# Patient Record
Sex: MALE | Race: WHITE | NOT HISPANIC OR LATINO | Employment: OTHER | URBAN - METROPOLITAN AREA
[De-identification: names, ages, dates, MRNs, and addresses within clinical notes are randomized per-mention and may not be internally consistent; named-entity substitution may affect disease eponyms.]

---

## 2017-07-06 ENCOUNTER — HOSPITAL ENCOUNTER (OUTPATIENT)
Dept: GENERAL RADIOLOGY | Facility: HOSPITAL | Age: 82
Discharge: HOME | End: 2017-07-06
Admitting: INTERNAL MEDICINE

## 2017-07-06 ENCOUNTER — LAB (OUTPATIENT)
Dept: LAB | Facility: HOSPITAL | Age: 82
End: 2017-07-06

## 2017-07-06 ENCOUNTER — TRANSCRIBE ORDERS (OUTPATIENT)
Dept: ADMINISTRATIVE | Facility: HOSPITAL | Age: 82
End: 2017-07-06

## 2017-07-06 DIAGNOSIS — K59.09 OTHER CONSTIPATION: ICD-10-CM

## 2017-07-06 DIAGNOSIS — K59.09 OTHER CONSTIPATION: Primary | ICD-10-CM

## 2017-07-06 DIAGNOSIS — Z87.19 HISTORY OF ULCERATIVE COLITIS: ICD-10-CM

## 2017-07-06 LAB
ALBUMIN SERPL-MCNC: 4.3 G/DL (ref 3.5–5.2)
ALBUMIN/GLOB SERPL: 1.7 G/DL
ALP SERPL-CCNC: 50 U/L (ref 39–117)
ALT SERPL W P-5'-P-CCNC: 9 U/L (ref 1–41)
ANION GAP SERPL CALCULATED.3IONS-SCNC: 15.4 MMOL/L
AST SERPL-CCNC: 16 U/L (ref 1–40)
BASOPHILS # BLD AUTO: 0.05 10*3/MM3 (ref 0–0.2)
BASOPHILS NFR BLD AUTO: 0.8 % (ref 0–1.5)
BILIRUB SERPL-MCNC: 0.7 MG/DL (ref 0.1–1.2)
BUN BLD-MCNC: 15 MG/DL (ref 8–23)
BUN/CREAT SERPL: 12.3 (ref 7–25)
CALCIUM SPEC-SCNC: 9.1 MG/DL (ref 8.6–10.5)
CHLORIDE SERPL-SCNC: 101 MMOL/L (ref 98–107)
CO2 SERPL-SCNC: 21.6 MMOL/L (ref 22–29)
CREAT BLD-MCNC: 1.22 MG/DL (ref 0.76–1.27)
DEPRECATED RDW RBC AUTO: 44.5 FL (ref 37–54)
EOSINOPHIL # BLD AUTO: 0.14 10*3/MM3 (ref 0–0.7)
EOSINOPHIL NFR BLD AUTO: 2.2 % (ref 0.3–6.2)
ERYTHROCYTE [DISTWIDTH] IN BLOOD BY AUTOMATED COUNT: 12.1 % (ref 11.5–14.5)
GFR SERPL CREATININE-BSD FRML MDRD: 57 ML/MIN/1.73
GLOBULIN UR ELPH-MCNC: 2.5 GM/DL
GLUCOSE BLD-MCNC: 95 MG/DL (ref 65–99)
HCT VFR BLD AUTO: 39 % (ref 40.4–52.2)
HGB BLD-MCNC: 13.3 G/DL (ref 13.7–17.6)
IMM GRANULOCYTES # BLD: 0 10*3/MM3 (ref 0–0.03)
IMM GRANULOCYTES NFR BLD: 0 % (ref 0–0.5)
LYMPHOCYTES # BLD AUTO: 1.85 10*3/MM3 (ref 0.9–4.8)
LYMPHOCYTES NFR BLD AUTO: 29.5 % (ref 19.6–45.3)
MCH RBC QN AUTO: 34.5 PG (ref 27–32.7)
MCHC RBC AUTO-ENTMCNC: 34.1 G/DL (ref 32.6–36.4)
MCV RBC AUTO: 101.3 FL (ref 79.8–96.2)
MONOCYTES # BLD AUTO: 0.69 10*3/MM3 (ref 0.2–1.2)
MONOCYTES NFR BLD AUTO: 11 % (ref 5–12)
NEUTROPHILS # BLD AUTO: 3.55 10*3/MM3 (ref 1.9–8.1)
NEUTROPHILS NFR BLD AUTO: 56.5 % (ref 42.7–76)
PLATELET # BLD AUTO: 197 10*3/MM3 (ref 140–500)
PMV BLD AUTO: 9.7 FL (ref 6–12)
POTASSIUM BLD-SCNC: 4.8 MMOL/L (ref 3.5–5.2)
PROT SERPL-MCNC: 6.8 G/DL (ref 6–8.5)
RBC # BLD AUTO: 3.85 10*6/MM3 (ref 4.6–6)
SODIUM BLD-SCNC: 138 MMOL/L (ref 136–145)
T4 FREE SERPL-MCNC: 1.06 NG/DL (ref 0.93–1.7)
TSH SERPL DL<=0.05 MIU/L-ACNC: 5.9 MIU/ML (ref 0.27–4.2)
WBC NRBC COR # BLD: 6.28 10*3/MM3 (ref 4.5–10.7)

## 2017-07-06 PROCEDURE — 36415 COLL VENOUS BLD VENIPUNCTURE: CPT

## 2017-07-06 PROCEDURE — 85025 COMPLETE CBC W/AUTO DIFF WBC: CPT

## 2017-07-06 PROCEDURE — 84439 ASSAY OF FREE THYROXINE: CPT

## 2017-07-06 PROCEDURE — 80053 COMPREHEN METABOLIC PANEL: CPT

## 2017-07-06 PROCEDURE — 84443 ASSAY THYROID STIM HORMONE: CPT

## 2017-07-06 PROCEDURE — 74022 RADEX COMPL AQT ABD SERIES: CPT

## 2019-06-30 ENCOUNTER — HOSPITAL ENCOUNTER (INPATIENT)
Facility: HOSPITAL | Age: 84
LOS: 4 days | Discharge: NURSING, REHAB OR LONG-TERM CARE | End: 2019-07-04
Attending: EMERGENCY MEDICINE | Admitting: ORTHOPAEDIC SURGERY

## 2019-06-30 ENCOUNTER — ANESTHESIA (OUTPATIENT)
Dept: PERIOP | Facility: HOSPITAL | Age: 84
End: 2019-06-30

## 2019-06-30 ENCOUNTER — APPOINTMENT (OUTPATIENT)
Dept: GENERAL RADIOLOGY | Facility: HOSPITAL | Age: 84
End: 2019-06-30

## 2019-06-30 ENCOUNTER — ANESTHESIA EVENT (OUTPATIENT)
Dept: PERIOP | Facility: HOSPITAL | Age: 84
End: 2019-06-30

## 2019-06-30 DIAGNOSIS — S72.142A CLOSED INTERTROCHANTERIC FRACTURE OF HIP, LEFT, INITIAL ENCOUNTER (HCC): ICD-10-CM

## 2019-06-30 DIAGNOSIS — S72.142A CLOSED COMMINUTED INTERTROCHANTERIC FRACTURE OF LEFT FEMUR, INITIAL ENCOUNTER (HCC): Primary | ICD-10-CM

## 2019-06-30 DIAGNOSIS — R26.2 DIFFICULTY WALKING: ICD-10-CM

## 2019-06-30 LAB
ABO GROUP BLD: NORMAL
ALBUMIN SERPL-MCNC: 3.4 G/DL (ref 3.5–5.2)
ALBUMIN/GLOB SERPL: 1.3 G/DL
ALP SERPL-CCNC: 49 U/L (ref 39–117)
ALT SERPL W P-5'-P-CCNC: 12 U/L (ref 1–41)
ANION GAP SERPL CALCULATED.3IONS-SCNC: 10.6 MMOL/L (ref 5–15)
APTT PPP: 28.6 SECONDS (ref 22.7–35.4)
AST SERPL-CCNC: 16 U/L (ref 1–40)
BASOPHILS # BLD AUTO: 0.03 10*3/MM3 (ref 0–0.2)
BASOPHILS NFR BLD AUTO: 0.4 % (ref 0–1.5)
BILIRUB SERPL-MCNC: 0.9 MG/DL (ref 0.2–1.2)
BLD GP AB SCN SERPL QL: NEGATIVE
BUN BLD-MCNC: 24 MG/DL (ref 8–23)
BUN/CREAT SERPL: 23.1 (ref 7–25)
CALCIUM SPEC-SCNC: 8.7 MG/DL (ref 8.6–10.5)
CHLORIDE SERPL-SCNC: 101 MMOL/L (ref 98–107)
CO2 SERPL-SCNC: 21.4 MMOL/L (ref 22–29)
CREAT BLD-MCNC: 1.04 MG/DL (ref 0.76–1.27)
DEPRECATED RDW RBC AUTO: 42.3 FL (ref 37–54)
EOSINOPHIL # BLD AUTO: 0.13 10*3/MM3 (ref 0–0.4)
EOSINOPHIL NFR BLD AUTO: 1.7 % (ref 0.3–6.2)
ERYTHROCYTE [DISTWIDTH] IN BLOOD BY AUTOMATED COUNT: 11.7 % (ref 12.3–15.4)
GFR SERPL CREATININE-BSD FRML MDRD: 68 ML/MIN/1.73
GLOBULIN UR ELPH-MCNC: 2.6 GM/DL
GLUCOSE BLD-MCNC: 132 MG/DL (ref 65–99)
HCT VFR BLD AUTO: 34.6 % (ref 37.5–51)
HGB BLD-MCNC: 11.9 G/DL (ref 13–17.7)
IMM GRANULOCYTES # BLD AUTO: 0.04 10*3/MM3 (ref 0–0.05)
IMM GRANULOCYTES NFR BLD AUTO: 0.5 % (ref 0–0.5)
INR PPP: 1.16 (ref 0.9–1.1)
LYMPHOCYTES # BLD AUTO: 0.65 10*3/MM3 (ref 0.7–3.1)
LYMPHOCYTES NFR BLD AUTO: 8.7 % (ref 19.6–45.3)
MCH RBC QN AUTO: 33.7 PG (ref 26.6–33)
MCHC RBC AUTO-ENTMCNC: 34.4 G/DL (ref 31.5–35.7)
MCV RBC AUTO: 98 FL (ref 79–97)
MONOCYTES # BLD AUTO: 0.7 10*3/MM3 (ref 0.1–0.9)
MONOCYTES NFR BLD AUTO: 9.4 % (ref 5–12)
NEUTROPHILS # BLD AUTO: 5.9 10*3/MM3 (ref 1.7–7)
NEUTROPHILS NFR BLD AUTO: 79.3 % (ref 42.7–76)
NRBC BLD AUTO-RTO: 0 /100 WBC (ref 0–0.2)
PLATELET # BLD AUTO: 202 10*3/MM3 (ref 140–450)
PMV BLD AUTO: 9.3 FL (ref 6–12)
POTASSIUM BLD-SCNC: 4.1 MMOL/L (ref 3.5–5.2)
PROT SERPL-MCNC: 6 G/DL (ref 6–8.5)
PROTHROMBIN TIME: 14.5 SECONDS (ref 11.7–14.2)
RBC # BLD AUTO: 3.53 10*6/MM3 (ref 4.14–5.8)
RH BLD: POSITIVE
SODIUM BLD-SCNC: 133 MMOL/L (ref 136–145)
T&S EXPIRATION DATE: NORMAL
TROPONIN T SERPL-MCNC: <0.01 NG/ML (ref 0–0.03)
WBC NRBC COR # BLD: 7.45 10*3/MM3 (ref 3.4–10.8)

## 2019-06-30 PROCEDURE — 73502 X-RAY EXAM HIP UNI 2-3 VIEWS: CPT

## 2019-06-30 PROCEDURE — 85730 THROMBOPLASTIN TIME PARTIAL: CPT | Performed by: PHYSICIAN ASSISTANT

## 2019-06-30 PROCEDURE — 25010000002 MORPHINE PER 10 MG: Performed by: HOSPITALIST

## 2019-06-30 PROCEDURE — 93010 ELECTROCARDIOGRAM REPORT: CPT | Performed by: INTERNAL MEDICINE

## 2019-06-30 PROCEDURE — 25010000002 DEXAMETHASONE PER 1 MG: Performed by: NURSE ANESTHETIST, CERTIFIED REGISTERED

## 2019-06-30 PROCEDURE — C1713 ANCHOR/SCREW BN/BN,TIS/BN: HCPCS | Performed by: ORTHOPAEDIC SURGERY

## 2019-06-30 PROCEDURE — 25010000002 MORPHINE PER 10 MG: Performed by: EMERGENCY MEDICINE

## 2019-06-30 PROCEDURE — 86850 RBC ANTIBODY SCREEN: CPT | Performed by: PHYSICIAN ASSISTANT

## 2019-06-30 PROCEDURE — 25010000002 NEOSTIGMINE PER 0.5 MG: Performed by: NURSE ANESTHETIST, CERTIFIED REGISTERED

## 2019-06-30 PROCEDURE — 99221 1ST HOSP IP/OBS SF/LOW 40: CPT | Performed by: INTERNAL MEDICINE

## 2019-06-30 PROCEDURE — 99285 EMERGENCY DEPT VISIT HI MDM: CPT

## 2019-06-30 PROCEDURE — 25010000002 FENTANYL CITRATE (PF) 100 MCG/2ML SOLUTION: Performed by: NURSE ANESTHETIST, CERTIFIED REGISTERED

## 2019-06-30 PROCEDURE — C1769 GUIDE WIRE: HCPCS | Performed by: ORTHOPAEDIC SURGERY

## 2019-06-30 PROCEDURE — 86900 BLOOD TYPING SEROLOGIC ABO: CPT | Performed by: PHYSICIAN ASSISTANT

## 2019-06-30 PROCEDURE — 85610 PROTHROMBIN TIME: CPT | Performed by: PHYSICIAN ASSISTANT

## 2019-06-30 PROCEDURE — 71045 X-RAY EXAM CHEST 1 VIEW: CPT

## 2019-06-30 PROCEDURE — 80053 COMPREHEN METABOLIC PANEL: CPT | Performed by: PHYSICIAN ASSISTANT

## 2019-06-30 PROCEDURE — 0QS704Z REPOSITION LEFT UPPER FEMUR WITH INTERNAL FIXATION DEVICE, OPEN APPROACH: ICD-10-PCS | Performed by: ORTHOPAEDIC SURGERY

## 2019-06-30 PROCEDURE — 25010000002 HYDROMORPHONE PER 4 MG: Performed by: NURSE ANESTHETIST, CERTIFIED REGISTERED

## 2019-06-30 PROCEDURE — 84484 ASSAY OF TROPONIN QUANT: CPT | Performed by: PHYSICIAN ASSISTANT

## 2019-06-30 PROCEDURE — 25010000002 ONDANSETRON PER 1 MG: Performed by: NURSE ANESTHETIST, CERTIFIED REGISTERED

## 2019-06-30 PROCEDURE — 85025 COMPLETE CBC W/AUTO DIFF WBC: CPT | Performed by: PHYSICIAN ASSISTANT

## 2019-06-30 PROCEDURE — 76000 FLUOROSCOPY <1 HR PHYS/QHP: CPT

## 2019-06-30 PROCEDURE — 93005 ELECTROCARDIOGRAM TRACING: CPT | Performed by: PHYSICIAN ASSISTANT

## 2019-06-30 PROCEDURE — 86901 BLOOD TYPING SEROLOGIC RH(D): CPT | Performed by: PHYSICIAN ASSISTANT

## 2019-06-30 PROCEDURE — 25010000002 ONDANSETRON PER 1 MG: Performed by: PHYSICIAN ASSISTANT

## 2019-06-30 PROCEDURE — 25010000002 PROPOFOL 10 MG/ML EMULSION: Performed by: NURSE ANESTHETIST, CERTIFIED REGISTERED

## 2019-06-30 DEVICE — LOCKING SCREW, FULLY THREADED: Type: IMPLANTABLE DEVICE | Site: TROCHANTER | Status: FUNCTIONAL

## 2019-06-30 DEVICE — LONG NAIL KIT R1.5, TI, LEFT
Type: IMPLANTABLE DEVICE | Site: TROCHANTER | Status: FUNCTIONAL
Brand: GAMMA

## 2019-06-30 DEVICE — LAG SCREW, TI
Type: IMPLANTABLE DEVICE | Site: TROCHANTER | Status: FUNCTIONAL
Brand: GAMMA

## 2019-06-30 RX ORDER — GLYCOPYRROLATE 0.2 MG/ML
INJECTION INTRAMUSCULAR; INTRAVENOUS AS NEEDED
Status: DISCONTINUED | OUTPATIENT
Start: 2019-06-30 | End: 2019-06-30 | Stop reason: SURG

## 2019-06-30 RX ORDER — SODIUM CHLORIDE 0.9 % (FLUSH) 0.9 %
1-10 SYRINGE (ML) INJECTION AS NEEDED
Status: DISCONTINUED | OUTPATIENT
Start: 2019-06-30 | End: 2019-06-30 | Stop reason: HOSPADM

## 2019-06-30 RX ORDER — SODIUM CHLORIDE 0.9 % (FLUSH) 0.9 %
3 SYRINGE (ML) INJECTION EVERY 12 HOURS SCHEDULED
Status: DISCONTINUED | OUTPATIENT
Start: 2019-06-30 | End: 2019-07-04 | Stop reason: HOSPADM

## 2019-06-30 RX ORDER — SODIUM CHLORIDE, SODIUM LACTATE, POTASSIUM CHLORIDE, CALCIUM CHLORIDE 600; 310; 30; 20 MG/100ML; MG/100ML; MG/100ML; MG/100ML
9 INJECTION, SOLUTION INTRAVENOUS CONTINUOUS
Status: DISCONTINUED | OUTPATIENT
Start: 2019-06-30 | End: 2019-06-30

## 2019-06-30 RX ORDER — PROPOFOL 10 MG/ML
VIAL (ML) INTRAVENOUS AS NEEDED
Status: DISCONTINUED | OUTPATIENT
Start: 2019-06-30 | End: 2019-06-30 | Stop reason: SURG

## 2019-06-30 RX ORDER — EPHEDRINE SULFATE 50 MG/ML
5 INJECTION, SOLUTION INTRAVENOUS ONCE AS NEEDED
Status: DISCONTINUED | OUTPATIENT
Start: 2019-06-30 | End: 2019-06-30 | Stop reason: HOSPADM

## 2019-06-30 RX ORDER — PROMETHAZINE HYDROCHLORIDE 25 MG/ML
12.5 INJECTION, SOLUTION INTRAMUSCULAR; INTRAVENOUS ONCE AS NEEDED
Status: DISCONTINUED | OUTPATIENT
Start: 2019-06-30 | End: 2019-06-30 | Stop reason: HOSPADM

## 2019-06-30 RX ORDER — BISACODYL 5 MG/1
10 TABLET, DELAYED RELEASE ORAL DAILY PRN
Status: DISCONTINUED | OUTPATIENT
Start: 2019-06-30 | End: 2019-07-04 | Stop reason: HOSPADM

## 2019-06-30 RX ORDER — MORPHINE SULFATE 2 MG/ML
2 INJECTION, SOLUTION INTRAMUSCULAR; INTRAVENOUS EVERY 4 HOURS PRN
Status: DISCONTINUED | OUTPATIENT
Start: 2019-06-30 | End: 2019-07-04 | Stop reason: HOSPADM

## 2019-06-30 RX ORDER — LABETALOL HYDROCHLORIDE 5 MG/ML
5 INJECTION, SOLUTION INTRAVENOUS
Status: DISCONTINUED | OUTPATIENT
Start: 2019-06-30 | End: 2019-06-30 | Stop reason: HOSPADM

## 2019-06-30 RX ORDER — SODIUM CHLORIDE 9 MG/ML
100 INJECTION, SOLUTION INTRAVENOUS CONTINUOUS
Status: DISCONTINUED | OUTPATIENT
Start: 2019-06-30 | End: 2019-07-01

## 2019-06-30 RX ORDER — HYDROCODONE BITARTRATE AND ACETAMINOPHEN 7.5; 325 MG/1; MG/1
1 TABLET ORAL ONCE AS NEEDED
Status: DISCONTINUED | OUTPATIENT
Start: 2019-06-30 | End: 2019-06-30 | Stop reason: HOSPADM

## 2019-06-30 RX ORDER — PROMETHAZINE HYDROCHLORIDE 25 MG/ML
6.25 INJECTION, SOLUTION INTRAMUSCULAR; INTRAVENOUS
Status: DISCONTINUED | OUTPATIENT
Start: 2019-06-30 | End: 2019-06-30 | Stop reason: HOSPADM

## 2019-06-30 RX ORDER — CLINDAMYCIN PHOSPHATE 900 MG/50ML
900 INJECTION INTRAVENOUS EVERY 8 HOURS
Status: COMPLETED | OUTPATIENT
Start: 2019-06-30 | End: 2019-07-01

## 2019-06-30 RX ORDER — MAGNESIUM HYDROXIDE 1200 MG/15ML
LIQUID ORAL AS NEEDED
Status: DISCONTINUED | OUTPATIENT
Start: 2019-06-30 | End: 2019-06-30 | Stop reason: HOSPADM

## 2019-06-30 RX ORDER — PROMETHAZINE HYDROCHLORIDE 25 MG/1
25 SUPPOSITORY RECTAL ONCE AS NEEDED
Status: DISCONTINUED | OUTPATIENT
Start: 2019-06-30 | End: 2019-06-30 | Stop reason: HOSPADM

## 2019-06-30 RX ORDER — DOCUSATE SODIUM 100 MG/1
100 CAPSULE, LIQUID FILLED ORAL 2 TIMES DAILY PRN
Status: DISCONTINUED | OUTPATIENT
Start: 2019-06-30 | End: 2019-07-04 | Stop reason: HOSPADM

## 2019-06-30 RX ORDER — FENTANYL CITRATE 50 UG/ML
INJECTION, SOLUTION INTRAMUSCULAR; INTRAVENOUS
Status: COMPLETED
Start: 2019-06-30 | End: 2019-06-30

## 2019-06-30 RX ORDER — OXYCODONE AND ACETAMINOPHEN 7.5; 325 MG/1; MG/1
1 TABLET ORAL ONCE AS NEEDED
Status: DISCONTINUED | OUTPATIENT
Start: 2019-06-30 | End: 2019-06-30 | Stop reason: HOSPADM

## 2019-06-30 RX ORDER — FAMOTIDINE 10 MG/ML
20 INJECTION, SOLUTION INTRAVENOUS ONCE
Status: COMPLETED | OUTPATIENT
Start: 2019-06-30 | End: 2019-06-30

## 2019-06-30 RX ORDER — HYDRALAZINE HYDROCHLORIDE 20 MG/ML
5 INJECTION INTRAMUSCULAR; INTRAVENOUS
Status: DISCONTINUED | OUTPATIENT
Start: 2019-06-30 | End: 2019-06-30 | Stop reason: HOSPADM

## 2019-06-30 RX ORDER — FENTANYL CITRATE 50 UG/ML
50 INJECTION, SOLUTION INTRAMUSCULAR; INTRAVENOUS
Status: DISCONTINUED | OUTPATIENT
Start: 2019-06-30 | End: 2019-06-30 | Stop reason: HOSPADM

## 2019-06-30 RX ORDER — DIPHENHYDRAMINE HYDROCHLORIDE 50 MG/ML
12.5 INJECTION INTRAMUSCULAR; INTRAVENOUS
Status: DISCONTINUED | OUTPATIENT
Start: 2019-06-30 | End: 2019-06-30 | Stop reason: HOSPADM

## 2019-06-30 RX ORDER — MORPHINE SULFATE 2 MG/ML
2 INJECTION, SOLUTION INTRAMUSCULAR; INTRAVENOUS ONCE
Status: COMPLETED | OUTPATIENT
Start: 2019-06-30 | End: 2019-06-30

## 2019-06-30 RX ORDER — FLUMAZENIL 0.1 MG/ML
0.2 INJECTION INTRAVENOUS AS NEEDED
Status: DISCONTINUED | OUTPATIENT
Start: 2019-06-30 | End: 2019-06-30 | Stop reason: HOSPADM

## 2019-06-30 RX ORDER — SODIUM CHLORIDE 0.9 % (FLUSH) 0.9 %
10 SYRINGE (ML) INJECTION AS NEEDED
Status: DISCONTINUED | OUTPATIENT
Start: 2019-06-30 | End: 2019-07-04 | Stop reason: HOSPADM

## 2019-06-30 RX ORDER — LIDOCAINE HYDROCHLORIDE 20 MG/ML
INJECTION, SOLUTION INFILTRATION; PERINEURAL AS NEEDED
Status: DISCONTINUED | OUTPATIENT
Start: 2019-06-30 | End: 2019-06-30 | Stop reason: SURG

## 2019-06-30 RX ORDER — ACETAMINOPHEN 325 MG/1
650 TABLET ORAL ONCE AS NEEDED
Status: DISCONTINUED | OUTPATIENT
Start: 2019-06-30 | End: 2019-06-30 | Stop reason: HOSPADM

## 2019-06-30 RX ORDER — ROCURONIUM BROMIDE 10 MG/ML
INJECTION, SOLUTION INTRAVENOUS AS NEEDED
Status: DISCONTINUED | OUTPATIENT
Start: 2019-06-30 | End: 2019-06-30 | Stop reason: SURG

## 2019-06-30 RX ORDER — ONDANSETRON 2 MG/ML
4 INJECTION INTRAMUSCULAR; INTRAVENOUS ONCE AS NEEDED
Status: DISCONTINUED | OUTPATIENT
Start: 2019-06-30 | End: 2019-06-30 | Stop reason: HOSPADM

## 2019-06-30 RX ORDER — SODIUM CHLORIDE 0.9 % (FLUSH) 0.9 %
3-10 SYRINGE (ML) INJECTION AS NEEDED
Status: DISCONTINUED | OUTPATIENT
Start: 2019-06-30 | End: 2019-07-04 | Stop reason: HOSPADM

## 2019-06-30 RX ORDER — CLINDAMYCIN PHOSPHATE 900 MG/50ML
900 INJECTION INTRAVENOUS ONCE
Status: COMPLETED | OUTPATIENT
Start: 2019-06-30 | End: 2019-06-30

## 2019-06-30 RX ORDER — PROMETHAZINE HYDROCHLORIDE 25 MG/1
25 TABLET ORAL ONCE AS NEEDED
Status: DISCONTINUED | OUTPATIENT
Start: 2019-06-30 | End: 2019-06-30 | Stop reason: HOSPADM

## 2019-06-30 RX ORDER — FAMOTIDINE 10 MG/ML
20 INJECTION, SOLUTION INTRAVENOUS EVERY 12 HOURS SCHEDULED
Status: DISCONTINUED | OUTPATIENT
Start: 2019-06-30 | End: 2019-07-01

## 2019-06-30 RX ORDER — HYDROMORPHONE HYDROCHLORIDE 1 MG/ML
0.5 INJECTION, SOLUTION INTRAMUSCULAR; INTRAVENOUS; SUBCUTANEOUS
Status: DISCONTINUED | OUTPATIENT
Start: 2019-06-30 | End: 2019-06-30 | Stop reason: HOSPADM

## 2019-06-30 RX ORDER — ONDANSETRON 2 MG/ML
4 INJECTION INTRAMUSCULAR; INTRAVENOUS EVERY 6 HOURS PRN
Status: DISCONTINUED | OUTPATIENT
Start: 2019-06-30 | End: 2019-07-04 | Stop reason: HOSPADM

## 2019-06-30 RX ORDER — HYDROCODONE BITARTRATE AND ACETAMINOPHEN 7.5; 325 MG/1; MG/1
1 TABLET ORAL EVERY 4 HOURS PRN
Status: DISCONTINUED | OUTPATIENT
Start: 2019-06-30 | End: 2019-07-04 | Stop reason: HOSPADM

## 2019-06-30 RX ORDER — LIDOCAINE HYDROCHLORIDE 10 MG/ML
0.5 INJECTION, SOLUTION EPIDURAL; INFILTRATION; INTRACAUDAL; PERINEURAL ONCE AS NEEDED
Status: DISCONTINUED | OUTPATIENT
Start: 2019-06-30 | End: 2019-06-30 | Stop reason: HOSPADM

## 2019-06-30 RX ORDER — ONDANSETRON 2 MG/ML
INJECTION INTRAMUSCULAR; INTRAVENOUS AS NEEDED
Status: DISCONTINUED | OUTPATIENT
Start: 2019-06-30 | End: 2019-06-30 | Stop reason: SURG

## 2019-06-30 RX ORDER — DEXAMETHASONE SODIUM PHOSPHATE 10 MG/ML
INJECTION INTRAMUSCULAR; INTRAVENOUS AS NEEDED
Status: DISCONTINUED | OUTPATIENT
Start: 2019-06-30 | End: 2019-06-30 | Stop reason: SURG

## 2019-06-30 RX ORDER — ATORVASTATIN CALCIUM 10 MG/1
10 TABLET, FILM COATED ORAL DAILY
Status: DISCONTINUED | OUTPATIENT
Start: 2019-06-30 | End: 2019-07-04 | Stop reason: HOSPADM

## 2019-06-30 RX ORDER — ACETAMINOPHEN 325 MG/1
650 TABLET ORAL EVERY 4 HOURS PRN
Status: DISCONTINUED | OUTPATIENT
Start: 2019-06-30 | End: 2019-07-04 | Stop reason: HOSPADM

## 2019-06-30 RX ORDER — FENTANYL CITRATE 50 UG/ML
INJECTION, SOLUTION INTRAMUSCULAR; INTRAVENOUS AS NEEDED
Status: DISCONTINUED | OUTPATIENT
Start: 2019-06-30 | End: 2019-06-30 | Stop reason: SURG

## 2019-06-30 RX ORDER — DIPHENHYDRAMINE HCL 25 MG
25 CAPSULE ORAL
Status: DISCONTINUED | OUTPATIENT
Start: 2019-06-30 | End: 2019-06-30 | Stop reason: HOSPADM

## 2019-06-30 RX ORDER — ONDANSETRON 2 MG/ML
4 INJECTION INTRAMUSCULAR; INTRAVENOUS ONCE
Status: COMPLETED | OUTPATIENT
Start: 2019-06-30 | End: 2019-06-30

## 2019-06-30 RX ORDER — ASPIRIN 81 MG/1
81 TABLET ORAL DAILY
Status: DISCONTINUED | OUTPATIENT
Start: 2019-06-30 | End: 2019-07-04 | Stop reason: HOSPADM

## 2019-06-30 RX ORDER — NALOXONE HCL 0.4 MG/ML
0.2 VIAL (ML) INJECTION AS NEEDED
Status: DISCONTINUED | OUTPATIENT
Start: 2019-06-30 | End: 2019-06-30 | Stop reason: HOSPADM

## 2019-06-30 RX ADMIN — MORPHINE SULFATE 2 MG: 2 INJECTION, SOLUTION INTRAMUSCULAR; INTRAVENOUS at 12:39

## 2019-06-30 RX ADMIN — FAMOTIDINE 20 MG: 10 INJECTION INTRAVENOUS at 12:35

## 2019-06-30 RX ADMIN — GLYCOPYRROLATE 0.3 MG: 0.2 INJECTION INTRAMUSCULAR; INTRAVENOUS at 16:10

## 2019-06-30 RX ADMIN — MORPHINE SULFATE 2 MG: 2 INJECTION, SOLUTION INTRAMUSCULAR; INTRAVENOUS at 18:16

## 2019-06-30 RX ADMIN — PROPOFOL 120 MG: 10 INJECTION, EMULSION INTRAVENOUS at 15:01

## 2019-06-30 RX ADMIN — ROCURONIUM BROMIDE 40 MG: 10 INJECTION INTRAVENOUS at 15:01

## 2019-06-30 RX ADMIN — FENTANYL CITRATE 50 MCG: 50 INJECTION, SOLUTION INTRAMUSCULAR; INTRAVENOUS at 16:53

## 2019-06-30 RX ADMIN — MORPHINE SULFATE 2 MG: 2 INJECTION, SOLUTION INTRAMUSCULAR; INTRAVENOUS at 05:18

## 2019-06-30 RX ADMIN — SODIUM CHLORIDE 500 ML: 9 INJECTION, SOLUTION INTRAVENOUS at 03:29

## 2019-06-30 RX ADMIN — SODIUM CHLORIDE 100 ML/HR: 9 INJECTION, SOLUTION INTRAVENOUS at 18:16

## 2019-06-30 RX ADMIN — FENTANYL CITRATE 50 MCG: 50 INJECTION INTRAMUSCULAR; INTRAVENOUS at 15:28

## 2019-06-30 RX ADMIN — ATORVASTATIN CALCIUM 10 MG: 10 TABLET, FILM COATED ORAL at 22:25

## 2019-06-30 RX ADMIN — CLINDAMYCIN PHOSPHATE 900 MG: 900 INJECTION INTRAVENOUS at 15:05

## 2019-06-30 RX ADMIN — MORPHINE SULFATE 2 MG: 2 INJECTION, SOLUTION INTRAMUSCULAR; INTRAVENOUS at 03:30

## 2019-06-30 RX ADMIN — METOPROLOL TARTRATE 12.5 MG: 25 TABLET ORAL at 22:26

## 2019-06-30 RX ADMIN — HYDROMORPHONE HYDROCHLORIDE 0.5 MG: 1 INJECTION, SOLUTION INTRAMUSCULAR; INTRAVENOUS; SUBCUTANEOUS at 17:05

## 2019-06-30 RX ADMIN — SODIUM CHLORIDE 100 ML/HR: 9 INJECTION, SOLUTION INTRAVENOUS at 05:16

## 2019-06-30 RX ADMIN — MORPHINE SULFATE 2 MG: 2 INJECTION, SOLUTION INTRAMUSCULAR; INTRAVENOUS at 22:26

## 2019-06-30 RX ADMIN — ONDANSETRON 4 MG: 2 INJECTION INTRAMUSCULAR; INTRAVENOUS at 16:00

## 2019-06-30 RX ADMIN — FAMOTIDINE 20 MG: 10 INJECTION INTRAVENOUS at 14:51

## 2019-06-30 RX ADMIN — SODIUM CHLORIDE, POTASSIUM CHLORIDE, SODIUM LACTATE AND CALCIUM CHLORIDE 9 ML/HR: 600; 310; 30; 20 INJECTION, SOLUTION INTRAVENOUS at 14:51

## 2019-06-30 RX ADMIN — SODIUM CHLORIDE, PRESERVATIVE FREE 3 ML: 5 INJECTION INTRAVENOUS at 22:25

## 2019-06-30 RX ADMIN — DEXAMETHASONE SODIUM PHOSPHATE 4 MG: 10 INJECTION INTRAMUSCULAR; INTRAVENOUS at 15:15

## 2019-06-30 RX ADMIN — LIDOCAINE HYDROCHLORIDE 60 MG: 20 INJECTION, SOLUTION INFILTRATION; PERINEURAL at 15:01

## 2019-06-30 RX ADMIN — FENTANYL CITRATE 50 MCG: 50 INJECTION INTRAMUSCULAR; INTRAVENOUS at 15:01

## 2019-06-30 RX ADMIN — ONDANSETRON 4 MG: 2 INJECTION INTRAMUSCULAR; INTRAVENOUS at 03:29

## 2019-06-30 RX ADMIN — ASPIRIN 81 MG: 81 TABLET, COATED ORAL at 22:26

## 2019-06-30 RX ADMIN — NEOSTIGMINE METHYLSULFATE 2 MG: 1 INJECTION INTRAMUSCULAR; INTRAVENOUS; SUBCUTANEOUS at 16:10

## 2019-06-30 RX ADMIN — CLINDAMYCIN PHOSPHATE 900 MG: 900 INJECTION, SOLUTION INTRAVENOUS at 22:27

## 2019-06-30 NOTE — ANESTHESIA PROCEDURE NOTES
Airway  Urgency: elective    Date/Time: 6/30/2019 3:02 PM  Airway not difficult    General Information and Staff    Patient location during procedure: OR  Anesthesiologist: Lobo Quintana MD  CRNA: Yaniv Greco CRNA    Indications and Patient Condition  Indications for airway management: airway protection    Preoxygenated: yes  MILS maintained throughout  Mask difficulty assessment: 2 - vent by mask + OA or adjuvant +/- NMBA    Final Airway Details  Final airway type: endotracheal airway      Successful airway: ETT  Cuffed: yes   Successful intubation technique: direct laryngoscopy  Endotracheal tube insertion site: oral  Blade: Je  Blade size: 4  ETT size (mm): 7.5  Cormack-Lehane Classification: grade I - full view of glottis  Placement verified by: chest auscultation and capnometry   Measured from: gums  ETT to gums (cm): 22  Number of attempts at approach: 1

## 2019-06-30 NOTE — ANESTHESIA PREPROCEDURE EVALUATION
Anesthesia Evaluation     Patient summary reviewed and Nursing notes reviewed   no history of anesthetic complications:  NPO Solid Status: > 6 hours             Airway   Mallampati: III  TM distance: >3 FB  Neck ROM: full  No difficulty expected  Dental    (+) edentulous    Pulmonary - normal exam   (-) not a smoker  Cardiovascular - normal exam    ECG reviewed    (+) CAD,   CAB.      Neuro/Psych  GI/Hepatic/Renal/Endo      Musculoskeletal         ROS comment: Left Femur Fracture  Abdominal    Substance History      OB/GYN          Other                      Anesthesia Plan    ASA 3     general     Anesthetic plan, all risks, benefits, and alternatives have been provided, discussed and informed consent has been obtained with: patient.

## 2019-06-30 NOTE — ANESTHESIA POSTPROCEDURE EVALUATION
Patient: Fritz Flores    Procedure Summary     Date:  06/30/19 Room / Location:  [facility] OR  / [facility] MAIN OR    Anesthesia Start:  1457 Anesthesia Stop:  1620    Procedure:  FEMUR INTRAMEDULLARY NAILING/RODDING (Left Thigh) Diagnosis:       Closed comminuted intertrochanteric fracture of left femur, initial encounter (CMS/[facility])      (Closed comminuted intertrochanteric fracture of left femur, initial encounter (CMS/[facility]) [S72.142A])    Surgeon:  Jaqueline Wagoner MD Provider:  Lobo Quintana MD    Anesthesia Type:  general ASA Status:  3          Anesthesia Type: general  Last vitals  BP   169/80 (06/30/19 1640)   Temp   37.1 °C (98.7 °F) (06/30/19 1618)   Pulse   80 (06/30/19 1640)   Resp   16 (06/30/19 1630)     SpO2   97 % (06/30/19 1640)     Post Anesthesia Care and Evaluation    Patient location during evaluation: PACU  Anesthetic complications: No anesthetic complications

## 2019-07-01 PROBLEM — D62 POSTOPERATIVE ANEMIA DUE TO ACUTE BLOOD LOSS: Status: ACTIVE | Noted: 2019-07-01

## 2019-07-01 PROBLEM — E87.1 HYPONATREMIA: Status: ACTIVE | Noted: 2019-07-01

## 2019-07-01 PROBLEM — K51.90 ULCERATIVE COLITIS (HCC): Chronic | Status: ACTIVE | Noted: 2019-07-01

## 2019-07-01 PROBLEM — I25.10 CORONARY ARTERY DISEASE: Chronic | Status: ACTIVE | Noted: 2019-07-01

## 2019-07-01 PROBLEM — G93.41 METABOLIC ENCEPHALOPATHY: Status: ACTIVE | Noted: 2019-07-01

## 2019-07-01 LAB
ANION GAP SERPL CALCULATED.3IONS-SCNC: 8.5 MMOL/L (ref 5–15)
BUN BLD-MCNC: 18 MG/DL (ref 8–23)
BUN/CREAT SERPL: 23.1 (ref 7–25)
CALCIUM SPEC-SCNC: 8.2 MG/DL (ref 8.6–10.5)
CHLORIDE SERPL-SCNC: 100 MMOL/L (ref 98–107)
CO2 SERPL-SCNC: 23.5 MMOL/L (ref 22–29)
CREAT BLD-MCNC: 0.78 MG/DL (ref 0.76–1.27)
DEPRECATED RDW RBC AUTO: 42.5 FL (ref 37–54)
ERYTHROCYTE [DISTWIDTH] IN BLOOD BY AUTOMATED COUNT: 11.9 % (ref 12.3–15.4)
GFR SERPL CREATININE-BSD FRML MDRD: 95 ML/MIN/1.73
GLUCOSE BLD-MCNC: 126 MG/DL (ref 65–99)
HCT VFR BLD AUTO: 26.9 % (ref 37.5–51)
HGB BLD-MCNC: 9 G/DL (ref 13–17.7)
MAGNESIUM SERPL-MCNC: 1.9 MG/DL (ref 1.6–2.4)
MCH RBC QN AUTO: 33.1 PG (ref 26.6–33)
MCHC RBC AUTO-ENTMCNC: 33.5 G/DL (ref 31.5–35.7)
MCV RBC AUTO: 98.9 FL (ref 79–97)
PLATELET # BLD AUTO: 218 10*3/MM3 (ref 140–450)
PMV BLD AUTO: 9.5 FL (ref 6–12)
POTASSIUM BLD-SCNC: 4.6 MMOL/L (ref 3.5–5.2)
RBC # BLD AUTO: 2.72 10*6/MM3 (ref 4.14–5.8)
SODIUM BLD-SCNC: 132 MMOL/L (ref 136–145)
WBC NRBC COR # BLD: 7.06 10*3/MM3 (ref 3.4–10.8)

## 2019-07-01 PROCEDURE — 80048 BASIC METABOLIC PNL TOTAL CA: CPT | Performed by: INTERNAL MEDICINE

## 2019-07-01 PROCEDURE — 25010000002 ENOXAPARIN PER 10 MG: Performed by: ORTHOPAEDIC SURGERY

## 2019-07-01 PROCEDURE — 83735 ASSAY OF MAGNESIUM: CPT | Performed by: INTERNAL MEDICINE

## 2019-07-01 PROCEDURE — 25010000002 MORPHINE PER 10 MG: Performed by: HOSPITALIST

## 2019-07-01 PROCEDURE — 99231 SBSQ HOSP IP/OBS SF/LOW 25: CPT | Performed by: NURSE PRACTITIONER

## 2019-07-01 PROCEDURE — 97110 THERAPEUTIC EXERCISES: CPT

## 2019-07-01 PROCEDURE — 97162 PT EVAL MOD COMPLEX 30 MIN: CPT

## 2019-07-01 PROCEDURE — 85027 COMPLETE CBC AUTOMATED: CPT | Performed by: INTERNAL MEDICINE

## 2019-07-01 RX ORDER — FAMOTIDINE 20 MG/1
20 TABLET, FILM COATED ORAL
Status: DISCONTINUED | OUTPATIENT
Start: 2019-07-01 | End: 2019-07-04 | Stop reason: HOSPADM

## 2019-07-01 RX ADMIN — SODIUM CHLORIDE 100 ML/HR: 9 INJECTION, SOLUTION INTRAVENOUS at 01:07

## 2019-07-01 RX ADMIN — METOPROLOL TARTRATE 12.5 MG: 25 TABLET ORAL at 08:29

## 2019-07-01 RX ADMIN — CLINDAMYCIN PHOSPHATE 900 MG: 900 INJECTION, SOLUTION INTRAVENOUS at 06:51

## 2019-07-01 RX ADMIN — HYDROCODONE BITARTRATE AND ACETAMINOPHEN 1 TABLET: 7.5; 325 TABLET ORAL at 08:29

## 2019-07-01 RX ADMIN — MORPHINE SULFATE 2 MG: 2 INJECTION, SOLUTION INTRAMUSCULAR; INTRAVENOUS at 03:05

## 2019-07-01 RX ADMIN — FAMOTIDINE 20 MG: 10 INJECTION INTRAVENOUS at 08:45

## 2019-07-01 RX ADMIN — ACETAMINOPHEN 650 MG: 325 TABLET, FILM COATED ORAL at 20:34

## 2019-07-01 RX ADMIN — FAMOTIDINE 20 MG: 20 TABLET, FILM COATED ORAL at 17:04

## 2019-07-01 RX ADMIN — ASPIRIN 81 MG: 81 TABLET, COATED ORAL at 08:29

## 2019-07-01 RX ADMIN — ACETAMINOPHEN 650 MG: 325 TABLET, FILM COATED ORAL at 16:47

## 2019-07-01 RX ADMIN — SODIUM CHLORIDE, PRESERVATIVE FREE 3 ML: 5 INJECTION INTRAVENOUS at 08:46

## 2019-07-01 RX ADMIN — ATORVASTATIN CALCIUM 10 MG: 10 TABLET, FILM COATED ORAL at 08:29

## 2019-07-01 RX ADMIN — ENOXAPARIN SODIUM 40 MG: 40 INJECTION SUBCUTANEOUS at 08:29

## 2019-07-01 RX ADMIN — FAMOTIDINE 20 MG: 10 INJECTION INTRAVENOUS at 01:07

## 2019-07-01 RX ADMIN — METOPROLOL TARTRATE 12.5 MG: 25 TABLET ORAL at 20:34

## 2019-07-01 NOTE — PROGRESS NOTES
"   LOS: 1 day   Patient Care Team:  Rachid العلي Jr., MD as PCP - General (Internal Medicine)    Chief Complaint: hip pain    Subjective     C/o some pain left hip. No BM. Voiding better today--had to be straight cath'd last night. Tolerating diet. Confused this AM. Improving as the day goes on.        Subjective:  Symptoms:  Stable.  No shortness of breath, malaise, cough, chest pain, weakness, headache, chest pressure, anorexia, diarrhea or anxiety.    Diet:  Adequate intake.  No nausea or vomiting.    Activity level: Impaired due to pain.    Pain:  He complains of pain that is mild.  He reports pain is unchanged.  Pain is well controlled.        History taken from: patient chart family RN    Objective     Vital Signs  Temp:  [97.4 °F (36.3 °C)-98.7 °F (37.1 °C)] 97.4 °F (36.3 °C)  Heart Rate:  [64-90] 65  Resp:  [14-18] 16  BP: (116-196)/(62-99) 116/66    Objective:  General Appearance:  Comfortable and in no acute distress.    Vital signs: (most recent): Blood pressure 116/66, pulse 65, temperature 97.4 °F (36.3 °C), temperature source Oral, resp. rate 16, height 182.9 cm (72\"), weight 81.6 kg (179 lb 14.3 oz), SpO2 96 %.  Vital signs are normal.  No fever.    Output: Producing urine and no stool output.    HEENT: Normal HEENT exam.    Lungs:  Normal effort and normal respiratory rate.  Breath sounds clear to auscultation.    Heart: Normal rate.  Regular rhythm.    Abdomen: Abdomen is soft.  Bowel sounds are normal.   There is no abdominal tenderness.     Extremities: There is dependent edema (1-2+ chronic in BLEs).    Pulses: Distal pulses are intact.    Neurological: Patient is alert.  (Oriented to person).    Skin:  Warm and dry.  No rash.             Results Review:     I reviewed the patient's new clinical results.  I reviewed the patient's other test results and agree with the interpretation  I personally viewed and interpreted the patient's EKG/Telemetry data  Discussed with pt, son, RN, and " CCP    Medication Review: reviewed and adjusted    Assessment/Plan       Closed comminuted intertrochanteric fracture of proximal end of left femur (CMS/HCC)    Ulcerative colitis (CMS/HCC)    Coronary artery disease    Hyponatremia    Metabolic encephalopathy    Postoperative anemia due to acute blood loss          Plan:   (POD#1 s/p IM nailing of left femoral neck fracture by Dr. Wagoner  Continue present mgmt  PT eval noted, SNF recommended  Monitor Hgb  Monitor Na+, dc IVFs  No cardiac symptoms  Confusion not unusual postop, improving as day goes on, hopefully just due to anesthesia, opioids, and hospital environment).       Richy Monterroso MD  07/01/19  2:12 PM    Time: 20min

## 2019-07-01 NOTE — PROGRESS NOTES
"[facility]    Patient Name: Fritz Flores  :1933  85 y.o.  LOS: 1  Encounter Provider: LAURA Marshall      Patient Care Team:  Rachid العلي Jr., MD as PCP - General (Internal Medicine)    Chief Complaint:  Follow-up coronary artery disease, hip-fracture    Interval History: Resting in bed. Per nursing staff he is slightly confused and trying to get out of bed, which is why they moved him to a new room.  He has no cardiac complaints.         Objective   Vital Signs  Temp:  [97.4 °F (36.3 °C)-98.4 °F (36.9 °C)] 97.9 °F (36.6 °C)  Heart Rate:  [64-90] 66  Resp:  [14-18] 18  BP: (116-169)/(55-99) 127/55    Intake/Output Summary (Last 24 hours) at 2019 1654  Last data filed at 2019 1300  Gross per 24 hour   Intake 2090 ml   Output 1200 ml   Net 890 ml     Flowsheet Rows      First Filed Value   Admission Height  185.4 cm (73\") Documented at 2019 0229   Admission Weight  84.4 kg (186 lb) Documented at 2019 0300            Physical Exam   Constitutional: He is oriented to person, place, and time. He appears well-developed and well-nourished. No distress.   HENT:   Head: Normocephalic and atraumatic.   Eyes: Pupils are equal, round, and reactive to light.   Neck: No JVD present. No thyromegaly present.   Cardiovascular: Normal rate, regular rhythm, normal heart sounds and intact distal pulses.   No murmur heard.  Pulmonary/Chest: Effort normal and breath sounds normal. No respiratory distress.   Abdominal: Soft. Bowel sounds are normal. He exhibits no distension. There is no splenomegaly or hepatomegaly. There is no tenderness.   Musculoskeletal: He exhibits no edema.        Left hip: He exhibits decreased range of motion and tenderness.   Neurological: He is alert and oriented to person, place, and time.   Skin: Skin is warm and dry. He is not diaphoretic. No erythema.   Psychiatric: He has a normal mood and affect. His behavior is normal. Judgment normal. "       Results Review:    Results from last 7 days   Lab Units 07/01/19  0751   SODIUM mmol/L 132*   POTASSIUM mmol/L 4.6   CHLORIDE mmol/L 100   CO2 mmol/L 23.5   BUN mg/dL 18   CREATININE mg/dL 0.78   GLUCOSE mg/dL 126*   CALCIUM mg/dL 8.2*     Results from last 7 days   Lab Units 06/30/19  0329   TROPONIN T ng/mL <0.010     Results from last 7 days   Lab Units 07/01/19  0751   WBC 10*3/mm3 7.06   HEMOGLOBIN g/dL 9.0*   HEMATOCRIT % 26.9*   PLATELETS 10*3/mm3 218     Results from last 7 days   Lab Units 06/30/19  0329   INR  1.16*   APTT seconds 28.6     Results from last 7 days   Lab Units 07/01/19  0751   MAGNESIUM mg/dL 1.9                   Medication Review:     aspirin 81 mg Oral Daily   atorvastatin 10 mg Oral Daily   enoxaparin 40 mg Subcutaneous Daily   famotidine 20 mg Oral BID AC   metoprolol tartrate 12.5 mg Oral Q12H   sodium chloride 3 mL Intravenous Q12H             Assessment/Plan   1. Coronary artery disease: History of CABG in 1990.  Does not follow regularly with a cardiologist.  No complaints of angina.    2.   Hip fracture: status post surgery.  Tolerated well.     His cardiac status is currently stable.  He should probably get established with a cardiologist to follow with as an outpatient.  We would be happy to follow.  He can call our office to arrange appointment if he wishes.  We will sign off.   Please call if you have any additional questions.         LAURA Marshall  [facility]  07/01/19  4:54 PM

## 2019-07-01 NOTE — THERAPY EVALUATION
Acute Care - Physical Therapy Initial Evaluation  [facility]     Patient Name: Fritz Flores  : 1933  MRN: [id number]  Today's Date: 2019   Onset of Illness/Injury or Date of Surgery: 19     Referring Physician: Rizwana      Admit Date: 2019    Visit Dx:     ICD-10-CM ICD-9-CM   1. Closed comminuted intertrochanteric fracture of left femur, initial encounter (CMS/HCC) S72.142A 820.21   2. Closed intertrochanteric fracture of hip, left, initial encounter (CMS/HCC) S72.142A 820.21   3. Difficulty walking R26.2 719.7     Patient Active Problem List   Diagnosis   • Closed intertrochanteric fracture of hip, left, initial encounter (CMS/[facility])   • Closed comminuted intertrochanteric fracture of proximal end of left femur (CMS/[facility])     Past Medical History:   Diagnosis Date   • Colon polyps    • Coronary artery disease    • Hyperlipemia    • Myocardial infarction (CMS/[facility])    • Ulcerative colitis (CMS/[facility])      Past Surgical History:   Procedure Laterality Date   • CARDIAC SURGERY      CABG X 1   • COLONOSCOPY     • COLONOSCOPY N/A 2016    Procedure: COLONOSCOPY INTO CECUM WITH COLD BIOPSIES;  Surgeon: Aaron Gregg MD;  Location: [facility] ENDOSCOPY;  Service:    • FRACTURE SURGERY      upper leg w/hardware   • HIP SURGERY Left     x 3        PT ASSESSMENT (last 12 hours)      Physical Therapy Evaluation     Row Name 19 0840          PT Evaluation Time/Intention    Subjective Information  complains of;pain  -EE     Document Type  evaluation  -EE     Mode of Treatment  physical therapy;individual therapy  -EE     Patient Effort  good  -EE     Symptoms Noted During/After Treatment  none  -EE     Row Name 19 0840          General Information    Onset of Illness/Injury or Date of Surgery  19  -EE     Referring Physician  Rizwana  -SONA     Patient Observations  alert;cooperative;agree to therapy  -EE     Patient/Family Observations  Pt supine in bed in no acute  distress.  -EE     Prior Level of Function  independent:;all household mobility;community mobility  -EE     Equipment Currently Used at Home  cane, straight  -EE     Pertinent History of Current Functional Problem  L femur intertrochanteric fx s/p IM nailing/rodding  -EE     Existing Precautions/Restrictions  fall  -EE     Barriers to Rehab  none identified  -EE     Row Name 07/01/19 0840          Relationship/Environment    Lives With  spouse  -EE     Row Name 07/01/19 0840          Cognitive Assessment/Interventions    Additional Documentation  Cognitive Assessment/Intervention (Group)  -EE     Row Name 07/01/19 0840          Cognitive Assessment/Intervention- PT/OT    Affect/Mental Status (Cognitive)  confused  -EE     Orientation Status (Cognition)  oriented to;person;place  -EE     Follows Commands (Cognition)  follows one step commands;over 90% accuracy  -EE     Cognitive Function (Cognitive)  safety deficit  -EE     Safety Deficit (Cognitive)  mild deficit  -EE     Personal Safety Interventions  fall prevention program maintained;gait belt;muscle strengthening facilitated;nonskid shoes/slippers when out of bed;supervised activity  -EE     Row Name 07/01/19 0840          Mobility Assessment/Treatment    Extremity Weight-bearing Status  left lower extremity  -EE     Left Lower Extremity (Weight-bearing Status)  weight-bearing as tolerated (WBAT)  -EE     Row Name 07/01/19 0840          Bed Mobility Assessment/Treatment    Bed Mobility Assessment/Treatment  supine-sit  -EE     Supine-Sit [place] (Bed Mobility)  minimum assist (75% patient effort);2 person assist;verbal cues  -EE     Bed Mobility, Safety Issues  decreased use of legs for bridging/pushing  -EE     Assistive Device (Bed Mobility)  bed rails;head of bed elevated  -EE     Row Name 07/01/19 0840          Transfer Assessment/Treatment    Transfer Assessment/Treatment  sit-stand transfer;stand-sit transfer  -EE     Sit-Stand [place]  (Transfers)  minimum assist (75% patient effort);2 person assist;verbal cues  -EE     Stand-Sit [place] (Transfers)  minimum assist (75% patient effort);2 person assist;verbal cues  -EE     Row Name 07/01/19 0840          Sit-Stand Transfer    Assistive Device (Sit-Stand Transfers)  walker, front-wheeled  -EE     Row Name 07/01/19 0840          Gait/Stairs Assessment/Training    [place] Level (Gait)  minimum assist (75% patient effort);2 person assist;verbal cues  -EE     Assistive Device (Gait)  walker, front-wheeled  -EE     Distance in Feet (Gait)  3  -EE     Pattern (Gait)  step-to  -EE     Deviations/Abnormal Patterns (Gait)  left sided deviations;antalgic;courtney decreased;stride length decreased  -EE     Bilateral Gait Deviations  forward flexed posture  -EE     Left Sided Gait Deviations  weight shift ability decreased  -EE     Comment (Gait/Stairs)  verbal cues for sequencing  with walker  -EE     Row Name 07/01/19 0840          General ROM    GENERAL ROM COMMENTS  L hip limited  by pain; all other ROM grossly WFL  -EE     Row Name 07/01/19 0840          MMT (Manual Muscle Testing)    General MMT Comments  R LE grossly WFL; L ankle WFL, L knee/hip somewhat limited by pain,  grossly 3- to 3/5  -EE     Row Name 07/01/19 0840          Motor Assessment/Intervention    Additional Documentation  Balance (Group);Therapeutic Exercise Interventions (Group)  -EE     Row Name 07/01/19 0840          Therapeutic Exercise    Comment (Therapeutic Exercise)  10 reps L hip ORIF protocol, assist for hip abd  -EE     Row Name 07/01/19 0840          Balance    Balance  static sitting balance;static standing balance  -EE     Row Name 07/01/19 0840          Static Sitting Balance    Level of [place] (Unsupported Sitting, Static Balance)  standby assist  -EE     Sitting Position (Unsupported Sitting, Static Balance)  sitting on edge of bed  -EE     Time Able to Maintain Position (Unsupported Sitting, Static  Balance)  3 to 4 minutes  -EE     Row Name 07/01/19 0840          Static Standing Balance    Level of [place] (Supported Standing, Static Balance)  minimal assist, 75% patient effort;contact guard assist;2 person assist  -EE     Time Able to Maintain Position (Supported Standing, Static Balance)  1 to 2 minutes  -EE     Assistive Device Utilized (Supported Standing, Static Balance)  walker, rolling  -EE     Row Name 07/01/19 0840          Pain Assessment    Additional Documentation  Pain Scale: Numbers Pre/Post-Treatment (Group)  -EE     Row Name 07/01/19 0840          Pain Scale: Numbers Pre/Post-Treatment    Pain Scale: Numbers, Post-Treatment  7/10  -EE     Pain Location - Side  Left  -EE     Pain Location  hip  -EE     Pain Intervention(s)  Repositioned;Cold applied;Medication (See MAR);Elevated  -EE     Row Name             Wound 06/30/19 1540 Left leg incision    Wound - Properties Group Date first assessed: 06/30/19  -JM Time first assessed: 1540  -JM Side: Left  -JM Location: leg  -JM Type: incision  -JM    Row Name 07/01/19 0840          Plan of Care Review    Plan of Care Reviewed With  patient  -EE     Row Name 07/01/19 0840          Physical Therapy Clinical Impression    Patient/Family Goals Statement (PT Clinical Impression)  Decrease pain  -EE     Criteria for Skilled Interventions Met (PT Clinical Impression)  yes;treatment indicated  -EE     Pathology/Pathophysiology Noted (Describe Specifically for Each System)  musculoskeletal  -EE     Impairments Found (describe specific impairments)  gait, locomotion, and balance;ROM;aerobic capacity/endurance  -EE     Rehab Potential (PT Clinical Summary)  good, to achieve stated therapy goals  -EE     Row Name 07/01/19 0840          Physical Therapy Goals    Bed Mobility Goal Selection (PT)  bed mobility, PT goal 1  -EE     Transfer Goal Selection (PT)  transfer, PT goal 1  -EE     Gait Training Goal Selection (PT)  gait training, PT goal 1  -EE     Row  Name 07/01/19 0840          Bed Mobility Goal 1 (PT)    Activity/Assistive Device (Bed Mobility Goal 1, PT)  bed mobility activities, all  -EE     [place] Level/Cues Needed (Bed Mobility Goal 1, PT)  contact guard assist  -EE     Time Frame (Bed Mobility Goal 1, PT)  1 week  -EE     Progress/Outcomes (Bed Mobility Goal 1, PT)  goal ongoing  -EE     Row Name 07/01/19 0840          Transfer Goal 1 (PT)    Activity/Assistive Device (Transfer Goal 1, PT)  sit-to-stand/stand-to-sit  -EE     [place] Level/Cues Needed (Transfer Goal 1, PT)  contact guard assist  -EE     Time Frame (Transfer Goal 1, PT)  1 week  -EE     Progress/Outcome (Transfer Goal 1, PT)  goal ongoing  -EE     Row Name 07/01/19 0840          Gait Training Goal 1 (PT)    Activity/Assistive Device (Gait Training Goal 1, PT)  gait (walking locomotion);walker, rolling  -EE     [place] Level (Gait Training Goal 1, PT)  minimum assist (75% or more patient effort)  -EE     Distance (Gait Goal 1, PT)  30  -EE     Time Frame (Gait Training Goal 1, PT)  1 week  -EE     Progress/Outcome (Gait Training Goal 1, PT)  goal ongoing  -EE     Row Name 07/01/19 0840          Patient Education Goal (PT)    Activity (Patient Education Goal, PT)  L hip HEP  -EE     [place]/Cues/Accuracy (Memory Goal 2, PT)  demonstrates adequately;verbalizes understanding  -EE     Time Frame (Patient Education Goal, PT)  1 week  -EE     Progress/Outcome (Patient Education Goal, PT)  goal ongoing  -EE     Row Name 07/01/19 0840          Positioning and Restraints    Pre-Treatment Position  in bed  -EE     Post Treatment Position  chair  -EE     In Chair  reclined;call light within reach;encouraged to call for assist;exit alarm on;notified nsg;with family/caregiver;legs elevated  -EE     Row Name 07/01/19 0840          Living Environment    Home Accessibility  wheelchair accessible  -EE       User Key  (r) = Recorded By, (t) = Taken By, (c) = Cosigned By    Initials Name  Provider Type    EE Sugar Marcus PT Physical Therapist    Anthony Valdovinos, RN Registered Nurse        Physical Therapy Education     Title: PT OT SLP Therapies (Done)     Topic: Physical Therapy (Done)     Point: Mobility training (Done)     Learning Progress Summary           Patient Acceptance, E, VU,NR by  at 7/1/2019  9:13 AM                   Point: Home exercise program (Done)     Learning Progress Summary           Patient Acceptance, E, VU,NR by  at 7/1/2019  9:13 AM                   Point: Body mechanics (Done)     Learning Progress Summary           Patient Acceptance, E, VU,NR by  at 7/1/2019  9:13 AM                   Point: Precautions (Done)     Learning Progress Summary           Patient Acceptance, E, VU,NR by  at 7/1/2019  9:13 AM                               User Key     Initials Effective Dates Name Provider Type Discipline     04/03/18 -  Sugar Marcus PT Physical Therapist PT              PT Recommendation and Plan  Anticipated Discharge Disposition (PT): skilled nursing facility  Planned Therapy Interventions (PT Eval): bed mobility training, balance training, gait training, home exercise program, patient/family education, ROM (range of motion), strengthening, transfer training  Therapy Frequency (PT Clinical Impression): daily  Outcome Summary/Treatment Plan (PT)  Anticipated Discharge Disposition (PT): skilled nursing facility  Plan of Care Reviewed With: patient  Outcome Summary: Pt is an 84 yo male who presents from home with L femur fx s/p IM nailing. Upon exam, pt demonstrates post op pain, impaired L hip ROM, generalized weakness, impaired balance, and decreased endurance. Pt was independent with cane prior to admission, currently requiring min A x2 for safety. Pt would benefit from continued PT to address impairments and increase independence with functional mobility.   Outcome Measures     Row Name 07/01/19 0900             How much help from another person do you  currently need...    Turning from your back to your side while in flat bed without using bedrails?  3  -EE      Moving from lying on back to sitting on the side of a flat bed without bedrails?  2  -EE      Moving to and from a bed to a chair (including a wheelchair)?  2  -EE      Standing up from a chair using your arms (e.g., wheelchair, bedside chair)?  2  -EE      Climbing 3-5 steps with a railing?  1  -EE      To walk in hospital room?  2  -EE      AM-PAC 6 Clicks Score  12  -EE         Functional Assessment    Outcome Measure Options  AM-PAC 6 Clicks Basic Mobility (PT)  -EE        User Key  (r) = Recorded By, (t) = Taken By, (c) = Cosigned By    Initials Name Provider Type    Sugar Bagley PT Physical Therapist         Time Calculation:   PT Charges     Row Name 07/01/19 0916             Time Calculation    Start Time  0840  -EE      Stop Time  0902  -EE      Time Calculation (min)  22 min  -EE      PT Received On  07/01/19  -EE      PT - Next Appointment  07/02/19  -EE      PT Goal Re-Cert Due Date  07/08/19  -EE         Time Calculation- PT    Total Timed Code Minutes- PT  10 minute(s)  -EE        User Key  (r) = Recorded By, (t) = Taken By, (c) = Cosigned By    Initials Name Provider Type    Sugar Bagley PT Physical Therapist        Therapy Charges for Today     Code Description Service Date Service Provider Modifiers Qty    30008533803 HC PT EVAL MOD COMPLEXITY 2 7/1/2019 Sugar Marcus, PT GP 1    84638172299 HC PT THER PROC EA 15 MIN 7/1/2019 Sugar Marcus, PT GP 1    37664620480 HC PT THER SUPP EA 15 MIN 7/1/2019 Sugar Marcus, PT GP 1          PT G-Codes  Outcome Measure Options: AM-PAC 6 Clicks Basic Mobility (PT)  AM-PAC 6 Clicks Score: 12      Sugar Marcus PT  7/1/2019

## 2019-07-01 NOTE — PLAN OF CARE
Problem: Patient Care Overview  Goal: Plan of Care Review   07/01/19 0913   Coping/Psychosocial   Plan of Care Reviewed With patient   OTHER   Outcome Summary Pt is an 86 yo male who presents from home with L femur fx s/p IM nailing. Upon exam, pt demonstrates post op pain, impaired L hip ROM, generalized weakness, impaired balance, and decreased endurance. Pt was independent with cane prior to admission, currently requiring min A x2 for safety. Pt would benefit from continued PT to address impairments and increase independence with functional mobility.

## 2019-07-01 NOTE — PLAN OF CARE
Problem: Patient Care Overview  Goal: Plan of Care Review   07/01/19 0621   Coping/Psychosocial   Plan of Care Reviewed With patient   OTHER   Outcome Summary Vital signs stable. Not in any distress. Medicated for pain. Unable to urinate at first,bladder scan and straight cath done as charted. Dressing dry and intact . Turned. Patient woke p at 0400 more alert and oriented except the time and able to urinate l via urinal. Fall precautions enforced. Monitored closely.      Goal: Discharge Needs Assessment  Outcome: Ongoing (interventions implemented as appropriate)      Problem: Fall Risk (Adult)  Goal: Absence of Fall  Outcome: Ongoing (interventions implemented as appropriate)      Problem: Skin Injury Risk (Adult)  Goal: Skin Health and Integrity  Outcome: Ongoing (interventions implemented as appropriate)      Problem: Fracture Orthopaedic (Adult)  Goal: Signs and Symptoms of Listed Potential Problems Will be Absent, Minimized or Managed (Fracture Orthopaedic)  Outcome: Ongoing (interventions implemented as appropriate)

## 2019-07-02 LAB
ALBUMIN SERPL-MCNC: 3.3 G/DL (ref 3.5–5.2)
ALBUMIN/GLOB SERPL: 1.3 G/DL
ALP SERPL-CCNC: 47 U/L (ref 39–117)
ALT SERPL W P-5'-P-CCNC: 11 U/L (ref 1–41)
ANION GAP SERPL CALCULATED.3IONS-SCNC: 8.6 MMOL/L (ref 5–15)
AST SERPL-CCNC: 20 U/L (ref 1–40)
BASOPHILS # BLD AUTO: 0.04 10*3/MM3 (ref 0–0.2)
BASOPHILS NFR BLD AUTO: 0.4 % (ref 0–1.5)
BILIRUB SERPL-MCNC: 0.8 MG/DL (ref 0.2–1.2)
BUN BLD-MCNC: 21 MG/DL (ref 8–23)
BUN/CREAT SERPL: 23.6 (ref 7–25)
CALCIUM SPEC-SCNC: 8.7 MG/DL (ref 8.6–10.5)
CHLORIDE SERPL-SCNC: 98 MMOL/L (ref 98–107)
CO2 SERPL-SCNC: 24.4 MMOL/L (ref 22–29)
CREAT BLD-MCNC: 0.89 MG/DL (ref 0.76–1.27)
DEPRECATED RDW RBC AUTO: 43.6 FL (ref 37–54)
EOSINOPHIL # BLD AUTO: 0.45 10*3/MM3 (ref 0–0.4)
EOSINOPHIL NFR BLD AUTO: 4.5 % (ref 0.3–6.2)
ERYTHROCYTE [DISTWIDTH] IN BLOOD BY AUTOMATED COUNT: 11.9 % (ref 12.3–15.4)
GFR SERPL CREATININE-BSD FRML MDRD: 81 ML/MIN/1.73
GLOBULIN UR ELPH-MCNC: 2.6 GM/DL
GLUCOSE BLD-MCNC: 103 MG/DL (ref 65–99)
HCT VFR BLD AUTO: 27.3 % (ref 37.5–51)
HGB BLD-MCNC: 9 G/DL (ref 13–17.7)
IMM GRANULOCYTES # BLD AUTO: 0.05 10*3/MM3 (ref 0–0.05)
IMM GRANULOCYTES NFR BLD AUTO: 0.5 % (ref 0–0.5)
LYMPHOCYTES # BLD AUTO: 1.04 10*3/MM3 (ref 0.7–3.1)
LYMPHOCYTES NFR BLD AUTO: 10.3 % (ref 19.6–45.3)
MCH RBC QN AUTO: 33.1 PG (ref 26.6–33)
MCHC RBC AUTO-ENTMCNC: 33 G/DL (ref 31.5–35.7)
MCV RBC AUTO: 100.4 FL (ref 79–97)
MONOCYTES # BLD AUTO: 0.83 10*3/MM3 (ref 0.1–0.9)
MONOCYTES NFR BLD AUTO: 8.2 % (ref 5–12)
NEUTROPHILS # BLD AUTO: 7.7 10*3/MM3 (ref 1.7–7)
NEUTROPHILS NFR BLD AUTO: 76.1 % (ref 42.7–76)
NRBC BLD AUTO-RTO: 0 /100 WBC (ref 0–0.2)
PLATELET # BLD AUTO: 249 10*3/MM3 (ref 140–450)
PMV BLD AUTO: 9.7 FL (ref 6–12)
POTASSIUM BLD-SCNC: 4.1 MMOL/L (ref 3.5–5.2)
PROT SERPL-MCNC: 5.9 G/DL (ref 6–8.5)
RBC # BLD AUTO: 2.72 10*6/MM3 (ref 4.14–5.8)
SODIUM BLD-SCNC: 131 MMOL/L (ref 136–145)
WBC NRBC COR # BLD: 10.11 10*3/MM3 (ref 3.4–10.8)

## 2019-07-02 PROCEDURE — 25010000002 ONDANSETRON PER 1 MG: Performed by: HOSPITALIST

## 2019-07-02 PROCEDURE — 97530 THERAPEUTIC ACTIVITIES: CPT

## 2019-07-02 PROCEDURE — 25010000002 ENOXAPARIN PER 10 MG: Performed by: ORTHOPAEDIC SURGERY

## 2019-07-02 PROCEDURE — 85025 COMPLETE CBC W/AUTO DIFF WBC: CPT | Performed by: HOSPITALIST

## 2019-07-02 PROCEDURE — 80053 COMPREHEN METABOLIC PANEL: CPT | Performed by: HOSPITALIST

## 2019-07-02 RX ORDER — SENNA AND DOCUSATE SODIUM 50; 8.6 MG/1; MG/1
2 TABLET, FILM COATED ORAL NIGHTLY
Status: DISCONTINUED | OUTPATIENT
Start: 2019-07-02 | End: 2019-07-04 | Stop reason: HOSPADM

## 2019-07-02 RX ADMIN — SENNOSIDES,DOCUSATE SODIUM 2 TABLET: 50; 8.6 TABLET, FILM COATED ORAL at 21:33

## 2019-07-02 RX ADMIN — SODIUM CHLORIDE, PRESERVATIVE FREE 3 ML: 5 INJECTION INTRAVENOUS at 08:14

## 2019-07-02 RX ADMIN — HYDROCODONE BITARTRATE AND ACETAMINOPHEN 1 TABLET: 7.5; 325 TABLET ORAL at 16:17

## 2019-07-02 RX ADMIN — FAMOTIDINE 20 MG: 20 TABLET, FILM COATED ORAL at 08:14

## 2019-07-02 RX ADMIN — METOPROLOL TARTRATE 12.5 MG: 25 TABLET ORAL at 21:33

## 2019-07-02 RX ADMIN — ENOXAPARIN SODIUM 40 MG: 40 INJECTION SUBCUTANEOUS at 08:14

## 2019-07-02 RX ADMIN — ATORVASTATIN CALCIUM 10 MG: 10 TABLET, FILM COATED ORAL at 08:13

## 2019-07-02 RX ADMIN — METOPROLOL TARTRATE 12.5 MG: 25 TABLET ORAL at 08:13

## 2019-07-02 RX ADMIN — FAMOTIDINE 20 MG: 20 TABLET, FILM COATED ORAL at 16:46

## 2019-07-02 RX ADMIN — SODIUM CHLORIDE, PRESERVATIVE FREE 3 ML: 5 INJECTION INTRAVENOUS at 19:29

## 2019-07-02 RX ADMIN — POLYETHYLENE GLYCOL 3350 17 G: 17 POWDER, FOR SOLUTION ORAL at 14:53

## 2019-07-02 RX ADMIN — SODIUM CHLORIDE, PRESERVATIVE FREE 3 ML: 5 INJECTION INTRAVENOUS at 19:28

## 2019-07-02 RX ADMIN — ASPIRIN 81 MG: 81 TABLET, COATED ORAL at 08:14

## 2019-07-02 RX ADMIN — ONDANSETRON HYDROCHLORIDE 4 MG: 2 SOLUTION INTRAMUSCULAR; INTRAVENOUS at 05:51

## 2019-07-02 NOTE — PLAN OF CARE
Problem: Patient Care Overview  Goal: Plan of Care Review  Outcome: Ongoing (interventions implemented as appropriate)   07/02/19 0628   Coping/Psychosocial   Plan of Care Reviewed With patient;daughter   OTHER   Outcome Summary Pt rested well. Tylenol given 1x for pain, good relief. Confusion improved. Zofran given 1x for intermit nausea. VSS< no s/s acute distress, will continue to monitor     Goal: Individualization and Mutuality  Outcome: Ongoing (interventions implemented as appropriate)      Problem: Fall Risk (Adult)  Goal: Absence of Fall  Outcome: Ongoing (interventions implemented as appropriate)      Problem: Skin Injury Risk (Adult)  Goal: Identify Related Risk Factors and Signs and Symptoms  Outcome: Outcome(s) achieved Date Met: 07/02/19    Goal: Skin Health and Integrity  Outcome: Ongoing (interventions implemented as appropriate)      Problem: Fracture Orthopaedic (Adult)  Goal: Signs and Symptoms of Listed Potential Problems Will be Absent, Minimized or Managed (Fracture Orthopaedic)  Outcome: Ongoing (interventions implemented as appropriate)

## 2019-07-02 NOTE — PLAN OF CARE
Problem: Patient Care Overview  Goal: Plan of Care Review  Outcome: Ongoing (interventions implemented as appropriate)   07/02/19 8929   Coping/Psychosocial   Plan of Care Reviewed With patient   Plan of Care Review   Progress no change   OTHER   Outcome Summary Patient treated pain with prn medications. Started on bowel regimen. VSS on RA. No s/s of acute distress. Will continue to monitor.

## 2019-07-02 NOTE — DISCHARGE PLACEMENT REQUEST
"Fritz Flores (85 y.o. Male)     Date of Birth Social Security Number Address Home Phone MRN    12/21/1933  [address] [place] [address] [phone] [id number]    Evangel[employer] Marital Status          Orthodoxy        Admission Date Admission Type Admitting Provider Attending Provider Department, Room/Bed    6/30/19 Emergency Luis E Rivera MD Benton, John B, MD [address], [address]    Discharge Date Discharge Disposition Discharge Destination                       Attending Provider:  Richy Monterroso MD    Allergies:  Penicillins    Isolation:  None   Infection:  None   Code Status:  CPR    Ht:  182.9 cm (72\")   Wt:  81.6 kg (179 lb 14.3 oz)    Admission Cmt:  None   Principal Problem:  Closed comminuted intertrochanteric fracture of proximal end of left femur (CMS/[facility]) [S72.142A] More...                 Active Insurance as of 6/30/2019     Primary Coverage     Payor Plan Insurance Group Employer/Plan Group    MEDICARE MEDICARE A & B      Payor Plan Address Payor Plan Phone Number Payor Plan Fax Number Effective Dates    [address] [phone]  12/1/1998 - None Entered    [facility] [id number]       Subscriber Name Subscriber Birth Date Member ID       FRITZ FLORES 12/21/1933 [id number]           Secondary Coverage     Payor Plan Insurance Group Employer/Plan Group    [employer]HEM BLUE CROSS ANTHEM BLUE CROSS BLUE SHIELD PPO [id number]     Payor Plan Address Payor Plan Phone Number Payor Plan Fax Number Effective Dates    [address] [phone]  7/1/2005 - None Entered    [facility] [id number]       Subscriber Name Subscriber Birth Date Member ID       FRITZ FLORES 12/21/1933 [id number]                 Emergency Contacts      (Rel.) Home Phone Work Phone Mobile Phone    MarkMaria D mckeon (Spouse) [phone] -- [phone]              "

## 2019-07-02 NOTE — PROGRESS NOTES
Continued Stay Note  [facility]     Patient Name: Fritz Flores  MRN: [id number]  Today's Date: 7/2/2019    Admit Date: 6/30/2019    Discharge Plan     Row Name 07/02/19 1432       Plan    Plan  [facility], referral pending     Plan Comments  Spoke with Dr Monterroso, pt needs to go to [facility]. Incoming call recieved from [facility] does not have any beds available. Spoke with pt and his son at bedside. Pts son states pt will go to [facility] at ID. He requested I speak to his sister Deidre about [facility] choices. Spoke with Pat and updated her, [facility] JCrogerstsophiaRN/CCP         Discharge Codes    No documentation.             Migdalia Cabral RN

## 2019-07-02 NOTE — PROGRESS NOTES
Discharge Planning Assessment  [facility]     Patient Name: Fritz Flores  MRN: [id number]  Today's Date: 7/2/2019    Admit Date: 6/30/2019    Discharge Needs Assessment    Met w/ patient & youngest daughter- Savanna in room.  Introduced self and explained role.  Facesheet verified.   Pt hopes to go home with family and home health services but was agreeable to rehab referral in case needed at d/c.  [facility] referral made in [facility].  Family is very supportive; dgt is a RN and niece is OT.  CCP to follow progress  and confirm d/c plans.         Discharge Plan :  Undetermined; pt hopes to d/c home with HH, but is considering rehab if warranted.  CCP  to follow & assist.            Destination      Service Provider Request Status Selected Services Address Phone Number Fax Number    [facility] Pending - Request Sent N/A [address] [facility] [id number] [phone]      Durable Medical Equipment      No service coordination in this encounter.      Dialysis/Infusion      No service coordination in this encounter.      Home Medical Care      No service coordination in this encounter.                 Maikol Agudelo, RN

## 2019-07-02 NOTE — PROGRESS NOTES
Continued Stay Note  [facility]     Patient Name: Fritz Flores  MRN: [id number]  Today's Date: 7/2/2019    Admit Date: 6/30/2019    Discharge Plan     Row Name 07/02/19 1737       Plan    Plan  [facility], accepted bed available tomorrow 7/3, [facility] ambulance set up for 1pm     Plan Comments  Spoke with Ania/Monica, [facility] has accepted and has a bed available tomorrow. [facility] ambulance set up for 1pm. Pat [phone], pts dtr updated. [facility]  following per Pat's request, Marilin notified. JChastsophiaRN/CCP         Discharge Codes    No documentation.             Migdalia Cabral RN

## 2019-07-02 NOTE — PROGRESS NOTES
Patient: rFitz Flores  YOB: 1933     Date of Admission: 6/30/2019  2:53 AM Medical Record Number: [id number]     Attending Physician: Richy Monterroso MD    Status Post:  Procedure(s):  FEMUR INTRAMEDULLARY NAILING/RODDINGPost Operative Day Number: 1    Subjective : No new orthopaedic complaints     Pain Relief: some relief with present medication.     Systemic Complaints: No Complaints  Vitals:    07/01/19 0500 07/01/19 0645 07/01/19 0700 07/01/19 1500   BP:   116/66 127/55   BP Location:   Left arm Right arm   Patient Position:   Lying Lying   Pulse: 65 65 65 66   Resp:  18 16 18   Temp:   97.4 °F (36.3 °C) 97.9 °F (36.6 °C)   TempSrc:   Oral Oral   SpO2:  94% 96% 93%   Weight:       Height:           Physical Exam: 85 y.o. male    General Appearance:       Alert, cooperative, in no acute distress                  Extremities:    Dressing Clean, Dry and Intact         Incision healthy without signs or symptoms of infections         No clinical sign of DVT        Able to do good movements of digits    Pulses:   Pulses palpable and equal bilaterally           Diagnostic Tests:     Results from last 7 days   Lab Units 07/01/19  0751 06/30/19  0329   WBC 10*3/mm3 7.06 7.45   HEMOGLOBIN g/dL 9.0* 11.9*   HEMATOCRIT % 26.9* 34.6*   PLATELETS 10*3/mm3 218 202     Results from last 7 days   Lab Units 07/01/19  0751 06/30/19  0329   SODIUM mmol/L 132* 133*   POTASSIUM mmol/L 4.6 4.1   CHLORIDE mmol/L 100 101   CO2 mmol/L 23.5 21.4*   BUN mg/dL 18 24*   CREATININE mg/dL 0.78 1.04   GLUCOSE mg/dL 126* 132*   CALCIUM mg/dL 8.2* 8.7     Results from last 7 days   Lab Units 06/30/19  0329   INR  1.16*   APTT seconds 28.6     No results found for: CRP  No results found for: SEDRATE  No results found for: URICACID  No results found for: CRYSTAL  Microbiology Results (last 10 days)     ** No results found for the last 240 hours. **        Xr Chest 1 View    Result Date: 6/30/2019   No active disease by portable  imaging.  This report was finalized on 6/30/2019 4:20 AM by Chino Grey M.D.      Xr Hip With Or Without Pelvis 2 - 3 View Left    Result Date: 6/30/2019  FINDINGS AND IMPRESSION: There is mild distention of multiple small bowel loops. While findings are favored to represent postoperative ileus, obstruction could appear similar and correlation with patient history as well as continued attention on follow-up with serial abdominal exams is recommended. This may be further evaluated with CT of the abdomen and pelvis versus small bowel follow-through if clinically indicated.  Post surgical changes from recent open reduction and internal fixation of a left proximal femur fracture with placement of an intramedullary tevin and dynamic hip screw are present. The hardware is intact. Alignment of the femoral neck is near-anatomic. There continues to be displacement of the lesser trochanter medially.  There is an intramedullary tevin and dynamic hip screw within the right femur. The distal locking screw is fractured, new since 07/08/2008. No displaced right hip fracture is visualized.  This report was finalized on 6/30/2019 5:18 PM by Dr. Oscar Silva M.D.      Xr Hip With Or Without Pelvis 2 - 3 View Left    Result Date: 6/30/2019  1. Proximal left femur fracture without dislocation.  This report was finalized on 6/30/2019 4:21 AM by Chino Grey M.D.      Current Medications:  Scheduled Meds:  aspirin 81 mg Oral Daily   atorvastatin 10 mg Oral Daily   enoxaparin 40 mg Subcutaneous Daily   famotidine 20 mg Oral BID AC   metoprolol tartrate 12.5 mg Oral Q12H   sodium chloride 3 mL Intravenous Q12H     Continuous Infusions:   PRN Meds:.•  acetaminophen  •  bisacodyl  •  docusate sodium  •  HYDROcodone-acetaminophen  •  Morphine  •  ondansetron  •  [COMPLETED] Insert peripheral IV **AND** sodium chloride  •  sodium chloride    Assessment: Status post  Procedure(s):  FEMUR INTRAMEDULLARY NAILING/RODDING    Patient Active  Problem List   Diagnosis   • Closed intertrochanteric fracture of hip, left, initial encounter (CMS/[facility])   • Closed comminuted intertrochanteric fracture of proximal end of left femur (CMS/[facility])   • Ulcerative colitis (CMS/[facility])   • Coronary artery disease   • Hyponatremia   • Metabolic encephalopathy   • Postoperative anemia due to acute blood loss       PLAN:   Continues current post-op course  Anticoagulation: Lovenox started  Dressing Change prn  Mobilize with PT as tolerated per protocol    Weight Bearing: WBAT  Discharge Plan: OK to plan for discharge in  tomorrow to SNF  from orthopadic perspective.      Jaqueline Wagoner MD    Date: 7/1/2019    Time: 9:10 PM

## 2019-07-02 NOTE — PROGRESS NOTES
"   LOS: 2 days   Patient Care Team:  Rachid العلي Jr., MD as PCP - General (Internal Medicine)    Chief Complaint: hip pain    Subjective     Feeling okay today. Pain is well-controlled with only Tylenol        Subjective:  Symptoms:  Stable.  No shortness of breath, malaise, cough, chest pain, weakness, headache, chest pressure, anorexia, diarrhea or anxiety.    Diet:  Adequate intake.  No nausea or vomiting.    Activity level: Impaired due to pain.    Pain:  He complains of pain that is mild.  He reports pain is unchanged.  Pain is well controlled.        History taken from: patient chart family RN    Objective     Vital Signs  Temp:  [97.9 °F (36.6 °C)-98 °F (36.7 °C)] 98 °F (36.7 °C)  Heart Rate:  [66-77] 71  Resp:  [18] 18  BP: (127-161)/(55-89) 137/68    Objective:  General Appearance:  Comfortable and in no acute distress.    Vital signs: (most recent): Blood pressure 137/68, pulse 71, temperature 98 °F (36.7 °C), temperature source Oral, resp. rate 18, height 182.9 cm (72\"), weight 81.6 kg (179 lb 14.3 oz), SpO2 98 %.  Vital signs are normal.  No fever.    Output: Producing urine and no stool output.    HEENT: Normal HEENT exam.    Lungs:  Normal effort and normal respiratory rate.  Breath sounds clear to auscultation.    Heart: Normal rate.  Regular rhythm.    Abdomen: Abdomen is soft.  Bowel sounds are normal.   There is no abdominal tenderness.     Extremities: There is dependent edema (1-2+ chronic in BLEs).    Pulses: Distal pulses are intact.    Neurological: Patient is alert.  (Oriented to person).    Skin:  Warm and dry.  No rash.             Results Review:     I reviewed the patient's new clinical results.  I reviewed the patient's other test results and agree with the interpretation  I personally viewed and interpreted the patient's EKG/Telemetry data  Discussed with pt, son, RN, and CCP    Medication Review: reviewed    Assessment/Plan       Closed comminuted intertrochanteric fracture of " proximal end of left femur (CMS/HCC)    Ulcerative colitis (CMS/HCC)    Coronary artery disease    Hyponatremia    Metabolic encephalopathy    Postoperative anemia due to acute blood loss          Plan:   (POD#2 s/p IM nailing of left femoral neck fracture by Dr. Wagoner  Continue present mgmt  PT eval noted, SNF recommended by PT and Dr. Wagoner  Monitoring Hgb, stable  Monitoring Na+, stable, dc'd IVFs  No cardiac symptoms  Confusion not unusual postop, better today  Start bowel regimen    Dispo: to [facility] when bed available and precert obtained).       Richy Monterroso MD  07/02/19  1:11 PM    Time: 20min

## 2019-07-02 NOTE — THERAPY TREATMENT NOTE
Acute Care - Physical Therapy Treatment Note  [facility]     Patient Name: Fritz Flores  : 1933  MRN: [id number]  Today's Date: 2019  Onset of Illness/Injury or Date of Surgery: 19     Referring Physician: Rizwana    Admit Date: 2019    Visit Dx:    ICD-10-CM ICD-9-CM   1. Closed comminuted intertrochanteric fracture of left femur, initial encounter (CMS/HCC) S72.142A 820.21   2. Closed intertrochanteric fracture of hip, left, initial encounter (CMS/[facility]) S72.142A 820.21   3. Difficulty walking R26.2 719.7     Patient Active Problem List   Diagnosis   • Closed intertrochanteric fracture of hip, left, initial encounter (CMS/[facility])   • Closed comminuted intertrochanteric fracture of proximal end of left femur (CMS/[facility])   • Ulcerative colitis (CMS/[facility])   • Coronary artery disease   • Hyponatremia   • Metabolic encephalopathy   • Postoperative anemia due to acute blood loss       Therapy Treatment    Rehabilitation Treatment Summary     Row Name 19 1602             Treatment Time/Intention    Discipline  physical therapist  -EE      Document Type  therapy note (daily note)  -EE      Subjective Information  complains of;pain  -EE      Mode of Treatment  physical therapy;individual therapy  -EE      Patient/Family Observations  Pt supine in bed in no acute distress.  -EE      Patient Effort  good  -EE      Existing Precautions/Restrictions  fall  -EE      Treatment Considerations/Comments  Pt with increased pain today; RNJade, notified pt requesting meds during tx  -EE      Recorded by [EE] Sugar Marcus, PT 19 1624      Row Name 19 1600             Cognitive Assessment/Intervention- PT/OT    Affect/Mental Status (Cognitive)  confused  -EE      Orientation Status (Cognition)  oriented to;person;place  -EE      Follows Commands (Cognition)  follows one step commands;75-90% accuracy;increased processing time needed;repetition of directions required;verbal cues/prompting required   -EE      Cognitive Function (Cognitive)  safety deficit  -EE      Safety Deficit (Cognitive)  moderate deficit;awareness of need for assistance;safety precautions awareness  -EE      Personal Safety Interventions  fall prevention program maintained;gait belt;muscle strengthening facilitated;nonskid shoes/slippers when out of bed;supervised activity  -EE      Recorded by [EE] Sugar Marcus, PT 07/02/19 1624      Row Name 07/02/19 1602             Mobility Assessment/Intervention    Extremity Weight-bearing Status  left lower extremity  -EE      Left Lower Extremity (Weight-bearing Status)  weight-bearing as tolerated (WBAT)  -EE      Recorded by [EE] Sugar Marcus, PT 07/02/19 1624      Row Name 07/02/19 1602             Bed Mobility Assessment/Treatment    Bed Mobility Assessment/Treatment  sit-supine  -EE      Supine-Sit [place] (Bed Mobility)  moderate assist (50% patient effort);2 person assist;verbal cues;nonverbal cues (demo/gesture)  -EE      Sit-Supine [place] (Bed Mobility)  maximum assist (25% patient effort);2 person assist;verbal cues  -EE      Bed Mobility, Safety Issues  decreased use of legs for bridging/pushing  -EE      Assistive Device (Bed Mobility)  bed rails;head of bed elevated  -EE      Recorded by [EE] Sugar Marcus, PT 07/02/19 1624      Row Name 07/02/19 1602             Transfer Assessment/Treatment    Comment (Transfers)  attempted to stand x2 from EOB; able to clear buttocks but unable to achieve fully upright posture due to L hip pain and weakness  -EE      Recorded by [EE] Sugar Marcus, PT 07/02/19 1624      Row Name 07/02/19 1602             Sit-Stand Transfer    Sit-Stand [place] (Transfers)  moderate assist (50% patient effort);maximum assist (25% patient effort);2 person assist;verbal cues  -EE      Assistive Device (Sit-Stand Transfers)  walker, front-wheeled x1 with walker, x1 with HHA  -EE      Recorded by [EE] Sugar Marcus, PT 07/02/19 1624      Row Name 07/02/19  1602             Stand-Sit Transfer    Stand-Sit [place] (Transfers)  moderate assist (50% patient effort);2 person assist;verbal cues  -EE      Recorded by [EE] Sugar Marcus, PT 07/02/19 1624      Row Name 07/02/19 1602             Gait/Stairs Assessment/Training    [place] Level (Gait)  not tested  -EE      Comment (Gait/Stairs)  unable to assess due to inc difficulty with static standing  -EE      Recorded by [EE] Sugar Marcus, PT 07/02/19 1624      Row Name 07/02/19 1602             Balance    Balance  static sitting balance;static standing balance  -EE      Recorded by [EE] Sugar Marcus, PT 07/02/19 1624      Row Name 07/02/19 1602             Static Sitting Balance    Level of [place] (Unsupported Sitting, Static Balance)  minimal assist, 75% patient effort  -EE      Sitting Position (Unsupported Sitting, Static Balance)  sitting on edge of bed  -EE      Time Able to Maintain Position (Unsupported Sitting, Static Balance)  3 to 4 minutes  -EE      Comment (Unsupported Sitting, Static Balance)  leaning to the R and posteriorly  -EE      Recorded by [EE] Sugar Marcus, PT 07/02/19 1624      Row Name 07/02/19 1602             Static Standing Balance    Level of [place] (Supported Standing, Static Balance)  moderate assist, 50 to 74% patient effort;2 person assist  -EE      Time Able to Maintain Position (Supported Standing, Static Balance)  less than 15 seconds  -EE      Assistive Device Utilized (Supported Standing, Static Balance)  walker, rolling;other (see comments) x1 with wx, x1 with HHA  -EE      Comment (Supported Standing, Static Balance)  Pt leaning posteriorly and to the R; very narrow ELIZABETH with R foot, therapist attempted to block but unable to keep in position while standing  -EE      Recorded by [EE] Sugar Marcus, PT 07/02/19 1624      Row Name 07/02/19 1602             Positioning and Restraints    Pre-Treatment Position  in bed  -EE      Post Treatment Position  bed  -EE      In  Bed  fowlers;call light within reach;encouraged to call for assist;exit alarm on;notified nsg  -EE      Recorded by [EE] Sugar Marcus, PT 07/02/19 1624      Row Name 07/02/19 1602             Pain Assessment    Additional Documentation  Pain Scale: Numbers Pre/Post-Treatment (Group)  -EE      Recorded by [EE] Sugar Marcus, PT 07/02/19 1624      Row Name 07/02/19 1602             Pain Scale: Numbers Pre/Post-Treatment    Pain Scale: Numbers, Post-Treatment  5/10  -EE      Pain Location - Side  Left  -EE      Pain Location - Orientation  incisional  -EE      Pain Location  hip  -EE      Pain Intervention(s)  Repositioned;Medication (See MAR);Cold applied;Elevated  -EE      Recorded by [EE] Sugar Marcus, PT 07/02/19 1624      Row Name                Wound 06/30/19 1540 Left leg incision    Wound - Properties Group Date first assessed: 06/30/19 [JM] Time first assessed: 1540 [JM] Side: Left [JM] Location: leg [JM] Type: incision [JM] Recorded by:  [JM] Anthony Ruth, RN 06/30/19 1540    Row Name 07/02/19 1602             Outcome Summary/Treatment Plan (PT)    Anticipated Discharge Disposition (PT)  skilled nursing facility  -EE      Recorded by [EE] Sugar Marcus, PT 07/02/19 1624        User Key  (r) = Recorded By, (t) = Taken By, (c) = Cosigned By    Initials Name Effective Dates Discipline    EE Sugar Marcus, PT 04/03/18 -  PT    Anthony Valdovinos, RN 06/16/16 -  Nurse          Wound 06/30/19 1540 Left leg incision (Active)   Dressing Appearance dry;intact 7/2/2019  3:54 PM   Closure Adhesive bandage 7/2/2019  3:54 PM   Base dressing in place, unable to visualize 7/2/2019  3:54 PM   Periwound intact;dry 7/2/2019  3:54 PM   Periwound Temperature warm 7/2/2019  9:30 AM   Periwound Skin Turgor soft 7/2/2019  9:30 AM   Periwound Care, Wound dry periwound area maintained 7/1/2019  8:05 PM           Physical Therapy Education     Title: PT OT SLP Therapies (In Progress)     Topic: Physical Therapy (In Progress)      Point: Mobility training (In Progress)     Learning Progress Summary           Patient Acceptance, E, NR by EE at 7/2/2019  4:25 PM    Acceptance, E, VU,NR by EE at 7/1/2019  9:13 AM                   Point: Home exercise program (Done)     Learning Progress Summary           Patient Acceptance, E, VU,NR by EE at 7/1/2019  9:13 AM                   Point: Body mechanics (In Progress)     Learning Progress Summary           Patient Acceptance, E, NR by EE at 7/2/2019  4:25 PM    Acceptance, E, VU,NR by EE at 7/1/2019  9:13 AM                   Point: Precautions (Done)     Learning Progress Summary           Patient Acceptance, E, VU,NR by EE at 7/1/2019  9:13 AM                               User Key     Initials Effective Dates Name Provider Type Discipline    EE 04/03/18 -  Sugar Marcus, PT Physical Therapist PT                PT Recommendation and Plan  Anticipated Discharge Disposition (PT): skilled nursing facility  Planned Therapy Interventions (PT Eval): bed mobility training, balance training, gait training, home exercise program, patient/family education, ROM (range of motion), strengthening, transfer training  Therapy Frequency (PT Clinical Impression): daily  Outcome Summary/Treatment Plan (PT)  Anticipated Discharge Disposition (PT): skilled nursing facility  Plan of Care Reviewed With: patient  Outcome Summary: Pt demonstrating more pain in L hip during session today; requiring more assistance with bed mobility and transfers. Unable to ambulate today due to increased pain and difficulty with static standing balance. RN notified and pt given pain meds following tx. Will continue to strengthen and progress activity as able.   Outcome Measures     Row Name 07/02/19 1600 07/01/19 0900          How much help from another person do you currently need...    Turning from your back to your side while in flat bed without using bedrails?  2  -EE  3  -EE     Moving from lying on back to sitting on the side of a  flat bed without bedrails?  2  -EE  2  -EE     Moving to and from a bed to a chair (including a wheelchair)?  2  -EE  2  -EE     Standing up from a chair using your arms (e.g., wheelchair, bedside chair)?  2  -EE  2  -EE     Climbing 3-5 steps with a railing?  1  -EE  1  -EE     To walk in hospital room?  1  -EE  2  -EE     AM-PAC 6 Clicks Score (PT)  10  -EE  12  -EE        Functional Assessment    Outcome Measure Options  AM-PAC 6 Clicks Basic Mobility (PT)  -EE  AM-PAC 6 Clicks Basic Mobility (PT)  -EE       User Key  (r) = Recorded By, (t) = Taken By, (c) = Cosigned By    Initials Name Provider Type    Sugar Bagley PT Physical Therapist         Time Calculation:   PT Charges     Row Name 07/02/19 1626 07/02/19 0941          Time Calculation    Start Time  1602  -EE  --     Stop Time  1615  -EE  --     Time Calculation (min)  13 min  -EE  --     PT Received On  07/02/19  -EE  --     PT - Next Appointment  07/03/19  -EE  07/02/19  -EE        Time Calculation- PT    Total Timed Code Minutes- PT  13 minute(s)  -EE  --       User Key  (r) = Recorded By, (t) = Taken By, (c) = Cosigned By    Initials Name Provider Type    Sugar Bagley PT Physical Therapist        Therapy Charges for Today     Code Description Service Date Service Provider Modifiers Qty    81349863902 HC PT EVAL MOD COMPLEXITY 2 7/1/2019 Sugar Marcus, PT GP 1    83604049451 HC PT THER PROC EA 15 MIN 7/1/2019 Sugar Marcus, PT GP 1    35135846208 HC PT THER SUPP EA 15 MIN 7/1/2019 Sugar Marcus, PT GP 1    45331412015 HC PT THERAPEUTIC ACT EA 15 MIN 7/2/2019 Sugar Marcus, PT GP 1    64394927067 HC PT THER SUPP EA 15 MIN 7/2/2019 Sugar Marcus, PT GP 1          PT G-Codes  Outcome Measure Options: AM-PAC 6 Clicks Basic Mobility (PT)  AM-PAC 6 Clicks Score (PT): 10    Sugar Marcus PT  7/2/2019

## 2019-07-02 NOTE — PLAN OF CARE
Problem: Patient Care Overview  Goal: Plan of Care Review   07/02/19 8770   Coping/Psychosocial   Plan of Care Reviewed With patient   OTHER   Outcome Summary Pt demonstrating more pain in L hip during session today; requiring more assistance with bed mobility and transfers. Unable to ambulate today due to increased pain and difficulty with static standing balance. RN notified and pt given pain meds following tx. Will continue to strengthen and progress activity as able.

## 2019-07-03 PROBLEM — G93.41 METABOLIC ENCEPHALOPATHY: Status: RESOLVED | Noted: 2019-07-01 | Resolved: 2019-07-03

## 2019-07-03 LAB
ANION GAP SERPL CALCULATED.3IONS-SCNC: 9 MMOL/L (ref 5–15)
BUN BLD-MCNC: 21 MG/DL (ref 8–23)
BUN/CREAT SERPL: 23.9 (ref 7–25)
CALCIUM SPEC-SCNC: 8.3 MG/DL (ref 8.6–10.5)
CHLORIDE SERPL-SCNC: 99 MMOL/L (ref 98–107)
CO2 SERPL-SCNC: 24 MMOL/L (ref 22–29)
CREAT BLD-MCNC: 0.88 MG/DL (ref 0.76–1.27)
DEPRECATED RDW RBC AUTO: 41.8 FL (ref 37–54)
ERYTHROCYTE [DISTWIDTH] IN BLOOD BY AUTOMATED COUNT: 11.8 % (ref 12.3–15.4)
GFR SERPL CREATININE-BSD FRML MDRD: 82 ML/MIN/1.73
GLUCOSE BLD-MCNC: 113 MG/DL (ref 65–99)
HCT VFR BLD AUTO: 22 % (ref 37.5–51)
HGB BLD-MCNC: 7.5 G/DL (ref 13–17.7)
MCH RBC QN AUTO: 33.5 PG (ref 26.6–33)
MCHC RBC AUTO-ENTMCNC: 34.1 G/DL (ref 31.5–35.7)
MCV RBC AUTO: 98.2 FL (ref 79–97)
PLATELET # BLD AUTO: 225 10*3/MM3 (ref 140–450)
PMV BLD AUTO: 9.5 FL (ref 6–12)
POTASSIUM BLD-SCNC: 4 MMOL/L (ref 3.5–5.2)
RBC # BLD AUTO: 2.24 10*6/MM3 (ref 4.14–5.8)
SODIUM BLD-SCNC: 132 MMOL/L (ref 136–145)
WBC NRBC COR # BLD: 7.26 10*3/MM3 (ref 3.4–10.8)

## 2019-07-03 PROCEDURE — P9016 RBC LEUKOCYTES REDUCED: HCPCS

## 2019-07-03 PROCEDURE — 86900 BLOOD TYPING SEROLOGIC ABO: CPT

## 2019-07-03 PROCEDURE — 85027 COMPLETE CBC AUTOMATED: CPT | Performed by: HOSPITALIST

## 2019-07-03 PROCEDURE — 97110 THERAPEUTIC EXERCISES: CPT

## 2019-07-03 PROCEDURE — 36430 TRANSFUSION BLD/BLD COMPNT: CPT

## 2019-07-03 PROCEDURE — 80048 BASIC METABOLIC PNL TOTAL CA: CPT | Performed by: HOSPITALIST

## 2019-07-03 PROCEDURE — 86923 COMPATIBILITY TEST ELECTRIC: CPT

## 2019-07-03 PROCEDURE — 86901 BLOOD TYPING SEROLOGIC RH(D): CPT

## 2019-07-03 PROCEDURE — 25010000002 ENOXAPARIN PER 10 MG: Performed by: ORTHOPAEDIC SURGERY

## 2019-07-03 RX ADMIN — FAMOTIDINE 20 MG: 20 TABLET, FILM COATED ORAL at 07:37

## 2019-07-03 RX ADMIN — ACETAMINOPHEN 650 MG: 325 TABLET, FILM COATED ORAL at 03:22

## 2019-07-03 RX ADMIN — POLYETHYLENE GLYCOL 3350 17 G: 17 POWDER, FOR SOLUTION ORAL at 09:29

## 2019-07-03 RX ADMIN — SODIUM CHLORIDE, PRESERVATIVE FREE 3 ML: 5 INJECTION INTRAVENOUS at 21:26

## 2019-07-03 RX ADMIN — SODIUM CHLORIDE, PRESERVATIVE FREE 3 ML: 5 INJECTION INTRAVENOUS at 09:27

## 2019-07-03 RX ADMIN — FAMOTIDINE 20 MG: 20 TABLET, FILM COATED ORAL at 18:41

## 2019-07-03 RX ADMIN — ACETAMINOPHEN 650 MG: 325 TABLET, FILM COATED ORAL at 14:39

## 2019-07-03 RX ADMIN — SENNOSIDES,DOCUSATE SODIUM 2 TABLET: 50; 8.6 TABLET, FILM COATED ORAL at 21:26

## 2019-07-03 RX ADMIN — HYDROCODONE BITARTRATE AND ACETAMINOPHEN 1 TABLET: 7.5; 325 TABLET ORAL at 07:37

## 2019-07-03 RX ADMIN — ENOXAPARIN SODIUM 40 MG: 40 INJECTION SUBCUTANEOUS at 11:49

## 2019-07-03 RX ADMIN — METOPROLOL TARTRATE 12.5 MG: 25 TABLET ORAL at 21:26

## 2019-07-03 RX ADMIN — ACETAMINOPHEN 650 MG: 325 TABLET, FILM COATED ORAL at 21:26

## 2019-07-03 RX ADMIN — ATORVASTATIN CALCIUM 10 MG: 10 TABLET, FILM COATED ORAL at 09:09

## 2019-07-03 RX ADMIN — ASPIRIN 81 MG: 81 TABLET, COATED ORAL at 09:09

## 2019-07-03 RX ADMIN — METOPROLOL TARTRATE 12.5 MG: 25 TABLET ORAL at 09:09

## 2019-07-03 NOTE — PROGRESS NOTES
Continued Stay Note  [facility]     Patient Name: Fritz Flores  MRN: [id number]  Today's Date: 7/3/2019    Admit Date: 6/30/2019    Discharge Plan     Row Name 07/03/19 1226       Plan    Plan  [facility], accepted bed available tomorrow 7/4, [facility] ambulance set up for 2pm    Plan Comments  Spoke with Dr Shultz, pt is not ready for dc today. Anticipate dc tomorrow. [facility] ambulance rescheduled for 7/4/19 @ 2pm. [facility] updated, bed available at [facility] tomorrow. Pkt on chart. Called and updated Pat, pts dtr [phone]. Pt resting in bed with eyes closed. CCP to follow. JChasteenRN/CCP         Discharge Codes    No documentation.             Migdalia Cabral RN

## 2019-07-03 NOTE — PROGRESS NOTES
"    DAILY PROGRESS NOTE  [facility]    Patient Identification:  Name: Fritz Flores  Age: 85 y.o.  Sex: male  :  1933  MRN: [id number]         Primary Care Physician: Rachid العلي Jr., MD    Subjective:  Interval History: Hemoglobin dropped down to 7.5 (11.9 at admission) -denies any current chest pain or shortness of breath altered mentation or any nausea or vomiting.  Currently undergoing transfusion with no reaction    Objective: No family at bedside    Scheduled Meds:    aspirin 81 mg Oral Daily   atorvastatin 10 mg Oral Daily   enoxaparin 40 mg Subcutaneous Daily   famotidine 20 mg Oral BID AC   metoprolol tartrate 12.5 mg Oral Q12H   polyethylene glycol 17 g Oral Daily   sennosides-docusate sodium 2 tablet Oral Nightly   sodium chloride 3 mL Intravenous Q12H     Continuous Infusions:     Vital signs in last 24 hours:  Temp:  [97.4 °F (36.3 °C)-98.2 °F (36.8 °C)] 97.5 °F (36.4 °C)  Heart Rate:  [66-92] 66  Resp:  [16-18] 18  BP: (120-149)/(52-64) 133/58    Intake/Output:    Intake/Output Summary (Last 24 hours) at 7/3/2019 1228  Last data filed at 7/3/2019 0640  Gross per 24 hour   Intake 270 ml   Output 1160 ml   Net -890 ml       Exam:  /58   Pulse 66   Temp 97.5 °F (36.4 °C) (Oral)   Resp 18   Ht 182.9 cm (72\")   Wt 81.6 kg (179 lb 14.3 oz)   SpO2 98%   BMI 24.40 kg/m²     General Appearance:    Alert, cooperative, no distress, AAOx3, appears weak but nontoxic                          Head:    Normocephalic, without obvious abnormality, atraumatic                           Eyes:    PERRL, conjunctiva/corneas clear, EOM's intact, both eyes                         Throat:   Oral mucosa pink and moist                           Neck:   Supple, symmetrical, trachea midline, no JVD                         Lungs:    Clear to auscultation bilaterally, respirations unlabored                 Chest Wall:    No tenderness or deformity                          Heart:    Regular " rate and rhythm, S1 and S2 normal                  Abdomen:     Soft, non-tender, bowel sounds active                 Extremities: Chronic lower extremity edema 1+                  Neurologic:   CNII-XII intact, no focal deficits noted     Data Review:  Labs in chart were reviewed.    Assessment:  Active Hospital Problems    Diagnosis  POA   • **Closed comminuted intertrochanteric fracture of proximal end of left femur (CMS/[facility]) [S72.142A]  Yes   • Ulcerative colitis (CMS/[facility]) [K51.90]  Yes   • Coronary artery disease [I25.10]  Yes   • Hyponatremia [E87.1]  Yes   • Postoperative anemia due to acute blood loss [D62]  No      Resolved Hospital Problems    Diagnosis Date Resolved POA   • Metabolic encephalopathy [G93.41] 07/03/2019 No       Plan:    ABLA -1 unit transferred this morning -nearly four-point drop since admission    Continue Lovenox for DVT prophylaxis    POD3 IM nail of left femoral neck fracture    Disposition -plan is for tomorrow.  Case discussed in multidisciplinary rounds.  A.m. H&H pending    Samuel Shultz MD  7/3/2019  12:28 PM

## 2019-07-03 NOTE — PLAN OF CARE
Problem: Patient Care Overview  Goal: Plan of Care Review  Outcome: Ongoing (interventions implemented as appropriate)   07/03/19 0351   Coping/Psychosocial   Plan of Care Reviewed With patient   Plan of Care Review   Progress no change   OTHER   Outcome Summary Slept intermittently, gets disoriented at times, oral pain meds given, no distress noted, voids per urnial but sometimes misses, possible DC to rehab today, will monitor     Goal: Individualization and Mutuality  Outcome: Ongoing (interventions implemented as appropriate)   07/03/19 0351   Individualization   Patient Specific Preferences none   Patient Specific Goals (Include Timeframe) pain control, prevent skin ulcer, go to rehab today, get stronger, no falls   Patient Specific Interventions oral pain meds offered, turned Q2, redirected and reoriented to surroundings     Goal: Discharge Needs Assessment  Outcome: Ongoing (interventions implemented as appropriate)   07/03/19 0351   Discharge Needs Assessment   Readmission Within the Last 30 Days no previous admission in last 30 days   Concerns to be Addressed basic needs;home safety   Patient/Family Anticipates Transition to inpatient rehabilitation facility   Patient/Family Anticipated Services at Transition rehabilitation services   Transportation Anticipated health plan transportation;agency   Equipment Needed After Discharge walker, rolling   Discharge Facility/Level of Care Needs rehabilitation facility   Offered/Gave Vendor List yes   Patient's Choice of Community Agency(s) [facility]   Current Discharge Risk physical impairment   Disability   Equipment Currently Used at Home cane, straight       Problem: Fall Risk (Adult)  Goal: Absence of Fall  Outcome: Ongoing (interventions implemented as appropriate)   07/03/19 0351   Fall Risk (Adult)   Absence of Fall making progress toward outcome       Problem: Skin Injury Risk (Adult)  Goal: Skin Health and Integrity  Outcome: Ongoing (interventions  implemented as appropriate)   07/03/19 0351   Skin Injury Risk (Adult)   Skin Health and Integrity making progress toward outcome       Problem: Fracture Orthopaedic (Adult)  Goal: Signs and Symptoms of Listed Potential Problems Will be Absent, Minimized or Managed (Fracture Orthopaedic)  Outcome: Ongoing (interventions implemented as appropriate)   07/03/19 0351   Goal/Outcome Evaluation   Problems Assessed (Orthopaedic Fracture) all   Problems Present (Orthopaedic Fracture) pain;situational response;functional deficit/self-care deficit

## 2019-07-03 NOTE — PROGRESS NOTES
[facility]    Physicians Statement of Medical Necessity for Ambulance Transportation    It is medically necessary for:    Patient Name: Fritz Flores    Insurance Information:      To be transported by ambulance:    From (if nursing facility, specify level of care: skilled, detention, etc): [facility]     To (specify level of care if nursing facility): [facility]     Date of Service:     For dialysis patients state date dialysis began:     Diagnosis: Closed intertrochanteric fracture of Left Hip with surgical repair     Past Medical/Surgical History:  Past Medical History:   Diagnosis Date   • Colon polyps    • Coronary artery disease    • Hyperlipemia    • Myocardial infarction (CMS/HCC) 1989   • Ulcerative colitis (CMS/[facility])       Past Surgical History:   Procedure Laterality Date   • CARDIAC SURGERY  1989    CABG X 1   • COLONOSCOPY  2014   • COLONOSCOPY N/A 8/23/2016    Procedure: COLONOSCOPY INTO CECUM WITH COLD BIOPSIES;  Surgeon: Aaron Gregg MD;  Location: [facility] ENDOSCOPY;  Service:    • FEMUR IM NAILING/RODDING Left 6/30/2019    Procedure: FEMUR INTRAMEDULLARY NAILING/RODDING;  Surgeon: Jaqueline Wagoner MD;  Location: [facility] MAIN OR;  Service: Orthopedics   • FRACTURE SURGERY      upper leg w/hardware   • HIP SURGERY Left     x 3        Current Objective Medical Evidence(including physical exam finding to support reason for limitations):    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

## 2019-07-03 NOTE — THERAPY TREATMENT NOTE
Acute Care - Physical Therapy Treatment Note  [facility]     Patient Name: Fritz Flores  : 1933  MRN: [id number]  Today's Date: 7/3/2019  Onset of Illness/Injury or Date of Surgery: 19     Referring Physician: Rizwana    Admit Date: 2019    Visit Dx:    ICD-10-CM ICD-9-CM   1. Closed comminuted intertrochanteric fracture of left femur, initial encounter (CMS/HCC) S72.142A 820.21   2. Closed intertrochanteric fracture of hip, left, initial encounter (CMS/HCC) S72.142A 820.21   3. Difficulty walking R26.2 719.7     Patient Active Problem List   Diagnosis   • Closed intertrochanteric fracture of hip, left, initial encounter (CMS/[facility])   • Closed comminuted intertrochanteric fracture of proximal end of left femur (CMS/[facility])   • Ulcerative colitis (CMS/[facility])   • Coronary artery disease   • Hyponatremia   • Postoperative anemia due to acute blood loss       Therapy Treatment    Rehabilitation Treatment Summary     Row Name 19 1444             Treatment Time/Intention    Discipline  physical therapist  -MA      Document Type  therapy note (daily note)  -MA      Subjective Information  complains of;pain  -MA      Mode of Treatment  physical therapy;individual therapy  -MA      Patient/Family Observations  supine in bed, no acute distress  -MA      Patient Effort  good  -MA      Existing Precautions/Restrictions  fall  -MA      Recorded by [MA] Vianney Oneill, PT 19 1445      Row Name 19 1444             Vital Signs    O2 Delivery Pre Treatment  room air  -MA      Recorded by [MA] Vianney Oneill, PT 19 1445      Row Name 19 1444             Cognitive Assessment/Intervention- PT/OT    Orientation Status (Cognition)  oriented to;person;place  -MA      Follows Commands (Cognition)  follows one step commands;75-90% accuracy;delayed response/completion;increased processing time needed;verbal cues/prompting required  -MA      Safety Deficit (Cognitive)  moderate deficit;awareness of  need for assistance;insight into deficits/self awareness  -MA      Personal Safety Interventions  fall prevention program maintained;muscle strengthening facilitated;gait belt;nonskid shoes/slippers when out of bed;supervised activity  -MA      Recorded by [MA] Vianney Oneill, PT 07/03/19 1445      Row Name 07/03/19 1444             Bed Mobility Assessment/Treatment    Supine-Sit [place] (Bed Mobility)  moderate assist (50% patient effort);2 person assist  -MA      Sit-Supine [place] (Bed Mobility)  maximum assist (25% patient effort);2 person assist  -MA      Bed Mobility, Safety Issues  cognitive deficits limit understanding;decreased use of arms for pushing/pulling;decreased use of legs for bridging/pushing;impaired trunk control for bed mobility  -MA      Assistive Device (Bed Mobility)  bed rails;draw sheet;head of bed elevated  -MA      Recorded by [MA] Vianney Oneill, PT 07/03/19 1456      Row Name 07/03/19 1444             Sit-Stand Transfer    Sit-Stand [place] (Transfers)  moderate assist (50% patient effort);2 person assist  -MA      Assistive Device (Sit-Stand Transfers)  walker, front-wheeled  -MA2      Recorded by [MA] Vianney Oneill, PT 07/03/19 1456  [MA2] Vianney Oneill, PT 07/03/19 1500      Row Name 07/03/19 1444             Stand-Sit Transfer    Stand-Sit [place] (Transfers)  moderate assist (50% patient effort);2 person assist  -MA      Recorded by [MA] Vianney Oneill, PT 07/03/19 1456      Row Name 07/03/19 1444             Gait/Stairs Assessment/Training    Comment (Gait/Stairs)  attempted to slide feet towards head of bed, minimal clearance of feet   -MA      Recorded by [MA] Vianney Oneill, PT 07/03/19 1500      Row Name 07/03/19 1444             Therapeutic Exercise    Comment (Therapeutic Exercise)  LAQ, ankle pumps x10  -MA      Recorded by [MA] Vianney Oneill, PT 07/03/19 1456      Row Name 07/03/19 1444             Static Sitting Balance    Level of [place] (Unsupported  Sitting, Static Balance)  minimal assist, 75% patient effort  -MA      Sitting Position (Unsupported Sitting, Static Balance)  sitting on edge of bed  -MA      Time Able to Maintain Position (Unsupported Sitting, Static Balance)  4 to 5 minutes  -MA      Comment (Unsupported Sitting, Static Balance)  leaning posterior  -MA      Recorded by [MA] Vianney Oneill, PT 07/03/19 1456      Row Name 07/03/19 1444             Static Standing Balance    Level of [place] (Supported Standing, Static Balance)  minimal assist, 75% patient effort;2 person assist  -MA      Time Able to Maintain Position (Supported Standing, Static Balance)  15 to 30 seconds  -MA      Assistive Device Utilized (Supported Standing, Static Balance)  walker, rolling  -MA2      Recorded by [MA] Vianney Oneill, PT 07/03/19 1456  [MA2] Vianney Oneill, PT 07/03/19 1500      Row Name 07/03/19 1444             Positioning and Restraints    Pre-Treatment Position  in bed  -MA      Post Treatment Position  bed  -MA      In Bed  fowlers;call light within reach;encouraged to call for assist;exit alarm on;with nsg  -MA2      Recorded by [MA] Vianney Oneill, PT 07/03/19 1456  [MA2] Vianney Oneill, PT 07/03/19 1500      Row Name 07/03/19 1444             Pain Assessment    Additional Documentation  Pain Scale: Word Pre/Post-Treatment (Group)  -MA      Recorded by [MA] Vianney Oneill, PT 07/03/19 1456      Row Name 07/03/19 1444             Pain Scale: Word Pre/Post-Treatment    Pain: Word Scale, Pretreatment  2 - mild pain  -MA      Pain: Word Scale, Post-Treatment  6 - moderate-severe pain  -MA      Pre/Post Treatment Pain Comment  L hip   -MA      Recorded by [MA] Vianney Oneill, PT 07/03/19 1500      Row Name                Wound 06/30/19 1540 Left leg incision    Wound - Properties Group Date first assessed: 06/30/19 [JM] Time first assessed: 1540 [JM] Side: Left [JM] Location: leg [JM] Type: incision [JM] Recorded by:  [SHARMILA] Anthony Ruth RN 06/30/19 1540      User  Key  (r) = Recorded By, (t) = Taken By, (c) = Cosigned By    Initials Name Effective Dates Discipline    Anthony Valdovinos RN 06/16/16 -  Nurse    Vianney Layne, PT 10/19/18 -  PT          Wound 06/30/19 1540 Left leg incision (Active)   Dressing Appearance dry;intact 7/3/2019  9:05 AM   Closure Adhesive bandage 7/3/2019  9:05 AM   Base dressing in place, unable to visualize 7/3/2019  9:05 AM   Periwound intact;dry 7/3/2019  9:05 AM   Drainage Amount none 7/3/2019  9:05 AM   Dressing Care, Wound border dressing 7/2/2019  7:33 PM           Physical Therapy Education     Title: PT OT SLP Therapies (Done)     Topic: Physical Therapy (Done)     Point: Mobility training (Done)     Learning Progress Summary           Patient Acceptance, E, VU,NR by MA at 7/3/2019  3:00 PM    Acceptance, E, NR by  at 7/2/2019  4:25 PM    Acceptance, E, VU,NR by  at 7/1/2019  9:13 AM                   Point: Home exercise program (Done)     Learning Progress Summary           Patient Acceptance, E, VU,NR by MA at 7/3/2019  3:00 PM    Acceptance, E, VU,NR by  at 7/1/2019  9:13 AM                   Point: Body mechanics (Done)     Learning Progress Summary           Patient Acceptance, E, VU,NR by MA at 7/3/2019  3:00 PM    Acceptance, E, NR by  at 7/2/2019  4:25 PM    Acceptance, E, VU,NR by  at 7/1/2019  9:13 AM                   Point: Precautions (Done)     Learning Progress Summary           Patient Acceptance, E, VU,NR by MA at 7/3/2019  3:00 PM    Acceptance, E, VU,NR by  at 7/1/2019  9:13 AM                               User Key     Initials Effective Dates Name Provider Type Discipline    EE 04/03/18 -  Sugar Marcus, PT Physical Therapist PT    MA 10/19/18 -  Vianney Oneill PT Physical Therapist PT                PT Recommendation and Plan     Outcome Summary: Pt requires 2 assist to stand. increased L hip pain with mobility. Educated on exercises to improve L hip mobility. Will continue to progress as tolerated.    Outcome Measures     Row Name 07/03/19 1500 07/02/19 1600 07/01/19 0900       How much help from another person do you currently need...    Turning from your back to your side while in flat bed without using bedrails?  2  -MA  2  -EE  3  -EE    Moving from lying on back to sitting on the side of a flat bed without bedrails?  2  -MA  2  -EE  2  -EE    Moving to and from a bed to a chair (including a wheelchair)?  2  -MA  2  -EE  2  -EE    Standing up from a chair using your arms (e.g., wheelchair, bedside chair)?  2  -MA  2  -EE  2  -EE    Climbing 3-5 steps with a railing?  1  -MA  1  -EE  1  -EE    To walk in hospital room?  1  -MA  1  -EE  2  -EE    AM-PAC 6 Clicks Score (PT)  10  -MA  10  -EE  12  -EE       Functional Assessment    Outcome Measure Options  AM-PAC 6 Clicks Basic Mobility (PT)  -MA  AM-PAC 6 Clicks Basic Mobility (PT)  -EE  AM-PAC 6 Clicks Basic Mobility (PT)  -EE      User Key  (r) = Recorded By, (t) = Taken By, (c) = Cosigned By    Initials Name Provider Type    EE Sugar Marcus, PT Physical Therapist    Vianney Layne PT Physical Therapist         Time Calculation:   PT Charges     Row Name 07/03/19 1501             Time Calculation    Start Time  1430  -MA      Stop Time  1442  -MA      Time Calculation (min)  12 min  -MA      PT Received On  07/03/19  -MA      PT - Next Appointment  07/04/19  -MA         Time Calculation- PT    Total Timed Code Minutes- PT  12 minute(s)  -MA        User Key  (r) = Recorded By, (t) = Taken By, (c) = Cosigned By    Initials Name Provider Type    Vianney Layne, PT Physical Therapist        Therapy Charges for Today     Code Description Service Date Service Provider Modifiers Qty    28909156930 HC PT THER PROC EA 15 MIN 7/3/2019 Vianney Oneill, PT GP 1    38993862257 HC PT THER SUPP EA 15 MIN 7/3/2019 Vianney Oneill, PT GP 1          PT G-Codes  Outcome Measure Options: AM-PAC 6 Clicks Basic Mobility (PT)  AM-PAC 6 Clicks Score (PT): 10    Vianney Oneill  PT  7/3/2019

## 2019-07-03 NOTE — PLAN OF CARE
Problem: Patient Care Overview  Goal: Plan of Care Review   07/03/19 1500   OTHER   Outcome Summary Pt requires 2 assist to stand. increased L hip pain with mobility. Educated on exercises to improve L hip mobility. Will continue to progress as tolerated.

## 2019-07-04 VITALS
RESPIRATION RATE: 18 BRPM | HEART RATE: 78 BPM | DIASTOLIC BLOOD PRESSURE: 87 MMHG | OXYGEN SATURATION: 97 % | WEIGHT: 179.9 LBS | TEMPERATURE: 97.7 F | BODY MASS INDEX: 24.37 KG/M2 | SYSTOLIC BLOOD PRESSURE: 165 MMHG | HEIGHT: 72 IN

## 2019-07-04 LAB
ABO + RH BLD: NORMAL
BH BB BLOOD EXPIRATION DATE: NORMAL
BH BB BLOOD TYPE BARCODE: 7300
BH BB DISPENSE STATUS: NORMAL
BH BB PRODUCT CODE: NORMAL
BH BB UNIT NUMBER: NORMAL
HCT VFR BLD AUTO: 27.7 % (ref 37.5–51)
HGB BLD-MCNC: 9.6 G/DL (ref 13–17.7)
UNIT  ABO: NORMAL
UNIT  RH: NORMAL

## 2019-07-04 PROCEDURE — 25010000002 ENOXAPARIN PER 10 MG: Performed by: ORTHOPAEDIC SURGERY

## 2019-07-04 PROCEDURE — 85014 HEMATOCRIT: CPT | Performed by: HOSPITALIST

## 2019-07-04 PROCEDURE — 85018 HEMOGLOBIN: CPT | Performed by: HOSPITALIST

## 2019-07-04 PROCEDURE — 25010000002 MORPHINE PER 10 MG: Performed by: HOSPITALIST

## 2019-07-04 RX ORDER — HYDROCODONE BITARTRATE AND ACETAMINOPHEN 7.5; 325 MG/1; MG/1
1 TABLET ORAL EVERY 4 HOURS PRN
Qty: 15 TABLET | Refills: 0 | Status: SHIPPED | OUTPATIENT
Start: 2019-07-04 | End: 2019-07-10

## 2019-07-04 RX ORDER — SENNA AND DOCUSATE SODIUM 50; 8.6 MG/1; MG/1
2 TABLET, FILM COATED ORAL NIGHTLY
Start: 2019-07-04 | End: 2022-01-28

## 2019-07-04 RX ADMIN — ENOXAPARIN SODIUM 40 MG: 40 INJECTION SUBCUTANEOUS at 08:53

## 2019-07-04 RX ADMIN — FAMOTIDINE 20 MG: 20 TABLET, FILM COATED ORAL at 06:32

## 2019-07-04 RX ADMIN — ASPIRIN 81 MG: 81 TABLET, COATED ORAL at 08:53

## 2019-07-04 RX ADMIN — MORPHINE SULFATE 2 MG: 2 INJECTION, SOLUTION INTRAMUSCULAR; INTRAVENOUS at 06:35

## 2019-07-04 RX ADMIN — ACETAMINOPHEN 650 MG: 325 TABLET, FILM COATED ORAL at 04:03

## 2019-07-04 RX ADMIN — HYDROCODONE BITARTRATE AND ACETAMINOPHEN 1 TABLET: 7.5; 325 TABLET ORAL at 00:08

## 2019-07-04 RX ADMIN — METOPROLOL TARTRATE 12.5 MG: 25 TABLET ORAL at 08:53

## 2019-07-04 RX ADMIN — POLYETHYLENE GLYCOL 3350 17 G: 17 POWDER, FOR SOLUTION ORAL at 08:53

## 2019-07-04 RX ADMIN — ATORVASTATIN CALCIUM 10 MG: 10 TABLET, FILM COATED ORAL at 08:53

## 2019-07-04 NOTE — DISCHARGE SUMMARY
[facility]    Date of Discharge:  7/4/2019    PCP: Rachid العلي Jr., MD    Discharge Diagnosis:   Active Hospital Problems    Diagnosis  POA   • **Closed comminuted intertrochanteric fracture of proximal end of left femur (CMS/HCC) [S72.142A]  Yes   • Ulcerative colitis (CMS/HCC) [K51.90]  Yes   • Coronary artery disease [I25.10]  Yes   • Hyponatremia [E87.1]  Yes   • Postoperative anemia due to acute blood loss [D62]  No      Resolved Hospital Problems    Diagnosis Date Resolved POA   • Metabolic encephalopathy [G93.41] 07/03/2019 No     Procedures Performed  Procedure(s):  FEMUR INTRAMEDULLARY NAILING/RODDING     Consults     Date and Time Order Name Status Description    6/30/2019 1119 Inpatient Cardiology Consult Completed     6/30/2019 0500 Inpatient Orthopedic Surgery Consult Completed     6/30/2019 0422 Ortho (on-call MD unless specified) Completed     6/30/2019 0422 LHA (on-call MD unless specified) Completed         Hospital Course  Please see history and physical for details. Patient is a 85 y.o. male initially admitted after a fall at home which resulted in a closed comminuted intertrochanteric fracture of the left femur.  Orthopedics was consulted and Dr. Wagoner surgically fixed with IM nailing and rodding on 6/30/2019.  Cardiology was consulted for OR clearance and past history of coronary artery disease of which she does not follow-up with a cardiologist routinely.  He was cleared for the OR and patient has done fine.  He has been started on metoprolol and blood pressure and heart rate are tolerating.  He experienced postoperative acute blood loss anemia and was transfused 1 unit packed red blood cells and by discharge hemoglobin is trended back up to 9.6.  Patient to continue to remain on Lovenox per orthopedic recommendations for DVT prophylaxis.  He is now postoperative day 4 from the above procedure.  He is medically stable for  transition and is voicing no complaints.  He is amenable to the above plan.  Answered all questions to the patient prior to discharge and no family member present at bedside at that time.  Case discussed with RN to try to ensure smooth transition to rehab.           Condition on Discharge: Improved.     Temp:  [97.1 °F (36.2 °C)-98 °F (36.7 °C)] 97.7 °F (36.5 °C)  Heart Rate:  [73-78] 78  Resp:  [16-18] 18  BP: (154-174)/(60-87) 165/87  Body mass index is 24.4 kg/m².    Physical Exam   Constitutional: He is oriented to person, place, and time. He appears well-developed.   HENT:   Head: Normocephalic.   Eyes: Conjunctivae are normal.   Neck: No JVD present.   Cardiovascular: Normal rate and regular rhythm.   Pulmonary/Chest: Effort normal and breath sounds normal. No respiratory distress.   Abdominal: Soft. Bowel sounds are normal. He exhibits no distension. There is no tenderness.   Neurological: He is alert and oriented to person, place, and time. No cranial nerve deficit.   Psychiatric: He has a normal mood and affect. His behavior is normal.        Discharge Medications      New Medications      Instructions Start Date   enoxaparin 40 MG/0.4ML solution syringe  Commonly known as:  LOVENOX   40 mg, Subcutaneous, Daily   Start Date:  7/5/2019     HYDROcodone-acetaminophen 7.5-325 MG per tablet  Commonly known as:  NORCO   1 tablet, Oral, Every 4 Hours PRN      metoprolol tartrate 25 MG tablet  Commonly known as:  LOPRESSOR   12.5 mg, Oral, Every 12 Hours Scheduled      polyethylene glycol pack packet  Commonly known as:  MIRALAX   17 g, Oral, Daily   Start Date:  7/5/2019     sennosides-docusate sodium 8.6-50 MG tablet  Commonly known as:  SENOKOT-S   2 tablets, Oral, Nightly         Continue These Medications      Instructions Start Date   aspirin 81 MG EC tablet   81 mg, Oral, Daily      AZULFIDINE PO   Oral, 2 Times Daily, Patient unsure of dose       SIMVASTATIN PO   Oral, Daily, Patient unsure of dose           Stop These Medications    Unable to find             Additional Instructions for the Follow-ups that You Need to Schedule     Discharge Follow-up with PCP   As directed       Currently Documented PCP:    Rachid العلي Jr., MD    PCP Phone Number:    [phone]     Follow Up Details:  pcp post rehab - Ortho per their recs - cards prn            Contact information for follow-up providers     Jaqueline Wagoner MD Follow up on 7/22/2019.    Specialty:  Orthopedic Surgery  Contact information:  [address]  [facility] [address]  [phone]             Rachid العلي Jr., MD .    Specialty:  Internal Medicine  Why:  pcp post rehab - Ortho per their recs - cards prn  Contact information:  [address]  [address]  [facility] [id number] [phone]                   Contact information for after-discharge care     Destination     [facility] .    Service:  Skilled Nursing  Contact information:  [address]  [facility] [address] [phone]                 Home Medical Care     [facility] .    Service:  Home Health Services  Contact information:  [address]  [facility] [id number] [phone]                           Test Results Pending at Discharge     Samuel Shultz MD  07/04/19  9:46 AM    Discharge time spent greater than 30 minutes.

## 2019-07-04 NOTE — PLAN OF CARE
Problem: Patient Care Overview  Goal: Plan of Care Review  Outcome: Ongoing (interventions implemented as appropriate)   07/04/19 0813   Coping/Psychosocial   Plan of Care Reviewed With patient   Plan of Care Review   Progress improving   OTHER   Outcome Summary C/O pain to lower back and bilat hips. Medicated with positive effect. Afebrile. No drainage from incison.        Problem: Fall Risk (Adult)  Goal: Absence of Fall  Outcome: Ongoing (interventions implemented as appropriate)      Problem: Skin Injury Risk (Adult)  Goal: Skin Health and Integrity  Outcome: Ongoing (interventions implemented as appropriate)      Problem: Fracture Orthopaedic (Adult)  Goal: Signs and Symptoms of Listed Potential Problems Will be Absent, Minimized or Managed (Fracture Orthopaedic)  Outcome: Ongoing (interventions implemented as appropriate)

## 2019-07-04 NOTE — PROGRESS NOTES
Patient: Fritz Flores  YOB: 1933     Date of Admission: 6/30/2019  2:53 AM Medical Record Number: [id number]     Attending Physician: Samuel Shultz MD    Status Post: Left FEMUR INTRAMEDULLARY NAILING/RODDINGPost Operative Day Number: 4    Subjective : No new orthopaedic complaints     Pain Relief: some relief with present medication.     Systemic Complaints: No Complaints  Vitals:    07/03/19 1900 07/03/19 2300 07/04/19 0406 07/04/19 0700   BP: 159/68 156/79 174/72 165/87   BP Location: Right arm Right arm Right arm Right arm   Patient Position: Lying Lying Lying Lying   Pulse:   75 78   Resp: 16 16 17 18   Temp: 98 °F (36.7 °C) 97.2 °F (36.2 °C)  97.7 °F (36.5 °C)   TempSrc: Oral Oral  Oral   SpO2: 98%  98% 97%   Weight:       Height:           Physical Exam: 85 y.o. male    General Appearance:       Alert, cooperative, in no acute distress                  Extremities:    Dressing Clean, Dry and Intact         Incision healthy without signs or symptoms of infections         No clinical sign of DVT        Able to do good movements of digits    Pulses:   Pulses palpable and equal bilaterally           Diagnostic Tests:     Results from last 7 days   Lab Units 07/04/19  0628 07/03/19  0536 07/02/19  0720 07/01/19  0751   WBC 10*3/mm3  --  7.26 10.11 7.06   HEMOGLOBIN g/dL 9.6* 7.5* 9.0* 9.0*   HEMATOCRIT % 27.7* 22.0* 27.3* 26.9*   PLATELETS 10*3/mm3  --  225 249 218     Results from last 7 days   Lab Units 07/03/19  0536 07/02/19  0720 07/01/19  0751   SODIUM mmol/L 132* 131* 132*   POTASSIUM mmol/L 4.0 4.1 4.6   CHLORIDE mmol/L 99 98 100   CO2 mmol/L 24.0 24.4 23.5   BUN mg/dL 21 21 18   CREATININE mg/dL 0.88 0.89 0.78   GLUCOSE mg/dL 113* 103* 126*   CALCIUM mg/dL 8.3* 8.7 8.2*     Results from last 7 days   Lab Units 06/30/19  0329   INR  1.16*   APTT seconds 28.6     No results found for: CRP  No results found for: SEDRATE  No results found for: URICACID  No results found for:  CRYSTAL  Microbiology Results (last 10 days)     ** No results found for the last 240 hours. **        Xr Chest 1 View    Result Date: 6/30/2019   No active disease by portable imaging.  This report was finalized on 6/30/2019 4:20 AM by Chino Grey M.D.      Xr Hip With Or Without Pelvis 2 - 3 View Left    Result Date: 6/30/2019  FINDINGS AND IMPRESSION: There is mild distention of multiple small bowel loops. While findings are favored to represent postoperative ileus, obstruction could appear similar and correlation with patient history as well as continued attention on follow-up with serial abdominal exams is recommended. This may be further evaluated with CT of the abdomen and pelvis versus small bowel follow-through if clinically indicated.  Post surgical changes from recent open reduction and internal fixation of a left proximal femur fracture with placement of an intramedullary tevin and dynamic hip screw are present. The hardware is intact. Alignment of the femoral neck is near-anatomic. There continues to be displacement of the lesser trochanter medially.  There is an intramedullary tevin and dynamic hip screw within the right femur. The distal locking screw is fractured, new since 07/08/2008. No displaced right hip fracture is visualized.  This report was finalized on 6/30/2019 5:18 PM by Dr. Oscar Silva M.D.      Xr Hip With Or Without Pelvis 2 - 3 View Left    Result Date: 6/30/2019  1. Proximal left femur fracture without dislocation.  This report was finalized on 6/30/2019 4:21 AM by Chino Grey M.D.              Current Medications:  Scheduled Meds:  aspirin 81 mg Oral Daily   atorvastatin 10 mg Oral Daily   enoxaparin 40 mg Subcutaneous Daily   famotidine 20 mg Oral BID AC   metoprolol tartrate 12.5 mg Oral Q12H   polyethylene glycol 17 g Oral Daily   sennosides-docusate sodium 2 tablet Oral Nightly   sodium chloride 3 mL Intravenous Q12H     Continuous Infusions:   PRN Meds:.•  acetaminophen  •   bisacodyl  •  docusate sodium  •  HYDROcodone-acetaminophen  •  Morphine  •  ondansetron  •  [COMPLETED] Insert peripheral IV **AND** sodium chloride  •  sodium chloride    Assessment: Status post  Procedure(s):  FEMUR INTRAMEDULLARY NAILING/RODDING    Patient Active Problem List   Diagnosis   • Closed intertrochanteric fracture of hip, left, initial encounter (CMS/[facility])   • Closed comminuted intertrochanteric fracture of proximal end of left femur (CMS/[facility])   • Ulcerative colitis (CMS/[facility])   • Coronary artery disease   • Hyponatremia   • Postoperative anemia due to acute blood loss       PLAN:   Continues current post-op course  Mobilize with PT as tolerated per protocol    Weight Bearing: WBAT  Discharge Plan: OK to plan for discharge   from orthopadic perspective.      Jaqueline Wagoner MD    Date: 7/4/2019    Time: 1:47 PM

## 2019-07-05 NOTE — PROGRESS NOTES
Case Management Discharge Note    Final Note: Pt dc'd to [facility]     Destination - Selection Complete      Service Provider Request Status Selected Services Address Phone Number Fax Number    [facility] Selected Skilled Nursing [address] [facility] [address] [phone]      Durable Medical Equipment      No service has been selected for the patient.      Dialysis/Infusion      No service has been selected for the patient.      Home Medical Care - Selection Complete      Service Provider Request Status Selected Services Address Phone Number Fax Number    [facility] Selected Home Health Services [address] [address] [phone]      Therapy      No service has been selected for the patient.      Community Resources      No service has been selected for the patient.        Transportation Services  Ambulance: [facility]    Final Discharge Disposition Code: 03 - skilled nursing facility (SNF)

## 2021-06-11 ENCOUNTER — APPOINTMENT (OUTPATIENT)
Dept: GENERAL RADIOLOGY | Facility: HOSPITAL | Age: 86
End: 2021-06-11

## 2021-06-11 ENCOUNTER — HOSPITAL ENCOUNTER (EMERGENCY)
Facility: HOSPITAL | Age: 86
Discharge: HOME | End: 2021-06-11
Attending: EMERGENCY MEDICINE | Admitting: EMERGENCY MEDICINE

## 2021-06-11 VITALS
DIASTOLIC BLOOD PRESSURE: 74 MMHG | BODY MASS INDEX: 23.86 KG/M2 | SYSTOLIC BLOOD PRESSURE: 155 MMHG | WEIGHT: 180 LBS | HEART RATE: 73 BPM | TEMPERATURE: 97.6 F | RESPIRATION RATE: 16 BRPM | OXYGEN SATURATION: 98 % | HEIGHT: 73 IN

## 2021-06-11 DIAGNOSIS — S50.12XA CONTUSION OF LEFT FOREARM, INITIAL ENCOUNTER: ICD-10-CM
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
## 2021-06-11 DIAGNOSIS — S51.812A SKIN TEAR OF LEFT FOREARM WITHOUT COMPLICATION, INITIAL ENCOUNTER: Primary | ICD-10-CM

## 2021-06-11 PROCEDURE — 73090 X-RAY EXAM OF FOREARM: CPT

## 2021-06-11 PROCEDURE — 99283 EMERGENCY DEPT VISIT LOW MDM: CPT

## 2021-06-11 NOTE — ED NOTES
Pt to triage via [facility] [phone] with c/o unwitnessed fall. Pt tripped out of bathroom and hit doorframe - c/o pain to left arm.  EMS states pt's son monitors parents and he was notified quickly of fall.  Pt did not hit head, no LOC, does not take blood thinners.  Pt has lower extremity edema, but this is chronic, per ems.  Pt wearing mask per ems.  Triage personnel wore appropriate PPE       Juany Osborn RN  06/11/21 0636

## 2021-06-11 NOTE — ED NOTES
This RN wearing all appropriate PPE during patient encounter. Hand hygiene performed before and during entering room.       Radha Glasgow, ALEX  06/11/21 2465

## 2021-06-11 NOTE — DISCHARGE INSTRUCTIONS
Keep the dressing in place for 2 days and then gently clean the wound every day.  Apply an over-the-counter antibiotic ointment on the wounds.  Follow-up with the wound care center.  Call today for an appointment.  He is return to the emergency department if symptoms worsen.

## 2021-06-11 NOTE — ED NOTES
This RN wearing all appropriate PPE during patient encounter. Hand hygiene performed before and during entering room.       Radha Glasgow, ALEX  06/11/21 1116

## 2021-07-25 ENCOUNTER — HOSPITAL ENCOUNTER (EMERGENCY)
Facility: HOSPITAL | Age: 86
Discharge: HOME | End: 2021-07-25
Attending: EMERGENCY MEDICINE | Admitting: EMERGENCY MEDICINE

## 2021-07-25 ENCOUNTER — APPOINTMENT (OUTPATIENT)
Dept: GENERAL RADIOLOGY | Facility: HOSPITAL | Age: 86
End: 2021-07-25

## 2021-07-25 VITALS
BODY MASS INDEX: 23.19 KG/M2 | DIASTOLIC BLOOD PRESSURE: 90 MMHG | TEMPERATURE: 97.9 F | SYSTOLIC BLOOD PRESSURE: 163 MMHG | WEIGHT: 175 LBS | OXYGEN SATURATION: 82 % | RESPIRATION RATE: 18 BRPM | HEIGHT: 73 IN | HEART RATE: 84 BPM

## 2021-07-25 DIAGNOSIS — R53.1 WEAKNESS: Primary | ICD-10-CM

## 2021-07-25 DIAGNOSIS — T67.5XXA HEAT EXHAUSTION, INITIAL ENCOUNTER: ICD-10-CM

## 2021-07-25 LAB
ALBUMIN SERPL-MCNC: 3.7 G/DL (ref 3.5–5.2)
ALBUMIN/GLOB SERPL: 1.9 G/DL
ALP SERPL-CCNC: 62 U/L (ref 39–117)
ALT SERPL W P-5'-P-CCNC: 6 U/L (ref 1–41)
ANION GAP SERPL CALCULATED.3IONS-SCNC: 9.1 MMOL/L (ref 5–15)
AST SERPL-CCNC: 11 U/L (ref 1–40)
BACTERIA UR QL AUTO: ABNORMAL /HPF
BASOPHILS # BLD AUTO: 0.05 10*3/MM3 (ref 0–0.2)
BASOPHILS NFR BLD AUTO: 0.5 % (ref 0–1.5)
BILIRUB SERPL-MCNC: 0.4 MG/DL (ref 0–1.2)
BILIRUB UR QL STRIP: NEGATIVE
BUN SERPL-MCNC: 20 MG/DL (ref 8–23)
BUN/CREAT SERPL: 16.9 (ref 7–25)
CALCIUM SPEC-SCNC: 8.3 MG/DL (ref 8.6–10.5)
CHLORIDE SERPL-SCNC: 106 MMOL/L (ref 98–107)
CLARITY UR: CLEAR
CO2 SERPL-SCNC: 23.9 MMOL/L (ref 22–29)
COLOR UR: YELLOW
CREAT SERPL-MCNC: 1.18 MG/DL (ref 0.76–1.27)
DEPRECATED RDW RBC AUTO: 44 FL (ref 37–54)
EOSINOPHIL # BLD AUTO: 0.07 10*3/MM3 (ref 0–0.4)
EOSINOPHIL NFR BLD AUTO: 0.8 % (ref 0.3–6.2)
ERYTHROCYTE [DISTWIDTH] IN BLOOD BY AUTOMATED COUNT: 12 % (ref 12.3–15.4)
GFR SERPL CREATININE-BSD FRML MDRD: 58 ML/MIN/1.73
GLOBULIN UR ELPH-MCNC: 1.9 GM/DL
GLUCOSE SERPL-MCNC: 96 MG/DL (ref 65–99)
GLUCOSE UR STRIP-MCNC: NEGATIVE MG/DL
HCT VFR BLD AUTO: 34 % (ref 37.5–51)
HGB BLD-MCNC: 11.6 G/DL (ref 13–17.7)
HGB UR QL STRIP.AUTO: NEGATIVE
HYALINE CASTS UR QL AUTO: ABNORMAL /LPF
IMM GRANULOCYTES # BLD AUTO: 0.03 10*3/MM3 (ref 0–0.05)
IMM GRANULOCYTES NFR BLD AUTO: 0.3 % (ref 0–0.5)
KETONES UR QL STRIP: NEGATIVE
LEUKOCYTE ESTERASE UR QL STRIP.AUTO: ABNORMAL
LYMPHOCYTES # BLD AUTO: 1.09 10*3/MM3 (ref 0.7–3.1)
LYMPHOCYTES NFR BLD AUTO: 11.8 % (ref 19.6–45.3)
MCH RBC QN AUTO: 33.9 PG (ref 26.6–33)
MCHC RBC AUTO-ENTMCNC: 34.1 G/DL (ref 31.5–35.7)
MCV RBC AUTO: 99.4 FL (ref 79–97)
MONOCYTES # BLD AUTO: 0.65 10*3/MM3 (ref 0.1–0.9)
MONOCYTES NFR BLD AUTO: 7 % (ref 5–12)
NEUTROPHILS NFR BLD AUTO: 7.34 10*3/MM3 (ref 1.7–7)
NEUTROPHILS NFR BLD AUTO: 79.6 % (ref 42.7–76)
NITRITE UR QL STRIP: NEGATIVE
NRBC BLD AUTO-RTO: 0 /100 WBC (ref 0–0.2)
NT-PROBNP SERPL-MCNC: 264.3 PG/ML (ref 0–1800)
PH UR STRIP.AUTO: 6 [PH] (ref 5–8)
PLATELET # BLD AUTO: 242 10*3/MM3 (ref 140–450)
PMV BLD AUTO: 9.2 FL (ref 6–12)
POTASSIUM SERPL-SCNC: 4.4 MMOL/L (ref 3.5–5.2)
PROT SERPL-MCNC: 5.6 G/DL (ref 6–8.5)
PROT UR QL STRIP: NEGATIVE
QT INTERVAL: 436 MS
RBC # BLD AUTO: 3.42 10*6/MM3 (ref 4.14–5.8)
RBC # UR: ABNORMAL /HPF
REF LAB TEST METHOD: ABNORMAL
SODIUM SERPL-SCNC: 139 MMOL/L (ref 136–145)
SP GR UR STRIP: 1.01 (ref 1–1.03)
SQUAMOUS #/AREA URNS HPF: ABNORMAL /HPF
TROPONIN T SERPL-MCNC: <0.01 NG/ML (ref 0–0.03)
UROBILINOGEN UR QL STRIP: ABNORMAL
WBC # BLD AUTO: 9.23 10*3/MM3 (ref 3.4–10.8)
WBC UR QL AUTO: ABNORMAL /HPF

## 2021-07-25 PROCEDURE — 93005 ELECTROCARDIOGRAM TRACING: CPT | Performed by: PHYSICIAN ASSISTANT

## 2021-07-25 PROCEDURE — 93010 ELECTROCARDIOGRAM REPORT: CPT | Performed by: INTERNAL MEDICINE

## 2021-07-25 PROCEDURE — 81001 URINALYSIS AUTO W/SCOPE: CPT | Performed by: PHYSICIAN ASSISTANT

## 2021-07-25 PROCEDURE — 99283 EMERGENCY DEPT VISIT LOW MDM: CPT

## 2021-07-25 PROCEDURE — 83880 ASSAY OF NATRIURETIC PEPTIDE: CPT | Performed by: PHYSICIAN ASSISTANT

## 2021-07-25 PROCEDURE — 85025 COMPLETE CBC W/AUTO DIFF WBC: CPT | Performed by: PHYSICIAN ASSISTANT

## 2021-07-25 PROCEDURE — 99284 EMERGENCY DEPT VISIT MOD MDM: CPT

## 2021-07-25 PROCEDURE — P9612 CATHETERIZE FOR URINE SPEC: HCPCS

## 2021-07-25 PROCEDURE — 71045 X-RAY EXAM CHEST 1 VIEW: CPT

## 2021-07-25 PROCEDURE — 80053 COMPREHEN METABOLIC PANEL: CPT | Performed by: PHYSICIAN ASSISTANT

## 2021-07-25 PROCEDURE — 84484 ASSAY OF TROPONIN QUANT: CPT | Performed by: PHYSICIAN ASSISTANT

## 2021-09-12 ENCOUNTER — APPOINTMENT (OUTPATIENT)
Dept: GENERAL RADIOLOGY | Facility: HOSPITAL | Age: 86
End: 2021-09-12

## 2021-09-12 ENCOUNTER — HOSPITAL ENCOUNTER (EMERGENCY)
Facility: HOSPITAL | Age: 86
Discharge: HOME | End: 2021-09-13
Attending: EMERGENCY MEDICINE | Admitting: EMERGENCY MEDICINE

## 2021-09-12 DIAGNOSIS — M25.571 ACUTE BILATERAL ANKLE PAIN: ICD-10-CM

## 2021-09-12 DIAGNOSIS — R53.1 GENERAL WEAKNESS: ICD-10-CM

## 2021-09-12 DIAGNOSIS — D72.829 LEUKOCYTOSIS, UNSPECIFIED TYPE: ICD-10-CM

## 2021-09-12 DIAGNOSIS — I95.1 ORTHOSTASIS: ICD-10-CM

## 2021-09-12 DIAGNOSIS — M25.572 ACUTE BILATERAL ANKLE PAIN: ICD-10-CM

## 2021-09-12 DIAGNOSIS — N17.9 AKI (ACUTE KIDNEY INJURY) (HCC): Primary | ICD-10-CM

## 2021-09-12 DIAGNOSIS — M25.551 RIGHT HIP PAIN: ICD-10-CM

## 2021-09-12 PROCEDURE — 93005 ELECTROCARDIOGRAM TRACING: CPT | Performed by: EMERGENCY MEDICINE

## 2021-09-12 PROCEDURE — 71045 X-RAY EXAM CHEST 1 VIEW: CPT

## 2021-09-12 PROCEDURE — 93010 ELECTROCARDIOGRAM REPORT: CPT | Performed by: INTERNAL MEDICINE

## 2021-09-12 PROCEDURE — 99284 EMERGENCY DEPT VISIT MOD MDM: CPT

## 2021-09-12 PROCEDURE — 73502 X-RAY EXAM HIP UNI 2-3 VIEWS: CPT

## 2021-09-12 PROCEDURE — 73610 X-RAY EXAM OF ANKLE: CPT

## 2021-09-13 VITALS
RESPIRATION RATE: 20 BRPM | SYSTOLIC BLOOD PRESSURE: 144 MMHG | DIASTOLIC BLOOD PRESSURE: 65 MMHG | HEART RATE: 68 BPM | OXYGEN SATURATION: 99 % | TEMPERATURE: 97.7 F

## 2021-09-13 LAB
ALBUMIN SERPL-MCNC: 4.2 G/DL (ref 3.5–5.2)
ALBUMIN/GLOB SERPL: 1.8 G/DL
ALP SERPL-CCNC: 62 U/L (ref 39–117)
ALT SERPL W P-5'-P-CCNC: 9 U/L (ref 1–41)
ANION GAP SERPL CALCULATED.3IONS-SCNC: 11.5 MMOL/L (ref 5–15)
AST SERPL-CCNC: 12 U/L (ref 1–40)
BACTERIA UR QL AUTO: NORMAL /HPF
BASOPHILS # BLD AUTO: 0.04 10*3/MM3 (ref 0–0.2)
BASOPHILS NFR BLD AUTO: 0.3 % (ref 0–1.5)
BILIRUB SERPL-MCNC: 0.5 MG/DL (ref 0–1.2)
BILIRUB UR QL STRIP: NEGATIVE
BUN SERPL-MCNC: 27 MG/DL (ref 8–23)
BUN/CREAT SERPL: 18.5 (ref 7–25)
CALCIUM SPEC-SCNC: 9 MG/DL (ref 8.6–10.5)
CHLORIDE SERPL-SCNC: 108 MMOL/L (ref 98–107)
CLARITY UR: CLEAR
CO2 SERPL-SCNC: 23.5 MMOL/L (ref 22–29)
COLOR UR: YELLOW
CREAT SERPL-MCNC: 1.46 MG/DL (ref 0.76–1.27)
D-LACTATE SERPL-SCNC: 1.1 MMOL/L (ref 0.5–2)
DEPRECATED RDW RBC AUTO: 43.4 FL (ref 37–54)
EOSINOPHIL # BLD AUTO: 0.09 10*3/MM3 (ref 0–0.4)
EOSINOPHIL NFR BLD AUTO: 0.7 % (ref 0.3–6.2)
ERYTHROCYTE [DISTWIDTH] IN BLOOD BY AUTOMATED COUNT: 11.9 % (ref 12.3–15.4)
GFR SERPL CREATININE-BSD FRML MDRD: 46 ML/MIN/1.73
GLOBULIN UR ELPH-MCNC: 2.3 GM/DL
GLUCOSE SERPL-MCNC: 113 MG/DL (ref 65–99)
GLUCOSE UR STRIP-MCNC: NEGATIVE MG/DL
HCT VFR BLD AUTO: 35.2 % (ref 37.5–51)
HGB BLD-MCNC: 12 G/DL (ref 13–17.7)
HGB UR QL STRIP.AUTO: NEGATIVE
HYALINE CASTS UR QL AUTO: NORMAL /LPF
IMM GRANULOCYTES # BLD AUTO: 0.14 10*3/MM3 (ref 0–0.05)
IMM GRANULOCYTES NFR BLD AUTO: 1.1 % (ref 0–0.5)
KETONES UR QL STRIP: ABNORMAL
LEUKOCYTE ESTERASE UR QL STRIP.AUTO: ABNORMAL
LYMPHOCYTES # BLD AUTO: 1.01 10*3/MM3 (ref 0.7–3.1)
LYMPHOCYTES NFR BLD AUTO: 7.6 % (ref 19.6–45.3)
MAGNESIUM SERPL-MCNC: 2.2 MG/DL (ref 1.6–2.4)
MCH RBC QN AUTO: 33.9 PG (ref 26.6–33)
MCHC RBC AUTO-ENTMCNC: 34.1 G/DL (ref 31.5–35.7)
MCV RBC AUTO: 99.4 FL (ref 79–97)
MONOCYTES # BLD AUTO: 0.95 10*3/MM3 (ref 0.1–0.9)
MONOCYTES NFR BLD AUTO: 7.2 % (ref 5–12)
NEUTROPHILS NFR BLD AUTO: 11.02 10*3/MM3 (ref 1.7–7)
NEUTROPHILS NFR BLD AUTO: 83.1 % (ref 42.7–76)
NITRITE UR QL STRIP: NEGATIVE
NRBC BLD AUTO-RTO: 0 /100 WBC (ref 0–0.2)
NT-PROBNP SERPL-MCNC: 347.8 PG/ML (ref 0–1800)
PH UR STRIP.AUTO: <=5 [PH] (ref 5–8)
PLATELET # BLD AUTO: 245 10*3/MM3 (ref 140–450)
PMV BLD AUTO: 9.4 FL (ref 6–12)
POTASSIUM SERPL-SCNC: 4.7 MMOL/L (ref 3.5–5.2)
PROT SERPL-MCNC: 6.5 G/DL (ref 6–8.5)
PROT UR QL STRIP: ABNORMAL
QT INTERVAL: 403 MS
RBC # BLD AUTO: 3.54 10*6/MM3 (ref 4.14–5.8)
RBC # UR: NORMAL /HPF
REF LAB TEST METHOD: NORMAL
SARS-COV-2 RNA RESP QL NAA+PROBE: NOT DETECTED
SODIUM SERPL-SCNC: 143 MMOL/L (ref 136–145)
SP GR UR STRIP: 1.02 (ref 1–1.03)
SQUAMOUS #/AREA URNS HPF: NORMAL /HPF
T4 FREE SERPL-MCNC: 1.18 NG/DL (ref 0.93–1.7)
TROPONIN T SERPL-MCNC: 0.02 NG/ML (ref 0–0.03)
TSH SERPL DL<=0.05 MIU/L-ACNC: 8.5 UIU/ML (ref 0.27–4.2)
UROBILINOGEN UR QL STRIP: ABNORMAL
WBC # BLD AUTO: 13.25 10*3/MM3 (ref 3.4–10.8)
WBC UR QL AUTO: NORMAL /HPF

## 2021-09-13 PROCEDURE — U0003 INFECTIOUS AGENT DETECTION BY NUCLEIC ACID (DNA OR RNA); SEVERE ACUTE RESPIRATORY SYNDROME CORONAVIRUS 2 (SARS-COV-2) (CORONAVIRUS DISEASE [COVID-19]), AMPLIFIED PROBE TECHNIQUE, MAKING USE OF HIGH THROUGHPUT TECHNOLOGIES AS DESCRIBED BY CMS-2020-01-R: HCPCS | Performed by: EMERGENCY MEDICINE

## 2021-09-13 PROCEDURE — 87040 BLOOD CULTURE FOR BACTERIA: CPT | Performed by: EMERGENCY MEDICINE

## 2021-09-13 PROCEDURE — U0005 INFEC AGEN DETEC AMPLI PROBE: HCPCS | Performed by: EMERGENCY MEDICINE

## 2021-09-13 PROCEDURE — 96374 THER/PROPH/DIAG INJ IV PUSH: CPT

## 2021-09-13 PROCEDURE — 80053 COMPREHEN METABOLIC PANEL: CPT | Performed by: EMERGENCY MEDICINE

## 2021-09-13 PROCEDURE — P9612 CATHETERIZE FOR URINE SPEC: HCPCS

## 2021-09-13 PROCEDURE — 83880 ASSAY OF NATRIURETIC PEPTIDE: CPT | Performed by: EMERGENCY MEDICINE

## 2021-09-13 PROCEDURE — 83605 ASSAY OF LACTIC ACID: CPT | Performed by: EMERGENCY MEDICINE

## 2021-09-13 PROCEDURE — 81001 URINALYSIS AUTO W/SCOPE: CPT | Performed by: EMERGENCY MEDICINE

## 2021-09-13 PROCEDURE — 96361 HYDRATE IV INFUSION ADD-ON: CPT

## 2021-09-13 PROCEDURE — 84439 ASSAY OF FREE THYROXINE: CPT | Performed by: EMERGENCY MEDICINE

## 2021-09-13 PROCEDURE — 25010000002 MORPHINE PER 10 MG: Performed by: EMERGENCY MEDICINE

## 2021-09-13 PROCEDURE — 84484 ASSAY OF TROPONIN QUANT: CPT | Performed by: EMERGENCY MEDICINE

## 2021-09-13 PROCEDURE — 83735 ASSAY OF MAGNESIUM: CPT | Performed by: EMERGENCY MEDICINE

## 2021-09-13 PROCEDURE — 85025 COMPLETE CBC W/AUTO DIFF WBC: CPT | Performed by: EMERGENCY MEDICINE

## 2021-09-13 PROCEDURE — 84443 ASSAY THYROID STIM HORMONE: CPT | Performed by: EMERGENCY MEDICINE

## 2021-09-13 RX ORDER — MORPHINE SULFATE 2 MG/ML
2 INJECTION, SOLUTION INTRAMUSCULAR; INTRAVENOUS ONCE
Status: COMPLETED | OUTPATIENT
Start: 2021-09-13 | End: 2021-09-13

## 2021-09-13 RX ADMIN — SODIUM CHLORIDE 500 ML: 9 INJECTION, SOLUTION INTRAVENOUS at 01:12

## 2021-09-13 RX ADMIN — MORPHINE SULFATE 2 MG: 2 INJECTION, SOLUTION INTRAMUSCULAR; INTRAVENOUS at 00:31

## 2021-09-13 NOTE — ED NOTES
Patient lost balance and fell while attempting to sit on the couch. Complaining of right hip and knee pain. Patient denies hitting his head and blood thinner use. Alert and oriented x 4. Patient in a mask and this RN wearing appropriate PPE.      Steph Hull, RN  09/12/21 9282

## 2021-09-13 NOTE — ED PROVIDER NOTES
EMERGENCY DEPARTMENT ENCOUNTER  Patient was placed in face mask in first look and the following protective measures were taken unless  documented below in the ED course. Patient was wearing facemask when I entered the room and throughout our encounter. I wore full protective equipment throughout this patient encounter including a N95, eye shield, and gloves. Hand hygiene was performed before donning protective equipment and after removal when leaving the room.    Room Number:  22/22  Date of encounter:  9/13/2021  PCP: Rachid العلي Jr., MD    HPI:  Context: Fritz Flores is a 87 y.o. male who presents to the ED c/o chief complaint of fall with right hip pain.  Patient reports that he was attempting to sit on the couch when he slipped and fell.  Patient complains of dull pain in his right hip.  Patient reports he was unable stand after the fall, called for EMS.  Patient denies any head injury, no neck or back pain.  Patient also complaining of mild dull pain in bilateral ankles.  Patient reports that prior to the fall he has been having progressive weakness for last 3 to 4 weeks.  Patient reports that he normally ambulates with a walker.  Patient denies any vomiting or diarrhea, reports he has been eating drinking normally.  No cough, no runny nose congestion or sore throat.  Patient denies any abdominal pain.  Patient does not endorse increased urination as well as increased urgency, no dysuria, no hematuria.  Patient denies any fever shakes chills or night sweats.  No chest pain or shortness of breath.    MEDICAL HISTORY REVIEW  Reviewed in EPIC    PAST MEDICAL HISTORY  Active Ambulatory Problems     Diagnosis Date Noted   • Closed intertrochanteric fracture of hip, left, initial encounter (CMS/[facility]) 06/30/2019   • Closed comminuted intertrochanteric fracture of proximal end of left femur (CMS/[facility]) 06/30/2019   • Ulcerative colitis (CMS/[facility]) 07/01/2019   • Coronary artery disease 07/01/2019   • Hyponatremia  07/01/2019   • Postoperative anemia due to acute blood loss 07/01/2019     Resolved Ambulatory Problems     Diagnosis Date Noted   • Metabolic encephalopathy 07/01/2019     Past Medical History:   Diagnosis Date   • Colon polyps    • Hyperlipemia    • Myocardial infarction (CMS/HCC) 1989       PAST SURGICAL HISTORY  Past Surgical History:   Procedure Laterality Date   • CARDIAC SURGERY  1989    CABG X 1   • COLONOSCOPY  2014   • COLONOSCOPY N/A 8/23/2016    Procedure: COLONOSCOPY INTO CECUM WITH COLD BIOPSIES;  Surgeon: Aaron Gregg MD;  Location: [facility] ENDOSCOPY;  Service:    • FEMUR IM NAILING/RODDING Left 6/30/2019    Procedure: FEMUR INTRAMEDULLARY NAILING/RODDING;  Surgeon: Jaqueline Wagoner MD;  Location: [facility] MAIN OR;  Service: Orthopedics   • FRACTURE SURGERY      upper leg w/hardware   • HIP SURGERY Left     x 3       FAMILY HISTORY  No family history on file.    SOCIAL HISTORY  Social History     Socioeconomic History   • Marital status:      Spouse name: Not on file   • Number of children: Not on file   • Years of education: Not on file   • Highest education level: Not on file   Tobacco Use   • Smoking status: Never Smoker   • Smokeless tobacco: Never Used   Substance and Sexual Activity   • Alcohol use: No   • Drug use: No   • Sexual activity: Defer       ALLERGIES  Penicillins    The patient's allergies have been reviewed    REVIEW OF SYSTEMS  All systems reviewed and negative except for those discussed in HPI.     PHYSICAL EXAM  I have reviewed the triage vital signs and nursing notes.  ED Triage Vitals [09/12/21 2248]   Temp Heart Rate Resp BP SpO2   97.7 °F (36.5 °C) 90 20 156/62 98 %      Temp src Heart Rate Source Patient Position BP Location FiO2 (%)   -- -- -- -- --       General: No acute distress.  HENT: NCAT, PERRL, Nares patent.  Eyes: no scleral icterus.  Neck: trachea midline, no ROM limitations.  CV: regular rhythm, regular rate.  Respiratory: normal effort,  CTAB.  Abdomen: soft, nondistended, NTTP, no rebound tenderness, no guarding or rigidity.  : deferred.  Musculoskeletal: no deformity.  Pelvis is stable.  Patient has right leg at normal length, externally rotated, is able to internally rotated.  Patient has tenderness at right lateral hip, no crepitus or deformity.  Patient is able to perform straight leg raise although with difficulty.  Patient denies any knee pain, knee is nontender, no swelling.  Patient has nonpitting edema in bilateral ankles with mild tenderness to palpation.  Skin is intact.  Bilateral lower extremities show: Ankle/toes up/down, sensation intact light touch all peripheral nerve distributions, 2+ dorsalis pedis and posterior tibial pulses, brisk cap refill all digits.  Neuro: Alert and oriented, no facial droop, speech clear, no dysarthria or aphasia, moves all extremities well, sensation intact light touch all extremities, no focal deficits  Skin: warm, dry.    LAB RESULTS  Recent Results (from the past 24 hour(s))   ECG 12 Lead    Collection Time: 09/12/21 11:54 PM   Result Value Ref Range    QT Interval 403 ms   Comprehensive Metabolic Panel    Collection Time: 09/13/21 12:06 AM    Specimen: Blood   Result Value Ref Range    Glucose 113 (H) 65 - 99 mg/dL    BUN 27 (H) 8 - 23 mg/dL    Creatinine 1.46 (H) 0.76 - 1.27 mg/dL    Sodium 143 136 - 145 mmol/L    Potassium 4.7 3.5 - 5.2 mmol/L    Chloride 108 (H) 98 - 107 mmol/L    CO2 23.5 22.0 - 29.0 mmol/L    Calcium 9.0 8.6 - 10.5 mg/dL    Total Protein 6.5 6.0 - 8.5 g/dL    Albumin 4.20 3.50 - 5.20 g/dL    ALT (SGPT) 9 1 - 41 U/L    AST (SGOT) 12 1 - 40 U/L    Alkaline Phosphatase 62 39 - 117 U/L    Total Bilirubin 0.5 0.0 - 1.2 mg/dL    eGFR Non African Amer 46 (L) >60 mL/min/1.73    Globulin 2.3 gm/dL    A/G Ratio 1.8 g/dL    BUN/Creatinine Ratio 18.5 7.0 - 25.0    Anion Gap 11.5 5.0 - 15.0 mmol/L   Troponin    Collection Time: 09/13/21 12:06 AM    Specimen: Blood   Result Value Ref Range     Troponin T 0.018 0.000 - 0.030 ng/mL   BNP    Collection Time: 09/13/21 12:06 AM    Specimen: Blood   Result Value Ref Range    proBNP 347.8 0.0-1,800.0 pg/mL   T4, Free    Collection Time: 09/13/21 12:06 AM    Specimen: Blood   Result Value Ref Range    Free T4 1.18 0.93 - 1.70 ng/dL   TSH    Collection Time: 09/13/21 12:06 AM    Specimen: Blood   Result Value Ref Range    TSH 8.500 (H) 0.270 - 4.200 uIU/mL   Magnesium    Collection Time: 09/13/21 12:06 AM    Specimen: Blood   Result Value Ref Range    Magnesium 2.2 1.6 - 2.4 mg/dL   CBC Auto Differential    Collection Time: 09/13/21 12:06 AM    Specimen: Blood   Result Value Ref Range    WBC 13.25 (H) 3.40 - 10.80 10*3/mm3    RBC 3.54 (L) 4.14 - 5.80 10*6/mm3    Hemoglobin 12.0 (L) 13.0 - 17.7 g/dL    Hematocrit 35.2 (L) 37.5 - 51.0 %    MCV 99.4 (H) 79.0 - 97.0 fL    MCH 33.9 (H) 26.6 - 33.0 pg    MCHC 34.1 31.5 - 35.7 g/dL    RDW 11.9 (L) 12.3 - 15.4 %    RDW-SD 43.4 37.0 - 54.0 fl    MPV 9.4 6.0 - 12.0 fL    Platelets 245 140 - 450 10*3/mm3    Neutrophil % 83.1 (H) 42.7 - 76.0 %    Lymphocyte % 7.6 (L) 19.6 - 45.3 %    Monocyte % 7.2 5.0 - 12.0 %    Eosinophil % 0.7 0.3 - 6.2 %    Basophil % 0.3 0.0 - 1.5 %    Immature Grans % 1.1 (H) 0.0 - 0.5 %    Neutrophils, Absolute 11.02 (H) 1.70 - 7.00 10*3/mm3    Lymphocytes, Absolute 1.01 0.70 - 3.10 10*3/mm3    Monocytes, Absolute 0.95 (H) 0.10 - 0.90 10*3/mm3    Eosinophils, Absolute 0.09 0.00 - 0.40 10*3/mm3    Basophils, Absolute 0.04 0.00 - 0.20 10*3/mm3    Immature Grans, Absolute 0.14 (H) 0.00 - 0.05 10*3/mm3    nRBC 0.0 0.0 - 0.2 /100 WBC   Urinalysis With Microscopic If Indicated (No Culture) - Urine, Catheter    Collection Time: 09/13/21 12:09 AM    Specimen: Urine, Catheter   Result Value Ref Range    Color, UA Yellow Yellow, Straw    Appearance, UA Clear Clear    pH, UA <=5.0 5.0 - 8.0    Specific Gravity, UA 1.022 1.005 - 1.030    Glucose, UA Negative Negative    Ketones, UA Trace (A) Negative     Bilirubin, UA Negative Negative    Blood, UA Negative Negative    Protein, UA Trace (A) Negative    Leuk Esterase, UA Trace (A) Negative    Nitrite, UA Negative Negative    Urobilinogen, UA 1.0 E.U./dL 0.2 - 1.0 E.U./dL   Urinalysis, Microscopic Only - Urine, Catheter    Collection Time: 09/13/21 12:09 AM    Specimen: Urine, Catheter   Result Value Ref Range    RBC, UA 0-2 None Seen, 0-2 /HPF    WBC, UA 0-2 None Seen, 0-2 /HPF    Bacteria, UA None Seen None Seen /HPF    Squamous Epithelial Cells, UA 0-2 None Seen, 0-2 /HPF    Hyaline Casts, UA 0-2 None Seen /LPF    Methodology Automated Microscopy        I ordered the above labs and reviewed the results.    RADIOLOGY  XR Chest 1 View    Result Date: 9/13/2021  Patient: FELIPA HERBERT  Time Out: 01:06 Exam(s): FILM CXR 1 VIEW EXAM:   XR Chest, 1 View CLINICAL HISTORY:    Reason for exam: gen weakness. TECHNIQUE:   Frontal view of the chest. COMPARISON:   Chest x-ray 7 25 2021 at 1905 hrs. FINDINGS:   Lungs:  Unremarkable.  No consolidation.   Pleural space:  Unremarkable.  No pneumothorax.   Heart:  Stable post CABG surgical changes with the lung bases not fully included in the field-of-view.   Mediastinum:  Unremarkable.   Bones joints:  Unremarkable. IMPRESSION:       No acute cardiopulmonary process.     Electronically signed by Jason Ferrera MD on 09-13-21 at 0106    XR Ankle 3+ View Bilateral    Result Date: 9/13/2021  Patient: FELIPA HERBERT  Time Out: 01:08 Exam(s): FILM BILATERAL ANKLES EXAM:   XR Bilateral Ankles Complete, 3 or More Views CLINICAL HISTORY:    Reason for exam: pain trauma. TECHNIQUE:   Frontal, lateral and oblique views of the bilateral ankles. COMPARISON:   No relevant prior studies available. FINDINGS:   Bones joints:  No fracture or malalignment.  Joint spaces are preserved.   No aggressive appearing bony lesions are seen.  The bones are diffusely demineralized.   Soft tissues:  Soft tissues show diffuse soft tissue swelling of the  ankles.  No abnormal soft tissue gas.  Arterial vascular calcifications noted. IMPRESSION:       No acute osseous abnormality in the ankles but soft tissue swelling is present, potentially reflecting lymphedema or dependent edema.     Electronically signed by Jason Ferrera MD on 09-13-21 at 0108    XR Hip With or Without Pelvis 2 - 3 View Right    Result Date: 9/13/2021  Patient: FELIPA HERBERT  Time Out: 01:14 Exam(s): FILM HIP + PELVIS EXAM:   XR Bilateral Hips With Pelvis When Performed, 2 Views CLINICAL HISTORY:    Reason for exam: pain trauma. TECHNIQUE:   Frontal view of the bilateral hips with pelvis when performed. COMPARISON:   Pelvis and right hip x-ray of 6 30 2019 FINDINGS:   Bones joints:  Bilateral intramedullary rods with dynamic hip screw fixation proximally and locking screws distally are present.  Fracture of the distal right locking screw is redemonstrated.  There is evidence of good osseous bridging of the bilateral proximal femoral fractures.  No acute fracture or dislocation is identified.  The bones are demineralized.   There is some lucency along the lateral cortex of the proximal right femoral diaphysis could reflect incomplete osseous healing versus remodeling from a stress reaction.  It is overall similar to the previous examination.   Soft tissues:  The soft tissues show moderate stool throughout the visualized large bowel. IMPRESSION:       No acute osseous abnormality status post bilateral femoral fixation.     Electronically signed by Jason Ferrera MD on 09-13-21 at 0114      I ordered the above noted radiological studies. I reviewed the images and results. I agree with the radiologist interpretation.    PROCEDURES  Procedures    MEDICATIONS GIVEN IN ER  Medications   sodium chloride 0.9 % bolus 500 mL (500 mL Intravenous New Bag 9/13/21 0112)   morphine injection 2 mg (2 mg Intravenous Given 9/13/21 0031)       PROGRESS, DATA ANALYSIS, CONSULTS, AND MEDICAL DECISION  MAKING  A complete history and physical exam have been performed.  All available laboratory and imaging results have been reviewed by myself prior to disposition.    MDM  After the initial H&P, I discussed pertinent information from history and physical exam with patient/family.  Discussed differential diagnosis.  Discussed plan for ED evaluation/work-up/treatment.  All questions answered.  Patient/family is agreeable with plan.  ED Course as of Sep 13 0129   Sun Sep 12, 2021   2358 EKG independently viewed and contemporaneously interpreted by ED physician. Time: 2354.  Rate 73.  Interpretation: Normal sinus rhythm, normal axis, normal QRS, no acute ST changes.    [JG]   Mon Sep 13, 2021   0016 Patient's blood pressure did not decrease with orthostatics but did have increase in heart rate from 79 to 108 that then increased to 119 with continued standing.  Giving IV fluids.    [JG]   0054 Chronic anemia, hemoglobin similar to prior.    [JG]   0055 Patient afebrile but noted to have leukocytosis with left shift.  Chest x-ray shows no sign of pneumonia, urinalysis shows no sign of urinary tract infection.  Given patient's age, risk for occult bacteremia, ordering blood cultures and lactic acid.    [JG]   0056 JOSE with creatinine elevated at 1.46, BUN 27.  Potassium is normal, no anion gap.  Patient receiving IV fluids.    [JG]   0056 TSH is abnormal but free T4 is normal.    [JG]   0127 Patient reassessed.  Daughter now at bedside who lives with him, reports that father has 24-hour around the clock care.  Discussed ED work-up and results, offered admission for further treatment.  After extensive discussion of risk and benefits, patient declines admission, reports that he would prefer discharge with outpatient follow-up.  Family states that they are comfortable caring for him at home.  Did discuss need to ensure that he is adequately hydrated, need for close follow-up with primary care for repeat lab work, discussed  follow-up with orthopedics as needed.  I did discuss leukocytosis and that blood cultures were obtained and that we would call back if they were positive and patient would require repeat visit at that time.  Extensive discussion return precautions.  Patient family comfortable with discharge, no questions or concerns.    [JG]   0128 The patient was reexamined.  They have had symptomatic improvement during their ED stay.  I discussed today's findings with the patient, explaining the pertinent positives and negatives from today's visit, and the plan of care.  Discussed plan for discharge.  Discussed limitation of the ED work-up and that this is to rule out life-threatening emergencies but that they could require further testing as determined by their primary care and or any referred specialist patient is agreeable and understands need for follow-up and repeat exam/testing.  Patient is aware that discharge does not mean there is nothing wrong, and that they must continue their care with their primary care physician and/or any referred specialist.  They were given appropriate follow-up with their primary care physician and/or specialist.  I had an extensive discussion on the expected clinical course and return precautions.  Patient understands to return to the emergency department for continuation, worsening, or new symptoms.  I answered any of the patient's questions. Patient was discharged home in a stable condition.        [JG]      ED Course User Index  [JG] Miguel Ángel Newberry MD       AS OF 01:29 EDT VITALS:    BP - 101/72  HR - 66  TEMP - 97.7 °F (36.5 °C)  O2 SATS - 98%    DIAGNOSIS  Final diagnoses:   JOSE (acute kidney injury) (CMS/[facility])   Orthostasis   General weakness   Right hip pain   Acute bilateral ankle pain   Leukocytosis, unspecified type         DISPOSITION  DISCHARGE    Patient discharged in stable condition.    Reviewed implications of results, diagnosis, meds, responsibility to follow up, warning  signs and symptoms of possible worsening, potential complications and reasons to return to ER.    Patient/Family voiced understanding of above instructions.    Discussed plan for discharge. Patient referred to primary care provider for BP management due to today's BP. Pt/family is agreeable and understands need for follow up and repeat testing.  Pt is aware that discharge does not mean that nothing is wrong and they must continue care with follow-up as given below or physician of their choice.     FOLLOW-UP  Rachid العلي Jr., MD  [address] [id number] [phone]    Schedule an appointment as soon as possible for a visit in 2 days  even if well, for repeat BMP         Medication List      No changes were made to your prescriptions during this visit.          Miguel Ángel Newberry MD  09/13/21 0139

## 2021-09-17 ENCOUNTER — APPOINTMENT (OUTPATIENT)
Dept: CT IMAGING | Facility: HOSPITAL | Age: 86
End: 2021-09-17

## 2021-09-17 ENCOUNTER — APPOINTMENT (OUTPATIENT)
Dept: GENERAL RADIOLOGY | Facility: HOSPITAL | Age: 86
End: 2021-09-17

## 2021-09-17 ENCOUNTER — HOSPITAL ENCOUNTER (EMERGENCY)
Facility: HOSPITAL | Age: 86
Discharge: HOME | End: 2021-09-17
Attending: EMERGENCY MEDICINE | Admitting: EMERGENCY MEDICINE

## 2021-09-17 VITALS
HEART RATE: 68 BPM | HEIGHT: 73 IN | SYSTOLIC BLOOD PRESSURE: 147 MMHG | TEMPERATURE: 97.5 F | RESPIRATION RATE: 17 BRPM | DIASTOLIC BLOOD PRESSURE: 62 MMHG | WEIGHT: 180 LBS | BODY MASS INDEX: 23.86 KG/M2 | OXYGEN SATURATION: 97 %
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
## 2021-09-17 DIAGNOSIS — M25.551 ACUTE RIGHT HIP PAIN: ICD-10-CM

## 2021-09-17 LAB
ALBUMIN SERPL-MCNC: 4 G/DL (ref 3.5–5.2)
ALBUMIN/GLOB SERPL: 1.7 G/DL
ALP SERPL-CCNC: 58 U/L (ref 39–117)
ALT SERPL W P-5'-P-CCNC: 13 U/L (ref 1–41)
ANION GAP SERPL CALCULATED.3IONS-SCNC: 11.3 MMOL/L (ref 5–15)
APTT PPP: 29.5 SECONDS (ref 22.7–35.4)
AST SERPL-CCNC: 26 U/L (ref 1–40)
BASOPHILS # BLD AUTO: 0.03 10*3/MM3 (ref 0–0.2)
BASOPHILS NFR BLD AUTO: 0.2 % (ref 0–1.5)
BILIRUB SERPL-MCNC: 1 MG/DL (ref 0–1.2)
BUN SERPL-MCNC: 29 MG/DL (ref 8–23)
BUN/CREAT SERPL: 20.4 (ref 7–25)
CALCIUM SPEC-SCNC: 8.8 MG/DL (ref 8.6–10.5)
CHLORIDE SERPL-SCNC: 104 MMOL/L (ref 98–107)
CO2 SERPL-SCNC: 23.7 MMOL/L (ref 22–29)
CREAT SERPL-MCNC: 1.42 MG/DL (ref 0.76–1.27)
DEPRECATED RDW RBC AUTO: 44 FL (ref 37–54)
EOSINOPHIL # BLD AUTO: 0.03 10*3/MM3 (ref 0–0.4)
EOSINOPHIL NFR BLD AUTO: 0.2 % (ref 0.3–6.2)
ERYTHROCYTE [DISTWIDTH] IN BLOOD BY AUTOMATED COUNT: 12.1 % (ref 12.3–15.4)
GFR SERPL CREATININE-BSD FRML MDRD: 47 ML/MIN/1.73
GLOBULIN UR ELPH-MCNC: 2.4 GM/DL
GLUCOSE SERPL-MCNC: 104 MG/DL (ref 65–99)
HCT VFR BLD AUTO: 33 % (ref 37.5–51)
HGB BLD-MCNC: 11.4 G/DL (ref 13–17.7)
IMM GRANULOCYTES # BLD AUTO: 0.06 10*3/MM3 (ref 0–0.05)
IMM GRANULOCYTES NFR BLD AUTO: 0.5 % (ref 0–0.5)
INR PPP: 1.19 (ref 0.9–1.1)
LYMPHOCYTES # BLD AUTO: 1.58 10*3/MM3 (ref 0.7–3.1)
LYMPHOCYTES NFR BLD AUTO: 13.1 % (ref 19.6–45.3)
MCH RBC QN AUTO: 34.2 PG (ref 26.6–33)
MCHC RBC AUTO-ENTMCNC: 34.5 G/DL (ref 31.5–35.7)
MCV RBC AUTO: 99.1 FL (ref 79–97)
MONOCYTES # BLD AUTO: 1.07 10*3/MM3 (ref 0.1–0.9)
MONOCYTES NFR BLD AUTO: 8.8 % (ref 5–12)
NEUTROPHILS NFR BLD AUTO: 77.2 % (ref 42.7–76)
NEUTROPHILS NFR BLD AUTO: 9.33 10*3/MM3 (ref 1.7–7)
NRBC BLD AUTO-RTO: 0 /100 WBC (ref 0–0.2)
PLATELET # BLD AUTO: 260 10*3/MM3 (ref 140–450)
PMV BLD AUTO: 9.2 FL (ref 6–12)
POTASSIUM SERPL-SCNC: 4 MMOL/L (ref 3.5–5.2)
PROT SERPL-MCNC: 6.4 G/DL (ref 6–8.5)
PROTHROMBIN TIME: 14.9 SECONDS (ref 11.7–14.2)
RBC # BLD AUTO: 3.33 10*6/MM3 (ref 4.14–5.8)
SARS-COV-2 RNA RESP QL NAA+PROBE: NOT DETECTED
SODIUM SERPL-SCNC: 139 MMOL/L (ref 136–145)
WBC # BLD AUTO: 12.1 10*3/MM3 (ref 3.4–10.8)

## 2021-09-17 PROCEDURE — 99284 EMERGENCY DEPT VISIT MOD MDM: CPT

## 2021-09-17 PROCEDURE — 85025 COMPLETE CBC W/AUTO DIFF WBC: CPT | Performed by: PHYSICIAN ASSISTANT

## 2021-09-17 PROCEDURE — 85610 PROTHROMBIN TIME: CPT | Performed by: PHYSICIAN ASSISTANT

## 2021-09-17 PROCEDURE — 73700 CT LOWER EXTREMITY W/O DYE: CPT

## 2021-09-17 PROCEDURE — 80053 COMPREHEN METABOLIC PANEL: CPT | Performed by: PHYSICIAN ASSISTANT

## 2021-09-17 PROCEDURE — U0005 INFEC AGEN DETEC AMPLI PROBE: HCPCS | Performed by: PHYSICIAN ASSISTANT

## 2021-09-17 PROCEDURE — U0003 INFECTIOUS AGENT DETECTION BY NUCLEIC ACID (DNA OR RNA); SEVERE ACUTE RESPIRATORY SYNDROME CORONAVIRUS 2 (SARS-COV-2) (CORONAVIRUS DISEASE [COVID-19]), AMPLIFIED PROBE TECHNIQUE, MAKING USE OF HIGH THROUGHPUT TECHNOLOGIES AS DESCRIBED BY CMS-2020-01-R: HCPCS | Performed by: PHYSICIAN ASSISTANT

## 2021-09-17 PROCEDURE — 73502 X-RAY EXAM HIP UNI 2-3 VIEWS: CPT

## 2021-09-17 PROCEDURE — 85730 THROMBOPLASTIN TIME PARTIAL: CPT | Performed by: PHYSICIAN ASSISTANT

## 2021-09-17 NOTE — DISCHARGE INSTRUCTIONS
Although you are being discharged from the ED today, I encourage you to return for worsening symptoms. Things can, and do, change such that treatment at home with medication may not be adequate. Specifically I recommend returning for chest pain or discomfort, difficulty breathing, persistent vomiting or difficulty holding down liquids or medications, fever > 102.0 F or any other worsening or alarming symptoms.     Rest. Drink plenty of fluids.  Follow up with primary care provider for further management and to have blood pressure rechecked.

## 2021-09-17 NOTE — ED TRIAGE NOTES
Pt from home for mechanical fall using walking trying to get to bathroom pt states was about 0500. reports rt hip injury, denies head injury. Skin tear to rt forearm. Family lives with pt and found him this morning. Pt brought to ER via [facility] ems.     Pt arrives in triage with mask on. Triage staff wearing masks and goggles.

## 2021-09-17 NOTE — ED NOTES
This RN wearing all appropriate PPE during patient encounter. Hand hygiene performed before and during entering room.       Radha Glasgow, ALEX  09/17/21 [id number]

## 2021-09-17 NOTE — ED NOTES
This RN wearing all appropriate PPE during patient encounter. Hand hygiene performed before and during entering room.       Radha Glasgow, LAEX  09/17/21 5128

## 2021-09-17 NOTE — ED PROVIDER NOTES
EMERGENCY DEPARTMENT ENCOUNTER    Room Number: 06/06  Date seen: 9/17/2021  Time seen: 07:40 EDT  PCP: Rachid العلي Jr., MD    Spoken Language:  English  Language interpretation services not needed     CHIEF COMPLAINT: right hip pain    HPI: Fritz Flores is a 87 y.o. male with PMH of coronary artery disease and hyperlipidemia presenting to the ED for evaluation of right hip pain. The history is being obtained by the patient and by review of the medical chart.  The patient states that he was ambulating with his walker at approximately 5 AM this morning whenever his walker got caught, causing him to fall.  The patient denies feeling lightheaded or dizzy prior to his fall.  The patient denies hitting his head.  He denies headache or neck pain.  The patient does present complaining of pain in the right hip.  He rates his pain as 6/10 while laying still.  It is increased with any movement.  He has not been ambulatory since his fall.  He denies any relieving factors.  He denies any additional injuries resulting from his fall.  The patient states that he does have chronic swelling in the right lower extremity.  He states that this is unchanged from his baseline.    MEDICAL RECORD REVIEW:  Reviewed in [employer] ([email or web address]).     PAST MEDICAL HISTORY  Past Medical History:   Diagnosis Date   • Colon polyps    • Coronary artery disease    • Hyperlipemia    • Myocardial infarction (CMS/HCC) 1989   • Ulcerative colitis (CMS/HCC)        PAST SURGICAL HISTORY  Past Surgical History:   Procedure Laterality Date   • CARDIAC SURGERY  1989    CABG X 1   • COLONOSCOPY  2014   • COLONOSCOPY N/A 8/23/2016    Procedure: COLONOSCOPY INTO CECUM WITH COLD BIOPSIES;  Surgeon: Aaron Gregg MD;  Location: [facility] ENDOSCOPY;  Service:    • FEMUR IM NAILING/RODDING Left 6/30/2019    Procedure: FEMUR INTRAMEDULLARY NAILING/RODDING;  Surgeon: Jaqueline Wagoner MD;  Location: [facility] OR;  Service: Orthopedics   • FRACTURE SURGERY      upper leg  w/hardware   • HIP SURGERY Left     x 3       FAMILY HISTORY  No family history on file.    SOCIAL HISTORY  Social History     Socioeconomic History   • Marital status:      Spouse name: Not on file   • Number of children: Not on file   • Years of education: Not on file   • Highest education level: Not on file   Tobacco Use   • Smoking status: Never Smoker   • Smokeless tobacco: Never Used   Substance and Sexual Activity   • Alcohol use: No   • Drug use: No   • Sexual activity: Defer       CURRENT MEDICATIONS  Prior to Admission medications    Medication Sig Start Date End Date Taking? Authorizing Provider   aspirin 81 MG EC tablet Take 81 mg by mouth daily.    Crescencio Fernandez MD   Furosemide (LASIX PO) Take  by mouth.    Crescencio Fernandez MD   metoprolol tartrate (LOPRESSOR) 25 MG tablet Take 0.5 tablets by mouth Every 12 (Twelve) Hours. 7/4/19   Samuel Shultz MD   polyethylene glycol (MIRALAX) pack packet Take 17 g by mouth Daily. 7/5/19   Samuel Shultz MD   sennosides-docusate sodium (SENOKOT-S) 8.6-50 MG tablet Take 2 tablets by mouth Every Night. 7/4/19   Samuel Shultz MD   SIMVASTATIN PO Take  by mouth daily. Patient unsure of dose    Crescencio Fernandez MD   SulfaSALAzine (AZULFIDINE PO) Take  by mouth 2 (two) times a day. Patient unsure of dose    Crescencio Fernandez MD       ALLERGIES  Penicillins    REVIEW OF SYSTEMS  All systems reviewed and negative except for those discussed in HPI.     PHYSICAL EXAM  ED Triage Vitals [09/17/21 0712]   Temp Heart Rate Resp BP SpO2   97.5 °F (36.4 °C) 77 16 156/72 100 %      Temp src Heart Rate Source Patient Position BP Location FiO2 (%)   Tympanic -- -- -- --       Physical Exam  Constitutional:       Appearance: Normal appearance.   HENT:      Head: Normocephalic and atraumatic.      Mouth/Throat:      Mouth: Mucous membranes are moist.   Eyes:      Extraocular Movements: Extraocular movements intact.      Pupils: Pupils are  equal, round, and reactive to light.   Cardiovascular:      Rate and Rhythm: Normal rate and regular rhythm.   Pulmonary:      Effort: Pulmonary effort is normal.      Breath sounds: Normal breath sounds.   Abdominal:      General: There is no distension.      Palpations: Abdomen is soft.      Tenderness: There is no abdominal tenderness.   Musculoskeletal:      Cervical back: Normal range of motion.      Comments: The patient's right leg has 2+ pitting edema.  It is mildly shortened and externally rotated.   Skin:     General: Skin is warm and dry.   Neurological:      General: No focal deficit present.      Mental Status: He is alert and oriented to person, place, and time.   Psychiatric:         Mood and Affect: Mood normal.         Behavior: Behavior normal.         Thought Content: Thought content normal.         Judgment: Judgment normal.         PROCEDURES  Procedures  None    LABS  Recent Results (from the past 24 hour(s))   Comprehensive Metabolic Panel    Collection Time: 09/17/21  8:20 AM    Specimen: Blood   Result Value Ref Range    Glucose 104 (H) 65 - 99 mg/dL    BUN 29 (H) 8 - 23 mg/dL    Creatinine 1.42 (H) 0.76 - 1.27 mg/dL    Sodium 139 136 - 145 mmol/L    Potassium 4.0 3.5 - 5.2 mmol/L    Chloride 104 98 - 107 mmol/L    CO2 23.7 22.0 - 29.0 mmol/L    Calcium 8.8 8.6 - 10.5 mg/dL    Total Protein 6.4 6.0 - 8.5 g/dL    Albumin 4.00 3.50 - 5.20 g/dL    ALT (SGPT) 13 1 - 41 U/L    AST (SGOT) 26 1 - 40 U/L    Alkaline Phosphatase 58 39 - 117 U/L    Total Bilirubin 1.0 0.0 - 1.2 mg/dL    eGFR Non African Amer 47 (L) >60 mL/min/1.73    Globulin 2.4 gm/dL    A/G Ratio 1.7 g/dL    BUN/Creatinine Ratio 20.4 7.0 - 25.0    Anion Gap 11.3 5.0 - 15.0 mmol/L   Protime-INR    Collection Time: 09/17/21  8:20 AM    Specimen: Blood   Result Value Ref Range    Protime 14.9 (H) 11.7 - 14.2 Seconds    INR 1.19 (H) 0.90 - 1.10   aPTT    Collection Time: 09/17/21  8:20 AM    Specimen: Blood   Result Value Ref Range     PTT 29.5 22.7 - 35.4 seconds   CBC Auto Differential    Collection Time: 09/17/21  8:20 AM    Specimen: Blood   Result Value Ref Range    WBC 12.10 (H) 3.40 - 10.80 10*3/mm3    RBC 3.33 (L) 4.14 - 5.80 10*6/mm3    Hemoglobin 11.4 (L) 13.0 - 17.7 g/dL    Hematocrit 33.0 (L) 37.5 - 51.0 %    MCV 99.1 (H) 79.0 - 97.0 fL    MCH 34.2 (H) 26.6 - 33.0 pg    MCHC 34.5 31.5 - 35.7 g/dL    RDW 12.1 (L) 12.3 - 15.4 %    RDW-SD 44.0 37.0 - 54.0 fl    MPV 9.2 6.0 - 12.0 fL    Platelets 260 140 - 450 10*3/mm3    Neutrophil % 77.2 (H) 42.7 - 76.0 %    Lymphocyte % 13.1 (L) 19.6 - 45.3 %    Monocyte % 8.8 5.0 - 12.0 %    Eosinophil % 0.2 (L) 0.3 - 6.2 %    Basophil % 0.2 0.0 - 1.5 %    Immature Grans % 0.5 0.0 - 0.5 %    Neutrophils, Absolute 9.33 (H) 1.70 - 7.00 10*3/mm3    Lymphocytes, Absolute 1.58 0.70 - 3.10 10*3/mm3    Monocytes, Absolute 1.07 (H) 0.10 - 0.90 10*3/mm3    Eosinophils, Absolute 0.03 0.00 - 0.40 10*3/mm3    Basophils, Absolute 0.03 0.00 - 0.20 10*3/mm3    Immature Grans, Absolute 0.06 (H) 0.00 - 0.05 10*3/mm3    nRBC 0.0 0.0 - 0.2 /100 WBC   COVID-19,[facility] IN-HOUSE CEPHEID/ANA M NP SWAB IN TRANSPORT MEDIA 8-12 HR TAT - Swab, Nasopharynx    Collection Time: 09/17/21  8:22 AM    Specimen: Nasopharynx; Swab   Result Value Ref Range    COVID19 Not Detected Not Detected - Ref. Range       RADIOLOGY  CT Lower Extremity Right Without Contrast   Final Result   1. Previous surgical fixation of both proximal femoral fractures which   are healed. There is also a healed right midfemoral shaft fracture.   There has been settling on the right and the distal interlocking screw   is fractured and exhibits inferior angulation similar to previous   x-rays. There is some limitation due to the degree of osteopenia though   there is no evidence for acute fracture or acute osseous abnormality.   2. Edema within the subcutaneous fat along the mid and distal lateral   right thigh and lateral right knee may be posttraumatic without  formed   fluid collection.       Radiation dose reduction techniques were utilized, including automated   exposure control and exposure modulation based on body size.       This report was finalized on 9/17/2021 10:47 AM by Dr. Herson Grider M.D.          XR Hip With or Without Pelvis 2 - 3 View Right   Final Result   FINDINGS AND IMPRESSION:   For the purposes of this dictation, there are 5 nonrib-bearing lumbar   vertebral bodies. Generalized bony demineralization is present. L5   compression deformity as a grossly unchanged AP appearance since   06/30/2019; however it is not well evaluated single radiograph. Findings   can be further evaluated with dedicated lumbar spine radiographs if   clinically indicated.       Postsurgical changes from left intertrochanteric hip fracture fixation   with intramedullary tevin and left dynamic hip screw are present. The   hardware is intact.       Postsurgical changes from placement of a right femoral intramedullary   tevin and dynamic hip screw are present as well. No displaced right hip   fracture is visualized; however, please note in setting of bony   demineralization and trauma, nondisplaced hip fractures can remain   radiographically occult and if there is continued clinical concern for   right hip trauma, recommend dedicated right hip MRI or CT for further   evaluation. Fracture of the distal interlocking screw within the right   femoral intramedullary tevin has a grossly unchanged appearance since   06/30/2019.       This report was finalized on 9/17/2021 8:45 AM by Dr. Oscar Silva M.D.              MEDICATIONS GIVEN IN THE ER  Medications - No data to display    MEDICAL DECISION MAKING, CONSULTS AND PROGRESS NOTES  All labs and all radiology studies were have been viewed and interpreted by me.   Discussion below represents my analysis of pertinent findings related to patient's condition, differential diagnosis, treatment plan and final disposition.    Differential  "diagnosis includes but is not limited to:  - hip fracture  - contusion  - muscle strain    PPE: The patient was placed in a face mask in first look. Patient was wearing facemask when I entered the room and throughout our encounter. I wore full protective equipment throughout this patient encounter including a face mask, eye protection and gloves. Hand hygiene was performed before donning protective equipment and after removal when leaving the room.    AS OF 11:46 EDT VITALS:    BP - 172/76  HR - 76  TEMP - 97.5 °F (36.4 °C) (Tympanic)  O2 SATS - 99%    ED Course as of Sep 17 1146   Fri Sep 17, 2021   1115 I discussed the patient's overall care with his daughter, who is now at bedside.  She states that the patient has had multiple falls at home because he is \"stubborn\" and wants to get up and do things independently.  She is adamant that the family provides 24-hour care and that he has a walker, hospital bed, monitors, etc. in order to provide good care for him at home.  She states that her and her siblings wish to take him home from the ED today.  They are not interested in any admission to discuss potential rehab or nursing home placement.    [AR]      ED Course User Index  [AR] Stpehie Carson PA     The patient's history, physical exam, and lab findings were discussed with the physician, who also performed a face to face history and physical exam.  I discussed all results and noted any abnormalities with patient.  Discussed absoute need to recheck abnormalities with their family physician.  I answered any of the patient's questions.  Discussed plan for discharge, as there is no emergent indication for admission.  Pt is agreeable and understands need for follow up and repeat testing.  Pt is aware that discharge does not mean that nothing is wrong but it indicates no emergency is present and they must continue care with their family physician.  Pt is discharged with instructions to follow up with primary " care doctor to have their blood pressure rechecked.     DIAGNOSIS   Diagnosis Plan   1. Fall, initial encounter     2. Acute right hip pain         DISPOSITION  ED Disposition     ED Disposition Condition Comment    Discharge Stable           FOLLOW UP  Rachid العلي Jr., MD  [address] [facility] [place] [id number]  [phone]    Schedule an appointment as soon as possible for a visit         RX  Medications - No data to display       Medication List      No changes were made to your prescriptions during this visit.       Provider Attestation:  I personally reviewed the past medical history, past surgical history, social history, family history, current medications and allergies as they appear in the chart. I reviewed the patient's history, physical, lab/imaging results and overall care with Dr. Watters who is in agreement with the patient's treatment plan.    EMR Dragon/Transcription disclaimer:  Some of this encounter note is an electronic transcription/translation of spoken language to printed text. The electronic translation of spoken language may permit erroneous, or at times, nonsensical words or phrases to be inadvertently transcribed; Although I have reviewed the note for such errors, some may still exist.    Provider note signed by:         Stephie Carson PA  09/17/21 1146

## 2021-09-17 NOTE — ED PROVIDER NOTES
Pt presents to the ED c/o  right hip pain.  He reportedly had a fall this morning.  Patient is at home with 24/7 supervision.  He has personal care and also 5 children that help.     On exam,   Awake and alert, no acute distress.  No scalp hematoma.  Pupils 3 mm reactive bilaterally.       Plan: Plain films of the pelvis as well as CT pelvis are negative for acute fracture.  Patient was offered admission in order to facilitate skilled nursing placement however family prefers to keep him at home and they feel that they have adequate help to care for him at home.  They will contact his PCP about getting a wheelchair and also about ordering physical therapy at home.  He was advised to take Tylenol as needed for pain.      I wore an N95 mask, face shield, and gloves during this patient encounter.  Patient also wearing a surgical mask.  Hand hygeine performed before and after seeing the patient.     Attestation:  The ELIA and I have discussed this patient's history, physical exam, and treatment plan.  I have reviewed the documentation and personally had a face to face interaction with the patient. I affirm the documentation and agree with the treatment and plan.  The attached note describes my personal findings.            Raul Watters MD  09/17/21 [id number]

## 2021-09-18 LAB
BACTERIA SPEC AEROBE CULT: NORMAL
BACTERIA SPEC AEROBE CULT: NORMAL

## 2021-09-22 ENCOUNTER — HOME HEALTH ADMISSION (OUTPATIENT)
Dept: HOME HEALTH SERVICES | Facility: HOME HEALTHCARE | Age: 86
End: 2021-09-22

## 2021-09-22 ENCOUNTER — TRANSCRIBE ORDERS (OUTPATIENT)
Dept: HOME HEALTH SERVICES | Facility: HOME HEALTHCARE | Age: 86
End: 2021-09-22
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
## 2021-09-27 ENCOUNTER — APPOINTMENT (OUTPATIENT)
Dept: GENERAL RADIOLOGY | Facility: HOSPITAL | Age: 86
End: 2021-09-27

## 2021-09-27 ENCOUNTER — HOSPITAL ENCOUNTER (OUTPATIENT)
Facility: HOSPITAL | Age: 86
Setting detail: OBSERVATION
Discharge: HOME | End: 2021-09-30
Attending: EMERGENCY MEDICINE | Admitting: EMERGENCY MEDICINE

## 2021-09-27 ENCOUNTER — APPOINTMENT (OUTPATIENT)
Dept: CT IMAGING | Facility: HOSPITAL | Age: 86
End: 2021-09-27

## 2021-09-27 DIAGNOSIS — R53.1 GENERAL WEAKNESS: ICD-10-CM

## 2021-09-27 DIAGNOSIS — N39.0 UTI (URINARY TRACT INFECTION), UNCOMPLICATED: ICD-10-CM

## 2021-09-27 DIAGNOSIS — N20.1 URETEROLITHIASIS: ICD-10-CM

## 2021-09-27 DIAGNOSIS — K59.00 CONSTIPATION, UNSPECIFIED CONSTIPATION TYPE: Primary | ICD-10-CM

## 2021-09-27 DIAGNOSIS — R54 AGE-RELATED PHYSICAL DEBILITY: ICD-10-CM

## 2021-09-27 LAB
ALBUMIN SERPL-MCNC: 3.9 G/DL (ref 3.5–5.2)
ALBUMIN/GLOB SERPL: 1.6 G/DL
ALP SERPL-CCNC: 87 U/L (ref 39–117)
ALT SERPL W P-5'-P-CCNC: 7 U/L (ref 1–41)
ANION GAP SERPL CALCULATED.3IONS-SCNC: 11.5 MMOL/L (ref 5–15)
AST SERPL-CCNC: 10 U/L (ref 1–40)
BACTERIA UR QL AUTO: ABNORMAL /HPF
BASOPHILS # BLD AUTO: 0.06 10*3/MM3 (ref 0–0.2)
BASOPHILS NFR BLD AUTO: 0.6 % (ref 0–1.5)
BILIRUB SERPL-MCNC: 0.6 MG/DL (ref 0–1.2)
BILIRUB UR QL STRIP: NEGATIVE
BUN SERPL-MCNC: 28 MG/DL (ref 8–23)
BUN/CREAT SERPL: 20.7 (ref 7–25)
CALCIUM SPEC-SCNC: 8.7 MG/DL (ref 8.6–10.5)
CHLORIDE SERPL-SCNC: 103 MMOL/L (ref 98–107)
CLARITY UR: ABNORMAL
CO2 SERPL-SCNC: 24.5 MMOL/L (ref 22–29)
COLOR UR: YELLOW
CREAT SERPL-MCNC: 1.35 MG/DL (ref 0.76–1.27)
DEPRECATED RDW RBC AUTO: 44.8 FL (ref 37–54)
EOSINOPHIL # BLD AUTO: 0.15 10*3/MM3 (ref 0–0.4)
EOSINOPHIL NFR BLD AUTO: 1.4 % (ref 0.3–6.2)
ERYTHROCYTE [DISTWIDTH] IN BLOOD BY AUTOMATED COUNT: 12 % (ref 12.3–15.4)
GFR SERPL CREATININE-BSD FRML MDRD: 50 ML/MIN/1.73
GLOBULIN UR ELPH-MCNC: 2.4 GM/DL
GLUCOSE SERPL-MCNC: 101 MG/DL (ref 65–99)
GLUCOSE UR STRIP-MCNC: NEGATIVE MG/DL
HCT VFR BLD AUTO: 35.6 % (ref 37.5–51)
HGB BLD-MCNC: 12 G/DL (ref 13–17.7)
HGB UR QL STRIP.AUTO: NEGATIVE
HOLD SPECIMEN: NORMAL
HOLD SPECIMEN: NORMAL
HYALINE CASTS UR QL AUTO: ABNORMAL /LPF
IMM GRANULOCYTES # BLD AUTO: 0.08 10*3/MM3 (ref 0–0.05)
IMM GRANULOCYTES NFR BLD AUTO: 0.8 % (ref 0–0.5)
KETONES UR QL STRIP: NEGATIVE
LEUKOCYTE ESTERASE UR QL STRIP.AUTO: ABNORMAL
LYMPHOCYTES # BLD AUTO: 1.13 10*3/MM3 (ref 0.7–3.1)
LYMPHOCYTES NFR BLD AUTO: 10.9 % (ref 19.6–45.3)
MAGNESIUM SERPL-MCNC: 2.2 MG/DL (ref 1.6–2.4)
MCH RBC QN AUTO: 33.7 PG (ref 26.6–33)
MCHC RBC AUTO-ENTMCNC: 33.7 G/DL (ref 31.5–35.7)
MCV RBC AUTO: 100 FL (ref 79–97)
MONOCYTES # BLD AUTO: 0.78 10*3/MM3 (ref 0.1–0.9)
MONOCYTES NFR BLD AUTO: 7.5 % (ref 5–12)
NEUTROPHILS NFR BLD AUTO: 78.8 % (ref 42.7–76)
NEUTROPHILS NFR BLD AUTO: 8.16 10*3/MM3 (ref 1.7–7)
NITRITE UR QL STRIP: POSITIVE
NRBC BLD AUTO-RTO: 0.1 /100 WBC (ref 0–0.2)
NT-PROBNP SERPL-MCNC: 599.5 PG/ML (ref 0–1800)
PH UR STRIP.AUTO: 5.5 [PH] (ref 5–8)
PLATELET # BLD AUTO: 439 10*3/MM3 (ref 140–450)
PMV BLD AUTO: 9.1 FL (ref 6–12)
POTASSIUM SERPL-SCNC: 4.8 MMOL/L (ref 3.5–5.2)
PROT SERPL-MCNC: 6.3 G/DL (ref 6–8.5)
PROT UR QL STRIP: ABNORMAL
QT INTERVAL: 428 MS
RBC # BLD AUTO: 3.56 10*6/MM3 (ref 4.14–5.8)
RBC # UR: ABNORMAL /HPF
REF LAB TEST METHOD: ABNORMAL
SARS-COV-2 ORF1AB RESP QL NAA+PROBE: NOT DETECTED
SODIUM SERPL-SCNC: 139 MMOL/L (ref 136–145)
SP GR UR STRIP: 1.02 (ref 1–1.03)
SQUAMOUS #/AREA URNS HPF: ABNORMAL /HPF
TROPONIN T SERPL-MCNC: 0.03 NG/ML (ref 0–0.03)
TSH SERPL DL<=0.05 MIU/L-ACNC: 3.89 UIU/ML (ref 0.27–4.2)
UROBILINOGEN UR QL STRIP: ABNORMAL
WBC # BLD AUTO: 10.36 10*3/MM3 (ref 3.4–10.8)
WBC UR QL AUTO: ABNORMAL /HPF
WHOLE BLOOD HOLD SPECIMEN: NORMAL
WHOLE BLOOD HOLD SPECIMEN: NORMAL

## 2021-09-27 PROCEDURE — 83735 ASSAY OF MAGNESIUM: CPT | Performed by: NURSE PRACTITIONER

## 2021-09-27 PROCEDURE — 25010000002 ENOXAPARIN PER 10 MG: Performed by: INTERNAL MEDICINE

## 2021-09-27 PROCEDURE — 99285 EMERGENCY DEPT VISIT HI MDM: CPT

## 2021-09-27 PROCEDURE — 25010000002 CEFTRIAXONE PER 250 MG: Performed by: NURSE PRACTITIONER

## 2021-09-27 PROCEDURE — G0378 HOSPITAL OBSERVATION PER HR: HCPCS

## 2021-09-27 PROCEDURE — U0004 COV-19 TEST NON-CDC HGH THRU: HCPCS | Performed by: INTERNAL MEDICINE

## 2021-09-27 PROCEDURE — 96365 THER/PROPH/DIAG IV INF INIT: CPT

## 2021-09-27 PROCEDURE — 80053 COMPREHEN METABOLIC PANEL: CPT | Performed by: NURSE PRACTITIONER

## 2021-09-27 PROCEDURE — 70450 CT HEAD/BRAIN W/O DYE: CPT

## 2021-09-27 PROCEDURE — 93005 ELECTROCARDIOGRAM TRACING: CPT | Performed by: NURSE PRACTITIONER

## 2021-09-27 PROCEDURE — 87088 URINE BACTERIA CULTURE: CPT | Performed by: NURSE PRACTITIONER

## 2021-09-27 PROCEDURE — 81001 URINALYSIS AUTO W/SCOPE: CPT | Performed by: NURSE PRACTITIONER

## 2021-09-27 PROCEDURE — 93010 ELECTROCARDIOGRAM REPORT: CPT | Performed by: INTERNAL MEDICINE

## 2021-09-27 PROCEDURE — 74176 CT ABD & PELVIS W/O CONTRAST: CPT

## 2021-09-27 PROCEDURE — P9612 CATHETERIZE FOR URINE SPEC: HCPCS

## 2021-09-27 PROCEDURE — C9803 HOPD COVID-19 SPEC COLLECT: HCPCS

## 2021-09-27 PROCEDURE — 83880 ASSAY OF NATRIURETIC PEPTIDE: CPT | Performed by: NURSE PRACTITIONER

## 2021-09-27 PROCEDURE — U0005 INFEC AGEN DETEC AMPLI PROBE: HCPCS | Performed by: INTERNAL MEDICINE

## 2021-09-27 PROCEDURE — 87086 URINE CULTURE/COLONY COUNT: CPT | Performed by: NURSE PRACTITIONER

## 2021-09-27 PROCEDURE — 84484 ASSAY OF TROPONIN QUANT: CPT | Performed by: NURSE PRACTITIONER

## 2021-09-27 PROCEDURE — 85025 COMPLETE CBC W/AUTO DIFF WBC: CPT | Performed by: NURSE PRACTITIONER

## 2021-09-27 PROCEDURE — 96372 THER/PROPH/DIAG INJ SC/IM: CPT

## 2021-09-27 PROCEDURE — 84443 ASSAY THYROID STIM HORMONE: CPT | Performed by: NURSE PRACTITIONER

## 2021-09-27 PROCEDURE — 71045 X-RAY EXAM CHEST 1 VIEW: CPT

## 2021-09-27 PROCEDURE — 87186 SC STD MICRODIL/AGAR DIL: CPT | Performed by: NURSE PRACTITIONER

## 2021-09-27 RX ORDER — POLYETHYLENE GLYCOL 3350 17 G/17G
17 POWDER, FOR SOLUTION ORAL DAILY
Status: DISCONTINUED | OUTPATIENT
Start: 2021-09-28 | End: 2021-09-28

## 2021-09-27 RX ORDER — ASPIRIN 81 MG/1
81 TABLET ORAL DAILY
Status: DISCONTINUED | OUTPATIENT
Start: 2021-09-28 | End: 2021-09-30 | Stop reason: HOSPADM

## 2021-09-27 RX ORDER — SODIUM CHLORIDE 0.9 % (FLUSH) 0.9 %
10 SYRINGE (ML) INJECTION AS NEEDED
Status: DISCONTINUED | OUTPATIENT
Start: 2021-09-27 | End: 2021-09-30 | Stop reason: HOSPADM

## 2021-09-27 RX ORDER — ONDANSETRON 2 MG/ML
4 INJECTION INTRAMUSCULAR; INTRAVENOUS EVERY 6 HOURS PRN
Status: DISCONTINUED | OUTPATIENT
Start: 2021-09-27 | End: 2021-09-30 | Stop reason: HOSPADM

## 2021-09-27 RX ORDER — ONDANSETRON 4 MG/1
4 TABLET, FILM COATED ORAL EVERY 6 HOURS PRN
Status: DISCONTINUED | OUTPATIENT
Start: 2021-09-27 | End: 2021-09-30 | Stop reason: HOSPADM

## 2021-09-27 RX ORDER — NITROGLYCERIN 0.4 MG/1
0.4 TABLET SUBLINGUAL
Status: DISCONTINUED | OUTPATIENT
Start: 2021-09-27 | End: 2021-09-30 | Stop reason: HOSPADM

## 2021-09-27 RX ORDER — ACETAMINOPHEN 325 MG/1
650 TABLET ORAL EVERY 4 HOURS PRN
Status: DISCONTINUED | OUTPATIENT
Start: 2021-09-27 | End: 2021-09-30 | Stop reason: HOSPADM

## 2021-09-27 RX ORDER — SODIUM CHLORIDE 9 MG/ML
75 INJECTION, SOLUTION INTRAVENOUS CONTINUOUS
Status: DISCONTINUED | OUTPATIENT
Start: 2021-09-27 | End: 2021-09-28

## 2021-09-27 RX ORDER — AMOXICILLIN 250 MG
2 CAPSULE ORAL NIGHTLY
Status: DISCONTINUED | OUTPATIENT
Start: 2021-09-27 | End: 2021-09-28

## 2021-09-27 RX ORDER — UREA 10 %
3 LOTION (ML) TOPICAL NIGHTLY PRN
Status: DISCONTINUED | OUTPATIENT
Start: 2021-09-27 | End: 2021-09-30 | Stop reason: HOSPADM

## 2021-09-27 RX ORDER — FUROSEMIDE 20 MG/1
20 TABLET ORAL DAILY
Status: DISCONTINUED | OUTPATIENT
Start: 2021-09-28 | End: 2021-09-27

## 2021-09-27 RX ADMIN — DOCUSATE SODIUM 50MG AND SENNOSIDES 8.6MG 2 TABLET: 8.6; 5 TABLET, FILM COATED ORAL at 22:13

## 2021-09-27 RX ADMIN — ENOXAPARIN SODIUM 40 MG: 40 INJECTION SUBCUTANEOUS at 22:14

## 2021-09-27 RX ADMIN — CEFTRIAXONE 1 G: 1 INJECTION, POWDER, FOR SOLUTION INTRAMUSCULAR; INTRAVENOUS at 15:34

## 2021-09-27 RX ADMIN — SODIUM CHLORIDE 75 ML/HR: 9 INJECTION, SOLUTION INTRAVENOUS at 22:14

## 2021-09-27 RX ADMIN — METOPROLOL TARTRATE 12.5 MG: 25 TABLET, FILM COATED ORAL at 22:13

## 2021-09-28 PROBLEM — N39.0 UTI (URINARY TRACT INFECTION), BACTERIAL: Status: ACTIVE | Noted: 2021-09-28

## 2021-09-28 PROBLEM — A49.9 UTI (URINARY TRACT INFECTION), BACTERIAL: Status: ACTIVE | Noted: 2021-09-28

## 2021-09-28 PROBLEM — R13.10 DYSPHAGIA: Status: ACTIVE | Noted: 2021-09-28

## 2021-09-28 LAB
ANION GAP SERPL CALCULATED.3IONS-SCNC: 10.5 MMOL/L (ref 5–15)
BUN SERPL-MCNC: 24 MG/DL (ref 8–23)
BUN/CREAT SERPL: 24.2 (ref 7–25)
CALCIUM SPEC-SCNC: 8.6 MG/DL (ref 8.6–10.5)
CHLORIDE SERPL-SCNC: 104 MMOL/L (ref 98–107)
CO2 SERPL-SCNC: 22.5 MMOL/L (ref 22–29)
CREAT SERPL-MCNC: 0.99 MG/DL (ref 0.76–1.27)
DEPRECATED RDW RBC AUTO: 46.6 FL (ref 37–54)
ERYTHROCYTE [DISTWIDTH] IN BLOOD BY AUTOMATED COUNT: 12.1 % (ref 12.3–15.4)
GFR SERPL CREATININE-BSD FRML MDRD: 72 ML/MIN/1.73
GLUCOSE SERPL-MCNC: 93 MG/DL (ref 65–99)
HCT VFR BLD AUTO: 38.7 % (ref 37.5–51)
HGB BLD-MCNC: 12.5 G/DL (ref 13–17.7)
MCH RBC QN AUTO: 33.5 PG (ref 26.6–33)
MCHC RBC AUTO-ENTMCNC: 32.3 G/DL (ref 31.5–35.7)
MCV RBC AUTO: 103.8 FL (ref 79–97)
PLATELET # BLD AUTO: 414 10*3/MM3 (ref 140–450)
PMV BLD AUTO: 8.9 FL (ref 6–12)
POTASSIUM SERPL-SCNC: 4.2 MMOL/L (ref 3.5–5.2)
RBC # BLD AUTO: 3.73 10*6/MM3 (ref 4.14–5.8)
SODIUM SERPL-SCNC: 137 MMOL/L (ref 136–145)
WBC # BLD AUTO: 10.2 10*3/MM3 (ref 3.4–10.8)

## 2021-09-28 PROCEDURE — 97530 THERAPEUTIC ACTIVITIES: CPT

## 2021-09-28 PROCEDURE — 92610 EVALUATE SWALLOWING FUNCTION: CPT

## 2021-09-28 PROCEDURE — 96366 THER/PROPH/DIAG IV INF ADDON: CPT

## 2021-09-28 PROCEDURE — G0378 HOSPITAL OBSERVATION PER HR: HCPCS

## 2021-09-28 PROCEDURE — 97166 OT EVAL MOD COMPLEX 45 MIN: CPT

## 2021-09-28 PROCEDURE — 80048 BASIC METABOLIC PNL TOTAL CA: CPT | Performed by: INTERNAL MEDICINE

## 2021-09-28 PROCEDURE — 96372 THER/PROPH/DIAG INJ SC/IM: CPT

## 2021-09-28 PROCEDURE — 25010000002 ENOXAPARIN PER 10 MG: Performed by: HOSPITALIST

## 2021-09-28 PROCEDURE — 25010000002 CEFTRIAXONE PER 250 MG: Performed by: INTERNAL MEDICINE

## 2021-09-28 PROCEDURE — 85027 COMPLETE CBC AUTOMATED: CPT | Performed by: INTERNAL MEDICINE

## 2021-09-28 PROCEDURE — 97162 PT EVAL MOD COMPLEX 30 MIN: CPT

## 2021-09-28 RX ORDER — POLYETHYLENE GLYCOL 3350 17 G/17G
17 POWDER, FOR SOLUTION ORAL 2 TIMES DAILY
Status: DISCONTINUED | OUTPATIENT
Start: 2021-09-28 | End: 2021-09-30 | Stop reason: HOSPADM

## 2021-09-28 RX ORDER — AMOXICILLIN 250 MG
2 CAPSULE ORAL 2 TIMES DAILY
Status: DISCONTINUED | OUTPATIENT
Start: 2021-09-28 | End: 2021-09-30 | Stop reason: HOSPADM

## 2021-09-28 RX ADMIN — METOPROLOL TARTRATE 12.5 MG: 25 TABLET, FILM COATED ORAL at 21:44

## 2021-09-28 RX ADMIN — ENOXAPARIN SODIUM 40 MG: 40 INJECTION SUBCUTANEOUS at 21:44

## 2021-09-28 RX ADMIN — METOPROLOL TARTRATE 12.5 MG: 25 TABLET, FILM COATED ORAL at 08:20

## 2021-09-28 RX ADMIN — DOCUSATE SODIUM 50MG AND SENNOSIDES 8.6MG 2 TABLET: 8.6; 5 TABLET, FILM COATED ORAL at 21:44

## 2021-09-28 RX ADMIN — ASPIRIN 81 MG: 81 TABLET, COATED ORAL at 08:20

## 2021-09-28 RX ADMIN — POLYETHYLENE GLYCOL 3350 17 G: 17 POWDER, FOR SOLUTION ORAL at 08:20

## 2021-09-28 RX ADMIN — CEFTRIAXONE 1 G: 1 INJECTION, POWDER, FOR SOLUTION INTRAMUSCULAR; INTRAVENOUS at 14:03

## 2021-09-28 RX ADMIN — SODIUM CHLORIDE, PRESERVATIVE FREE 10 ML: 5 INJECTION INTRAVENOUS at 08:20

## 2021-09-29 ENCOUNTER — APPOINTMENT (OUTPATIENT)
Dept: GENERAL RADIOLOGY | Facility: HOSPITAL | Age: 86
End: 2021-09-29

## 2021-09-29 PROBLEM — R82.71 BACTERIURIA: Status: ACTIVE | Noted: 2021-09-29

## 2021-09-29 PROBLEM — R53.81 DEBILITY: Status: ACTIVE | Noted: 2021-09-29

## 2021-09-29 PROBLEM — B96.20 E-COLI UTI: Status: ACTIVE | Noted: 2021-09-28

## 2021-09-29 PROBLEM — R54 AGE-RELATED PHYSICAL DEBILITY: Status: ACTIVE | Noted: 2021-09-29

## 2021-09-29 LAB — BACTERIA SPEC AEROBE CULT: ABNORMAL

## 2021-09-29 PROCEDURE — 96372 THER/PROPH/DIAG INJ SC/IM: CPT

## 2021-09-29 PROCEDURE — G0378 HOSPITAL OBSERVATION PER HR: HCPCS

## 2021-09-29 PROCEDURE — 99219 PR INITIAL OBSERVATION CARE/DAY 50 MINUTES: CPT | Performed by: NURSE PRACTITIONER

## 2021-09-29 PROCEDURE — 92611 MOTION FLUOROSCOPY/SWALLOW: CPT

## 2021-09-29 PROCEDURE — 25010000002 ENOXAPARIN PER 10 MG: Performed by: HOSPITALIST

## 2021-09-29 PROCEDURE — 74230 X-RAY XM SWLNG FUNCJ C+: CPT

## 2021-09-29 PROCEDURE — 97530 THERAPEUTIC ACTIVITIES: CPT

## 2021-09-29 RX ORDER — BISACODYL 10 MG
10 SUPPOSITORY, RECTAL RECTAL DAILY
Status: DISCONTINUED | OUTPATIENT
Start: 2021-09-29 | End: 2021-09-30 | Stop reason: HOSPADM

## 2021-09-29 RX ADMIN — METOPROLOL TARTRATE 12.5 MG: 25 TABLET, FILM COATED ORAL at 22:01

## 2021-09-29 RX ADMIN — ENOXAPARIN SODIUM 40 MG: 40 INJECTION SUBCUTANEOUS at 22:01

## 2021-09-29 RX ADMIN — METOPROLOL TARTRATE 12.5 MG: 25 TABLET, FILM COATED ORAL at 10:51

## 2021-09-29 RX ADMIN — BARIUM SULFATE 55 ML: 0.81 POWDER, FOR SUSPENSION ORAL at 09:20

## 2021-09-29 RX ADMIN — POLYETHYLENE GLYCOL 3350 17 G: 17 POWDER, FOR SOLUTION ORAL at 10:51

## 2021-09-29 RX ADMIN — BARIUM SULFATE 135 ML: 980 POWDER, FOR SUSPENSION ORAL at 09:20

## 2021-09-29 RX ADMIN — BARIUM SULFATE 50 ML: 400 SUSPENSION ORAL at 09:20

## 2021-09-29 RX ADMIN — BARIUM SULFATE 1 TEASPOON(S): 0.6 CREAM ORAL at 09:20

## 2021-09-29 RX ADMIN — ASPIRIN 81 MG: 81 TABLET, COATED ORAL at 10:51

## 2021-09-29 RX ADMIN — DOCUSATE SODIUM 50MG AND SENNOSIDES 8.6MG 2 TABLET: 8.6; 5 TABLET, FILM COATED ORAL at 10:51

## 2021-09-29 RX ADMIN — BISACODYL 10 MG: 10 SUPPOSITORY RECTAL at 15:28

## 2021-09-30 ENCOUNTER — HOME HEALTH ADMISSION (OUTPATIENT)
Dept: HOME HEALTH SERVICES | Facility: HOME HEALTHCARE | Age: 86
End: 2021-09-30

## 2021-09-30 ENCOUNTER — TRANSCRIBE ORDERS (OUTPATIENT)
Dept: HOME HEALTH SERVICES | Facility: HOME HEALTHCARE | Age: 86
End: 2021-09-30

## 2021-09-30 VITALS
WEIGHT: 184.97 LBS | TEMPERATURE: 98.5 F | SYSTOLIC BLOOD PRESSURE: 110 MMHG | HEART RATE: 65 BPM | RESPIRATION RATE: 16 BRPM | HEIGHT: 74 IN | BODY MASS INDEX: 23.74 KG/M2 | OXYGEN SATURATION: 96 % | DIASTOLIC BLOOD PRESSURE: 55 MMHG

## 2021-09-30 DIAGNOSIS — N39.0 URINARY TRACT INFECTION WITHOUT HEMATURIA, SITE UNSPECIFIED: Primary | ICD-10-CM

## 2021-09-30 DIAGNOSIS — N18.30 STAGE 3 CHRONIC KIDNEY DISEASE, UNSPECIFIED WHETHER STAGE 3A OR 3B CKD (HCC): ICD-10-CM

## 2021-09-30 PROCEDURE — G0008 ADMIN INFLUENZA VIRUS VAC: HCPCS | Performed by: STUDENT IN AN ORGANIZED HEALTH CARE EDUCATION/TRAINING PROGRAM

## 2021-09-30 PROCEDURE — 25010000002 INFLUENZA VAC SPLIT QUAD 0.5 ML SUSPENSION PREFILLED SYRINGE: Performed by: STUDENT IN AN ORGANIZED HEALTH CARE EDUCATION/TRAINING PROGRAM

## 2021-09-30 PROCEDURE — G0378 HOSPITAL OBSERVATION PER HR: HCPCS

## 2021-09-30 PROCEDURE — 90686 IIV4 VACC NO PRSV 0.5 ML IM: CPT | Performed by: STUDENT IN AN ORGANIZED HEALTH CARE EDUCATION/TRAINING PROGRAM

## 2021-09-30 RX ADMIN — INFLUENZA VIRUS VACCINE 0.5 ML: 15; 15; 15; 15 SUSPENSION INTRAMUSCULAR at 12:24

## 2021-09-30 RX ADMIN — DOCUSATE SODIUM 50MG AND SENNOSIDES 8.6MG 2 TABLET: 8.6; 5 TABLET, FILM COATED ORAL at 08:48

## 2021-09-30 RX ADMIN — METOPROLOL TARTRATE 12.5 MG: 25 TABLET, FILM COATED ORAL at 08:48

## 2021-09-30 RX ADMIN — POLYETHYLENE GLYCOL 3350 17 G: 17 POWDER, FOR SOLUTION ORAL at 08:48

## 2021-09-30 RX ADMIN — ASPIRIN 81 MG: 81 TABLET, COATED ORAL at 08:47

## 2021-10-01 ENCOUNTER — HOME CARE VISIT (OUTPATIENT)
Dept: HOME HEALTH SERVICES | Facility: HOME HEALTHCARE | Age: 86
End: 2021-10-01

## 2021-10-01 VITALS
DIASTOLIC BLOOD PRESSURE: 60 MMHG | OXYGEN SATURATION: 98 % | TEMPERATURE: 95 F | SYSTOLIC BLOOD PRESSURE: 106 MMHG | HEART RATE: 92 BPM | RESPIRATION RATE: 18 BRPM

## 2021-10-01 PROCEDURE — G0299 HHS/HOSPICE OF RN EA 15 MIN: HCPCS

## 2021-10-01 NOTE — CASE COMMUNICATION
<<<RESIDENT DISCHARGE NOTE>>>     SOCORRO MANUEL  MRN-[id number]    VITAL SIGNS:  T(F): 97.6 (04-11-19 @ 05:22), Max: 98.1 (04-10-19 @ 14:14)  HR: 55 (04-11-19 @ 12:22)  BP: 129/67 (04-11-19 @ 12:22)  SpO2: 96% (04-11-19 @ 07:14)      PHYSICAL EXAMINATION:  General: obese male NAD  HEENT: no scleral icterus, no nasal discharge, moist mucous membranes, upper extremities appear jaundiced   Neck: supple, no cervical lymphadenopathy  Heart RRR s1s2 normal, no rub/murmur appreciated   Lungs- symmetric chest excursion, clear to auscultation bilaterally  Abdomen: Soft, distended, mild abdominal tenderness upon deep palpation of the LUQ, no guarding or rebound tenderness,negative Boateng sign   Extremities- 2+ pitting LE edema extending from feet to shins, this is about the same from the pt's prior hospitalization as per patient , no numbness or tingling, no clubbing/cyanosis, no asterixis   Neuro: AAA x 3        TEST RESULTS:                        8.9    3.01  )-----------( 87       ( 11 Apr 2019 08:27 )             28.1       04-11    138  |  107  |  18  ----------------------------<  90  4.5   |  25  |  1.1    Ca    8.2<L>      11 Apr 2019 08:27  Mg     1.7     04-11    TPro  6.2  /  Alb  2.2<L>  /  TBili  1.7<H>  /  DBili  x   /  AST  59<H>  /  ALT  34  /  AlkPhos  149<H>  04-11      FINAL DISCHARGE INTERVIEW:  Resident(s) Present: (Name: Dr. Castellon) RN Present: (Name:  Viki)    DISCHARGE MEDICATION RECONCILIATION  reviewed with Attending (Name: Dr. Castaneda)    DISPOSITION:   [ X ] Home,    [  ] Home with Visiting Nursing Services,   [    ]  SNF/ NH,    [   ] Acute Rehab (4A),   [   ] Other (Specify:_________) SOC 10/1    Patient seen following hospital stay where he was found to have E. Coli UTI, was not treated with antibiotics. Also found to have urinary stones, but these were not restricting flow, so these were not treated at this time.  Patient initially went to hospital following a fall and subsequent weakness. The primary goal for this patient will be strengthing with therapy at home.  Patient is alert and oriented, hard of hearing, can use walker to ambulate short distances, but uses a wheelchair for longer distances.  At hospital admission, patient reports to have large amount of BLE swelling, but this is resolved at this time, maybe only 1+ edema.  Patient also has a RUE skin tear, being treated with xeroform and covered with mepilex or gauze to be changed every 3 days.  Patient on honey thick liquids and pureed foods at this time.

## 2021-10-01 NOTE — PROGRESS NOTES
Discharge Planning Assessment  [facility]     Patient Name: Fritz Flores  MRN: [id number]  Today's Date: 10/1/2021    Admit Date: 9/27/2021    Discharge Needs Assessment    No documentation.       Discharge Plan     Row Name 10/01/21 1820       Plan    Plan  [facility]    Final Discharge Disposition Code  06 - home with home health care    Final Note  Home with [facility]        Continued Care and Services - Discharged on 9/30/2021 Admission date: 9/27/2021 - Discharge disposition: Home or Self Care    Home Medical Care Coordination complete.    Service Provider Request Status Selected Services Address Phone Fax Patient Preferred     [facility]   Selected Home Health Services [address] [address] [phone] --              Expected Discharge Date and Time     Expected Discharge Date Expected Discharge Time    Sep 30, 2021         Demographic Summary    No documentation.       Functional Status    No documentation.       Psychosocial    No documentation.       Abuse/Neglect    No documentation.       Legal    No documentation.       Substance Abuse    No documentation.       Patient Forms    No documentation.           Karen Jonas, RN

## 2021-10-01 NOTE — HOME HEALTH
Patient seen following hospital stay where he was found to have E. Coli UTI, was not treated with antibiotics. Also found to have urinary stones, but these were not restricting flow, so these were not treated at this time.  Patient initially went to hospital following a fall and subsequent weakness. The primary goal for this patient will be strengthing with therapy at home.  Patient is alert and oriented, hard of hearing, can use walker to ambulate short distances, but uses a wheelchair for longer distances.  At hospital admission, patient reports to have large amount of BLE swelling, but this is resolved at this time, maybe only 1+ edema.  Patient also has a RUE skin tear, being treated with xeroform and covered with mepilex or gauze to be changed every 3 days.  Orders for meds, nursing and wound care confirmed with Karen at Dr. العلي office.  Patient on honey thick liquids and pureed foods at this time.

## 2021-10-04 ENCOUNTER — HOME CARE VISIT (OUTPATIENT)
Dept: HOME HEALTH SERVICES | Facility: HOME HEALTHCARE | Age: 86
End: 2021-10-04

## 2021-10-04 VITALS
TEMPERATURE: 96.6 F | SYSTOLIC BLOOD PRESSURE: 110 MMHG | HEART RATE: 82 BPM | DIASTOLIC BLOOD PRESSURE: 62 MMHG | RESPIRATION RATE: 17 BRPM | OXYGEN SATURATION: 98 %

## 2021-10-04 PROCEDURE — G0299 HHS/HOSPICE OF RN EA 15 MIN: HCPCS

## 2021-10-04 PROCEDURE — G0151 HHCP-SERV OF PT,EA 15 MIN: HCPCS

## 2021-10-05 NOTE — HOME HEALTH
Patient initially went to hospital following a fall and subsequent weakness. The primary goal for this patient will be strengthing with therapy at home. PT seeing today.    Patient is alert and oriented, hard of hearing, can use walker to ambulate short distances, but uses a wheelchair for longer distances.    BLE Edema is slightly worse today, maybe 2+, educated patient to elevate feet more frequently.     Patient also has a RUE skin tear, being treated with xeroform and covered with mepilex or gauze to be changed every 3 days, only minimal improvement at this time.     Updated picture for wound not taken today because original just taken Friday, take updated picture at Thursday visit.    Orders for meds, nursing and wound care confirmed with Karen at Dr. العلي office, family still needs to obtain new meds.    Patient diet advanced to regular with thin liquids per family.

## 2021-10-05 NOTE — HOME HEALTH
Patient had multiple falls recently and was taken to the ER and admitted to hospital for UTI and weakness.  He has weakness and immobility at a baseline (weakness especially in the right LE from multiple injuries), but his daughter states he was able to ambulate some with the walker in his home.  He is elderly and appears to be in a functional decline over the last several years.  The family is providing 24/hr care.  PMH: multiple right LE hip surgeries, CAD and CABG, dysphagia on honey thick liquids (unsure of etiology and family denies any stroke history), Pala, falls.     Family reports patient now unable to stand/walk and they are assisting with all at this time.     Plan for next visit  1.  Continue transfer training as needed  2.  Increase gait duration as tolerated and as is safe.   3.  HEP progression.

## 2021-10-07 ENCOUNTER — HOME CARE VISIT (OUTPATIENT)
Dept: HOME HEALTH SERVICES | Facility: HOME HEALTHCARE | Age: 86
End: 2021-10-07

## 2021-10-07 VITALS
RESPIRATION RATE: 18 BRPM | TEMPERATURE: 96.9 F | HEART RATE: 81 BPM | DIASTOLIC BLOOD PRESSURE: 66 MMHG | SYSTOLIC BLOOD PRESSURE: 126 MMHG | OXYGEN SATURATION: 100 %

## 2021-10-07 PROCEDURE — G0300 HHS/HOSPICE OF LPN EA 15 MIN: HCPCS

## 2021-10-07 PROCEDURE — G0151 HHCP-SERV OF PT,EA 15 MIN: HCPCS

## 2021-10-07 NOTE — HOME HEALTH
Patient is a CP assess-  assess for edema in bilateal lower legs and make sure he has his compression socks on.  Check skin to make sure no other pressure areas are forming.  Check bowel status--- patients grandson states that patient has not had a bowel movement in 1 week. SN put a call into PCP to notify and see if any changes need to be made.  Grandson states he is taking his sennna everynight but does not think he is taking his miralax.  SN did educate on the importance of taking the miralax and drinking plenty of water.    He has skin tear to R forarm-  clean with normal saline or wound cleanser and apply vaseline gauze to wound then cover with a optifoam dressing.

## 2021-10-08 VITALS
DIASTOLIC BLOOD PRESSURE: 76 MMHG | TEMPERATURE: 97.4 F | OXYGEN SATURATION: 99 % | SYSTOLIC BLOOD PRESSURE: 136 MMHG | HEART RATE: 84 BPM

## 2021-10-08 NOTE — HOME HEALTH
His daughter (via the phone) stated concern that the patient was starting to get up on the walker by himself and she wanted me to reinforce that he needs assist when up.   Patient was up eating a sandwich when I arrived.  A caregiver was present, but did not stay in the room for any teaching.  Patient states he is doing better and feeling better.     Plan for next visit  1.  Continue to progress gait as tolerated  2.  Progress his HEP as needed and tolerated.   3.  Transfer training as needed.

## 2021-10-11 ENCOUNTER — HOME CARE VISIT (OUTPATIENT)
Dept: HOME HEALTH SERVICES | Facility: HOME HEALTHCARE | Age: 86
End: 2021-10-11

## 2021-10-11 VITALS
OXYGEN SATURATION: 98 % | RESPIRATION RATE: 16 BRPM | HEART RATE: 68 BPM | SYSTOLIC BLOOD PRESSURE: 128 MMHG | DIASTOLIC BLOOD PRESSURE: 60 MMHG | TEMPERATURE: 95.9 F

## 2021-10-11 PROCEDURE — G0299 HHS/HOSPICE OF RN EA 15 MIN: HCPCS

## 2021-10-12 ENCOUNTER — HOME CARE VISIT (OUTPATIENT)
Dept: HOME HEALTH SERVICES | Facility: HOME HEALTHCARE | Age: 86
End: 2021-10-12

## 2021-10-12 PROCEDURE — G0151 HHCP-SERV OF PT,EA 15 MIN: HCPCS

## 2021-10-12 NOTE — HOME HEALTH
Continue to see patient for CP assessment and wound care following UTI and subsequent fall.  PT is still seeing patient for strengthening.  Patient had bout of constipation lasting about 8 days until night before visit where patient did report a bowel movement.  Abdomen is slightly firm on assessment, but bowel sounds present, patient does not complain of any abdominal pain.  Patient did not have any miralax in home, educated on the importance of taking this daily to help promote more regular bowel movements, follow up with this on next visit.  Skin tear seems to be progressing appropriately.

## 2021-10-13 VITALS
OXYGEN SATURATION: 95 % | HEART RATE: 72 BPM | SYSTOLIC BLOOD PRESSURE: 142 MMHG | TEMPERATURE: 97.3 F | DIASTOLIC BLOOD PRESSURE: 64 MMHG

## 2021-10-13 NOTE — HOME HEALTH
Caregiver present and patient sitting up at the kitchen table.  Patient was not in a good mood and was short with the therapist today.  Caregiver states the patient is mad that they are not allowing him to use his walker by himself.  But, the caregiver states he is getting up and walking frequently.  His is transfering out of his lift chair and hospital bed with supervsion only.  Caregiver states he needs help get out of his regular bed but it is more because of the bed height and no rail.      Patient presents at a baseline and the family agrees that he is too much of a falls risk to let him walk on his own.  Since the goal will not be independent walking, his goals have been met and the family agree that physical therapy discharge is warranted.  Discipline discharge today.    **Caregiver was made aware to look out for any signs of a UTI since patient had one in the hospital and is prone to have one.

## 2021-10-14 ENCOUNTER — HOME CARE VISIT (OUTPATIENT)
Dept: HOME HEALTH SERVICES | Facility: HOME HEALTHCARE | Age: 86
End: 2021-10-14

## 2021-10-14 PROCEDURE — G0299 HHS/HOSPICE OF RN EA 15 MIN: HCPCS

## 2021-10-15 NOTE — HOME HEALTH
Patient continues to be seen after UTI and subsequent fall.   Much weaker after hospital stay, but this has been improving with PT.  BLE edema continues to improve.  Right arm skin tear is healing well.  CP assessment stable.  Patient will likely be ready for discharge in next visit or two.

## 2021-10-21 ENCOUNTER — HOME CARE VISIT (OUTPATIENT)
Dept: HOME HEALTH SERVICES | Facility: HOME HEALTHCARE | Age: 86
End: 2021-10-21

## 2021-10-21 PROCEDURE — G0299 HHS/HOSPICE OF RN EA 15 MIN: HCPCS

## 2021-10-25 VITALS
HEART RATE: 65 BPM | SYSTOLIC BLOOD PRESSURE: 110 MMHG | OXYGEN SATURATION: 95 % | DIASTOLIC BLOOD PRESSURE: 62 MMHG | RESPIRATION RATE: 16 BRPM | TEMPERATURE: 95.9 F

## 2021-10-25 NOTE — HOME HEALTH
Patient continues to improve.  Up moving independently with walker today.   Skin tear is healed, no need for more dressing changes.  Patient is elevating feet while resting, edema signifcantly reduced today.  Will be appropriate for d/c at next visit.

## 2021-10-27 ENCOUNTER — HOME CARE VISIT (OUTPATIENT)
Dept: HOME HEALTH SERVICES | Facility: HOME HEALTHCARE | Age: 86
End: 2021-10-27

## 2021-10-27 VITALS
OXYGEN SATURATION: 99 % | HEART RATE: 58 BPM | RESPIRATION RATE: 14 BRPM | TEMPERATURE: 95.5 F | DIASTOLIC BLOOD PRESSURE: 66 MMHG | SYSTOLIC BLOOD PRESSURE: 134 MMHG

## 2021-10-27 PROCEDURE — G0299 HHS/HOSPICE OF RN EA 15 MIN: HCPCS

## 2021-10-27 NOTE — HOME HEALTH
Patient has and is taking all meds as ordered.  skin tear completely healed.  Is elevating feet regularly.  Is appropriate for d/c at this time.

## 2022-01-01 ENCOUNTER — HOME CARE VISIT (OUTPATIENT)
Dept: HOME HEALTH SERVICES | Facility: HOME HEALTHCARE | Age: 87
End: 2022-01-01

## 2022-01-01 ENCOUNTER — APPOINTMENT (OUTPATIENT)
Dept: CT IMAGING | Facility: HOSPITAL | Age: 87
End: 2022-01-01

## 2022-01-01 ENCOUNTER — APPOINTMENT (OUTPATIENT)
Dept: CARDIOLOGY | Facility: HOSPITAL | Age: 87
End: 2022-01-01

## 2022-01-01 ENCOUNTER — READMISSION MANAGEMENT (OUTPATIENT)
Dept: CALL CENTER | Facility: HOSPITAL | Age: 87
End: 2022-01-01

## 2022-01-01 ENCOUNTER — HOME HEALTH ADMISSION (OUTPATIENT)
Dept: HOME HEALTH SERVICES | Facility: HOME HEALTHCARE | Age: 87
End: 2022-01-01

## 2022-01-01 ENCOUNTER — APPOINTMENT (OUTPATIENT)
Dept: MRI IMAGING | Facility: HOSPITAL | Age: 87
End: 2022-01-01

## 2022-01-01 ENCOUNTER — APPOINTMENT (OUTPATIENT)
Dept: GENERAL RADIOLOGY | Facility: HOSPITAL | Age: 87
End: 2022-01-01

## 2022-01-01 ENCOUNTER — TRANSCRIBE ORDERS (OUTPATIENT)
Dept: HOME HEALTH SERVICES | Facility: HOME HEALTHCARE | Age: 87
End: 2022-01-01

## 2022-01-01 ENCOUNTER — HOSPITAL ENCOUNTER (INPATIENT)
Facility: HOSPITAL | Age: 87
LOS: 3 days | Discharge: HOME HEALTH | End: 2022-06-05
Attending: EMERGENCY MEDICINE | Admitting: HOSPITALIST

## 2022-01-01 ENCOUNTER — HOSPITAL ENCOUNTER (EMERGENCY)
Facility: HOSPITAL | Age: 87
Discharge: HOME | End: 2022-04-05
Attending: EMERGENCY MEDICINE | Admitting: EMERGENCY MEDICINE

## 2022-01-01 ENCOUNTER — HOSPITAL ENCOUNTER (EMERGENCY)
Facility: HOSPITAL | Age: 87
Discharge: HOME | End: 2022-04-07
Attending: EMERGENCY MEDICINE | Admitting: EMERGENCY MEDICINE

## 2022-01-01 VITALS
SYSTOLIC BLOOD PRESSURE: 151 MMHG | HEART RATE: 71 BPM | OXYGEN SATURATION: 97 % | TEMPERATURE: 98.6 F | RESPIRATION RATE: 14 BRPM | DIASTOLIC BLOOD PRESSURE: 77 MMHG

## 2022-01-01 VITALS
DIASTOLIC BLOOD PRESSURE: 66 MMHG | RESPIRATION RATE: 18 BRPM | SYSTOLIC BLOOD PRESSURE: 118 MMHG | HEART RATE: 71 BPM | OXYGEN SATURATION: 99 %

## 2022-01-01 VITALS
SYSTOLIC BLOOD PRESSURE: 116 MMHG | HEART RATE: 77 BPM | DIASTOLIC BLOOD PRESSURE: 70 MMHG | OXYGEN SATURATION: 97 % | TEMPERATURE: 97.2 F

## 2022-01-01 VITALS
SYSTOLIC BLOOD PRESSURE: 112 MMHG | DIASTOLIC BLOOD PRESSURE: 52 MMHG | TEMPERATURE: 97.9 F | HEART RATE: 77 BPM | OXYGEN SATURATION: 99 %

## 2022-01-01 VITALS
HEART RATE: 68 BPM | DIASTOLIC BLOOD PRESSURE: 60 MMHG | SYSTOLIC BLOOD PRESSURE: 118 MMHG | TEMPERATURE: 97.4 F | OXYGEN SATURATION: 99 %

## 2022-01-01 VITALS
SYSTOLIC BLOOD PRESSURE: 120 MMHG | RESPIRATION RATE: 18 BRPM | HEART RATE: 62 BPM | DIASTOLIC BLOOD PRESSURE: 64 MMHG | TEMPERATURE: 97.5 F

## 2022-01-01 VITALS — HEART RATE: 68 BPM | OXYGEN SATURATION: 96 % | SYSTOLIC BLOOD PRESSURE: 122 MMHG | DIASTOLIC BLOOD PRESSURE: 58 MMHG

## 2022-01-01 VITALS
HEART RATE: 74 BPM | TEMPERATURE: 97.4 F | RESPIRATION RATE: 18 BRPM | SYSTOLIC BLOOD PRESSURE: 134 MMHG | DIASTOLIC BLOOD PRESSURE: 66 MMHG

## 2022-01-01 VITALS
TEMPERATURE: 97.2 F | RESPIRATION RATE: 18 BRPM | DIASTOLIC BLOOD PRESSURE: 62 MMHG | HEART RATE: 64 BPM | SYSTOLIC BLOOD PRESSURE: 110 MMHG

## 2022-01-01 VITALS
SYSTOLIC BLOOD PRESSURE: 128 MMHG | HEART RATE: 72 BPM | TEMPERATURE: 97.6 F | RESPIRATION RATE: 18 BRPM | DIASTOLIC BLOOD PRESSURE: 64 MMHG

## 2022-01-01 VITALS
DIASTOLIC BLOOD PRESSURE: 52 MMHG | OXYGEN SATURATION: 99 % | RESPIRATION RATE: 16 BRPM | TEMPERATURE: 97.6 F | SYSTOLIC BLOOD PRESSURE: 110 MMHG | HEART RATE: 62 BPM

## 2022-01-01 VITALS
HEART RATE: 73 BPM | OXYGEN SATURATION: 99 % | WEIGHT: 192 LBS | TEMPERATURE: 98.7 F | SYSTOLIC BLOOD PRESSURE: 147 MMHG | HEIGHT: 73 IN | RESPIRATION RATE: 18 BRPM | BODY MASS INDEX: 25.45 KG/M2 | DIASTOLIC BLOOD PRESSURE: 78 MMHG

## 2022-01-01 VITALS
TEMPERATURE: 97.3 F | DIASTOLIC BLOOD PRESSURE: 56 MMHG | OXYGEN SATURATION: 98 % | RESPIRATION RATE: 17 BRPM | SYSTOLIC BLOOD PRESSURE: 114 MMHG | HEART RATE: 58 BPM

## 2022-01-01 VITALS
DIASTOLIC BLOOD PRESSURE: 64 MMHG | RESPIRATION RATE: 18 BRPM | OXYGEN SATURATION: 96 % | HEART RATE: 73 BPM | SYSTOLIC BLOOD PRESSURE: 118 MMHG

## 2022-01-01 VITALS
RESPIRATION RATE: 18 BRPM | SYSTOLIC BLOOD PRESSURE: 142 MMHG | DIASTOLIC BLOOD PRESSURE: 76 MMHG | HEART RATE: 70 BPM | OXYGEN SATURATION: 98 %

## 2022-01-01 VITALS
SYSTOLIC BLOOD PRESSURE: 108 MMHG | OXYGEN SATURATION: 98 % | TEMPERATURE: 97.6 F | DIASTOLIC BLOOD PRESSURE: 58 MMHG | HEART RATE: 68 BPM

## 2022-01-01 VITALS
DIASTOLIC BLOOD PRESSURE: 54 MMHG | TEMPERATURE: 97.3 F | SYSTOLIC BLOOD PRESSURE: 110 MMHG | OXYGEN SATURATION: 97 % | RESPIRATION RATE: 20 BRPM | HEART RATE: 70 BPM

## 2022-01-01 VITALS
HEART RATE: 64 BPM | RESPIRATION RATE: 18 BRPM | DIASTOLIC BLOOD PRESSURE: 58 MMHG | OXYGEN SATURATION: 99 % | SYSTOLIC BLOOD PRESSURE: 112 MMHG

## 2022-01-01 VITALS
DIASTOLIC BLOOD PRESSURE: 60 MMHG | TEMPERATURE: 97.2 F | HEART RATE: 68 BPM | OXYGEN SATURATION: 99 % | SYSTOLIC BLOOD PRESSURE: 118 MMHG

## 2022-01-01 VITALS
RESPIRATION RATE: 18 BRPM | TEMPERATURE: 98.1 F | OXYGEN SATURATION: 98 % | DIASTOLIC BLOOD PRESSURE: 81 MMHG | HEART RATE: 71 BPM | SYSTOLIC BLOOD PRESSURE: 173 MMHG

## 2022-01-01 VITALS
OXYGEN SATURATION: 99 % | HEART RATE: 69 BPM | DIASTOLIC BLOOD PRESSURE: 58 MMHG | TEMPERATURE: 97.4 F | SYSTOLIC BLOOD PRESSURE: 108 MMHG

## 2022-01-01 VITALS
RESPIRATION RATE: 18 BRPM | SYSTOLIC BLOOD PRESSURE: 120 MMHG | TEMPERATURE: 98.6 F | DIASTOLIC BLOOD PRESSURE: 68 MMHG | HEART RATE: 64 BPM

## 2022-01-01 VITALS
SYSTOLIC BLOOD PRESSURE: 133 MMHG | RESPIRATION RATE: 18 BRPM | OXYGEN SATURATION: 99 % | HEART RATE: 62 BPM | TEMPERATURE: 97.7 F | DIASTOLIC BLOOD PRESSURE: 77 MMHG

## 2022-01-01 VITALS
HEART RATE: 80 BPM | DIASTOLIC BLOOD PRESSURE: 67 MMHG | SYSTOLIC BLOOD PRESSURE: 129 MMHG | RESPIRATION RATE: 18 BRPM | OXYGEN SATURATION: 98 % | TEMPERATURE: 97.7 F

## 2022-01-01 VITALS
RESPIRATION RATE: 18 BRPM | OXYGEN SATURATION: 99 % | HEART RATE: 57 BPM | DIASTOLIC BLOOD PRESSURE: 76 MMHG | SYSTOLIC BLOOD PRESSURE: 122 MMHG

## 2022-01-01 VITALS
TEMPERATURE: 97.4 F | RESPIRATION RATE: 17 BRPM | OXYGEN SATURATION: 99 % | SYSTOLIC BLOOD PRESSURE: 132 MMHG | HEART RATE: 68 BPM | DIASTOLIC BLOOD PRESSURE: 70 MMHG

## 2022-01-01 VITALS
SYSTOLIC BLOOD PRESSURE: 118 MMHG | DIASTOLIC BLOOD PRESSURE: 64 MMHG | RESPIRATION RATE: 18 BRPM | TEMPERATURE: 96.3 F | HEART RATE: 80 BPM | OXYGEN SATURATION: 92 %

## 2022-01-01 VITALS
DIASTOLIC BLOOD PRESSURE: 72 MMHG | HEART RATE: 72 BPM | SYSTOLIC BLOOD PRESSURE: 120 MMHG | OXYGEN SATURATION: 99 % | RESPIRATION RATE: 18 BRPM

## 2022-01-01 VITALS
TEMPERATURE: 97.2 F | HEART RATE: 63 BPM | DIASTOLIC BLOOD PRESSURE: 60 MMHG | SYSTOLIC BLOOD PRESSURE: 120 MMHG | OXYGEN SATURATION: 96 %

## 2022-01-01 VITALS
RESPIRATION RATE: 18 BRPM | HEART RATE: 70 BPM | TEMPERATURE: 97.4 F | SYSTOLIC BLOOD PRESSURE: 114 MMHG | DIASTOLIC BLOOD PRESSURE: 58 MMHG

## 2022-01-01 VITALS
OXYGEN SATURATION: 100 % | SYSTOLIC BLOOD PRESSURE: 118 MMHG | TEMPERATURE: 97.2 F | DIASTOLIC BLOOD PRESSURE: 56 MMHG | HEART RATE: 60 BPM

## 2022-01-01 VITALS
TEMPERATURE: 94.4 F | OXYGEN SATURATION: 99 % | DIASTOLIC BLOOD PRESSURE: 60 MMHG | SYSTOLIC BLOOD PRESSURE: 92 MMHG | HEART RATE: 69 BPM | RESPIRATION RATE: 18 BRPM

## 2022-01-01 VITALS — SYSTOLIC BLOOD PRESSURE: 124 MMHG | DIASTOLIC BLOOD PRESSURE: 64 MMHG | HEART RATE: 81 BPM | OXYGEN SATURATION: 100 %

## 2022-01-01 VITALS
DIASTOLIC BLOOD PRESSURE: 62 MMHG | SYSTOLIC BLOOD PRESSURE: 132 MMHG | TEMPERATURE: 97.7 F | OXYGEN SATURATION: 98 % | HEART RATE: 62 BPM

## 2022-01-01 VITALS
SYSTOLIC BLOOD PRESSURE: 128 MMHG | HEART RATE: 64 BPM | RESPIRATION RATE: 18 BRPM | DIASTOLIC BLOOD PRESSURE: 74 MMHG | TEMPERATURE: 97.4 F

## 2022-01-01 VITALS
DIASTOLIC BLOOD PRESSURE: 60 MMHG | HEART RATE: 77 BPM | SYSTOLIC BLOOD PRESSURE: 108 MMHG | OXYGEN SATURATION: 98 % | TEMPERATURE: 97.8 F

## 2022-01-01 VITALS
SYSTOLIC BLOOD PRESSURE: 110 MMHG | DIASTOLIC BLOOD PRESSURE: 60 MMHG | OXYGEN SATURATION: 97 % | TEMPERATURE: 97.8 F | HEART RATE: 76 BPM

## 2022-01-01 DIAGNOSIS — D72.829 LEUKOCYTOSIS, UNSPECIFIED TYPE: ICD-10-CM

## 2022-01-01 DIAGNOSIS — S02.119D CLOSED FRACTURE OF OCCIPITAL BONE WITH ROUTINE HEALING, UNSPECIFIED LATERALITY, UNSPECIFIED OCCIPITAL FRACTURE TYPE, SUBSEQUENT ENCOUNTER: Primary | ICD-10-CM

## 2022-01-01 DIAGNOSIS — M54.50 ACUTE MIDLINE LOW BACK PAIN WITHOUT SCIATICA: Primary | ICD-10-CM

## 2022-01-01 DIAGNOSIS — R29.6 MULTIPLE FALLS: Primary | ICD-10-CM

## 2022-01-01 DIAGNOSIS — R77.8 ELEVATED TROPONIN: ICD-10-CM

## 2022-01-01 DIAGNOSIS — R41.82 ALTERED MENTAL STATUS, UNSPECIFIED ALTERED MENTAL STATUS TYPE: ICD-10-CM

## 2022-01-01 DIAGNOSIS — R13.10 DYSPHAGIA, UNSPECIFIED TYPE: Primary | ICD-10-CM

## 2022-01-01 DIAGNOSIS — R29.6 FREQUENT FALLS: ICD-10-CM

## 2022-01-01 DIAGNOSIS — M79.89 LEG SWELLING: ICD-10-CM

## 2022-01-01 DIAGNOSIS — E86.0 DEHYDRATION: ICD-10-CM

## 2022-01-01 DIAGNOSIS — N39.0 ACUTE UTI: ICD-10-CM

## 2022-01-01 DIAGNOSIS — N17.9 ACUTE KIDNEY INJURY: ICD-10-CM

## 2022-01-01 LAB
ALBUMIN SERPL-MCNC: 4.1 G/DL (ref 3.5–5.2)
ALBUMIN SERPL-MCNC: 4.2 G/DL (ref 3.5–5.2)
ALBUMIN/GLOB SERPL: 1.9 G/DL
ALBUMIN/GLOB SERPL: 2.2 G/DL
ALP SERPL-CCNC: 67 U/L (ref 39–117)
ALP SERPL-CCNC: 80 U/L (ref 39–117)
ALT SERPL W P-5'-P-CCNC: 10 U/L (ref 1–41)
ALT SERPL W P-5'-P-CCNC: 12 U/L (ref 1–41)
ANION GAP SERPL CALCULATED.3IONS-SCNC: 10 MMOL/L (ref 5–15)
ANION GAP SERPL CALCULATED.3IONS-SCNC: 12.8 MMOL/L (ref 5–15)
ANION GAP SERPL CALCULATED.3IONS-SCNC: 13 MMOL/L (ref 5–15)
ANION GAP SERPL CALCULATED.3IONS-SCNC: 13.8 MMOL/L (ref 5–15)
ANION GAP SERPL CALCULATED.3IONS-SCNC: 9 MMOL/L (ref 5–15)
AORTIC DIMENSIONLESS INDEX: 0.7 (DI)
AST SERPL-CCNC: 15 U/L (ref 1–40)
AST SERPL-CCNC: 18 U/L (ref 1–40)
BACTERIA SPEC AEROBE CULT: ABNORMAL
BACTERIA UR QL AUTO: ABNORMAL /HPF
BACTERIA UR QL AUTO: ABNORMAL /HPF
BASOPHILS # BLD AUTO: 0.03 10*3/MM3 (ref 0–0.2)
BASOPHILS # BLD AUTO: 0.04 10*3/MM3 (ref 0–0.2)
BASOPHILS # BLD AUTO: 0.05 10*3/MM3 (ref 0–0.2)
BASOPHILS NFR BLD AUTO: 0.2 % (ref 0–1.5)
BASOPHILS NFR BLD AUTO: 0.3 % (ref 0–1.5)
BASOPHILS NFR BLD AUTO: 0.6 % (ref 0–1.5)
BH CV ECHO MEAS - ACS: 1.99 CM
BH CV ECHO MEAS - AI P1/2T: 446.1 MSEC
BH CV ECHO MEAS - AO MAX PG: 6.7 MMHG
BH CV ECHO MEAS - AO MEAN PG: 3.8 MMHG
BH CV ECHO MEAS - AO V2 MAX: 129 CM/SEC
BH CV ECHO MEAS - AO V2 VTI: 34.5 CM
BH CV ECHO MEAS - AVA(I,D): 2.3 CM2
BH CV ECHO MEAS - CONTRAST EF 4CH: 57 CM2
BH CV ECHO MEAS - EDV(CUBED): 102 ML
BH CV ECHO MEAS - EDV(MOD-SP2): 124 ML
BH CV ECHO MEAS - EDV(MOD-SP4): 129 ML
BH CV ECHO MEAS - EF(MOD-BP): 57 %
BH CV ECHO MEAS - EF(MOD-SP2): 60.5 %
BH CV ECHO MEAS - EF(MOD-SP4): 53.5 %
BH CV ECHO MEAS - ESV(CUBED): 30.4 ML
BH CV ECHO MEAS - ESV(MOD-SP2): 49 ML
BH CV ECHO MEAS - ESV(MOD-SP4): 60 ML
BH CV ECHO MEAS - FS: 33.2 %
BH CV ECHO MEAS - IVS/LVPW: 0.93 CM
BH CV ECHO MEAS - IVSD: 0.68 CM
BH CV ECHO MEAS - LAT PEAK E' VEL: 7.8 CM/SEC
BH CV ECHO MEAS - LV DIASTOLIC VOL/BSA (35-75): 61 CM2
BH CV ECHO MEAS - LV MASS(C)D: 102 GRAMS
BH CV ECHO MEAS - LV MAX PG: 3.7 MMHG
BH CV ECHO MEAS - LV MEAN PG: 1.97 MMHG
BH CV ECHO MEAS - LV SYSTOLIC VOL/BSA (12-30): 28.4 CM2
BH CV ECHO MEAS - LV V1 MAX: 96.5 CM/SEC
BH CV ECHO MEAS - LV V1 VTI: 25.6 CM
BH CV ECHO MEAS - LVIDD: 4.7 CM
BH CV ECHO MEAS - LVIDS: 3.1 CM
BH CV ECHO MEAS - LVOT AREA: 3.1 CM2
BH CV ECHO MEAS - LVOT DIAM: 1.99 CM
BH CV ECHO MEAS - LVPWD: 0.72 CM
BH CV ECHO MEAS - MED PEAK E' VEL: 5.1 CM/SEC
BH CV ECHO MEAS - MV A DUR: 0.13 SEC
BH CV ECHO MEAS - MV A MAX VEL: 103.3 CM/SEC
BH CV ECHO MEAS - MV DEC SLOPE: 242.4 CM/SEC2
BH CV ECHO MEAS - MV DEC TIME: 310 MSEC
BH CV ECHO MEAS - MV E MAX VEL: 66.5 CM/SEC
BH CV ECHO MEAS - MV E/A: 0.64
BH CV ECHO MEAS - MV MAX PG: 5.5 MMHG
BH CV ECHO MEAS - MV MEAN PG: 2.35 MMHG
BH CV ECHO MEAS - MV P1/2T: 113.2 MSEC
BH CV ECHO MEAS - MV V2 VTI: 28.8 CM
BH CV ECHO MEAS - MVA(P1/2T): 1.94 CM2
BH CV ECHO MEAS - MVA(VTI): 2.8 CM2
BH CV ECHO MEAS - PA ACC TIME: 0.11 SEC
BH CV ECHO MEAS - PA PR(ACCEL): 28.3 MMHG
BH CV ECHO MEAS - PA V2 MAX: 100.4 CM/SEC
BH CV ECHO MEAS - PI END-D VEL: 123.5 CM/SEC
BH CV ECHO MEAS - PULM A REVS DUR: 0.1 SEC
BH CV ECHO MEAS - PULM A REVS VEL: 27.6 CM/SEC
BH CV ECHO MEAS - PULM DIAS VEL: 52.4 CM/SEC
BH CV ECHO MEAS - PULM S/D: 1.64
BH CV ECHO MEAS - PULM SYS VEL: 86 CM/SEC
BH CV ECHO MEAS - QP/QS: 1.26
BH CV ECHO MEAS - RAP SYSTOLE: 8 MMHG
BH CV ECHO MEAS - RV MAX PG: 1.79 MMHG
BH CV ECHO MEAS - RV V1 MAX: 66.8 CM/SEC
BH CV ECHO MEAS - RV V1 VTI: 17.5 CM
BH CV ECHO MEAS - RVOT DIAM: 2.7 CM
BH CV ECHO MEAS - RVSP: 30.6 MMHG
BH CV ECHO MEAS - SI(MOD-SP2): 35.5 ML/M2
BH CV ECHO MEAS - SI(MOD-SP4): 32.6 ML/M2
BH CV ECHO MEAS - SV(LVOT): 79.5 ML
BH CV ECHO MEAS - SV(MOD-SP2): 75 ML
BH CV ECHO MEAS - SV(MOD-SP4): 69 ML
BH CV ECHO MEAS - SV(RVOT): 100.2 ML
BH CV ECHO MEAS - TAPSE (>1.6): 1.7 CM
BH CV ECHO MEAS - TR MAX PG: 22.6 MMHG
BH CV ECHO MEAS - TR MAX VEL: 237.5 CM/SEC
BH CV ECHO MEASUREMENTS AVERAGE E/E' RATIO: 10.31
BH CV LOW VAS LEFT GASTRONEMIUS VESSEL: 1
BH CV LOW VAS LEFT LESSER SAPH VESSEL: 1
BH CV LOW VAS RIGHT LESSER SAPH VESSEL: 1
BH CV LOWER VASCULAR LEFT COMMON FEMORAL AUGMENT: NORMAL
BH CV LOWER VASCULAR LEFT COMMON FEMORAL COMPETENT: NORMAL
BH CV LOWER VASCULAR LEFT COMMON FEMORAL COMPRESS: NORMAL
BH CV LOWER VASCULAR LEFT COMMON FEMORAL PHASIC: NORMAL
BH CV LOWER VASCULAR LEFT COMMON FEMORAL SPONT: NORMAL
BH CV LOWER VASCULAR LEFT DISTAL FEMORAL COMPRESS: NORMAL
BH CV LOWER VASCULAR LEFT GASTRONEMIUS COMPRESS: NORMAL
BH CV LOWER VASCULAR LEFT GASTRONEMIUS THROMBUS: NORMAL
BH CV LOWER VASCULAR LEFT GREATER SAPH AK COMPRESS: NORMAL
BH CV LOWER VASCULAR LEFT GREATER SAPH BK COMPRESS: NORMAL
BH CV LOWER VASCULAR LEFT LESSER SAPH COMPRESS: NORMAL
BH CV LOWER VASCULAR LEFT LESSER SAPH THROMBUS: NORMAL
BH CV LOWER VASCULAR LEFT MID FEMORAL AUGMENT: NORMAL
BH CV LOWER VASCULAR LEFT MID FEMORAL COMPETENT: NORMAL
BH CV LOWER VASCULAR LEFT MID FEMORAL COMPRESS: NORMAL
BH CV LOWER VASCULAR LEFT MID FEMORAL PHASIC: NORMAL
BH CV LOWER VASCULAR LEFT MID FEMORAL SPONT: NORMAL
BH CV LOWER VASCULAR LEFT PERONEAL COMPRESS: NORMAL
BH CV LOWER VASCULAR LEFT POPLITEAL AUGMENT: NORMAL
BH CV LOWER VASCULAR LEFT POPLITEAL COMPETENT: NORMAL
BH CV LOWER VASCULAR LEFT POPLITEAL COMPRESS: NORMAL
BH CV LOWER VASCULAR LEFT POPLITEAL PHASIC: NORMAL
BH CV LOWER VASCULAR LEFT POPLITEAL SPONT: NORMAL
BH CV LOWER VASCULAR LEFT POSTERIOR TIBIAL COMPRESS: NORMAL
BH CV LOWER VASCULAR LEFT PROFUNDA FEMORAL COMPRESS: NORMAL
BH CV LOWER VASCULAR LEFT PROXIMAL FEMORAL COMPRESS: NORMAL
BH CV LOWER VASCULAR LEFT SAPHENOFEMORAL JUNCTION COMPRESS: NORMAL
BH CV LOWER VASCULAR RIGHT COMMON FEMORAL AUGMENT: NORMAL
BH CV LOWER VASCULAR RIGHT COMMON FEMORAL COMPETENT: NORMAL
BH CV LOWER VASCULAR RIGHT COMMON FEMORAL COMPRESS: NORMAL
BH CV LOWER VASCULAR RIGHT COMMON FEMORAL PHASIC: NORMAL
BH CV LOWER VASCULAR RIGHT COMMON FEMORAL SPONT: NORMAL
BH CV LOWER VASCULAR RIGHT DISTAL FEMORAL COMPRESS: NORMAL
BH CV LOWER VASCULAR RIGHT GASTRONEMIUS COMPRESS: NORMAL
BH CV LOWER VASCULAR RIGHT GREATER SAPH AK COMPRESS: NORMAL
BH CV LOWER VASCULAR RIGHT GREATER SAPH BK COMPRESS: NORMAL
BH CV LOWER VASCULAR RIGHT LESSER SAPH COMPRESS: NORMAL
BH CV LOWER VASCULAR RIGHT LESSER SAPH THROMBUS: NORMAL
BH CV LOWER VASCULAR RIGHT MID FEMORAL AUGMENT: NORMAL
BH CV LOWER VASCULAR RIGHT MID FEMORAL COMPETENT: NORMAL
BH CV LOWER VASCULAR RIGHT MID FEMORAL COMPRESS: NORMAL
BH CV LOWER VASCULAR RIGHT MID FEMORAL PHASIC: NORMAL
BH CV LOWER VASCULAR RIGHT MID FEMORAL SPONT: NORMAL
BH CV LOWER VASCULAR RIGHT PERONEAL COMPRESS: NORMAL
BH CV LOWER VASCULAR RIGHT POPLITEAL AUGMENT: NORMAL
BH CV LOWER VASCULAR RIGHT POPLITEAL COMPETENT: NORMAL
BH CV LOWER VASCULAR RIGHT POPLITEAL COMPRESS: NORMAL
BH CV LOWER VASCULAR RIGHT POPLITEAL PHASIC: NORMAL
BH CV LOWER VASCULAR RIGHT POPLITEAL SPONT: NORMAL
BH CV LOWER VASCULAR RIGHT POSTERIOR TIBIAL COMPRESS: NORMAL
BH CV LOWER VASCULAR RIGHT PROFUNDA FEMORAL COMPRESS: NORMAL
BH CV LOWER VASCULAR RIGHT PROXIMAL FEMORAL COMPRESS: NORMAL
BH CV LOWER VASCULAR RIGHT SAPHENOFEMORAL JUNCTION COMPRESS: NORMAL
BH CV XLRA - RV BASE: 2.8 CM
BH CV XLRA - RV LENGTH: 6.8 CM
BH CV XLRA - RV MID: 2.14 CM
BH CV XLRA - TDI S': 8.4 CM/SEC
BILIRUB SERPL-MCNC: 0.4 MG/DL (ref 0–1.2)
BILIRUB SERPL-MCNC: 0.7 MG/DL (ref 0–1.2)
BILIRUB UR QL STRIP: NEGATIVE
BILIRUB UR QL STRIP: NEGATIVE
BUN SERPL-MCNC: 26 MG/DL (ref 8–23)
BUN SERPL-MCNC: 27 MG/DL (ref 8–23)
BUN SERPL-MCNC: 30 MG/DL (ref 8–23)
BUN SERPL-MCNC: 35 MG/DL (ref 8–23)
BUN SERPL-MCNC: 42 MG/DL (ref 8–23)
BUN/CREAT SERPL: 19.4 (ref 7–25)
BUN/CREAT SERPL: 23.3 (ref 7–25)
BUN/CREAT SERPL: 23.4 (ref 7–25)
BUN/CREAT SERPL: 26.9 (ref 7–25)
BUN/CREAT SERPL: 27.1 (ref 7–25)
CALCIUM SPEC-SCNC: 7.5 MG/DL (ref 8.6–10.5)
CALCIUM SPEC-SCNC: 8.4 MG/DL (ref 8.6–10.5)
CALCIUM SPEC-SCNC: 8.7 MG/DL (ref 8.6–10.5)
CALCIUM SPEC-SCNC: 8.8 MG/DL (ref 8.6–10.5)
CALCIUM SPEC-SCNC: 8.9 MG/DL (ref 8.6–10.5)
CHLORIDE SERPL-SCNC: 107 MMOL/L (ref 98–107)
CHLORIDE SERPL-SCNC: 109 MMOL/L (ref 98–107)
CHLORIDE SERPL-SCNC: 98 MMOL/L (ref 98–107)
CLARITY UR: CLEAR
CLARITY UR: CLEAR
CO2 SERPL-SCNC: 20 MMOL/L (ref 22–29)
CO2 SERPL-SCNC: 20.2 MMOL/L (ref 22–29)
CO2 SERPL-SCNC: 22.2 MMOL/L (ref 22–29)
CO2 SERPL-SCNC: 24 MMOL/L (ref 22–29)
CO2 SERPL-SCNC: 27 MMOL/L (ref 22–29)
COD CRY URNS QL: ABNORMAL /HPF
COLOR UR: YELLOW
COLOR UR: YELLOW
CREAT SERPL-MCNC: 1.11 MG/DL (ref 0.76–1.27)
CREAT SERPL-MCNC: 1.29 MG/DL (ref 0.76–1.27)
CREAT SERPL-MCNC: 1.3 MG/DL (ref 0.76–1.27)
CREAT SERPL-MCNC: 1.39 MG/DL (ref 0.76–1.27)
CREAT SERPL-MCNC: 1.55 MG/DL (ref 0.76–1.27)
D DIMER PPP FEU-MCNC: 0.7 MCGFEU/ML (ref 0–0.49)
DEPRECATED RDW RBC AUTO: 43.7 FL (ref 37–54)
DEPRECATED RDW RBC AUTO: 43.9 FL (ref 37–54)
DEPRECATED RDW RBC AUTO: 44.1 FL (ref 37–54)
DEPRECATED RDW RBC AUTO: 47.3 FL (ref 37–54)
EGFRCR SERPLBLD CKD-EPI 2021: 42.8 ML/MIN/1.73
EGFRCR SERPLBLD CKD-EPI 2021: 48.8 ML/MIN/1.73
EGFRCR SERPLBLD CKD-EPI 2021: 52.8 ML/MIN/1.73
EGFRCR SERPLBLD CKD-EPI 2021: 53.3 ML/MIN/1.73
EGFRCR SERPLBLD CKD-EPI 2021: 63.9 ML/MIN/1.73
EOSINOPHIL # BLD AUTO: 0.01 10*3/MM3 (ref 0–0.4)
EOSINOPHIL # BLD AUTO: 0.07 10*3/MM3 (ref 0–0.4)
EOSINOPHIL # BLD AUTO: 0.25 10*3/MM3 (ref 0–0.4)
EOSINOPHIL NFR BLD AUTO: 0.1 % (ref 0.3–6.2)
EOSINOPHIL NFR BLD AUTO: 0.5 % (ref 0.3–6.2)
EOSINOPHIL NFR BLD AUTO: 3 % (ref 0.3–6.2)
ERYTHROCYTE [DISTWIDTH] IN BLOOD BY AUTOMATED COUNT: 11.8 % (ref 12.3–15.4)
ERYTHROCYTE [DISTWIDTH] IN BLOOD BY AUTOMATED COUNT: 12 % (ref 12.3–15.4)
ERYTHROCYTE [DISTWIDTH] IN BLOOD BY AUTOMATED COUNT: 12 % (ref 12.3–15.4)
ERYTHROCYTE [DISTWIDTH] IN BLOOD BY AUTOMATED COUNT: 13.1 % (ref 12.3–15.4)
GLOBULIN UR ELPH-MCNC: 1.9 GM/DL
GLOBULIN UR ELPH-MCNC: 2.2 GM/DL
GLUCOSE SERPL-MCNC: 101 MG/DL (ref 65–99)
GLUCOSE SERPL-MCNC: 116 MG/DL (ref 65–99)
GLUCOSE SERPL-MCNC: 151 MG/DL (ref 65–99)
GLUCOSE SERPL-MCNC: 71 MG/DL (ref 65–99)
GLUCOSE SERPL-MCNC: 98 MG/DL (ref 65–99)
GLUCOSE UR STRIP-MCNC: NEGATIVE MG/DL
GLUCOSE UR STRIP-MCNC: NEGATIVE MG/DL
HCT VFR BLD AUTO: 31.6 % (ref 37.5–51)
HCT VFR BLD AUTO: 32.1 % (ref 37.5–51)
HCT VFR BLD AUTO: 33.7 % (ref 37.5–51)
HCT VFR BLD AUTO: 35.3 % (ref 37.5–51)
HGB BLD-MCNC: 11 G/DL (ref 13–17.7)
HGB BLD-MCNC: 11.2 G/DL (ref 13–17.7)
HGB BLD-MCNC: 11.5 G/DL (ref 13–17.7)
HGB BLD-MCNC: 12.5 G/DL (ref 13–17.7)
HGB UR QL STRIP.AUTO: NEGATIVE
HGB UR QL STRIP.AUTO: NEGATIVE
HYALINE CASTS UR QL AUTO: ABNORMAL /LPF
HYALINE CASTS UR QL AUTO: ABNORMAL /LPF
IMM GRANULOCYTES # BLD AUTO: 0.03 10*3/MM3 (ref 0–0.05)
IMM GRANULOCYTES # BLD AUTO: 0.07 10*3/MM3 (ref 0–0.05)
IMM GRANULOCYTES # BLD AUTO: 0.09 10*3/MM3 (ref 0–0.05)
IMM GRANULOCYTES NFR BLD AUTO: 0.4 % (ref 0–0.5)
IMM GRANULOCYTES NFR BLD AUTO: 0.5 % (ref 0–0.5)
IMM GRANULOCYTES NFR BLD AUTO: 0.6 % (ref 0–0.5)
KETONES UR QL STRIP: NEGATIVE
KETONES UR QL STRIP: NEGATIVE
LEFT ATRIUM VOLUME INDEX: 26.3 ML/M2
LEUKOCYTE ESTERASE UR QL STRIP.AUTO: ABNORMAL
LEUKOCYTE ESTERASE UR QL STRIP.AUTO: ABNORMAL
LYMPHOCYTES # BLD AUTO: 0.65 10*3/MM3 (ref 0.7–3.1)
LYMPHOCYTES # BLD AUTO: 0.98 10*3/MM3 (ref 0.7–3.1)
LYMPHOCYTES # BLD AUTO: 1.1 10*3/MM3 (ref 0.7–3.1)
LYMPHOCYTES NFR BLD AUTO: 13.3 % (ref 19.6–45.3)
LYMPHOCYTES NFR BLD AUTO: 4.4 % (ref 19.6–45.3)
LYMPHOCYTES NFR BLD AUTO: 7 % (ref 19.6–45.3)
MAGNESIUM SERPL-MCNC: 2.2 MG/DL (ref 1.6–2.4)
MAXIMAL PREDICTED HEART RATE: 132 BPM
MAXIMAL PREDICTED HEART RATE: 132 BPM
MCH RBC QN AUTO: 34.5 PG (ref 26.6–33)
MCH RBC QN AUTO: 34.6 PG (ref 26.6–33)
MCH RBC QN AUTO: 34.8 PG (ref 26.6–33)
MCH RBC QN AUTO: 34.8 PG (ref 26.6–33)
MCHC RBC AUTO-ENTMCNC: 34.1 G/DL (ref 31.5–35.7)
MCHC RBC AUTO-ENTMCNC: 34.8 G/DL (ref 31.5–35.7)
MCHC RBC AUTO-ENTMCNC: 34.9 G/DL (ref 31.5–35.7)
MCHC RBC AUTO-ENTMCNC: 35.4 G/DL (ref 31.5–35.7)
MCV RBC AUTO: 101.2 FL (ref 79–97)
MCV RBC AUTO: 98.3 FL (ref 79–97)
MCV RBC AUTO: 99.4 FL (ref 79–97)
MCV RBC AUTO: 99.7 FL (ref 79–97)
MONOCYTES # BLD AUTO: 0.61 10*3/MM3 (ref 0.1–0.9)
MONOCYTES # BLD AUTO: 0.88 10*3/MM3 (ref 0.1–0.9)
MONOCYTES # BLD AUTO: 0.95 10*3/MM3 (ref 0.1–0.9)
MONOCYTES NFR BLD AUTO: 6.3 % (ref 5–12)
MONOCYTES NFR BLD AUTO: 6.5 % (ref 5–12)
MONOCYTES NFR BLD AUTO: 7.4 % (ref 5–12)
MUCOUS THREADS URNS QL MICRO: ABNORMAL /HPF
NEUTROPHILS NFR BLD AUTO: 11.87 10*3/MM3 (ref 1.7–7)
NEUTROPHILS NFR BLD AUTO: 12.89 10*3/MM3 (ref 1.7–7)
NEUTROPHILS NFR BLD AUTO: 6.24 10*3/MM3 (ref 1.7–7)
NEUTROPHILS NFR BLD AUTO: 75.3 % (ref 42.7–76)
NEUTROPHILS NFR BLD AUTO: 85.4 % (ref 42.7–76)
NEUTROPHILS NFR BLD AUTO: 88.2 % (ref 42.7–76)
NITRITE UR QL STRIP: NEGATIVE
NITRITE UR QL STRIP: NEGATIVE
NRBC BLD AUTO-RTO: 0 /100 WBC (ref 0–0.2)
NT-PROBNP SERPL-MCNC: 1715 PG/ML (ref 0–1800)
PH UR STRIP.AUTO: 7 [PH] (ref 5–8)
PH UR STRIP.AUTO: <=5 [PH] (ref 5–8)
PLATELET # BLD AUTO: 221 10*3/MM3 (ref 140–450)
PLATELET # BLD AUTO: 232 10*3/MM3 (ref 140–450)
PLATELET # BLD AUTO: 245 10*3/MM3 (ref 140–450)
PLATELET # BLD AUTO: 284 10*3/MM3 (ref 140–450)
PMV BLD AUTO: 9 FL (ref 6–12)
PMV BLD AUTO: 9.1 FL (ref 6–12)
PMV BLD AUTO: 9.2 FL (ref 6–12)
PMV BLD AUTO: 9.8 FL (ref 6–12)
POTASSIUM SERPL-SCNC: 3 MMOL/L (ref 3.5–5.2)
POTASSIUM SERPL-SCNC: 3.9 MMOL/L (ref 3.5–5.2)
POTASSIUM SERPL-SCNC: 4.5 MMOL/L (ref 3.5–5.2)
POTASSIUM SERPL-SCNC: 4.6 MMOL/L (ref 3.5–5.2)
POTASSIUM SERPL-SCNC: 4.8 MMOL/L (ref 3.5–5.2)
PROT SERPL-MCNC: 6 G/DL (ref 6–8.5)
PROT SERPL-MCNC: 6.4 G/DL (ref 6–8.5)
PROT UR QL STRIP: NEGATIVE
PROT UR QL STRIP: NEGATIVE
QT INTERVAL: 405 MS
QT INTERVAL: 434 MS
RBC # BLD AUTO: 3.18 10*6/MM3 (ref 4.14–5.8)
RBC # BLD AUTO: 3.22 10*6/MM3 (ref 4.14–5.8)
RBC # BLD AUTO: 3.33 10*6/MM3 (ref 4.14–5.8)
RBC # BLD AUTO: 3.59 10*6/MM3 (ref 4.14–5.8)
RBC # UR STRIP: ABNORMAL /HPF
RBC # UR STRIP: ABNORMAL /HPF
REF LAB TEST METHOD: ABNORMAL
REF LAB TEST METHOD: ABNORMAL
SARS-COV-2 RNA RESP QL NAA+PROBE: NOT DETECTED
SINUS: 3.4 CM
SODIUM SERPL-SCNC: 135 MMOL/L (ref 136–145)
SODIUM SERPL-SCNC: 142 MMOL/L (ref 136–145)
SODIUM SERPL-SCNC: 143 MMOL/L (ref 136–145)
SP GR UR STRIP: 1.01 (ref 1–1.03)
SP GR UR STRIP: 1.01 (ref 1–1.03)
SQUAMOUS #/AREA URNS HPF: ABNORMAL /HPF
SQUAMOUS #/AREA URNS HPF: ABNORMAL /HPF
STRESS TARGET HR: 112 BPM
STRESS TARGET HR: 112 BPM
T4 FREE SERPL-MCNC: 1.04 NG/DL (ref 0.93–1.7)
TROPONIN T SERPL-MCNC: 0.05 NG/ML (ref 0–0.03)
TROPONIN T SERPL-MCNC: 0.05 NG/ML (ref 0–0.03)
TROPONIN T SERPL-MCNC: 0.06 NG/ML (ref 0–0.03)
TROPONIN T SERPL-MCNC: <0.01 NG/ML (ref 0–0.03)
TSH SERPL DL<=0.05 MIU/L-ACNC: 4.1 UIU/ML (ref 0.27–4.2)
UROBILINOGEN UR QL STRIP: ABNORMAL
UROBILINOGEN UR QL STRIP: ABNORMAL
VIT B12 BLD-MCNC: 198 PG/ML (ref 211–946)
WBC # UR STRIP: ABNORMAL /HPF
WBC # UR STRIP: ABNORMAL /HPF
WBC NRBC COR # BLD: 11.63 10*3/MM3 (ref 3.4–10.8)
WBC NRBC COR # BLD: 13.91 10*3/MM3 (ref 3.4–10.8)
WBC NRBC COR # BLD: 14.62 10*3/MM3 (ref 3.4–10.8)
WBC NRBC COR # BLD: 8.28 10*3/MM3 (ref 3.4–10.8)

## 2022-01-01 PROCEDURE — G0156 HHCP-SVS OF AIDE,EA 15 MIN: HCPCS

## 2022-01-01 PROCEDURE — G0151 HHCP-SERV OF PT,EA 15 MIN: HCPCS

## 2022-01-01 PROCEDURE — 73030 X-RAY EXAM OF SHOULDER: CPT

## 2022-01-01 PROCEDURE — P9612 CATHETERIZE FOR URINE SPEC: HCPCS

## 2022-01-01 PROCEDURE — G0152 HHCP-SERV OF OT,EA 15 MIN: HCPCS

## 2022-01-01 PROCEDURE — 97162 PT EVAL MOD COMPLEX 30 MIN: CPT

## 2022-01-01 PROCEDURE — 93005 ELECTROCARDIOGRAM TRACING: CPT | Performed by: EMERGENCY MEDICINE

## 2022-01-01 PROCEDURE — 71045 X-RAY EXAM CHEST 1 VIEW: CPT

## 2022-01-01 PROCEDURE — 71275 CT ANGIOGRAPHY CHEST: CPT

## 2022-01-01 PROCEDURE — 25010000002 ENOXAPARIN PER 10 MG: Performed by: STUDENT IN AN ORGANIZED HEALTH CARE EDUCATION/TRAINING PROGRAM

## 2022-01-01 PROCEDURE — 84484 ASSAY OF TROPONIN QUANT: CPT | Performed by: EMERGENCY MEDICINE

## 2022-01-01 PROCEDURE — G0153 HHCP-SVS OF S/L PATH,EA 15MN: HCPCS

## 2022-01-01 PROCEDURE — 83735 ASSAY OF MAGNESIUM: CPT | Performed by: INTERNAL MEDICINE

## 2022-01-01 PROCEDURE — 85025 COMPLETE CBC W/AUTO DIFF WBC: CPT | Performed by: HOSPITALIST

## 2022-01-01 PROCEDURE — 97116 GAIT TRAINING THERAPY: CPT

## 2022-01-01 PROCEDURE — 84484 ASSAY OF TROPONIN QUANT: CPT | Performed by: HOSPITALIST

## 2022-01-01 PROCEDURE — 85379 FIBRIN DEGRADATION QUANT: CPT | Performed by: INTERNAL MEDICINE

## 2022-01-01 PROCEDURE — 81001 URINALYSIS AUTO W/SCOPE: CPT | Performed by: EMERGENCY MEDICINE

## 2022-01-01 PROCEDURE — 83880 ASSAY OF NATRIURETIC PEPTIDE: CPT | Performed by: STUDENT IN AN ORGANIZED HEALTH CARE EDUCATION/TRAINING PROGRAM

## 2022-01-01 PROCEDURE — 70450 CT HEAD/BRAIN W/O DYE: CPT

## 2022-01-01 PROCEDURE — 84439 ASSAY OF FREE THYROXINE: CPT | Performed by: HOSPITALIST

## 2022-01-01 PROCEDURE — 93306 TTE W/DOPPLER COMPLETE: CPT | Performed by: INTERNAL MEDICINE

## 2022-01-01 PROCEDURE — 87086 URINE CULTURE/COLONY COUNT: CPT | Performed by: EMERGENCY MEDICINE

## 2022-01-01 PROCEDURE — 93306 TTE W/DOPPLER COMPLETE: CPT

## 2022-01-01 PROCEDURE — U0003 INFECTIOUS AGENT DETECTION BY NUCLEIC ACID (DNA OR RNA); SEVERE ACUTE RESPIRATORY SYNDROME CORONAVIRUS 2 (SARS-COV-2) (CORONAVIRUS DISEASE [COVID-19]), AMPLIFIED PROBE TECHNIQUE, MAKING USE OF HIGH THROUGHPUT TECHNOLOGIES AS DESCRIBED BY CMS-2020-01-R: HCPCS | Performed by: EMERGENCY MEDICINE

## 2022-01-01 PROCEDURE — 93970 EXTREMITY STUDY: CPT

## 2022-01-01 PROCEDURE — 0 IOPAMIDOL PER 1 ML: Performed by: STUDENT IN AN ORGANIZED HEALTH CARE EDUCATION/TRAINING PROGRAM

## 2022-01-01 PROCEDURE — 72110 X-RAY EXAM L-2 SPINE 4/>VWS: CPT

## 2022-01-01 PROCEDURE — 99283 EMERGENCY DEPT VISIT LOW MDM: CPT

## 2022-01-01 PROCEDURE — U0005 INFEC AGEN DETEC AMPLI PROBE: HCPCS | Performed by: EMERGENCY MEDICINE

## 2022-01-01 PROCEDURE — 85027 COMPLETE CBC AUTOMATED: CPT | Performed by: NURSE PRACTITIONER

## 2022-01-01 PROCEDURE — 36415 COLL VENOUS BLD VENIPUNCTURE: CPT

## 2022-01-01 PROCEDURE — 99284 EMERGENCY DEPT VISIT MOD MDM: CPT

## 2022-01-01 PROCEDURE — 25010000002 PERFLUTREN (DEFINITY) 8.476 MG IN SODIUM CHLORIDE (PF) 0.9 % 10 ML INJECTION: Performed by: HOSPITALIST

## 2022-01-01 PROCEDURE — 99232 SBSQ HOSP IP/OBS MODERATE 35: CPT | Performed by: INTERNAL MEDICINE

## 2022-01-01 PROCEDURE — 70551 MRI BRAIN STEM W/O DYE: CPT

## 2022-01-01 PROCEDURE — 36415 COLL VENOUS BLD VENIPUNCTURE: CPT | Performed by: HOSPITALIST

## 2022-01-01 PROCEDURE — 85025 COMPLETE CBC W/AUTO DIFF WBC: CPT | Performed by: EMERGENCY MEDICINE

## 2022-01-01 PROCEDURE — 84484 ASSAY OF TROPONIN QUANT: CPT | Performed by: NURSE PRACTITIONER

## 2022-01-01 PROCEDURE — 96374 THER/PROPH/DIAG INJ IV PUSH: CPT

## 2022-01-01 PROCEDURE — 80053 COMPREHEN METABOLIC PANEL: CPT | Performed by: EMERGENCY MEDICINE

## 2022-01-01 PROCEDURE — 72125 CT NECK SPINE W/O DYE: CPT

## 2022-01-01 PROCEDURE — 99222 1ST HOSP IP/OBS MODERATE 55: CPT | Performed by: INTERNAL MEDICINE

## 2022-01-01 PROCEDURE — 80048 BASIC METABOLIC PNL TOTAL CA: CPT | Performed by: HOSPITALIST

## 2022-01-01 PROCEDURE — 99285 EMERGENCY DEPT VISIT HI MDM: CPT

## 2022-01-01 PROCEDURE — 25010000002 CEFTRIAXONE PER 250 MG: Performed by: EMERGENCY MEDICINE

## 2022-01-01 PROCEDURE — 93010 ELECTROCARDIOGRAM REPORT: CPT | Performed by: INTERNAL MEDICINE

## 2022-01-01 PROCEDURE — 80048 BASIC METABOLIC PNL TOTAL CA: CPT | Performed by: INTERNAL MEDICINE

## 2022-01-01 PROCEDURE — 82607 VITAMIN B-12: CPT | Performed by: HOSPITALIST

## 2022-01-01 PROCEDURE — 84443 ASSAY THYROID STIM HORMONE: CPT | Performed by: HOSPITALIST

## 2022-01-01 RX ORDER — SODIUM CHLORIDE 0.9 % (FLUSH) 0.9 %
10 SYRINGE (ML) INJECTION AS NEEDED
Status: DISCONTINUED | OUTPATIENT
Start: 2022-01-01 | End: 2022-01-01 | Stop reason: HOSPADM

## 2022-01-01 RX ORDER — CHOLECALCIFEROL (VITAMIN D3) 125 MCG
5 CAPSULE ORAL NIGHTLY
Status: DISCONTINUED | OUTPATIENT
Start: 2022-01-01 | End: 2022-01-01 | Stop reason: HOSPADM

## 2022-01-01 RX ORDER — SODIUM CHLORIDE 9 MG/ML
100 INJECTION, SOLUTION INTRAVENOUS CONTINUOUS
Status: DISCONTINUED | OUTPATIENT
Start: 2022-01-01 | End: 2022-01-01

## 2022-01-01 RX ORDER — POTASSIUM CHLORIDE 750 MG/1
20 TABLET, FILM COATED, EXTENDED RELEASE ORAL DAILY
Qty: 60 TABLET | Refills: 0 | Status: SHIPPED | OUTPATIENT
Start: 2022-01-01

## 2022-01-01 RX ORDER — POTASSIUM CHLORIDE 750 MG/1
40 TABLET, FILM COATED, EXTENDED RELEASE ORAL AS NEEDED
Status: DISCONTINUED | OUTPATIENT
Start: 2022-01-01 | End: 2022-01-01 | Stop reason: HOSPADM

## 2022-01-01 RX ORDER — BISACODYL 10 MG
10 SUPPOSITORY, RECTAL RECTAL DAILY PRN
Status: DISCONTINUED | OUTPATIENT
Start: 2022-01-01 | End: 2022-01-01 | Stop reason: HOSPADM

## 2022-01-01 RX ORDER — AMOXICILLIN 250 MG
2 CAPSULE ORAL 2 TIMES DAILY
Status: DISCONTINUED | OUTPATIENT
Start: 2022-01-01 | End: 2022-01-01 | Stop reason: HOSPADM

## 2022-01-01 RX ORDER — CEPHALEXIN 500 MG/1
500 CAPSULE ORAL 4 TIMES DAILY
Qty: 40 CAPSULE | Refills: 0 | Status: SHIPPED | OUTPATIENT
Start: 2022-01-01 | End: 2022-01-01

## 2022-01-01 RX ORDER — ACETAMINOPHEN 650 MG/1
650 SUPPOSITORY RECTAL EVERY 4 HOURS PRN
Status: DISCONTINUED | OUTPATIENT
Start: 2022-01-01 | End: 2022-01-01 | Stop reason: HOSPADM

## 2022-01-01 RX ORDER — QUETIAPINE FUMARATE 25 MG/1
25 TABLET, FILM COATED ORAL NIGHTLY PRN
Status: DISCONTINUED | OUTPATIENT
Start: 2022-01-01 | End: 2022-01-01 | Stop reason: HOSPADM

## 2022-01-01 RX ORDER — CALCIUM CARBONATE 200(500)MG
2 TABLET,CHEWABLE ORAL 2 TIMES DAILY PRN
Status: DISCONTINUED | OUTPATIENT
Start: 2022-01-01 | End: 2022-01-01 | Stop reason: HOSPADM

## 2022-01-01 RX ORDER — FUROSEMIDE 20 MG/1
20 TABLET ORAL DAILY
Status: DISCONTINUED | OUTPATIENT
Start: 2022-01-01 | End: 2022-01-01

## 2022-01-01 RX ORDER — ASPIRIN 81 MG/1
81 TABLET ORAL DAILY
Status: DISCONTINUED | OUTPATIENT
Start: 2022-01-01 | End: 2022-01-01 | Stop reason: HOSPADM

## 2022-01-01 RX ORDER — SODIUM CHLORIDE 0.9 % (FLUSH) 0.9 %
10 SYRINGE (ML) INJECTION EVERY 12 HOURS SCHEDULED
Status: DISCONTINUED | OUTPATIENT
Start: 2022-01-01 | End: 2022-01-01 | Stop reason: HOSPADM

## 2022-01-01 RX ORDER — NITROGLYCERIN 0.4 MG/1
0.4 TABLET SUBLINGUAL
Status: DISCONTINUED | OUTPATIENT
Start: 2022-01-01 | End: 2022-01-01 | Stop reason: HOSPADM

## 2022-01-01 RX ORDER — CHOLECALCIFEROL (VITAMIN D3) 125 MCG
1000 CAPSULE ORAL DAILY
Status: DISCONTINUED | OUTPATIENT
Start: 2022-01-01 | End: 2022-01-01 | Stop reason: HOSPADM

## 2022-01-01 RX ORDER — ACETAMINOPHEN 160 MG/5ML
650 SOLUTION ORAL EVERY 4 HOURS PRN
Status: DISCONTINUED | OUTPATIENT
Start: 2022-01-01 | End: 2022-01-01 | Stop reason: HOSPADM

## 2022-01-01 RX ORDER — POLYETHYLENE GLYCOL 3350 17 G/17G
17 POWDER, FOR SOLUTION ORAL DAILY
Status: DISCONTINUED | OUTPATIENT
Start: 2022-01-01 | End: 2022-01-01 | Stop reason: HOSPADM

## 2022-01-01 RX ORDER — ACETAMINOPHEN 325 MG/1
650 TABLET ORAL EVERY 4 HOURS PRN
Status: DISCONTINUED | OUTPATIENT
Start: 2022-01-01 | End: 2022-01-01 | Stop reason: HOSPADM

## 2022-01-01 RX ORDER — DOCUSATE SODIUM 100 MG/1
100 CAPSULE, LIQUID FILLED ORAL 2 TIMES DAILY
Status: DISCONTINUED | OUTPATIENT
Start: 2022-01-01 | End: 2022-01-01

## 2022-01-01 RX ORDER — POTASSIUM CHLORIDE 1.5 G/1.77G
40 POWDER, FOR SOLUTION ORAL AS NEEDED
Status: DISCONTINUED | OUTPATIENT
Start: 2022-01-01 | End: 2022-01-01 | Stop reason: HOSPADM

## 2022-01-01 RX ORDER — POTASSIUM CHLORIDE 750 MG/1
20 TABLET, FILM COATED, EXTENDED RELEASE ORAL DAILY
Status: DISCONTINUED | OUTPATIENT
Start: 2022-01-01 | End: 2022-01-01 | Stop reason: HOSPADM

## 2022-01-01 RX ORDER — ENOXAPARIN SODIUM 100 MG/ML
40 INJECTION SUBCUTANEOUS EVERY 24 HOURS
Status: DISCONTINUED | OUTPATIENT
Start: 2022-01-01 | End: 2022-01-01 | Stop reason: HOSPADM

## 2022-01-01 RX ORDER — ONDANSETRON 4 MG/1
4 TABLET, FILM COATED ORAL EVERY 6 HOURS PRN
Status: DISCONTINUED | OUTPATIENT
Start: 2022-01-01 | End: 2022-01-01 | Stop reason: HOSPADM

## 2022-01-01 RX ORDER — POTASSIUM CHLORIDE 7.45 MG/ML
10 INJECTION INTRAVENOUS
Status: DISCONTINUED | OUTPATIENT
Start: 2022-01-01 | End: 2022-01-01 | Stop reason: HOSPADM

## 2022-01-01 RX ADMIN — Medication 10 ML: at 22:26

## 2022-01-01 RX ADMIN — WATER 1 G: 100 INJECTION, SOLUTION INTRAVENOUS at 01:37

## 2022-01-01 RX ADMIN — Medication 1000 MCG: at 08:13

## 2022-01-01 RX ADMIN — POLYETHYLENE GLYCOL 3350 17 G: 17 POWDER, FOR SOLUTION ORAL at 08:01

## 2022-01-01 RX ADMIN — POTASSIUM CHLORIDE 40 MEQ: 10 TABLET, EXTENDED RELEASE ORAL at 12:10

## 2022-01-01 RX ADMIN — SODIUM CHLORIDE 100 ML/HR: 9 INJECTION, SOLUTION INTRAVENOUS at 06:18

## 2022-01-01 RX ADMIN — Medication 10 ML: at 08:02

## 2022-01-01 RX ADMIN — ASPIRIN 81 MG: 81 TABLET, COATED ORAL at 08:02

## 2022-01-01 RX ADMIN — Medication 10 ML: at 16:00

## 2022-01-01 RX ADMIN — ENOXAPARIN SODIUM 40 MG: 100 INJECTION SUBCUTANEOUS at 17:17

## 2022-01-01 RX ADMIN — POTASSIUM CHLORIDE 40 MEQ: 10 TABLET, EXTENDED RELEASE ORAL at 19:56

## 2022-01-01 RX ADMIN — FUROSEMIDE 20 MG: 20 TABLET ORAL at 12:11

## 2022-01-01 RX ADMIN — POTASSIUM CHLORIDE 40 MEQ: 10 TABLET, EXTENDED RELEASE ORAL at 16:10

## 2022-01-01 RX ADMIN — POTASSIUM CHLORIDE 20 MEQ: 750 TABLET, EXTENDED RELEASE ORAL at 08:13

## 2022-01-01 RX ADMIN — SODIUM CHLORIDE 100 ML/HR: 9 INJECTION, SOLUTION INTRAVENOUS at 16:00

## 2022-01-01 RX ADMIN — ASPIRIN 81 MG: 81 TABLET, COATED ORAL at 10:11

## 2022-01-01 RX ADMIN — DOCUSATE SODIUM 50MG AND SENNOSIDES 8.6MG 2 TABLET: 8.6; 5 TABLET, FILM COATED ORAL at 08:02

## 2022-01-01 RX ADMIN — DOCUSATE SODIUM 50MG AND SENNOSIDES 8.6MG 2 TABLET: 8.6; 5 TABLET, FILM COATED ORAL at 22:26

## 2022-01-01 RX ADMIN — POTASSIUM CHLORIDE 20 MEQ: 750 TABLET, EXTENDED RELEASE ORAL at 10:11

## 2022-01-01 RX ADMIN — ENOXAPARIN SODIUM 40 MG: 100 INJECTION SUBCUTANEOUS at 14:13

## 2022-01-01 RX ADMIN — DOCUSATE SODIUM 100 MG: 100 CAPSULE, LIQUID FILLED ORAL at 19:56

## 2022-01-01 RX ADMIN — DOCUSATE SODIUM 100 MG: 100 CAPSULE, LIQUID FILLED ORAL at 10:11

## 2022-01-01 RX ADMIN — POTASSIUM CHLORIDE 20 MEQ: 750 TABLET, EXTENDED RELEASE ORAL at 08:01

## 2022-01-01 RX ADMIN — DOCUSATE SODIUM 100 MG: 100 CAPSULE, LIQUID FILLED ORAL at 20:43

## 2022-01-01 RX ADMIN — DOCUSATE SODIUM 100 MG: 100 CAPSULE, LIQUID FILLED ORAL at 12:11

## 2022-01-01 RX ADMIN — ASPIRIN 81 MG: 81 TABLET, COATED ORAL at 12:10

## 2022-01-01 RX ADMIN — Medication 10 ML: at 08:13

## 2022-01-01 RX ADMIN — Medication 1000 MCG: at 17:17

## 2022-01-01 RX ADMIN — SODIUM CHLORIDE 100 ML/HR: 9 INJECTION, SOLUTION INTRAVENOUS at 19:56

## 2022-01-01 RX ADMIN — ASPIRIN 81 MG: 81 TABLET, COATED ORAL at 08:13

## 2022-01-01 RX ADMIN — SODIUM CHLORIDE 500 ML: 9 INJECTION, SOLUTION INTRAVENOUS at 02:43

## 2022-01-01 RX ADMIN — Medication 10 ML: at 20:43

## 2022-01-01 RX ADMIN — Medication 1000 MCG: at 08:02

## 2022-01-01 RX ADMIN — Medication 5 MG: at 00:54

## 2022-01-01 RX ADMIN — PERFLUTREN 6 ML: 6.52 INJECTION, SUSPENSION INTRAVENOUS at 16:20

## 2022-01-01 RX ADMIN — IOPAMIDOL 85 ML: 755 INJECTION, SOLUTION INTRAVENOUS at 12:50

## 2022-01-01 RX ADMIN — DOCUSATE SODIUM 100 MG: 100 CAPSULE, LIQUID FILLED ORAL at 08:13

## 2022-01-01 RX ADMIN — POLYETHYLENE GLYCOL 3350 17 G: 17 POWDER, FOR SOLUTION ORAL at 16:35

## 2022-01-27 ENCOUNTER — HOSPITAL ENCOUNTER (INPATIENT)
Facility: HOSPITAL | Age: 87
LOS: 4 days | Discharge: NURSING, REHAB OR LONG-TERM CARE | End: 2022-02-01
Attending: EMERGENCY MEDICINE | Admitting: INTERNAL MEDICINE

## 2022-01-27 ENCOUNTER — APPOINTMENT (OUTPATIENT)
Dept: GENERAL RADIOLOGY | Facility: HOSPITAL | Age: 87
End: 2022-01-27

## 2022-01-27 ENCOUNTER — APPOINTMENT (OUTPATIENT)
Dept: CT IMAGING | Facility: HOSPITAL | Age: 87
End: 2022-01-27

## 2022-01-27 DIAGNOSIS — U07.1 COVID-19: ICD-10-CM

## 2022-01-27 DIAGNOSIS — R29.6 MULTIPLE FALLS: ICD-10-CM

## 2022-01-27 DIAGNOSIS — R53.1 GENERALIZED WEAKNESS: ICD-10-CM

## 2022-01-27 DIAGNOSIS — R77.8 ELEVATED TROPONIN: ICD-10-CM

## 2022-01-27 DIAGNOSIS — N17.9 AKI (ACUTE KIDNEY INJURY): Primary | ICD-10-CM

## 2022-01-27 PROBLEM — N39.0 ACUTE URINARY TRACT INFECTION: Status: ACTIVE | Noted: 2022-01-27

## 2022-01-27 PROBLEM — Z66 DNR (DO NOT RESUSCITATE): Status: ACTIVE | Noted: 2022-01-27

## 2022-01-27 PROBLEM — E86.0 DEHYDRATION: Status: ACTIVE | Noted: 2022-01-27

## 2022-01-27 LAB
ALBUMIN SERPL-MCNC: 4.3 G/DL (ref 3.5–5.2)
ALBUMIN/GLOB SERPL: 1.9 G/DL
ALP SERPL-CCNC: 78 U/L (ref 39–117)
ALT SERPL W P-5'-P-CCNC: 10 U/L (ref 1–41)
ANION GAP SERPL CALCULATED.3IONS-SCNC: 14.4 MMOL/L (ref 5–15)
AST SERPL-CCNC: 28 U/L (ref 1–40)
BACTERIA UR QL AUTO: ABNORMAL /HPF
BASOPHILS # BLD AUTO: 0.02 10*3/MM3 (ref 0–0.2)
BASOPHILS NFR BLD AUTO: 0.2 % (ref 0–1.5)
BILIRUB SERPL-MCNC: 0.5 MG/DL (ref 0–1.2)
BILIRUB UR QL STRIP: NEGATIVE
BUN SERPL-MCNC: 31 MG/DL (ref 8–23)
BUN/CREAT SERPL: 20.3 (ref 7–25)
CALCIUM SPEC-SCNC: 9.5 MG/DL (ref 8.6–10.5)
CHLORIDE SERPL-SCNC: 101 MMOL/L (ref 98–107)
CLARITY UR: CLEAR
CO2 SERPL-SCNC: 21.6 MMOL/L (ref 22–29)
COLOR UR: YELLOW
CREAT SERPL-MCNC: 1.53 MG/DL (ref 0.76–1.27)
DEPRECATED RDW RBC AUTO: 42.7 FL (ref 37–54)
EOSINOPHIL # BLD AUTO: 0 10*3/MM3 (ref 0–0.4)
EOSINOPHIL NFR BLD AUTO: 0 % (ref 0.3–6.2)
ERYTHROCYTE [DISTWIDTH] IN BLOOD BY AUTOMATED COUNT: 11.7 % (ref 12.3–15.4)
GFR SERPL CREATININE-BSD FRML MDRD: 43 ML/MIN/1.73
GLOBULIN UR ELPH-MCNC: 2.3 GM/DL
GLUCOSE SERPL-MCNC: 91 MG/DL (ref 65–99)
GLUCOSE UR STRIP-MCNC: NEGATIVE MG/DL
HCT VFR BLD AUTO: 38.1 % (ref 37.5–51)
HGB BLD-MCNC: 13 G/DL (ref 13–17.7)
HGB UR QL STRIP.AUTO: ABNORMAL
HYALINE CASTS UR QL AUTO: ABNORMAL /LPF
IMM GRANULOCYTES # BLD AUTO: 0.05 10*3/MM3 (ref 0–0.05)
IMM GRANULOCYTES NFR BLD AUTO: 0.6 % (ref 0–0.5)
KETONES UR QL STRIP: ABNORMAL
LEUKOCYTE ESTERASE UR QL STRIP.AUTO: ABNORMAL
LYMPHOCYTES # BLD AUTO: 0.52 10*3/MM3 (ref 0.7–3.1)
LYMPHOCYTES NFR BLD AUTO: 6.1 % (ref 19.6–45.3)
MAGNESIUM SERPL-MCNC: 2.1 MG/DL (ref 1.6–2.4)
MCH RBC QN AUTO: 34.1 PG (ref 26.6–33)
MCHC RBC AUTO-ENTMCNC: 34.1 G/DL (ref 31.5–35.7)
MCV RBC AUTO: 100 FL (ref 79–97)
MONOCYTES # BLD AUTO: 1.29 10*3/MM3 (ref 0.1–0.9)
MONOCYTES NFR BLD AUTO: 15.2 % (ref 5–12)
NEUTROPHILS NFR BLD AUTO: 6.58 10*3/MM3 (ref 1.7–7)
NEUTROPHILS NFR BLD AUTO: 77.9 % (ref 42.7–76)
NITRITE UR QL STRIP: NEGATIVE
NRBC BLD AUTO-RTO: 0 /100 WBC (ref 0–0.2)
NT-PROBNP SERPL-MCNC: 1048 PG/ML (ref 0–1800)
PH UR STRIP.AUTO: 6 [PH] (ref 5–8)
PLATELET # BLD AUTO: 201 10*3/MM3 (ref 140–450)
PMV BLD AUTO: 9.2 FL (ref 6–12)
POTASSIUM SERPL-SCNC: 4.7 MMOL/L (ref 3.5–5.2)
PROT SERPL-MCNC: 6.6 G/DL (ref 6–8.5)
PROT UR QL STRIP: ABNORMAL
RBC # BLD AUTO: 3.81 10*6/MM3 (ref 4.14–5.8)
RBC # UR STRIP: ABNORMAL /HPF
REF LAB TEST METHOD: ABNORMAL
SARS-COV-2 RNA RESP QL NAA+PROBE: DETECTED
SODIUM SERPL-SCNC: 137 MMOL/L (ref 136–145)
SP GR UR STRIP: 1.02 (ref 1–1.03)
SQUAMOUS #/AREA URNS HPF: ABNORMAL /HPF
TROPONIN T SERPL-MCNC: 0.03 NG/ML (ref 0–0.03)
UROBILINOGEN UR QL STRIP: ABNORMAL
WBC # UR STRIP: ABNORMAL /HPF
WBC NRBC COR # BLD: 8.46 10*3/MM3 (ref 3.4–10.8)

## 2022-01-27 PROCEDURE — 36415 COLL VENOUS BLD VENIPUNCTURE: CPT

## 2022-01-27 PROCEDURE — G0378 HOSPITAL OBSERVATION PER HR: HCPCS

## 2022-01-27 PROCEDURE — 72125 CT NECK SPINE W/O DYE: CPT

## 2022-01-27 PROCEDURE — 86140 C-REACTIVE PROTEIN: CPT | Performed by: INTERNAL MEDICINE

## 2022-01-27 PROCEDURE — 70450 CT HEAD/BRAIN W/O DYE: CPT

## 2022-01-27 PROCEDURE — U0005 INFEC AGEN DETEC AMPLI PROBE: HCPCS | Performed by: NURSE PRACTITIONER

## 2022-01-27 PROCEDURE — 93010 ELECTROCARDIOGRAM REPORT: CPT | Performed by: INTERNAL MEDICINE

## 2022-01-27 PROCEDURE — 71045 X-RAY EXAM CHEST 1 VIEW: CPT

## 2022-01-27 PROCEDURE — U0003 INFECTIOUS AGENT DETECTION BY NUCLEIC ACID (DNA OR RNA); SEVERE ACUTE RESPIRATORY SYNDROME CORONAVIRUS 2 (SARS-COV-2) (CORONAVIRUS DISEASE [COVID-19]), AMPLIFIED PROBE TECHNIQUE, MAKING USE OF HIGH THROUGHPUT TECHNOLOGIES AS DESCRIBED BY CMS-2020-01-R: HCPCS | Performed by: NURSE PRACTITIONER

## 2022-01-27 PROCEDURE — 80053 COMPREHEN METABOLIC PANEL: CPT | Performed by: NURSE PRACTITIONER

## 2022-01-27 PROCEDURE — 84484 ASSAY OF TROPONIN QUANT: CPT | Performed by: NURSE PRACTITIONER

## 2022-01-27 PROCEDURE — 81001 URINALYSIS AUTO W/SCOPE: CPT | Performed by: NURSE PRACTITIONER

## 2022-01-27 PROCEDURE — 25010000002 CEFTRIAXONE PER 250 MG: Performed by: NURSE PRACTITIONER

## 2022-01-27 PROCEDURE — 93005 ELECTROCARDIOGRAM TRACING: CPT | Performed by: NURSE PRACTITIONER

## 2022-01-27 PROCEDURE — 83735 ASSAY OF MAGNESIUM: CPT | Performed by: NURSE PRACTITIONER

## 2022-01-27 PROCEDURE — 83880 ASSAY OF NATRIURETIC PEPTIDE: CPT | Performed by: NURSE PRACTITIONER

## 2022-01-27 PROCEDURE — 99284 EMERGENCY DEPT VISIT MOD MDM: CPT

## 2022-01-27 PROCEDURE — P9612 CATHETERIZE FOR URINE SPEC: HCPCS

## 2022-01-27 PROCEDURE — 85025 COMPLETE CBC W/AUTO DIFF WBC: CPT | Performed by: NURSE PRACTITIONER

## 2022-01-27 PROCEDURE — 72131 CT LUMBAR SPINE W/O DYE: CPT

## 2022-01-27 RX ORDER — SODIUM CHLORIDE 0.9 % (FLUSH) 0.9 %
10 SYRINGE (ML) INJECTION AS NEEDED
Status: DISCONTINUED | OUTPATIENT
Start: 2022-01-27 | End: 2022-02-01 | Stop reason: HOSPADM

## 2022-01-27 RX ORDER — ACETAMINOPHEN 325 MG/1
650 TABLET ORAL EVERY 4 HOURS PRN
Status: DISCONTINUED | OUTPATIENT
Start: 2022-01-27 | End: 2022-02-01 | Stop reason: HOSPADM

## 2022-01-27 RX ORDER — ACETAMINOPHEN 160 MG/5ML
650 SOLUTION ORAL EVERY 4 HOURS PRN
Status: DISCONTINUED | OUTPATIENT
Start: 2022-01-27 | End: 2022-02-01 | Stop reason: HOSPADM

## 2022-01-27 RX ORDER — ACETAMINOPHEN 650 MG/1
650 SUPPOSITORY RECTAL EVERY 4 HOURS PRN
Status: DISCONTINUED | OUTPATIENT
Start: 2022-01-27 | End: 2022-02-01 | Stop reason: HOSPADM

## 2022-01-27 RX ORDER — SODIUM CHLORIDE 9 MG/ML
50 INJECTION, SOLUTION INTRAVENOUS CONTINUOUS
Status: DISCONTINUED | OUTPATIENT
Start: 2022-01-27 | End: 2022-01-28

## 2022-01-27 RX ORDER — SODIUM CHLORIDE 0.9 % (FLUSH) 0.9 %
10 SYRINGE (ML) INJECTION EVERY 12 HOURS SCHEDULED
Status: DISCONTINUED | OUTPATIENT
Start: 2022-01-27 | End: 2022-02-01 | Stop reason: HOSPADM

## 2022-01-27 RX ORDER — ONDANSETRON 4 MG/1
4 TABLET, FILM COATED ORAL EVERY 6 HOURS PRN
Status: DISCONTINUED | OUTPATIENT
Start: 2022-01-27 | End: 2022-02-01 | Stop reason: HOSPADM

## 2022-01-27 RX ORDER — ONDANSETRON 2 MG/ML
4 INJECTION INTRAMUSCULAR; INTRAVENOUS EVERY 6 HOURS PRN
Status: DISCONTINUED | OUTPATIENT
Start: 2022-01-27 | End: 2022-02-01 | Stop reason: HOSPADM

## 2022-01-27 RX ADMIN — CEFTRIAXONE 1 G: 1 INJECTION, POWDER, FOR SOLUTION INTRAMUSCULAR; INTRAVENOUS at 19:18

## 2022-01-27 RX ADMIN — SODIUM CHLORIDE, PRESERVATIVE FREE 10 ML: 5 INJECTION INTRAVENOUS at 23:38

## 2022-01-27 RX ADMIN — SODIUM CHLORIDE 50 ML/HR: 9 INJECTION, SOLUTION INTRAVENOUS at 23:38

## 2022-01-28 LAB
ANION GAP SERPL CALCULATED.3IONS-SCNC: 13.6 MMOL/L (ref 5–15)
BASOPHILS # BLD AUTO: 0.03 10*3/MM3 (ref 0–0.2)
BASOPHILS NFR BLD AUTO: 0.3 % (ref 0–1.5)
BUN SERPL-MCNC: 23 MG/DL (ref 8–23)
BUN/CREAT SERPL: 18.7 (ref 7–25)
CALCIUM SPEC-SCNC: 9.1 MG/DL (ref 8.6–10.5)
CHLORIDE SERPL-SCNC: 100 MMOL/L (ref 98–107)
CO2 SERPL-SCNC: 22.4 MMOL/L (ref 22–29)
CREAT SERPL-MCNC: 1.23 MG/DL (ref 0.76–1.27)
CRP SERPL-MCNC: 1.38 MG/DL (ref 0–0.5)
CRP SERPL-MCNC: 2.64 MG/DL (ref 0–0.5)
DEPRECATED RDW RBC AUTO: 45.1 FL (ref 37–54)
EOSINOPHIL # BLD AUTO: 0 10*3/MM3 (ref 0–0.4)
EOSINOPHIL NFR BLD AUTO: 0 % (ref 0.3–6.2)
ERYTHROCYTE [DISTWIDTH] IN BLOOD BY AUTOMATED COUNT: 11.9 % (ref 12.3–15.4)
GFR SERPL CREATININE-BSD FRML MDRD: 56 ML/MIN/1.73
GLUCOSE SERPL-MCNC: 100 MG/DL (ref 65–99)
HCT VFR BLD AUTO: 39.4 % (ref 37.5–51)
HGB BLD-MCNC: 13.2 G/DL (ref 13–17.7)
IMM GRANULOCYTES # BLD AUTO: 0.02 10*3/MM3 (ref 0–0.05)
IMM GRANULOCYTES NFR BLD AUTO: 0.2 % (ref 0–0.5)
LYMPHOCYTES # BLD AUTO: 0.79 10*3/MM3 (ref 0.7–3.1)
LYMPHOCYTES NFR BLD AUTO: 9.1 % (ref 19.6–45.3)
MCH RBC QN AUTO: 34.2 PG (ref 26.6–33)
MCHC RBC AUTO-ENTMCNC: 33.5 G/DL (ref 31.5–35.7)
MCV RBC AUTO: 102.1 FL (ref 79–97)
MONOCYTES # BLD AUTO: 1.13 10*3/MM3 (ref 0.1–0.9)
MONOCYTES NFR BLD AUTO: 13 % (ref 5–12)
NEUTROPHILS NFR BLD AUTO: 6.7 10*3/MM3 (ref 1.7–7)
NEUTROPHILS NFR BLD AUTO: 77.4 % (ref 42.7–76)
NRBC BLD AUTO-RTO: 0 /100 WBC (ref 0–0.2)
PLATELET # BLD AUTO: 203 10*3/MM3 (ref 140–450)
PMV BLD AUTO: 9.3 FL (ref 6–12)
POTASSIUM SERPL-SCNC: 4.1 MMOL/L (ref 3.5–5.2)
QT INTERVAL: 418 MS
RBC # BLD AUTO: 3.86 10*6/MM3 (ref 4.14–5.8)
SODIUM SERPL-SCNC: 136 MMOL/L (ref 136–145)
TROPONIN T SERPL-MCNC: 0.03 NG/ML (ref 0–0.03)
WBC NRBC COR # BLD: 8.67 10*3/MM3 (ref 3.4–10.8)

## 2022-01-28 PROCEDURE — 85025 COMPLETE CBC W/AUTO DIFF WBC: CPT | Performed by: INTERNAL MEDICINE

## 2022-01-28 PROCEDURE — 25010000002 REMDESIVIR 100 MG/20ML SOLUTION 1 EACH VIAL: Performed by: STUDENT IN AN ORGANIZED HEALTH CARE EDUCATION/TRAINING PROGRAM

## 2022-01-28 PROCEDURE — 97530 THERAPEUTIC ACTIVITIES: CPT

## 2022-01-28 PROCEDURE — 25010000002 HYDRALAZINE PER 20 MG: Performed by: STUDENT IN AN ORGANIZED HEALTH CARE EDUCATION/TRAINING PROGRAM

## 2022-01-28 PROCEDURE — 86140 C-REACTIVE PROTEIN: CPT | Performed by: INTERNAL MEDICINE

## 2022-01-28 PROCEDURE — 84484 ASSAY OF TROPONIN QUANT: CPT | Performed by: INTERNAL MEDICINE

## 2022-01-28 PROCEDURE — 97162 PT EVAL MOD COMPLEX 30 MIN: CPT

## 2022-01-28 PROCEDURE — 25010000002 CEFTRIAXONE PER 250 MG: Performed by: INTERNAL MEDICINE

## 2022-01-28 PROCEDURE — 80048 BASIC METABOLIC PNL TOTAL CA: CPT | Performed by: INTERNAL MEDICINE

## 2022-01-28 PROCEDURE — 25010000002 ENOXAPARIN PER 10 MG: Performed by: INTERNAL MEDICINE

## 2022-01-28 PROCEDURE — XW033E5 INTRODUCTION OF REMDESIVIR ANTI-INFECTIVE INTO PERIPHERAL VEIN, PERCUTANEOUS APPROACH, NEW TECHNOLOGY GROUP 5: ICD-10-PCS | Performed by: INTERNAL MEDICINE

## 2022-01-28 RX ORDER — DOCUSATE SODIUM 100 MG/1
100 CAPSULE, LIQUID FILLED ORAL 2 TIMES DAILY
COMMUNITY

## 2022-01-28 RX ORDER — AMLODIPINE BESYLATE 5 MG/1
5 TABLET ORAL
Status: DISCONTINUED | OUTPATIENT
Start: 2022-01-28 | End: 2022-02-01 | Stop reason: HOSPADM

## 2022-01-28 RX ORDER — HYDRALAZINE HYDROCHLORIDE 20 MG/ML
10 INJECTION INTRAMUSCULAR; INTRAVENOUS EVERY 6 HOURS PRN
Status: DISCONTINUED | OUTPATIENT
Start: 2022-01-28 | End: 2022-02-01 | Stop reason: HOSPADM

## 2022-01-28 RX ADMIN — HYDRALAZINE HYDROCHLORIDE 10 MG: 20 INJECTION INTRAMUSCULAR; INTRAVENOUS at 11:55

## 2022-01-28 RX ADMIN — SODIUM CHLORIDE, PRESERVATIVE FREE 10 ML: 5 INJECTION INTRAVENOUS at 13:57

## 2022-01-28 RX ADMIN — SODIUM CHLORIDE, PRESERVATIVE FREE 10 ML: 5 INJECTION INTRAVENOUS at 20:51

## 2022-01-28 RX ADMIN — ENOXAPARIN SODIUM 30 MG: 30 INJECTION SUBCUTANEOUS at 08:03

## 2022-01-28 RX ADMIN — REMDESIVIR 200 MG: 100 INJECTION, POWDER, LYOPHILIZED, FOR SOLUTION INTRAVENOUS at 17:13

## 2022-01-28 RX ADMIN — CEFTRIAXONE 1 G: 1 INJECTION, POWDER, FOR SOLUTION INTRAMUSCULAR; INTRAVENOUS at 15:58

## 2022-01-28 RX ADMIN — AMLODIPINE BESYLATE 5 MG: 5 TABLET ORAL at 11:52

## 2022-01-28 RX ADMIN — SODIUM CHLORIDE 50 ML/HR: 9 INJECTION, SOLUTION INTRAVENOUS at 13:42

## 2022-01-29 LAB
ALBUMIN SERPL-MCNC: 3.3 G/DL (ref 3.5–5.2)
ALBUMIN SERPL-MCNC: 4 G/DL (ref 3.5–5.2)
ALP SERPL-CCNC: 67 U/L (ref 39–117)
ALP SERPL-CCNC: 70 U/L (ref 39–117)
ALT SERPL W P-5'-P-CCNC: 13 U/L (ref 1–41)
ALT SERPL W P-5'-P-CCNC: 17 U/L (ref 1–41)
ANION GAP SERPL CALCULATED.3IONS-SCNC: 13 MMOL/L (ref 5–15)
AST SERPL-CCNC: 54 U/L (ref 1–40)
AST SERPL-CCNC: 55 U/L (ref 1–40)
BASOPHILS # BLD AUTO: 0.02 10*3/MM3 (ref 0–0.2)
BASOPHILS NFR BLD AUTO: 0.2 % (ref 0–1.5)
BILIRUB CONJ SERPL-MCNC: <0.2 MG/DL (ref 0–0.3)
BILIRUB CONJ SERPL-MCNC: <0.2 MG/DL (ref 0–0.3)
BILIRUB INDIRECT SERPL-MCNC: ABNORMAL MG/DL
BILIRUB INDIRECT SERPL-MCNC: ABNORMAL MG/DL
BILIRUB SERPL-MCNC: 0.5 MG/DL (ref 0–1.2)
BILIRUB SERPL-MCNC: 0.5 MG/DL (ref 0–1.2)
BUN SERPL-MCNC: 23 MG/DL (ref 8–23)
BUN/CREAT SERPL: 21.1 (ref 7–25)
CALCIUM SPEC-SCNC: 8.7 MG/DL (ref 8.6–10.5)
CHLORIDE SERPL-SCNC: 101 MMOL/L (ref 98–107)
CO2 SERPL-SCNC: 21 MMOL/L (ref 22–29)
CREAT SERPL-MCNC: 1.09 MG/DL (ref 0.76–1.27)
CRP SERPL-MCNC: 7.5 MG/DL (ref 0–0.5)
DEPRECATED RDW RBC AUTO: 40.4 FL (ref 37–54)
EOSINOPHIL # BLD AUTO: 0 10*3/MM3 (ref 0–0.4)
EOSINOPHIL NFR BLD AUTO: 0 % (ref 0.3–6.2)
ERYTHROCYTE [DISTWIDTH] IN BLOOD BY AUTOMATED COUNT: 11.8 % (ref 12.3–15.4)
GFR SERPL CREATININE-BSD FRML MDRD: 64 ML/MIN/1.73
GLUCOSE SERPL-MCNC: 100 MG/DL (ref 65–99)
HCT VFR BLD AUTO: 38.9 % (ref 37.5–51)
HGB BLD-MCNC: 14.1 G/DL (ref 13–17.7)
IMM GRANULOCYTES # BLD AUTO: 0.03 10*3/MM3 (ref 0–0.05)
IMM GRANULOCYTES NFR BLD AUTO: 0.3 % (ref 0–0.5)
LYMPHOCYTES # BLD AUTO: 0.76 10*3/MM3 (ref 0.7–3.1)
LYMPHOCYTES NFR BLD AUTO: 7.1 % (ref 19.6–45.3)
MAGNESIUM SERPL-MCNC: 2 MG/DL (ref 1.6–2.4)
MCH RBC QN AUTO: 34.2 PG (ref 26.6–33)
MCHC RBC AUTO-ENTMCNC: 36.2 G/DL (ref 31.5–35.7)
MCV RBC AUTO: 94.4 FL (ref 79–97)
MONOCYTES # BLD AUTO: 1.22 10*3/MM3 (ref 0.1–0.9)
MONOCYTES NFR BLD AUTO: 11.5 % (ref 5–12)
NEUTROPHILS NFR BLD AUTO: 8.61 10*3/MM3 (ref 1.7–7)
NEUTROPHILS NFR BLD AUTO: 80.9 % (ref 42.7–76)
NRBC BLD AUTO-RTO: 0 /100 WBC (ref 0–0.2)
PHOSPHATE SERPL-MCNC: 2.7 MG/DL (ref 2.5–4.5)
PLATELET # BLD AUTO: 206 10*3/MM3 (ref 140–450)
PMV BLD AUTO: 9.8 FL (ref 6–12)
POTASSIUM SERPL-SCNC: 4.1 MMOL/L (ref 3.5–5.2)
PROT SERPL-MCNC: 6.5 G/DL (ref 6–8.5)
PROT SERPL-MCNC: 6.6 G/DL (ref 6–8.5)
RBC # BLD AUTO: 4.12 10*6/MM3 (ref 4.14–5.8)
SODIUM SERPL-SCNC: 135 MMOL/L (ref 136–145)
WBC NRBC COR # BLD: 10.64 10*3/MM3 (ref 3.4–10.8)

## 2022-01-29 PROCEDURE — 36415 COLL VENOUS BLD VENIPUNCTURE: CPT | Performed by: STUDENT IN AN ORGANIZED HEALTH CARE EDUCATION/TRAINING PROGRAM

## 2022-01-29 PROCEDURE — 84100 ASSAY OF PHOSPHORUS: CPT | Performed by: STUDENT IN AN ORGANIZED HEALTH CARE EDUCATION/TRAINING PROGRAM

## 2022-01-29 PROCEDURE — 25010000002 CEFTRIAXONE PER 250 MG: Performed by: INTERNAL MEDICINE

## 2022-01-29 PROCEDURE — 85025 COMPLETE CBC W/AUTO DIFF WBC: CPT | Performed by: STUDENT IN AN ORGANIZED HEALTH CARE EDUCATION/TRAINING PROGRAM

## 2022-01-29 PROCEDURE — 86140 C-REACTIVE PROTEIN: CPT | Performed by: INTERNAL MEDICINE

## 2022-01-29 PROCEDURE — 80048 BASIC METABOLIC PNL TOTAL CA: CPT | Performed by: STUDENT IN AN ORGANIZED HEALTH CARE EDUCATION/TRAINING PROGRAM

## 2022-01-29 PROCEDURE — 80076 HEPATIC FUNCTION PANEL: CPT | Performed by: STUDENT IN AN ORGANIZED HEALTH CARE EDUCATION/TRAINING PROGRAM

## 2022-01-29 PROCEDURE — 83735 ASSAY OF MAGNESIUM: CPT | Performed by: STUDENT IN AN ORGANIZED HEALTH CARE EDUCATION/TRAINING PROGRAM

## 2022-01-29 PROCEDURE — 25010000002 ENOXAPARIN PER 10 MG: Performed by: INTERNAL MEDICINE

## 2022-01-29 PROCEDURE — 25010000002 REMDESIVIR 100 MG/20ML SOLUTION 1 EACH VIAL: Performed by: STUDENT IN AN ORGANIZED HEALTH CARE EDUCATION/TRAINING PROGRAM

## 2022-01-29 RX ADMIN — REMDESIVIR 100 MG: 100 INJECTION, POWDER, LYOPHILIZED, FOR SOLUTION INTRAVENOUS at 22:06

## 2022-01-29 RX ADMIN — ENOXAPARIN SODIUM 30 MG: 30 INJECTION SUBCUTANEOUS at 08:49

## 2022-01-29 RX ADMIN — SODIUM CHLORIDE, PRESERVATIVE FREE 10 ML: 5 INJECTION INTRAVENOUS at 20:22

## 2022-01-29 RX ADMIN — CEFTRIAXONE 1 G: 1 INJECTION, POWDER, FOR SOLUTION INTRAMUSCULAR; INTRAVENOUS at 17:15

## 2022-01-29 RX ADMIN — SODIUM CHLORIDE, PRESERVATIVE FREE 10 ML: 5 INJECTION INTRAVENOUS at 21:32

## 2022-01-29 RX ADMIN — AMLODIPINE BESYLATE 5 MG: 5 TABLET ORAL at 08:49

## 2022-01-30 LAB
ALBUMIN SERPL-MCNC: 3.5 G/DL (ref 3.5–5.2)
ALP SERPL-CCNC: 63 U/L (ref 39–117)
ALT SERPL W P-5'-P-CCNC: 12 U/L (ref 1–41)
ANION GAP SERPL CALCULATED.3IONS-SCNC: 16 MMOL/L (ref 5–15)
AST SERPL-CCNC: 38 U/L (ref 1–40)
BASOPHILS # BLD AUTO: 0.02 10*3/MM3 (ref 0–0.2)
BASOPHILS NFR BLD AUTO: 0.2 % (ref 0–1.5)
BILIRUB CONJ SERPL-MCNC: <0.2 MG/DL (ref 0–0.3)
BILIRUB INDIRECT SERPL-MCNC: NORMAL MG/DL
BILIRUB SERPL-MCNC: 0.4 MG/DL (ref 0–1.2)
BUN SERPL-MCNC: 27 MG/DL (ref 8–23)
BUN/CREAT SERPL: 23.7 (ref 7–25)
CALCIUM SPEC-SCNC: 8.6 MG/DL (ref 8.6–10.5)
CHLORIDE SERPL-SCNC: 100 MMOL/L (ref 98–107)
CO2 SERPL-SCNC: 20 MMOL/L (ref 22–29)
CREAT SERPL-MCNC: 1.14 MG/DL (ref 0.76–1.27)
CRP SERPL-MCNC: 9.28 MG/DL (ref 0–0.5)
DEPRECATED RDW RBC AUTO: 40.2 FL (ref 37–54)
EOSINOPHIL # BLD AUTO: 0.02 10*3/MM3 (ref 0–0.4)
EOSINOPHIL NFR BLD AUTO: 0.2 % (ref 0.3–6.2)
ERYTHROCYTE [DISTWIDTH] IN BLOOD BY AUTOMATED COUNT: 11.4 % (ref 12.3–15.4)
GFR SERPL CREATININE-BSD FRML MDRD: 61 ML/MIN/1.73
GLUCOSE SERPL-MCNC: 105 MG/DL (ref 65–99)
HCT VFR BLD AUTO: 39 % (ref 37.5–51)
HGB BLD-MCNC: 13.8 G/DL (ref 13–17.7)
IMM GRANULOCYTES # BLD AUTO: 0.02 10*3/MM3 (ref 0–0.05)
IMM GRANULOCYTES NFR BLD AUTO: 0.2 % (ref 0–0.5)
LYMPHOCYTES # BLD AUTO: 0.85 10*3/MM3 (ref 0.7–3.1)
LYMPHOCYTES NFR BLD AUTO: 9.8 % (ref 19.6–45.3)
MAGNESIUM SERPL-MCNC: 2 MG/DL (ref 1.6–2.4)
MCH RBC QN AUTO: 33.7 PG (ref 26.6–33)
MCHC RBC AUTO-ENTMCNC: 35.4 G/DL (ref 31.5–35.7)
MCV RBC AUTO: 95.1 FL (ref 79–97)
MONOCYTES # BLD AUTO: 0.84 10*3/MM3 (ref 0.1–0.9)
MONOCYTES NFR BLD AUTO: 9.7 % (ref 5–12)
NEUTROPHILS NFR BLD AUTO: 6.92 10*3/MM3 (ref 1.7–7)
NEUTROPHILS NFR BLD AUTO: 79.9 % (ref 42.7–76)
NRBC BLD AUTO-RTO: 0 /100 WBC (ref 0–0.2)
PHOSPHATE SERPL-MCNC: 2.4 MG/DL (ref 2.5–4.5)
PLATELET # BLD AUTO: 208 10*3/MM3 (ref 140–450)
PMV BLD AUTO: 9.7 FL (ref 6–12)
POTASSIUM SERPL-SCNC: 3.6 MMOL/L (ref 3.5–5.2)
PROT SERPL-MCNC: 6.2 G/DL (ref 6–8.5)
RBC # BLD AUTO: 4.1 10*6/MM3 (ref 4.14–5.8)
SODIUM SERPL-SCNC: 136 MMOL/L (ref 136–145)
WBC NRBC COR # BLD: 8.67 10*3/MM3 (ref 3.4–10.8)

## 2022-01-30 PROCEDURE — 25010000002 CEFTRIAXONE PER 250 MG: Performed by: INTERNAL MEDICINE

## 2022-01-30 PROCEDURE — 80076 HEPATIC FUNCTION PANEL: CPT | Performed by: STUDENT IN AN ORGANIZED HEALTH CARE EDUCATION/TRAINING PROGRAM

## 2022-01-30 PROCEDURE — 86140 C-REACTIVE PROTEIN: CPT | Performed by: INTERNAL MEDICINE

## 2022-01-30 PROCEDURE — 85025 COMPLETE CBC W/AUTO DIFF WBC: CPT | Performed by: STUDENT IN AN ORGANIZED HEALTH CARE EDUCATION/TRAINING PROGRAM

## 2022-01-30 PROCEDURE — 25010000002 ENOXAPARIN PER 10 MG: Performed by: INTERNAL MEDICINE

## 2022-01-30 PROCEDURE — 80048 BASIC METABOLIC PNL TOTAL CA: CPT | Performed by: STUDENT IN AN ORGANIZED HEALTH CARE EDUCATION/TRAINING PROGRAM

## 2022-01-30 PROCEDURE — 84100 ASSAY OF PHOSPHORUS: CPT | Performed by: STUDENT IN AN ORGANIZED HEALTH CARE EDUCATION/TRAINING PROGRAM

## 2022-01-30 PROCEDURE — 25010000002 REMDESIVIR 100 MG/20ML SOLUTION 1 EACH VIAL: Performed by: STUDENT IN AN ORGANIZED HEALTH CARE EDUCATION/TRAINING PROGRAM

## 2022-01-30 PROCEDURE — 83735 ASSAY OF MAGNESIUM: CPT | Performed by: STUDENT IN AN ORGANIZED HEALTH CARE EDUCATION/TRAINING PROGRAM

## 2022-01-30 RX ADMIN — AMLODIPINE BESYLATE 5 MG: 5 TABLET ORAL at 08:10

## 2022-01-30 RX ADMIN — SODIUM CHLORIDE, PRESERVATIVE FREE 10 ML: 5 INJECTION INTRAVENOUS at 11:27

## 2022-01-30 RX ADMIN — CEFTRIAXONE 1 G: 1 INJECTION, POWDER, FOR SOLUTION INTRAMUSCULAR; INTRAVENOUS at 15:02

## 2022-01-30 RX ADMIN — ENOXAPARIN SODIUM 30 MG: 30 INJECTION SUBCUTANEOUS at 08:10

## 2022-01-30 RX ADMIN — REMDESIVIR 100 MG: 100 INJECTION, POWDER, LYOPHILIZED, FOR SOLUTION INTRAVENOUS at 16:22

## 2022-01-30 RX ADMIN — SODIUM CHLORIDE, PRESERVATIVE FREE 10 ML: 5 INJECTION INTRAVENOUS at 22:21

## 2022-01-31 ENCOUNTER — APPOINTMENT (OUTPATIENT)
Dept: CARDIOLOGY | Facility: HOSPITAL | Age: 87
End: 2022-01-31

## 2022-01-31 PROBLEM — D89.831 CYTOKINE RELEASE SYNDROME, GRADE 1: Status: ACTIVE | Noted: 2022-01-31

## 2022-01-31 LAB
ALBUMIN SERPL-MCNC: 3.2 G/DL (ref 3.5–5.2)
ALP SERPL-CCNC: 59 U/L (ref 39–117)
ALT SERPL W P-5'-P-CCNC: 10 U/L (ref 1–41)
ANION GAP SERPL CALCULATED.3IONS-SCNC: 13.4 MMOL/L (ref 5–15)
AST SERPL-CCNC: 28 U/L (ref 1–40)
BASOPHILS # BLD AUTO: 0.01 10*3/MM3 (ref 0–0.2)
BASOPHILS NFR BLD AUTO: 0.1 % (ref 0–1.5)
BH CV LOW VAS LEFT GASTRONEMIUS VESSEL: 1
BH CV LOW VAS LEFT POPLITEAL SPONT: 1
BH CV LOW VAS RIGHT LESSER SAPH VESSEL: 1
BH CV LOW VAS RIGHT POPLITEAL SPONT: 1
BH CV LOWER VASCULAR LEFT COMMON FEMORAL AUGMENT: NORMAL
BH CV LOWER VASCULAR LEFT COMMON FEMORAL COMPETENT: NORMAL
BH CV LOWER VASCULAR LEFT COMMON FEMORAL COMPRESS: NORMAL
BH CV LOWER VASCULAR LEFT COMMON FEMORAL PHASIC: NORMAL
BH CV LOWER VASCULAR LEFT COMMON FEMORAL SPONT: NORMAL
BH CV LOWER VASCULAR LEFT DISTAL FEMORAL COMPRESS: NORMAL
BH CV LOWER VASCULAR LEFT GASTRONEMIUS COMPRESS: NORMAL
BH CV LOWER VASCULAR LEFT GREATER SAPH AK COMPRESS: NORMAL
BH CV LOWER VASCULAR LEFT GREATER SAPH BK COMPRESS: NORMAL
BH CV LOWER VASCULAR LEFT LESSER SAPH COMPRESS: NORMAL
BH CV LOWER VASCULAR LEFT LESSER SAPH THROMBUS: NORMAL
BH CV LOWER VASCULAR LEFT MID FEMORAL AUGMENT: NORMAL
BH CV LOWER VASCULAR LEFT MID FEMORAL COMPETENT: NORMAL
BH CV LOWER VASCULAR LEFT MID FEMORAL COMPRESS: NORMAL
BH CV LOWER VASCULAR LEFT MID FEMORAL PHASIC: NORMAL
BH CV LOWER VASCULAR LEFT MID FEMORAL SPONT: NORMAL
BH CV LOWER VASCULAR LEFT PERONEAL COMPRESS: NORMAL
BH CV LOWER VASCULAR LEFT POPLITEAL AUGMENT: NORMAL
BH CV LOWER VASCULAR LEFT POPLITEAL COMPETENT: NORMAL
BH CV LOWER VASCULAR LEFT POPLITEAL COMPRESS: NORMAL
BH CV LOWER VASCULAR LEFT POPLITEAL SPONT: NORMAL
BH CV LOWER VASCULAR LEFT POPLITEAL THROMBUS: NORMAL
BH CV LOWER VASCULAR LEFT POSTERIOR TIBIAL COMPRESS: NORMAL
BH CV LOWER VASCULAR LEFT PROFUNDA FEMORAL COMPRESS: NORMAL
BH CV LOWER VASCULAR LEFT PROXIMAL FEMORAL COMPRESS: NORMAL
BH CV LOWER VASCULAR LEFT SAPHENOFEMORAL JUNCTION COMPRESS: NORMAL
BH CV LOWER VASCULAR RIGHT COMMON FEMORAL AUGMENT: NORMAL
BH CV LOWER VASCULAR RIGHT COMMON FEMORAL COMPETENT: NORMAL
BH CV LOWER VASCULAR RIGHT COMMON FEMORAL COMPRESS: NORMAL
BH CV LOWER VASCULAR RIGHT COMMON FEMORAL PHASIC: NORMAL
BH CV LOWER VASCULAR RIGHT COMMON FEMORAL SPONT: NORMAL
BH CV LOWER VASCULAR RIGHT DISTAL FEMORAL COMPRESS: NORMAL
BH CV LOWER VASCULAR RIGHT GASTRONEMIUS COMPRESS: NORMAL
BH CV LOWER VASCULAR RIGHT GREATER SAPH AK COMPRESS: NORMAL
BH CV LOWER VASCULAR RIGHT GREATER SAPH BK COMPRESS: NORMAL
BH CV LOWER VASCULAR RIGHT LESSER SAPH COMPRESS: NORMAL
BH CV LOWER VASCULAR RIGHT LESSER SAPH THROMBUS: NORMAL
BH CV LOWER VASCULAR RIGHT MID FEMORAL AUGMENT: NORMAL
BH CV LOWER VASCULAR RIGHT MID FEMORAL COMPETENT: NORMAL
BH CV LOWER VASCULAR RIGHT MID FEMORAL COMPRESS: NORMAL
BH CV LOWER VASCULAR RIGHT MID FEMORAL PHASIC: NORMAL
BH CV LOWER VASCULAR RIGHT MID FEMORAL SPONT: NORMAL
BH CV LOWER VASCULAR RIGHT PERONEAL COMPRESS: NORMAL
BH CV LOWER VASCULAR RIGHT POPLITEAL AUGMENT: NORMAL
BH CV LOWER VASCULAR RIGHT POPLITEAL COMPRESS: NORMAL
BH CV LOWER VASCULAR RIGHT POPLITEAL PHASIC: NORMAL
BH CV LOWER VASCULAR RIGHT POPLITEAL SPONT: NORMAL
BH CV LOWER VASCULAR RIGHT POPLITEAL THROMBUS: NORMAL
BH CV LOWER VASCULAR RIGHT POSTERIOR TIBIAL COMPRESS: NORMAL
BH CV LOWER VASCULAR RIGHT PROFUNDA FEMORAL COMPRESS: NORMAL
BH CV LOWER VASCULAR RIGHT PROXIMAL FEMORAL COMPRESS: NORMAL
BH CV LOWER VASCULAR RIGHT SAPHENOFEMORAL JUNCTION COMPRESS: NORMAL
BILIRUB CONJ SERPL-MCNC: <0.2 MG/DL (ref 0–0.3)
BILIRUB INDIRECT SERPL-MCNC: ABNORMAL MG/DL
BILIRUB SERPL-MCNC: 0.5 MG/DL (ref 0–1.2)
BUN SERPL-MCNC: 27 MG/DL (ref 8–23)
BUN/CREAT SERPL: 23.7 (ref 7–25)
CALCIUM SPEC-SCNC: 8 MG/DL (ref 8.6–10.5)
CHLORIDE SERPL-SCNC: 102 MMOL/L (ref 98–107)
CO2 SERPL-SCNC: 19.6 MMOL/L (ref 22–29)
CREAT SERPL-MCNC: 1.14 MG/DL (ref 0.76–1.27)
CRP SERPL-MCNC: 8.3 MG/DL (ref 0–0.5)
DEPRECATED RDW RBC AUTO: 42.4 FL (ref 37–54)
EOSINOPHIL # BLD AUTO: 0.06 10*3/MM3 (ref 0–0.4)
EOSINOPHIL NFR BLD AUTO: 0.9 % (ref 0.3–6.2)
ERYTHROCYTE [DISTWIDTH] IN BLOOD BY AUTOMATED COUNT: 11.9 % (ref 12.3–15.4)
GFR SERPL CREATININE-BSD FRML MDRD: 61 ML/MIN/1.73
GLUCOSE SERPL-MCNC: 106 MG/DL (ref 65–99)
HCT VFR BLD AUTO: 35.9 % (ref 37.5–51)
HGB BLD-MCNC: 12.6 G/DL (ref 13–17.7)
IMM GRANULOCYTES # BLD AUTO: 0.02 10*3/MM3 (ref 0–0.05)
IMM GRANULOCYTES NFR BLD AUTO: 0.3 % (ref 0–0.5)
LYMPHOCYTES # BLD AUTO: 1.07 10*3/MM3 (ref 0.7–3.1)
LYMPHOCYTES NFR BLD AUTO: 15.6 % (ref 19.6–45.3)
MAGNESIUM SERPL-MCNC: 2.1 MG/DL (ref 1.6–2.4)
MAXIMAL PREDICTED HEART RATE: 132 BPM
MCH RBC QN AUTO: 34.1 PG (ref 26.6–33)
MCHC RBC AUTO-ENTMCNC: 35.1 G/DL (ref 31.5–35.7)
MCV RBC AUTO: 97.3 FL (ref 79–97)
MONOCYTES # BLD AUTO: 0.77 10*3/MM3 (ref 0.1–0.9)
MONOCYTES NFR BLD AUTO: 11.2 % (ref 5–12)
NEUTROPHILS NFR BLD AUTO: 4.93 10*3/MM3 (ref 1.7–7)
NEUTROPHILS NFR BLD AUTO: 71.9 % (ref 42.7–76)
NRBC BLD AUTO-RTO: 0 /100 WBC (ref 0–0.2)
PHOSPHATE SERPL-MCNC: 2.4 MG/DL (ref 2.5–4.5)
PLATELET # BLD AUTO: 199 10*3/MM3 (ref 140–450)
PMV BLD AUTO: 9.5 FL (ref 6–12)
POTASSIUM SERPL-SCNC: 3.6 MMOL/L (ref 3.5–5.2)
PROT SERPL-MCNC: 5.7 G/DL (ref 6–8.5)
RBC # BLD AUTO: 3.69 10*6/MM3 (ref 4.14–5.8)
SODIUM SERPL-SCNC: 135 MMOL/L (ref 136–145)
STRESS TARGET HR: 112 BPM
WBC NRBC COR # BLD: 6.86 10*3/MM3 (ref 3.4–10.8)

## 2022-01-31 PROCEDURE — 25010000002 ENOXAPARIN PER 10 MG: Performed by: INTERNAL MEDICINE

## 2022-01-31 PROCEDURE — 80048 BASIC METABOLIC PNL TOTAL CA: CPT | Performed by: STUDENT IN AN ORGANIZED HEALTH CARE EDUCATION/TRAINING PROGRAM

## 2022-01-31 PROCEDURE — 83735 ASSAY OF MAGNESIUM: CPT | Performed by: STUDENT IN AN ORGANIZED HEALTH CARE EDUCATION/TRAINING PROGRAM

## 2022-01-31 PROCEDURE — 84100 ASSAY OF PHOSPHORUS: CPT | Performed by: STUDENT IN AN ORGANIZED HEALTH CARE EDUCATION/TRAINING PROGRAM

## 2022-01-31 PROCEDURE — 93970 EXTREMITY STUDY: CPT

## 2022-01-31 PROCEDURE — 85025 COMPLETE CBC W/AUTO DIFF WBC: CPT | Performed by: STUDENT IN AN ORGANIZED HEALTH CARE EDUCATION/TRAINING PROGRAM

## 2022-01-31 PROCEDURE — 25010000002 CEFTRIAXONE PER 250 MG: Performed by: INTERNAL MEDICINE

## 2022-01-31 PROCEDURE — 36415 COLL VENOUS BLD VENIPUNCTURE: CPT | Performed by: STUDENT IN AN ORGANIZED HEALTH CARE EDUCATION/TRAINING PROGRAM

## 2022-01-31 PROCEDURE — 80076 HEPATIC FUNCTION PANEL: CPT | Performed by: STUDENT IN AN ORGANIZED HEALTH CARE EDUCATION/TRAINING PROGRAM

## 2022-01-31 PROCEDURE — 97110 THERAPEUTIC EXERCISES: CPT

## 2022-01-31 PROCEDURE — 97530 THERAPEUTIC ACTIVITIES: CPT

## 2022-01-31 PROCEDURE — 97166 OT EVAL MOD COMPLEX 45 MIN: CPT

## 2022-01-31 PROCEDURE — 86140 C-REACTIVE PROTEIN: CPT | Performed by: INTERNAL MEDICINE

## 2022-01-31 RX ORDER — AMLODIPINE BESYLATE 5 MG/1
5 TABLET ORAL
Start: 2022-02-01 | End: 2022-01-01 | Stop reason: HOSPADM

## 2022-01-31 RX ORDER — FUROSEMIDE 20 MG/1
20 TABLET ORAL DAILY
Status: DISCONTINUED | OUTPATIENT
Start: 2022-01-31 | End: 2022-02-01 | Stop reason: HOSPADM

## 2022-01-31 RX ORDER — ACETAMINOPHEN 325 MG/1
650 TABLET ORAL EVERY 4 HOURS PRN
Start: 2022-01-31

## 2022-01-31 RX ORDER — ONDANSETRON 4 MG/1
4 TABLET, FILM COATED ORAL EVERY 6 HOURS PRN
Start: 2022-01-31

## 2022-01-31 RX ADMIN — AMLODIPINE BESYLATE 5 MG: 5 TABLET ORAL at 08:49

## 2022-01-31 RX ADMIN — CEFTRIAXONE 1 G: 1 INJECTION, POWDER, FOR SOLUTION INTRAMUSCULAR; INTRAVENOUS at 17:54

## 2022-01-31 RX ADMIN — ENOXAPARIN SODIUM 40 MG: 100 INJECTION SUBCUTANEOUS at 08:48

## 2022-01-31 RX ADMIN — FUROSEMIDE 20 MG: 20 TABLET ORAL at 08:49

## 2022-01-31 RX ADMIN — SODIUM CHLORIDE, PRESERVATIVE FREE 10 ML: 5 INJECTION INTRAVENOUS at 20:34

## 2022-02-01 VITALS
HEART RATE: 84 BPM | BODY MASS INDEX: 23.74 KG/M2 | RESPIRATION RATE: 18 BRPM | WEIGHT: 184.97 LBS | OXYGEN SATURATION: 96 % | HEIGHT: 74 IN | SYSTOLIC BLOOD PRESSURE: 140 MMHG | TEMPERATURE: 96.1 F | DIASTOLIC BLOOD PRESSURE: 70 MMHG

## 2022-02-01 LAB
ALBUMIN SERPL-MCNC: 3.3 G/DL (ref 3.5–5.2)
ALP SERPL-CCNC: 58 U/L (ref 39–117)
ALT SERPL W P-5'-P-CCNC: 12 U/L (ref 1–41)
ANION GAP SERPL CALCULATED.3IONS-SCNC: 12 MMOL/L (ref 5–15)
AST SERPL-CCNC: 24 U/L (ref 1–40)
BASOPHILS # BLD AUTO: 0.02 10*3/MM3 (ref 0–0.2)
BASOPHILS NFR BLD AUTO: 0.3 % (ref 0–1.5)
BILIRUB CONJ SERPL-MCNC: 0.2 MG/DL (ref 0–0.3)
BILIRUB INDIRECT SERPL-MCNC: 0.5 MG/DL
BILIRUB SERPL-MCNC: 0.7 MG/DL (ref 0–1.2)
BUN SERPL-MCNC: 26 MG/DL (ref 8–23)
BUN/CREAT SERPL: 26.8 (ref 7–25)
CALCIUM SPEC-SCNC: 8.1 MG/DL (ref 8.6–10.5)
CHLORIDE SERPL-SCNC: 102 MMOL/L (ref 98–107)
CO2 SERPL-SCNC: 20 MMOL/L (ref 22–29)
CREAT SERPL-MCNC: 0.97 MG/DL (ref 0.76–1.27)
CRP SERPL-MCNC: 7.89 MG/DL (ref 0–0.5)
DEPRECATED RDW RBC AUTO: 40.9 FL (ref 37–54)
EOSINOPHIL # BLD AUTO: 0.07 10*3/MM3 (ref 0–0.4)
EOSINOPHIL NFR BLD AUTO: 1.1 % (ref 0.3–6.2)
ERYTHROCYTE [DISTWIDTH] IN BLOOD BY AUTOMATED COUNT: 11.8 % (ref 12.3–15.4)
GFR SERPL CREATININE-BSD FRML MDRD: 73 ML/MIN/1.73
GLUCOSE SERPL-MCNC: 108 MG/DL (ref 65–99)
HCT VFR BLD AUTO: 36 % (ref 37.5–51)
HGB BLD-MCNC: 12.7 G/DL (ref 13–17.7)
IMM GRANULOCYTES # BLD AUTO: 0.03 10*3/MM3 (ref 0–0.05)
IMM GRANULOCYTES NFR BLD AUTO: 0.5 % (ref 0–0.5)
LYMPHOCYTES # BLD AUTO: 0.93 10*3/MM3 (ref 0.7–3.1)
LYMPHOCYTES NFR BLD AUTO: 14.2 % (ref 19.6–45.3)
MAGNESIUM SERPL-MCNC: 2.1 MG/DL (ref 1.6–2.4)
MCH RBC QN AUTO: 34 PG (ref 26.6–33)
MCHC RBC AUTO-ENTMCNC: 35.3 G/DL (ref 31.5–35.7)
MCV RBC AUTO: 96.5 FL (ref 79–97)
MONOCYTES # BLD AUTO: 0.61 10*3/MM3 (ref 0.1–0.9)
MONOCYTES NFR BLD AUTO: 9.3 % (ref 5–12)
NEUTROPHILS NFR BLD AUTO: 4.88 10*3/MM3 (ref 1.7–7)
NEUTROPHILS NFR BLD AUTO: 74.6 % (ref 42.7–76)
NRBC BLD AUTO-RTO: 0 /100 WBC (ref 0–0.2)
PHOSPHATE SERPL-MCNC: 2.3 MG/DL (ref 2.5–4.5)
PLATELET # BLD AUTO: 208 10*3/MM3 (ref 140–450)
PMV BLD AUTO: 9.8 FL (ref 6–12)
POTASSIUM SERPL-SCNC: 3.4 MMOL/L (ref 3.5–5.2)
PROT SERPL-MCNC: 5.8 G/DL (ref 6–8.5)
RBC # BLD AUTO: 3.73 10*6/MM3 (ref 4.14–5.8)
SODIUM SERPL-SCNC: 134 MMOL/L (ref 136–145)
WBC NRBC COR # BLD: 6.54 10*3/MM3 (ref 3.4–10.8)

## 2022-02-01 PROCEDURE — 85025 COMPLETE CBC W/AUTO DIFF WBC: CPT | Performed by: STUDENT IN AN ORGANIZED HEALTH CARE EDUCATION/TRAINING PROGRAM

## 2022-02-01 PROCEDURE — 80076 HEPATIC FUNCTION PANEL: CPT | Performed by: STUDENT IN AN ORGANIZED HEALTH CARE EDUCATION/TRAINING PROGRAM

## 2022-02-01 PROCEDURE — 83735 ASSAY OF MAGNESIUM: CPT | Performed by: STUDENT IN AN ORGANIZED HEALTH CARE EDUCATION/TRAINING PROGRAM

## 2022-02-01 PROCEDURE — 86140 C-REACTIVE PROTEIN: CPT | Performed by: INTERNAL MEDICINE

## 2022-02-01 PROCEDURE — 84100 ASSAY OF PHOSPHORUS: CPT | Performed by: STUDENT IN AN ORGANIZED HEALTH CARE EDUCATION/TRAINING PROGRAM

## 2022-02-01 PROCEDURE — 25010000002 ENOXAPARIN PER 10 MG: Performed by: INTERNAL MEDICINE

## 2022-02-01 PROCEDURE — 80048 BASIC METABOLIC PNL TOTAL CA: CPT | Performed by: STUDENT IN AN ORGANIZED HEALTH CARE EDUCATION/TRAINING PROGRAM

## 2022-02-01 RX ORDER — POTASSIUM CHLORIDE 750 MG/1
20 TABLET, FILM COATED, EXTENDED RELEASE ORAL DAILY
Status: ON HOLD
Start: 2022-02-01 | End: 2022-01-01 | Stop reason: SDUPTHER

## 2022-02-01 RX ORDER — POTASSIUM CHLORIDE 750 MG/1
40 TABLET, FILM COATED, EXTENDED RELEASE ORAL AS NEEDED
Status: DISCONTINUED | OUTPATIENT
Start: 2022-02-01 | End: 2022-02-01 | Stop reason: HOSPADM

## 2022-02-01 RX ORDER — POTASSIUM CHLORIDE 1.5 G/1.77G
40 POWDER, FOR SOLUTION ORAL AS NEEDED
Status: DISCONTINUED | OUTPATIENT
Start: 2022-02-01 | End: 2022-02-01 | Stop reason: HOSPADM

## 2022-02-01 RX ADMIN — POTASSIUM CHLORIDE 40 MEQ: 750 TABLET, EXTENDED RELEASE ORAL at 18:09

## 2022-02-01 RX ADMIN — ENOXAPARIN SODIUM 40 MG: 100 INJECTION SUBCUTANEOUS at 09:52

## 2022-02-01 RX ADMIN — FUROSEMIDE 20 MG: 20 TABLET ORAL at 09:52

## 2022-02-01 RX ADMIN — POTASSIUM CHLORIDE 40 MEQ: 750 TABLET, EXTENDED RELEASE ORAL at 12:28

## 2022-02-01 RX ADMIN — SODIUM CHLORIDE, PRESERVATIVE FREE 10 ML: 5 INJECTION INTRAVENOUS at 09:52

## 2022-02-01 RX ADMIN — AMLODIPINE BESYLATE 5 MG: 5 TABLET ORAL at 09:52

## 2022-02-02 NOTE — CASE MANAGEMENT/SOCIAL WORK
Case Management Discharge Note      Final Note: [facility] via [facility] at [facility]         Selected Continued Care - Discharged on 2/1/2022 Admission date: 1/27/2022 - Discharge disposition: Skilled Nursing Facility (DC - External)    Destination Coordination complete.    Service Provider Selected Services Address Phone Fax Patient Preferred    [facility]  [facility] [address] [facility] [id number] [phone] --          Durable Medical Equipment    No services have been selected for the patient.              Dialysis/Infusion    No services have been selected for the patient.              Home Medical Care    No services have been selected for the patient.              Therapy    No services have been selected for the patient.              Community Resources    No services have been selected for the patient.              Community & DME    No services have been selected for the patient.                  Transportation Services  Ambulance: [facility]    Final Discharge Disposition Code: 03 - skilled nursing facility (SNF)

## 2022-02-10 ENCOUNTER — TRANSCRIBE ORDERS (OUTPATIENT)
Dept: HOME HEALTH SERVICES | Facility: HOME HEALTHCARE | Age: 87
End: 2022-02-10

## 2022-02-10 ENCOUNTER — HOME HEALTH ADMISSION (OUTPATIENT)
Dept: HOME HEALTH SERVICES | Facility: HOME HEALTHCARE | Age: 87
End: 2022-02-10

## 2022-02-10 DIAGNOSIS — U07.1 CLINICAL DIAGNOSIS OF SEVERE ACUTE RESPIRATORY SYNDROME CORONAVIRUS 2 (SARS-COV-2) DISEASE: Primary | ICD-10-CM

## 2022-02-11 ENCOUNTER — HOME CARE VISIT (OUTPATIENT)
Dept: HOME HEALTH SERVICES | Facility: HOME HEALTHCARE | Age: 87
End: 2022-02-11

## 2022-02-11 PROCEDURE — G0299 HHS/HOSPICE OF RN EA 15 MIN: HCPCS

## 2022-02-13 VITALS
TEMPERATURE: 97.8 F | DIASTOLIC BLOOD PRESSURE: 60 MMHG | OXYGEN SATURATION: 99 % | HEART RATE: 58 BPM | RESPIRATION RATE: 20 BRPM | SYSTOLIC BLOOD PRESSURE: 116 MMHG

## 2022-02-13 NOTE — CASE COMMUNICATION
SOC 2/11   DIAG: COVID, CYTOKINE RELEASE SYNDROM GRADE 1, UTI, JOSE, DEHYDRATION, PHYSICAL DEBILITY , CAD, DYSPHAGIA   Patient is a 89 yo male lives in his home with his wife. ( both are having medical issues ) family takes turn staying with them so they can remain at home.   He is up with walker, has no c/o pain, is soa with activity, and still has productive cough with thick yellow sputum   He had uti and has completed his oral antibio tic s.   nurisng rev meds in home, there are issues and concerns, tried to call rehab was put on hold for a very long time.   No lovenox in home dtr who is a nurse knew nothing about it. He was on aspirn before, she is going to resume his prior meds   He was on lasix before, not on d/c from rehab, she is going to give until fillow up with pmd.   She does not have any zofran in home and states he does not need.   Nursing called Dr Meenu houser left message .

## 2022-02-13 NOTE — HOME HEALTH
SOC 2/11  DIAG: COVID, CYTOKINE RELEASE SYNDROM GRADE 1, UTI, JOSE, DEHYDRATION, PHYSICAL DEBILITY , CAD, DYSPHAGIA  Patient is a 89 yo male lives in his home with his wife. ( both are having medical issues ) family takes turn staying with them so they can remain at home.   He is up with walker, has no c/o pain, is soa with activity, and still has productive cough with thick yellow sputum  He had uti and has completed his oral antibiotics.   nurisng rev meds in home, there are issues and concerns, tried to call rehab was put on hold for a very long time.   No lovenox in home dtr who is a nurse knew nothing about it. He was on aspirn before, she is going to resume his prior meds   He was on lasix before, not on d/c from rehab, she is going to give until fillow up with pmd.   She does not have any zofran in home and states he does not need.   Nursing called Dr العلي office left message .

## 2022-02-14 ENCOUNTER — HOME CARE VISIT (OUTPATIENT)
Dept: HOME HEALTH SERVICES | Facility: HOME HEALTHCARE | Age: 87
End: 2022-02-14

## 2022-02-14 VITALS
TEMPERATURE: 96.1 F | DIASTOLIC BLOOD PRESSURE: 72 MMHG | OXYGEN SATURATION: 98 % | HEART RATE: 72 BPM | RESPIRATION RATE: 18 BRPM | SYSTOLIC BLOOD PRESSURE: 130 MMHG

## 2022-02-14 VITALS
DIASTOLIC BLOOD PRESSURE: 62 MMHG | RESPIRATION RATE: 18 BRPM | OXYGEN SATURATION: 98 % | SYSTOLIC BLOOD PRESSURE: 138 MMHG | TEMPERATURE: 97.8 F | HEART RATE: 64 BPM

## 2022-02-14 PROCEDURE — G0300 HHS/HOSPICE OF LPN EA 15 MIN: HCPCS

## 2022-02-14 PROCEDURE — G0151 HHCP-SERV OF PT,EA 15 MIN: HCPCS

## 2022-02-14 NOTE — CASE COMMUNICATION
Please contact Deidre Flores, patient's daughter for all appointments. [phone].  She will be coordinating things for family.  Patient will be 2w4 for PT working on transfers, gait, exercise.

## 2022-02-14 NOTE — HOME HEALTH
Physical Therapy Evaluation   Diagnosis: had a fall and went to the hospital, found out about the COVID, UTI  Surgical Procedure and date: n/a  Pertinent Medical History:  see epic    Prior level of function:   Ambulation:  walks with a walker with supervision   Activities of daily living:  gets assist with bathing and dressing   Instrumental activities of daily living: family   Driving: does not drive   Other/ Hobbies/Work:    Edema:  bilateral LE's  Home/ social environment:  lives with wife, family assists on a schedule 24/7  Goal for Therapy: strengthening, walking short distances    Plan for next visit: walk with walker, add standing exercise and sit to stand

## 2022-02-15 ENCOUNTER — HOME CARE VISIT (OUTPATIENT)
Dept: HOME HEALTH SERVICES | Facility: HOME HEALTHCARE | Age: 87
End: 2022-02-15

## 2022-02-15 VITALS
TEMPERATURE: 97.5 F | RESPIRATION RATE: 18 BRPM | HEART RATE: 72 BPM | SYSTOLIC BLOOD PRESSURE: 138 MMHG | DIASTOLIC BLOOD PRESSURE: 64 MMHG | OXYGEN SATURATION: 98 %

## 2022-02-15 PROCEDURE — G0152 HHCP-SERV OF OT,EA 15 MIN: HCPCS

## 2022-02-16 ENCOUNTER — HOME CARE VISIT (OUTPATIENT)
Dept: HOME HEALTH SERVICES | Facility: HOME HEALTHCARE | Age: 87
End: 2022-02-16

## 2022-02-16 VITALS
TEMPERATURE: 98.6 F | DIASTOLIC BLOOD PRESSURE: 62 MMHG | HEART RATE: 70 BPM | OXYGEN SATURATION: 98 % | SYSTOLIC BLOOD PRESSURE: 122 MMHG | RESPIRATION RATE: 18 BRPM

## 2022-02-16 PROCEDURE — G0151 HHCP-SERV OF PT,EA 15 MIN: HCPCS

## 2022-02-16 NOTE — HOME HEALTH
"CURRENT SITUATION / DIAGNOSIS: Had 2 falls in same day, went to hospital. Was admitted (01/27 to 02/01/22) w/UTI & also found to be COVID-19 (+) with a mild cough only, JOSE, dehydration.   PMHx: age related generalized weakness, CAD, HLD, LBP, UTI, long term (current) use of aspirin.  SUBJECTIVE / CHIEF COMPLAINT: \"I don't believe I need any therapy.\"  Agreeable to OT evaluation.  SOCIAL / ENVIRONMENT: Pt lives w/his disabled wife. They have supportive CGs 24/7 via family members taking shifts/living there. Grandalexander Escobar lives w/them [facility] and other family members are there other days/nights. Home is single story, 2 steps to enter (no rail), home is w/wood and carpeted floors.   PATIENT'S / CAREGIVER'S GOAL: (CG) \"He needs to get his strength back. He's needing so much more help that before he went into the hospital. I can tell he's starting to gain it back, but it's not where it was.\"  INTERVENTIONS: OT evaluation, ADL training, Home Safety, Therapeutic Exercise/Activity, Transfer/Mobility training, Monitor vitals including SPO2 via pulse-oximeter (notifiy MD if resting O2 <90% on room air), Patient/CG education, Falls risk prevention, Recommendations on AE, DME, AD, environmental adaptations.  ASSESSMENT/PLAN: 1w4 (OT) and 1w3 (HHA) appropriate. He is requiring assistance w/transfers, mobilities, ADLs. BLE are w/mild swelling and zippered compression toe-less socks. Balance is impaired. Cognition is impaired requiring assistance/supervision in the home at all times for safety.   PLAN FOR NEXT VISIT: If new/recommended DME in the home, make sure it is set up correctly. Initiate BUE HEP for strengthening. Functional ambulation in home for endurance building. CG training as needed."

## 2022-02-16 NOTE — HOME HEALTH
Plan for next visit: add standing exercise, try car transfer    Subjective: Per grandson they have been exercisesing and walking.  Have not used the WC since I left the other day.    Falls since last visit: none  Home/Social Environment:  lives with wife in home with ramp to enter/ exit, has 24/7/ care

## 2022-02-17 ENCOUNTER — HOME CARE VISIT (OUTPATIENT)
Dept: HOME HEALTH SERVICES | Facility: HOME HEALTHCARE | Age: 87
End: 2022-02-17

## 2022-02-17 PROCEDURE — G0300 HHS/HOSPICE OF LPN EA 15 MIN: HCPCS

## 2022-02-18 ENCOUNTER — HOME CARE VISIT (OUTPATIENT)
Dept: HOME HEALTH SERVICES | Facility: HOME HEALTHCARE | Age: 87
End: 2022-02-18

## 2022-02-18 VITALS
DIASTOLIC BLOOD PRESSURE: 58 MMHG | RESPIRATION RATE: 18 BRPM | SYSTOLIC BLOOD PRESSURE: 120 MMHG | OXYGEN SATURATION: 96 % | HEART RATE: 75 BPM | TEMPERATURE: 96.6 F

## 2022-02-18 PROCEDURE — G0156 HHCP-SVS OF AIDE,EA 15 MIN: HCPCS

## 2022-02-18 NOTE — CASE COMMUNICATION
CALLED [facility] AT DR DUKES OFFICE   THEY DO SHOW LOVENOX IN CHART AS WELL, D/C ASPIRIN   THEY ALSO D/C LASIX WHICH HE NEEDS FOR SWELLING TO LEGS.   per Dr العلي , he is to restart aspirin 81 , d/c lovenox, restart lasix 20 mg a day   and nursing to get CBC and CMP in one week,   order entered. 
Daughter Deidre notified of orders. Pat voiced she was giving asa and lasix but did not have any Lovenox to administer. Also, patient has appointment w/ MD below on Monday 2.21.22 and will have labs drawn at that time. thanks  ----- Message -----  From: Marianela Quintana RN  Sent: 2/16/2022   1:21 PM EST  To: Maxwell Cid LPN      CALLED [facility] AT DR [facility]UKES OFFICE   THEY DO SHOW LOVENOX IN CHART AS WELL, D/C ASPIRIN   THEY ALSO D/C  LASIX WHICH HE NEEDS FOR SWELLING TO LEGS.   per Dr العلي , he is to restart aspirin 81 , d/c lovenox, restart lasix 20 mg a day   and nursing to get CBC and CMP in one week,   order entered. 
Clothing

## 2022-02-18 NOTE — HOME HEALTH
Skilled nurse visit for cp assessment and t/i on disease management r/t Covid, UTI. Nurse spoke with patient's daughter Deidre before arrival to make sure she received PARVI[facility] Quinones nte. Patient voiced yes and she was all ready giving lasix and ASA, but not Lovenox and Norvasc. Pat voiced patient has appointment with [facility] on Monday and will discuss medications at that time and have Labs to drawn. Family members checking b/p daily. Patient's granson and another daughter present at visit. Patient continues to have productive cough and scattered rhonchi, bot deneis any SOB. Patient has increase fluid to thin secrections and help UTI. Patient and family have no further question and left in good condition.

## 2022-02-20 VITALS
SYSTOLIC BLOOD PRESSURE: 124 MMHG | DIASTOLIC BLOOD PRESSURE: 58 MMHG | RESPIRATION RATE: 20 BRPM | HEART RATE: 74 BPM | TEMPERATURE: 96.9 F

## 2022-02-21 ENCOUNTER — HOME CARE VISIT (OUTPATIENT)
Dept: HOME HEALTH SERVICES | Facility: HOME HEALTHCARE | Age: 87
End: 2022-02-21

## 2022-02-21 VITALS
DIASTOLIC BLOOD PRESSURE: 70 MMHG | OXYGEN SATURATION: 99 % | SYSTOLIC BLOOD PRESSURE: 116 MMHG | TEMPERATURE: 96.9 F | RESPIRATION RATE: 18 BRPM | HEART RATE: 69 BPM

## 2022-02-21 VITALS
HEART RATE: 69 BPM | TEMPERATURE: 96.9 F | DIASTOLIC BLOOD PRESSURE: 70 MMHG | RESPIRATION RATE: 18 BRPM | SYSTOLIC BLOOD PRESSURE: 116 MMHG | OXYGEN SATURATION: 99 %

## 2022-02-21 PROCEDURE — G0151 HHCP-SERV OF PT,EA 15 MIN: HCPCS

## 2022-02-21 PROCEDURE — G0156 HHCP-SVS OF AIDE,EA 15 MIN: HCPCS

## 2022-02-21 PROCEDURE — G0299 HHS/HOSPICE OF RN EA 15 MIN: HCPCS

## 2022-02-21 NOTE — HOME HEALTH
Plan for next visit: car transfer if desired    Subjective:  Went to the doctor today and had a shower this morning.  Tired.      Falls since last visit: none  Home/Social Environment:  lives with wife in home with ramp to enter/ exit, has 24/7/ care

## 2022-02-23 ENCOUNTER — HOME CARE VISIT (OUTPATIENT)
Dept: HOME HEALTH SERVICES | Facility: HOME HEALTHCARE | Age: 87
End: 2022-02-23

## 2022-02-23 VITALS
SYSTOLIC BLOOD PRESSURE: 132 MMHG | TEMPERATURE: 96.4 F | DIASTOLIC BLOOD PRESSURE: 68 MMHG | HEART RATE: 64 BPM | RESPIRATION RATE: 20 BRPM

## 2022-02-23 PROCEDURE — G0300 HHS/HOSPICE OF LPN EA 15 MIN: HCPCS

## 2022-02-23 NOTE — CASE COMMUNICATION
Regarding: Fritz Flores  : 33    The Physical Therapy Visit for the above patient was missed on 22 due to patient cancellation.  Arrived at home for scheduled visit and daughter canceled visit due to patient having a bad night.  Therefore the prescribed frequency of visits was not met.      If you have any questions or would like further information about this patient please contact Vivian Mitchell PT [facility] at [phone].  Than k you for allowing us to assist you in the care of your patients.

## 2022-02-24 ENCOUNTER — HOME CARE VISIT (OUTPATIENT)
Dept: HOME HEALTH SERVICES | Facility: HOME HEALTHCARE | Age: 87
End: 2022-02-24

## 2022-02-24 PROCEDURE — G0152 HHCP-SERV OF OT,EA 15 MIN: HCPCS

## 2022-02-24 NOTE — HOME HEALTH
Patient seems to be improving.  Feels he is making progress with PT.  Still has a productive cough, but it is less frequent and productive than on admission.  Reviewed meds with patient and caregiver, they state no issues or questions.  CP assessment WNL, continue to monitor CP assessment and cough to make sure patient does not regress.

## 2022-02-25 VITALS
OXYGEN SATURATION: 99 % | RESPIRATION RATE: 18 BRPM | TEMPERATURE: 97.7 F | DIASTOLIC BLOOD PRESSURE: 60 MMHG | SYSTOLIC BLOOD PRESSURE: 124 MMHG | HEART RATE: 58 BPM

## 2022-02-28 ENCOUNTER — HOME CARE VISIT (OUTPATIENT)
Dept: HOME HEALTH SERVICES | Facility: HOME HEALTHCARE | Age: 87
End: 2022-02-28

## 2022-02-28 VITALS
TEMPERATURE: 96.8 F | DIASTOLIC BLOOD PRESSURE: 68 MMHG | RESPIRATION RATE: 18 BRPM | HEART RATE: 90 BPM | OXYGEN SATURATION: 100 % | SYSTOLIC BLOOD PRESSURE: 114 MMHG

## 2022-02-28 VITALS
OXYGEN SATURATION: 98 % | TEMPERATURE: 96.9 F | SYSTOLIC BLOOD PRESSURE: 120 MMHG | RESPIRATION RATE: 18 BRPM | DIASTOLIC BLOOD PRESSURE: 52 MMHG | HEART RATE: 52 BPM

## 2022-02-28 PROCEDURE — G0151 HHCP-SERV OF PT,EA 15 MIN: HCPCS

## 2022-02-28 PROCEDURE — G0156 HHCP-SVS OF AIDE,EA 15 MIN: HCPCS

## 2022-02-28 NOTE — HOME HEALTH
Plan for next visit: review exercises, walk further    Subjective:  Patient states he feels stronger.    Falls since last visit: none  Home/Social Environment:  lives with wife in home with ramp to enter/ exit, has 24/7/ care

## 2022-03-01 ENCOUNTER — HOSPITAL ENCOUNTER (EMERGENCY)
Facility: HOSPITAL | Age: 87
Discharge: HOME | End: 2022-03-02
Attending: EMERGENCY MEDICINE | Admitting: EMERGENCY MEDICINE

## 2022-03-01 VITALS
DIASTOLIC BLOOD PRESSURE: 74 MMHG | HEART RATE: 68 BPM | SYSTOLIC BLOOD PRESSURE: 132 MMHG | RESPIRATION RATE: 18 BRPM | TEMPERATURE: 98.2 F
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
## 2022-03-01 DIAGNOSIS — N39.0 ACUTE URINARY TRACT INFECTION: ICD-10-CM

## 2022-03-01 DIAGNOSIS — M54.9 UPPER BACK PAIN: ICD-10-CM

## 2022-03-01 DIAGNOSIS — I10 PRIMARY HYPERTENSION: ICD-10-CM

## 2022-03-01 PROCEDURE — 99284 EMERGENCY DEPT VISIT MOD MDM: CPT

## 2022-03-01 PROCEDURE — 36415 COLL VENOUS BLD VENIPUNCTURE: CPT

## 2022-03-02 ENCOUNTER — APPOINTMENT (OUTPATIENT)
Dept: GENERAL RADIOLOGY | Facility: HOSPITAL | Age: 87
End: 2022-03-02

## 2022-03-02 ENCOUNTER — HOME CARE VISIT (OUTPATIENT)
Dept: HOME HEALTH SERVICES | Facility: HOME HEALTHCARE | Age: 87
End: 2022-03-02

## 2022-03-02 VITALS
DIASTOLIC BLOOD PRESSURE: 62 MMHG | HEART RATE: 62 BPM | SYSTOLIC BLOOD PRESSURE: 140 MMHG | TEMPERATURE: 96.9 F | RESPIRATION RATE: 18 BRPM | OXYGEN SATURATION: 99 %

## 2022-03-02 VITALS
HEART RATE: 76 BPM | BODY MASS INDEX: 24.4 KG/M2 | HEIGHT: 73 IN | OXYGEN SATURATION: 99 % | RESPIRATION RATE: 16 BRPM | SYSTOLIC BLOOD PRESSURE: 163 MMHG | DIASTOLIC BLOOD PRESSURE: 83 MMHG | TEMPERATURE: 98 F

## 2022-03-02 LAB
ALBUMIN SERPL-MCNC: 3.4 G/DL (ref 3.5–5.2)
ALBUMIN/GLOB SERPL: 1.4 G/DL
ALP SERPL-CCNC: 73 U/L (ref 39–117)
ALT SERPL W P-5'-P-CCNC: 6 U/L (ref 1–41)
ANION GAP SERPL CALCULATED.3IONS-SCNC: 12 MMOL/L (ref 5–15)
AST SERPL-CCNC: 12 U/L (ref 1–40)
BACTERIA UR QL AUTO: ABNORMAL /HPF
BASOPHILS # BLD AUTO: 0.04 10*3/MM3 (ref 0–0.2)
BASOPHILS NFR BLD AUTO: 0.5 % (ref 0–1.5)
BILIRUB SERPL-MCNC: 0.4 MG/DL (ref 0–1.2)
BILIRUB UR QL STRIP: NEGATIVE
BUN SERPL-MCNC: 20 MG/DL (ref 8–23)
BUN/CREAT SERPL: 16.4 (ref 7–25)
CALCIUM SPEC-SCNC: 8.4 MG/DL (ref 8.6–10.5)
CHLORIDE SERPL-SCNC: 104 MMOL/L (ref 98–107)
CLARITY UR: CLEAR
CO2 SERPL-SCNC: 22 MMOL/L (ref 22–29)
COLOR UR: YELLOW
CREAT SERPL-MCNC: 1.22 MG/DL (ref 0.76–1.27)
DEPRECATED RDW RBC AUTO: 45.6 FL (ref 37–54)
EGFRCR SERPLBLD CKD-EPI 2021: 57 ML/MIN/1.73
EOSINOPHIL # BLD AUTO: 0.15 10*3/MM3 (ref 0–0.4)
EOSINOPHIL NFR BLD AUTO: 1.7 % (ref 0.3–6.2)
ERYTHROCYTE [DISTWIDTH] IN BLOOD BY AUTOMATED COUNT: 12.3 % (ref 12.3–15.4)
GLOBULIN UR ELPH-MCNC: 2.4 GM/DL
GLUCOSE SERPL-MCNC: 111 MG/DL (ref 65–99)
GLUCOSE UR STRIP-MCNC: NEGATIVE MG/DL
HCT VFR BLD AUTO: 35.7 % (ref 37.5–51)
HGB BLD-MCNC: 12.1 G/DL (ref 13–17.7)
HGB UR QL STRIP.AUTO: NEGATIVE
HYALINE CASTS UR QL AUTO: ABNORMAL /LPF
KETONES UR QL STRIP: NEGATIVE
LEUKOCYTE ESTERASE UR QL STRIP.AUTO: ABNORMAL
LYMPHOCYTES # BLD AUTO: 1.11 10*3/MM3 (ref 0.7–3.1)
LYMPHOCYTES NFR BLD AUTO: 12.7 % (ref 19.6–45.3)
MAGNESIUM SERPL-MCNC: 1.9 MG/DL (ref 1.6–2.4)
MCH RBC QN AUTO: 34.1 PG (ref 26.6–33)
MCHC RBC AUTO-ENTMCNC: 33.9 G/DL (ref 31.5–35.7)
MCV RBC AUTO: 100.6 FL (ref 79–97)
MONOCYTES # BLD AUTO: 0.64 10*3/MM3 (ref 0.1–0.9)
MONOCYTES NFR BLD AUTO: 7.3 % (ref 5–12)
NEUTROPHILS NFR BLD AUTO: 6.73 10*3/MM3 (ref 1.7–7)
NEUTROPHILS NFR BLD AUTO: 77.2 % (ref 42.7–76)
NITRITE UR QL STRIP: NEGATIVE
PH UR STRIP.AUTO: 6 [PH] (ref 5–8)
PLAT MORPH BLD: NORMAL
PLATELET # BLD AUTO: 269 10*3/MM3 (ref 140–450)
PMV BLD AUTO: 9.7 FL (ref 6–12)
POTASSIUM SERPL-SCNC: 3.7 MMOL/L (ref 3.5–5.2)
PROT SERPL-MCNC: 5.8 G/DL (ref 6–8.5)
PROT UR QL STRIP: ABNORMAL
RBC # BLD AUTO: 3.55 10*6/MM3 (ref 4.14–5.8)
RBC # UR STRIP: ABNORMAL /HPF
RBC MORPH BLD: NORMAL
REF LAB TEST METHOD: ABNORMAL
SODIUM SERPL-SCNC: 138 MMOL/L (ref 136–145)
SP GR UR STRIP: 1.02 (ref 1–1.03)
SQUAMOUS #/AREA URNS HPF: ABNORMAL /HPF
UROBILINOGEN UR QL STRIP: ABNORMAL
WBC # UR STRIP: ABNORMAL /HPF
WBC MORPH BLD: NORMAL
WBC NRBC COR # BLD: 8.72 10*3/MM3 (ref 3.4–10.8)

## 2022-03-02 PROCEDURE — 72072 X-RAY EXAM THORAC SPINE 3VWS: CPT

## 2022-03-02 PROCEDURE — 72170 X-RAY EXAM OF PELVIS: CPT

## 2022-03-02 PROCEDURE — 83735 ASSAY OF MAGNESIUM: CPT | Performed by: PHYSICIAN ASSISTANT

## 2022-03-02 PROCEDURE — 71046 X-RAY EXAM CHEST 2 VIEWS: CPT

## 2022-03-02 PROCEDURE — 85025 COMPLETE CBC W/AUTO DIFF WBC: CPT | Performed by: PHYSICIAN ASSISTANT

## 2022-03-02 PROCEDURE — 81001 URINALYSIS AUTO W/SCOPE: CPT | Performed by: PHYSICIAN ASSISTANT

## 2022-03-02 PROCEDURE — G0151 HHCP-SERV OF PT,EA 15 MIN: HCPCS

## 2022-03-02 PROCEDURE — 72050 X-RAY EXAM NECK SPINE 4/5VWS: CPT

## 2022-03-02 PROCEDURE — 80053 COMPREHEN METABOLIC PANEL: CPT | Performed by: PHYSICIAN ASSISTANT

## 2022-03-02 PROCEDURE — 87086 URINE CULTURE/COLONY COUNT: CPT | Performed by: PHYSICIAN ASSISTANT

## 2022-03-02 PROCEDURE — 85007 BL SMEAR W/DIFF WBC COUNT: CPT | Performed by: PHYSICIAN ASSISTANT

## 2022-03-02 RX ORDER — CEFDINIR 300 MG/1
300 CAPSULE ORAL 2 TIMES DAILY
Qty: 14 CAPSULE | Refills: 0 | Status: SHIPPED | OUTPATIENT
Start: 2022-03-02 | End: 2022-01-01 | Stop reason: HOSPADM

## 2022-03-02 RX ORDER — CEFDINIR 300 MG/1
300 CAPSULE ORAL ONCE
Status: COMPLETED | OUTPATIENT
Start: 2022-03-02 | End: 2022-03-02

## 2022-03-02 RX ADMIN — CEFDINIR 300 MG: 300 CAPSULE ORAL at 03:29

## 2022-03-02 NOTE — ED NOTES
Pt repositioned in bed for comfort. Call light is within reach.     Brenden Alonzo, PCT  03/02/22 0515     no

## 2022-03-02 NOTE — ED PROVIDER NOTES
MD ATTESTATION NOTE    The ELIA and I have discussed this patient's history, physical exam, and treatment plan.  I have reviewed the documentation and personally had a face to face interaction with the patient. I affirm the documentation and agree with the treatment and plan.  The attached note describes my personal findings.      I provided a substantive portion of the care of the patient.  I personally performed the physical exam in its entirety, and below are my findings.  For this patient encounter, the patient wore surgical mask, I wore full protective PPE including N95 and eye protection.      Brief HPI: This patient is an 88-year-old male presenting to the emergency room today with neck and back pain following a fall at his home tonight.  He denies any loss of consciousness, chest pain, nausea/vomiting, or shortness of breath.    PHYSICAL EXAM  ED Triage Vitals [03/01/22 2345]   Temp Heart Rate Resp BP SpO2   98 °F (36.7 °C) 82 16 132/84 98 %      Temp src Heart Rate Source Patient Position BP Location FiO2 (%)   Oral Monitor -- -- --         GENERAL: Resting comfortably and in no acute distress, nontoxic in appearance  HENT: nares patent  EYES: no scleral icterus  CV: regular rhythm, normal rate, no M/R/G  RESPIRATORY: normal effort, lungs clear bilaterally  ABDOMEN: soft  MUSCULOSKELETAL: no deformity, mild tenderness to palpation to the neck and mid back midline without step-off or deformity  NEURO: alert, moves all extremities, follows commands  PSYCH:  calm, cooperative  SKIN: warm, dry    Vital signs and nursing notes reviewed.        Plan: We will obtain labs as well as x-rays of the cervical and thoracic spines as well as the chest and pelvis.  We will monitor and reassess following.       Gabriel Lewis MD  03/02/22 0603

## 2022-03-02 NOTE — ED NOTES
Pt family called and stated they will be here to  pt in 30-45mins     Jose Raul Schmid, RN  03/02/22 0509

## 2022-03-02 NOTE — HOME HEALTH
Plan for next visit: review exercises, walk further, discharge to [facility]    Subjective:  Patient fell last night at 11.  Tried to get up by himself from bed fall backwards and hit back and shoulder.  Called EMS and went to [facility] ER.  Came home this morning at 6am.  No fractures.  Skin tear left elbow.       Falls since last visit: none  Home/Social Environment:  lives with wife in home with ramp to enter/ exit, has 24/7/ care

## 2022-03-02 NOTE — ED NOTES
Pt in mask throughout encounter. This ERT was in appropriate ppe.\     Brenden Alonzo, PCT  03/02/22 0233

## 2022-03-02 NOTE — ED PROVIDER NOTES
EMERGENCY DEPARTMENT ENCOUNTER    Room Number:  03/03  Date of encounter:  3/2/2022  PCP: Rachid العلي Jr., MD  Historian: Patient      HPI:  Chief Complaint: Fall        Context: Fritz Flores is a 88 y.o. male with past medical history of hypertension, coronary artery disease, and hyperlipidemia who presents to the ED c/o back pain after a fall.  Patient was at home, where he lives with his wife and son, when he bent over to  his pillow, stood back up, lost balance and fell backwards injuring his upper back.  Denies head injury, headache, loss of consciousness, chest pain, shortness of breath, dizziness, nausea or vomiting.  Patient complains of pain in his upper back and shoulders.  Denies any neck pain, numbness or tingling, pain going down his arms or legs, or any other complaints.      PAST MEDICAL HISTORY  Active Ambulatory Problems     Diagnosis Date Noted   • Closed intertrochanteric fracture of hip, left, initial encounter ([facility]) 06/30/2019   • Closed comminuted intertrochanteric fracture of proximal end of left femur ([facility]) 06/30/2019   • Ulcerative colitis ([facility]) 07/01/2019   • Coronary artery disease 07/01/2019   • Hyponatremia 07/01/2019   • Postoperative anemia due to acute blood loss 07/01/2019   • Constipation 09/27/2021   • Dysphagia 09/28/2021   • Age-related physical debility 09/29/2021   • Bacteriuria 09/29/2021   • Acute urinary tract infection 01/27/2022   • COVID-19 virus detected 01/27/2022   • DNR (do not resuscitate) 01/27/2022   • JOSE (acute kidney injury) ([facility]) 01/27/2022   • Dehydration 01/27/2022   • Cytokine release syndrome, grade 1 01/31/2022     Resolved Ambulatory Problems     Diagnosis Date Noted   • Metabolic encephalopathy 07/01/2019     Past Medical History:   Diagnosis Date   • Colon polyps    • Hyperlipemia    • Myocardial infarction ([facility]) 1989         PAST SURGICAL HISTORY  Past Surgical History:   Procedure Laterality Date   • CARDIAC SURGERY  1989    CABG X 1    • COLONOSCOPY  2014   • COLONOSCOPY N/A 8/23/2016    Procedure: COLONOSCOPY INTO CECUM WITH COLD BIOPSIES;  Surgeon: Aaron Gregg MD;  Location: [facility] ENDOSCOPY;  Service:    • FEMUR IM NAILING/RODDING Left 6/30/2019    Procedure: FEMUR INTRAMEDULLARY NAILING/RODDING;  Surgeon: Jaqueline Wagoner MD;  Location: [facility] MAIN OR;  Service: Orthopedics   • FRACTURE SURGERY      upper leg w/hardware   • HIP SURGERY Left     x 3         FAMILY HISTORY  No family history on file.      SOCIAL HISTORY  Social History     Socioeconomic History   • Marital status:    Tobacco Use   • Smoking status: Never Smoker   • Smokeless tobacco: Never Used   Substance and Sexual Activity   • Alcohol use: No   • Drug use: No   • Sexual activity: Defer         ALLERGIES  Penicillins        REVIEW OF SYSTEMS  Review of Systems     All systems reviewed and negative except for those discussed in HPI.       PHYSICAL EXAM    I have reviewed the triage vital signs and nursing notes.    ED Triage Vitals [03/01/22 2345]   Temp Heart Rate Resp BP SpO2   98 °F (36.7 °C) 82 16 132/84 98 %      Temp src Heart Rate Source Patient Position BP Location FiO2 (%)   Oral Monitor -- -- --       Physical Exam  GENERAL: Alert and oriented x3, not distressed  HENT: no hemotympanum, head atraumatic/normocephalic  EYES: no scleral icterus, PERRL, EOMI  CV: regular rhythm, regular rate  RESPIRATORY: normal effort, clear to auscultate bilaterally  ABDOMEN: soft/nontender  MUSCULOSKELETAL: no deformity, normal ROM, pelvis stable, no hip tenderness, mild upper T and lower C-spine tenderness but no step-off or malalignment  NEURO: alert, moves all extremities, no focal neuro deficits, follows commands  SKIN: warm, dry, intact        LAB RESULTS  Recent Results (from the past 24 hour(s))   Comprehensive Metabolic Panel    Collection Time: 03/02/22  1:05 AM    Specimen: Blood   Result Value Ref Range    Glucose 111 (H) 65 - 99 mg/dL    BUN 20 8 - 23  mg/dL    Creatinine 1.22 0.76 - 1.27 mg/dL    Sodium 138 136 - 145 mmol/L    Potassium 3.7 3.5 - 5.2 mmol/L    Chloride 104 98 - 107 mmol/L    CO2 22.0 22.0 - 29.0 mmol/L    Calcium 8.4 (L) 8.6 - 10.5 mg/dL    Total Protein 5.8 (L) 6.0 - 8.5 g/dL    Albumin 3.40 (L) 3.50 - 5.20 g/dL    ALT (SGPT) 6 1 - 41 U/L    AST (SGOT) 12 1 - 40 U/L    Alkaline Phosphatase 73 39 - 117 U/L    Total Bilirubin 0.4 0.0 - 1.2 mg/dL    Globulin 2.4 gm/dL    A/G Ratio 1.4 g/dL    BUN/Creatinine Ratio 16.4 7.0 - 25.0    Anion Gap 12.0 5.0 - 15.0 mmol/L    eGFR 57.0 (L) >60.0 mL/min/1.73   Magnesium    Collection Time: 03/02/22  1:05 AM    Specimen: Blood   Result Value Ref Range    Magnesium 1.9 1.6 - 2.4 mg/dL   CBC Auto Differential    Collection Time: 03/02/22  1:05 AM    Specimen: Blood   Result Value Ref Range    WBC 8.72 3.40 - 10.80 10*3/mm3    RBC 3.55 (L) 4.14 - 5.80 10*6/mm3    Hemoglobin 12.1 (L) 13.0 - 17.7 g/dL    Hematocrit 35.7 (L) 37.5 - 51.0 %    .6 (H) 79.0 - 97.0 fL    MCH 34.1 (H) 26.6 - 33.0 pg    MCHC 33.9 31.5 - 35.7 g/dL    RDW 12.3 12.3 - 15.4 %    RDW-SD 45.6 37.0 - 54.0 fl    MPV 9.7 6.0 - 12.0 fL    Platelets 269 140 - 450 10*3/mm3    Neutrophil % 77.2 (H) 42.7 - 76.0 %    Lymphocyte % 12.7 (L) 19.6 - 45.3 %    Monocyte % 7.3 5.0 - 12.0 %    Eosinophil % 1.7 0.3 - 6.2 %    Basophil % 0.5 0.0 - 1.5 %    Neutrophils, Absolute 6.73 1.70 - 7.00 10*3/mm3    Lymphocytes, Absolute 1.11 0.70 - 3.10 10*3/mm3    Monocytes, Absolute 0.64 0.10 - 0.90 10*3/mm3    Eosinophils, Absolute 0.15 0.00 - 0.40 10*3/mm3    Basophils, Absolute 0.04 0.00 - 0.20 10*3/mm3   Scan Slide    Collection Time: 03/02/22  1:05 AM    Specimen: Blood   Result Value Ref Range    RBC Morphology Normal Normal    WBC Morphology Normal Normal    Platelet Morphology Normal Normal   Urinalysis With Microscopic If Indicated (No Culture) - Urine, Clean Catch    Collection Time: 03/02/22  2:05 AM    Specimen: Urine, Clean Catch   Result Value Ref  Range    Color, UA Yellow Yellow, Straw    Appearance, UA Clear Clear    pH, UA 6.0 5.0 - 8.0    Specific Gravity, UA 1.017 1.005 - 1.030    Glucose, UA Negative Negative    Ketones, UA Negative Negative    Bilirubin, UA Negative Negative    Blood, UA Negative Negative    Protein, UA Trace (A) Negative    Leuk Esterase, UA Small (1+) (A) Negative    Nitrite, UA Negative Negative    Urobilinogen, UA 1.0 E.U./dL 0.2 - 1.0 E.U./dL   Urinalysis, Microscopic Only - Urine, Clean Catch    Collection Time: 03/02/22  2:05 AM    Specimen: Urine, Clean Catch   Result Value Ref Range    RBC, UA None Seen None Seen, 0-2 /HPF    WBC, UA 21-30 (A) None Seen, 0-2 /HPF    Bacteria, UA 2+ (A) None Seen /HPF    Squamous Epithelial Cells, UA 0-2 None Seen, 0-2 /HPF    Hyaline Casts, UA 0-2 None Seen /LPF    Methodology Manual Light Microscopy        Ordered the above labs and independently reviewed the results.        RADIOLOGY  XR Chest 2 View    Result Date: 3/2/2022  Patient: FELIPA HERBERT  Time Out: 01:18 Exam(s): FILM CXR 2 VIEWS EXAM:   XR Chest, 2 Views CLINICAL HISTORY:    Reason for exam: Fall. TECHNIQUE:   Frontal and lateral views of the chest. COMPARISON:   No relevant prior studies available. FINDINGS:   Lungs:  Unremarkable.  No consolidation.   Pleural space:  Unremarkable.  No pleural effusion or pneumothorax.   Heart:  Previous sternotomy and CABG.  Normal heart size.  No vascular congestion.   Bones joints:  Osteopenia.  No acute osseous findings. IMPRESSION:       No acute findings in the chest.     Electronically signed by Lizy Granados M.D. on 03-02-22 at 0118    XR Spine Cervical Complete 4 or 5 View    Result Date: 3/2/2022  Patient: FELIPA HERBERT  Time Out: 01:13 Exam(s): FILM C SPINE EXAM: XR Cervical Spine, 4 or 5 Views CLINICAL HISTORY: Reason for exam: Fall, lower neck pain. TECHNIQUE: Frontal, lateral and bilateral oblique views of the cervical spine. COMPARISON: CT cervical spine 1 27 22 FINDINGS: Bones are  osteopenic.  There is normal vertebral body height and alignment.  There is bulky anterior ossification in the cervical spine extending from C3 through the thoracic spine suggesting diffuse idiopathic skeletal hyperostosis.  There is focal discontinuity of the anterior bridging ossification at the C5 level which is stable from prior CT.  There is no convincing evidence of acute fracture or traumatic subluxation.  There is no prevertebral soft tissue swelling. Disc heights are maintained.  There is mild facet joint degeneration.  There is mild uncovertebral joint degeneration.  No significant foraminal narrowing is identified on oblique views. IMPRESSION:   1.  Diffuse idiopathic skeletal hyperostosis. 2.  No radiographic evidence of acute osseous abnormality.     Electronically signed by Lizy Granados M.D. on 03-02-22 at 0113    XR Spine Thoracic 3 View    Result Date: 3/2/2022  Patient: FELIPA HERBERT  Time Out: 01:07 Exam(s): FILM T SPINE EXAM:   XR Thoracic Spine, 3 Views CLINICAL HISTORY:    Reason for exam: Fall, upper back pain. TECHNIQUE:   Frontal, lateral and swimmer's views of the thoracic spine. COMPARISON:   No relevant prior studies available. FINDINGS:   Vertebrae:  Normal vertebral body height and alignment.  No evidence of acute fracture or subluxation.  Flowing anterior ossification in the thoracic spine suggesting diffuse idiopathic skeletal hyperostosis.  Bones are osteopenic.   Disc spaces:  Minimal multilevel disc height loss for patient age.   Soft tissues:  Unremarkable. IMPRESSION:       No acute osseous findings.     Electronically signed by Lizy Granados M.D. on 03-02-22 at 0107    XR Pelvis 1 or 2 View    Result Date: 3/2/2022  Patient: FELIPA HERBERT  Time Out: 01:16 Exam(s): FILM PELVIS EXAM: XR Pelvis, 1 or 2 Views CLINICAL HISTORY: Reason for exam: Fall. TECHNIQUE: Frontal view of the pelvis. COMPARISON: No relevant prior studies available. FINDINGS: Bones are osteopenic.  Sacroiliac  joints, hip joints, and pubic symphysis are normally aligned.  There is no evidence of acute fracture or dislocation. Prior ORIF has been performed in the bilateral femurs.  Bulky hypertrophic osseous changes involve the bilateral trochanteric regions.  Healed fracture deformities noted in the proximal right femoral shaft. There is a fractured interlocking transcortical screw in the distal right femur, age-indeterminate.  Bilateral femoral ORIF hardware appears otherwise intact. IMPRESSION:   1.  No acute osseous findings. 2.  Chronic posttraumatic changes. 3.  Prior ORIF of both femurs. 4.  Fractured distal interlocking transcortical screw in the right femur, age-indeterminate.     Electronically signed by Lizy Granados M.D. on 03-02-22 at 0116      I ordered the above noted radiological studies. Reviewed by me and discussed with radiologist.  See dictation for official radiology interpretation.      PROCEDURES    Procedures      MEDICATIONS GIVEN IN ER    Medications   cefdinir (OMNICEF) capsule 300 mg (300 mg Oral Given 3/2/22 0329)         PROGRESS, DATA ANALYSIS, CONSULTS, AND MEDICAL DECISION MAKING    All labs have been independently reviewed by me.  All radiology studies have been reviewed by me and discussed with radiologist dictating the report.   EKG's independently viewed and interpreted by me.  Discussion below represents my analysis of pertinent findings related to patient's condition, differential diagnosis, treatment plan and final disposition.    DDx: Includes but not limited to compression fracture, burst fracture, acute back pain, thoracic strain    ED Course as of 03/02/22 0442   Wed Mar 02, 2022   0140 WBC: 8.72 [EKATERINA]   0141 Hemoglobin(!): 12.1 [EKATERINA]   0141 Hematocrit(!): 35.7 [EKATERINA]   0141 Platelets: 269 [EKATERINA]   0141 Sodium: 138 [EKATERINA]   0141 Potassium: 3.7 [EKATERINA]   0141 Magnesium: 1.9 [EKATERINA]   0141 Chloride: 104 [EKATERINA]   0141 CO2: 22.0 [EKATERINA]   0141 BUN: 20 [EKATERINA]   0141 Creatinine: 1.22 [EKATERINA]   0141  Glucose(!): 111 [EKATERINA]   0148 X-ray showed no evidence of an acute injury. [EKATERINA]   0243 Leukocytes, UA(!): Small (1+) [EKATERINA]   0243 RBC, UA: None Seen [EKATERINA]   0243 WBC, UA(!): 21-30 [EKATERINA]   0243 Bacteria, UA(!): 2+ [EKATERINA]   0416 Spoke with patient's daughter, Deidre who is the power of , and discussed the results, diagnosis, need for antibiotics and plan for discharge home.  She expressed understanding and agrees with plan. [EKATERINA]      ED Course User Index  [EKATERINA] Richy Tysno PA       MDM: Patient has no acute fracture, blood work is unremarkable, but does appear to have a urinary tract infection.  Patient was started on cefdinir and ordered a urine culture was ordered.  Patient is otherwise at his baseline, vital signs are stable, patient is safe for discharge home.    PPE: The patient wore a surgical mask throughout the entire patient encounter. I wore an N95.    AS OF 04:42 EST VITALS:    BP - 153/89  HR - 62  TEMP - 98 °F (36.7 °C) (Oral)  O2 SATS - 98%        DIAGNOSIS  Final diagnoses:   Fall in home, initial encounter   Upper back pain   Acute urinary tract infection   Primary hypertension         DISPOSITION  DISCHARGE    Patient discharged in stable condition.    Reviewed implications of results, diagnosis, meds, responsibility to follow up, warning signs and symptoms of possible worsening, potential complications and reasons to return to ER.    Patient/Family voiced understanding of above instructions.    Discussed plan for discharge, as there is no emergent indication for admission. Patient referred to primary care provider for BP management due to today's BP. Pt/family is agreeable and understands need for follow up and repeat testing.  Pt is aware that discharge does not mean that nothing is wrong but it indicates no emergency is present that requires admission and they must continue care with follow-up as given below or physician of their choice.     FOLLOW-UP  Rachid العلي Jr., MD  [address]  [facility] [address]  [place] [id number] [phone]    Schedule an appointment as soon as possible for a visit in 3 days           Medication List      New Prescriptions    cefdinir 300 MG capsule  Commonly known as: OMNICEF  Take 1 capsule by mouth 2 (Two) Times a Day.           Where to Get Your Medications      These medications were sent to [id number] - [place], [place] - [address] AT [facility] - [phone]  - [phone] [address], [facility] [address]    Phone: [phone]   · cefdinir 300 MG capsule                    Richy Tyson PA  03/02/22 0443

## 2022-03-02 NOTE — ED NOTES
Pt to triage from home via [place] [id number] with c/o mechanical fall.  Pt c/o pain to right shoulder, but has full rom.  Pt did not hit head, does not take blood thinners.  Pt got up to go to bathroom, leaned forward to  pillow from floor, and upon standing, fell backwards.  Pt wearing mask in triage. Triage personnel wore appropriate PPE       Juany Osborn RN  03/01/22 0644

## 2022-03-02 NOTE — CASE COMMUNICATION
Patient fell last night at 11pm. Tried to get up by himself (did not call for assist) from bed fall backwards and hit back and shoulder. Was complaining of back and left shouolder pain so grandson called EMS and went to [facility] ER. Came home this morning at 6am. Workup was negative for fractures. Skin tear left elbow.

## 2022-03-03 ENCOUNTER — HOME CARE VISIT (OUTPATIENT)
Dept: HOME HEALTH SERVICES | Facility: HOME HEALTHCARE | Age: 87
End: 2022-03-03

## 2022-03-03 VITALS
RESPIRATION RATE: 18 BRPM | TEMPERATURE: 97.1 F | SYSTOLIC BLOOD PRESSURE: 112 MMHG | DIASTOLIC BLOOD PRESSURE: 60 MMHG | OXYGEN SATURATION: 99 % | HEART RATE: 82 BPM

## 2022-03-03 VITALS
HEART RATE: 87 BPM | TEMPERATURE: 95.8 F | SYSTOLIC BLOOD PRESSURE: 122 MMHG | RESPIRATION RATE: 18 BRPM | DIASTOLIC BLOOD PRESSURE: 70 MMHG | OXYGEN SATURATION: 99 %

## 2022-03-03 LAB — BACTERIA SPEC AEROBE CULT: ABNORMAL

## 2022-03-03 PROCEDURE — G0299 HHS/HOSPICE OF RN EA 15 MIN: HCPCS

## 2022-03-03 PROCEDURE — G0152 HHCP-SERV OF OT,EA 15 MIN: HCPCS

## 2022-03-03 NOTE — HOME HEALTH
"SUBJECTIVE:  CG Mark (family member) very frustrated w/pt this visit. \"He's not asking for help when he gets up. We go round and round about it. He just doesn't want to do it. He's at the table and I didn't help him get there. He must have used this table on wheels to get there cause it's right here next to him and his walker is still over there.\"     PLAN FOR NEXT VISIT: OT d/c, unless 30-Day Reassessment to continue is more appropriate. This OT will be on vacation next week, A different OT will see him, but this is not 100% guaranteed. CG undestood."

## 2022-03-07 NOTE — HOME HEALTH
Medications managed by family and they seem to be handling well.  CP assessment WNL.  Patient had fall yesterday, did not call for help, checked out at ED and all tests negative for injury.  Reinforced importance of using walker and calling for help and patient states he understands, continue to enforce this.  Denies pain at this time.  Does have injury to left elbow from fall that is scabbed at this time, continue to monitor.

## 2022-03-09 ENCOUNTER — HOME CARE VISIT (OUTPATIENT)
Dept: HOME HEALTH SERVICES | Facility: HOME HEALTHCARE | Age: 87
End: 2022-03-09

## 2022-03-09 VITALS
TEMPERATURE: 97.2 F | RESPIRATION RATE: 18 BRPM | HEART RATE: 73 BPM | DIASTOLIC BLOOD PRESSURE: 72 MMHG | SYSTOLIC BLOOD PRESSURE: 110 MMHG | OXYGEN SATURATION: 95 %

## 2022-03-09 PROCEDURE — G0152 HHCP-SERV OF OT,EA 15 MIN: HCPCS

## 2022-03-09 PROCEDURE — G0151 HHCP-SERV OF PT,EA 15 MIN: HCPCS

## 2022-03-09 NOTE — HOME HEALTH
Subjective:  I'm OK.  Per grandson, “no more events like last time.”       Falls since last visit: none  Home/Social Environment:  lives with wife in home with ramp to enter/ exit, has 24/7/ care

## 2022-03-10 ENCOUNTER — HOME CARE VISIT (OUTPATIENT)
Dept: HOME HEALTH SERVICES | Facility: HOME HEALTHCARE | Age: 87
End: 2022-03-10

## 2022-03-10 VITALS
RESPIRATION RATE: 18 BRPM | SYSTOLIC BLOOD PRESSURE: 110 MMHG | TEMPERATURE: 97.2 F | OXYGEN SATURATION: 95 % | HEART RATE: 73 BPM | DIASTOLIC BLOOD PRESSURE: 72 MMHG

## 2022-03-10 VITALS
HEART RATE: 88 BPM | RESPIRATION RATE: 18 BRPM | SYSTOLIC BLOOD PRESSURE: 122 MMHG | TEMPERATURE: 96.4 F | OXYGEN SATURATION: 98 % | DIASTOLIC BLOOD PRESSURE: 70 MMHG

## 2022-03-10 PROCEDURE — G0299 HHS/HOSPICE OF RN EA 15 MIN: HCPCS

## 2022-04-05 NOTE — ED NOTES
Pt to ER via EMS from home, pt in mask, staff in mask. Pt fell tonight, family states he has had multiple falls in the last couple of months. Pt denies any injury or pain. No blood thinners. Pt is alert and oriented.

## 2022-04-05 NOTE — ED PROVIDER NOTES
EMERGENCY DEPARTMENT ENCOUNTER    Room Number:  11/11  Date seen:  4/5/2022  PCP: Rachid العلي Jr., MD  Historian: Patient, daughter      HPI:  Chief Complaint: Fall, low back pain  A complete HPI/ROS/PMH/PSH/SH/FH are unobtainable due to: Nothing  Context: Fritz Flores is a 88 y.o. male who presents to the ED c/o low back pain after a fall last night.  Patient reports he fell backwards and landed on his back.  He denies head trauma or loss of consciousness.  He reports he was having some mild low back pain but it has really gotten better.  He was unaware that he hit his right elbow but he does appear to have a small skin tear on the right elbow.  Patient reports that he ambulates with a walker.  He believes he was only in the floor for about 30 minutes and then his grandson helped him up.    I called his daughter for further history.  Patient reportedly saw his doctor in a week for increased lower extremity swelling.  His Lasix was increased.  Daughter reports that patient has a history of frequent falls.            PAST MEDICAL HISTORY  Active Ambulatory Problems     Diagnosis Date Noted   • Closed intertrochanteric fracture of hip, left, initial encounter (Formerly [facility]) 06/30/2019   • Closed comminuted intertrochanteric fracture of proximal end of left femur (Formerly [facility]) 06/30/2019   • Ulcerative colitis (Formerly [facility]) 07/01/2019   • Coronary artery disease 07/01/2019   • Hyponatremia 07/01/2019   • Postoperative anemia due to acute blood loss 07/01/2019   • Constipation 09/27/2021   • Dysphagia 09/28/2021   • Age-related physical debility 09/29/2021   • Bacteriuria 09/29/2021   • Acute urinary tract infection 01/27/2022   • COVID-19 virus detected 01/27/2022   • DNR (do not resuscitate) 01/27/2022   • JOSE (acute kidney injury) (Formerly [facility]) 01/27/2022   • Dehydration 01/27/2022   • Cytokine release syndrome, grade 1 01/31/2022     Resolved Ambulatory Problems     Diagnosis Date Noted   • Metabolic encephalopathy 07/01/2019     Past  Medical History:   Diagnosis Date   • Colon polyps    • Hyperlipemia    • Myocardial infarction (HCC) 1989         PAST SURGICAL HISTORY  Past Surgical History:   Procedure Laterality Date   • CARDIAC SURGERY  1989    CABG X 1   • COLONOSCOPY  2014   • COLONOSCOPY N/A 8/23/2016    Procedure: COLONOSCOPY INTO CECUM WITH COLD BIOPSIES;  Surgeon: Aaron Gregg MD;  Location: [facility] ENDOSCOPY;  Service:    • FEMUR IM NAILING/RODDING Left 6/30/2019    Procedure: FEMUR INTRAMEDULLARY NAILING/RODDING;  Surgeon: Jaqueline Wagoner MD;  Location: [facility] MAIN OR;  Service: Orthopedics   • FRACTURE SURGERY      upper leg w/hardware   • HIP SURGERY Left     x 3         FAMILY HISTORY  No family history on file.      SOCIAL HISTORY  Social History     Socioeconomic History   • Marital status:    Tobacco Use   • Smoking status: Never Smoker   • Smokeless tobacco: Never Used   Substance and Sexual Activity   • Alcohol use: No   • Drug use: No   • Sexual activity: Defer         ALLERGIES  Penicillins        REVIEW OF SYSTEMS  Review of Systems   Review of all 14 systems is negative other than stated in the HPI above.      PHYSICAL EXAM  ED Triage Vitals [04/05/22 0504]   Temp Heart Rate Resp BP SpO2   98.1 °F (36.7 °C) 75 18 160/72 99 %      Temp src Heart Rate Source Patient Position BP Location FiO2 (%)   Oral Monitor Lying Right arm --         GENERAL: Awake and alert, no acute distress  HENT: nares patent, no scalp hematoma  EYES: no scleral icterus, pupils 3 mm reactive bilaterally, EOMI  CV: regular rhythm, normal rate  RESPIRATORY: normal effort, lungs clear auscultation bilaterally  ABDOMEN: soft, nondistended, nontender throughout  MUSCULOSKELETAL: no deformity, no midline cervical spine tenderness.  There is mild point tenderness over the lower lumbar spine.  Pelvis is stable and nontender to compression.  There is no limb length discrepancy of the lower extremities.  No point tenderness throughout  bilateral lower extremities.  1+ pitting edema bilateral lower extremities.  NEURO: alert, moves all extremities, follows commands, cranial nerves II through XII grossly intact  PSYCH:  calm, cooperative  SKIN: warm, dry, skin normal to inspection with the exception of a small skin tear on the right elbow    Vital signs and nursing notes reviewed.          LAB RESULTS  No results found for this or any previous visit (from the past 24 hour(s)).    Ordered the above labs and reviewed the results.        RADIOLOGY  XR Spine Lumbar Complete 4+VW    Result Date: 4/5/2022  LUMBAR SPINE COMPLETE  HISTORY: Fall, back pain.  COMPARISON: CT examination of the lumbar spine 01/27/2022.  FINDINGS: Ankylosing spondylosis is noted from T10 to L2 and from L3 to L4. There is diffuse osteopenia. Evaluation is hampered by patient positioning, overlying bowel gas and osteopenia. No obvious fracture is identified however fracture cannot be excluded. Further evaluation with a CT examination of the lumbar spine is recommended.  This report was finalized on 4/5/2022 10:14 AM by Dr. Nawaf Marrufo M.D.        Ordered the above noted radiological studies. Reviewed by me in PACS.            PROCEDURES  Procedures              MEDICATIONS GIVEN IN ER  Medications - No data to display                MEDICAL DECISION MAKING, PROGRESS, and CONSULTS    All labs have been independently reviewed by me.  All radiology studies have been reviewed by me and discussed with radiologist dictating the report.   EKG's independently viewed and interpreted by me.  Discussion below represents my analysis of pertinent findings related to patient's condition, differential diagnosis, treatment plan and final disposition.      Differential diagnosis includes but is not limited to:  Lumbar compression fracture  Lumbar disc herniation  Spinous process fracture  Transverse process fracture  Lumbar contusion      ED Course as of 04/05/22 1642   Tue Apr 05, 2022   0917  I called and spoke with patient's daughter, Deidre.  She confirms that patient fell last night and that her nephew helped him up.  She reports that he was recently seen by his doctor for increased leg swelling and his Lasix was increased.  Otherwise he has not had any other acute issues.  She reports that he falls very frequently but they have been doing her best at home using a bed alarm and he also has in-home care 24 hours a day 7 days a week. [JR]   1022 Plain films are negative for obvious lumbar spine fracture.  We will attempt to have him ambulate with a walker prior to discharge. [JR]   1108 Patient ambulated well with a walker as he does at baseline.  Appropriate discharge home at this time. [JR]      ED Course User Index  [JR] Raul Watters MD              I wore an N95 mask, face shield, and gloves during this patient encounter.  Patient also wearing a surgical mask.  Hand hygeine performed before and after seeing the patient.    DIAGNOSIS  Final diagnoses:   Acute midline low back pain without sciatica   Frequent falls   Leg swelling         DISPOSITION  DISCHARGE    Patient discharged in stable condition.    Reviewed implications of results, diagnosis, meds, responsibility to follow up, warning signs and symptoms of possible worsening, potential complications and reasons to return to ER.    Patient/Family voiced understanding of above instructions.    Discussed plan for discharge, as there is no emergent indication for admission. Patient referred to primary care provider for BP management due to today's BP. Pt/family is agreeable and understands need for follow up and repeat testing.  Pt is aware that discharge does not mean that nothing is wrong but it indicates no emergency is present that requires admission and they must continue care with follow-up as given below or physician of their choice.     FOLLOW-UP  Rachid العلي Jr., MD  [address]  [address]  [phone]      As needed         Medication List      No changes were made to your prescriptions during this visit.                   Latest Documented Vital Signs:  As of 16:42 EDT  BP- 173/81 HR- 71 Temp- 98.1 °F (36.7 °C) (Oral) O2 sat- 98%        --    Please note that portions of this were completed with a voice recognition program.          Raul Watters MD  04/05/22 0759

## 2022-04-07 NOTE — ED PROVIDER NOTES
EMERGENCY DEPARTMENT ENCOUNTER    Room Number:  20/20  Date of encounter:  4/7/2022  PCP: Rachid العلي Jr., MD  Patient Care Team:  Rachid العلي Jr., MD as PCP - General (Internal Medicine)   Historian: EMS, patient    HPI:  Chief Complaint: Fall  A complete HPI/ROS/PMH/PSH/SH/FH are unobtainable due to: Confusion    Context: Fritz Flores is a 88 y.o. male who presents to the ED c/o having a fall tonight.  EMS reports that he fell 3 times this week.  He was unclear why he has been falling so much.  EMS reports he has a skin tear to his right elbow.  He denies other pain.  He lives with his wife.  She is not present currently.  He is not able to provide any significant amount of history.    Prior record review: ER visit 4/5/2022 with a fall.  Skin tear to the right elbow.  Documentation notes that they talked to the patient's daughter who reports the patient has had increased leg swelling and his Lasix has been increased.  He has 24 7 in-home care and he falls very frequently.    PAST MEDICAL HISTORY  Active Ambulatory Problems     Diagnosis Date Noted   • Closed intertrochanteric fracture of hip, left, initial encounter (Formerly [facility]) 06/30/2019   • Closed comminuted intertrochanteric fracture of proximal end of left femur (Formerly [facility]) 06/30/2019   • Ulcerative colitis (Formerly [facility]) 07/01/2019   • Coronary artery disease 07/01/2019   • Hyponatremia 07/01/2019   • Postoperative anemia due to acute blood loss 07/01/2019   • Constipation 09/27/2021   • Dysphagia 09/28/2021   • Age-related physical debility 09/29/2021   • Bacteriuria 09/29/2021   • Acute urinary tract infection 01/27/2022   • COVID-19 virus detected 01/27/2022   • DNR (do not resuscitate) 01/27/2022   • JOSE (acute kidney injury) (Formerly [facility]) 01/27/2022   • Dehydration 01/27/2022   • Cytokine release syndrome, grade 1 01/31/2022     Resolved Ambulatory Problems     Diagnosis Date Noted   • Metabolic encephalopathy 07/01/2019     Past Medical History:   Diagnosis Date   •  Colon polyps    • Hyperlipemia    • Myocardial infarction (HCC) 1989       The patient has started, but not completed, their COVID-19 vaccination series.    PAST SURGICAL HISTORY  Past Surgical History:   Procedure Laterality Date   • CARDIAC SURGERY  1989    CABG X 1   • COLONOSCOPY  2014   • COLONOSCOPY N/A 8/23/2016    Procedure: COLONOSCOPY INTO CECUM WITH COLD BIOPSIES;  Surgeon: Aaron Gregg MD;  Location: [facility] ENDOSCOPY;  Service:    • FEMUR IM NAILING/RODDING Left 6/30/2019    Procedure: FEMUR INTRAMEDULLARY NAILING/RODDING;  Surgeon: Jaqueline Wagoner MD;  Location: [facility] MAIN OR;  Service: Orthopedics   • FRACTURE SURGERY      upper leg w/hardware   • HIP SURGERY Left     x 3         FAMILY HISTORY  No family history on file.      SOCIAL HISTORY  Social History     Socioeconomic History   • Marital status:    Tobacco Use   • Smoking status: Never Smoker   • Smokeless tobacco: Never Used   Substance and Sexual Activity   • Alcohol use: No   • Drug use: No   • Sexual activity: Defer         ALLERGIES  Penicillins        REVIEW OF SYSTEMS  Review of Systems   No chest pain, no abdominal pain, no nausea, no headache no vomiting  All systems reviewed and negative except for those discussed in HPI.       PHYSICAL EXAM    I have reviewed the triage vital signs and nursing notes.    ED Triage Vitals [04/06/22 2357]   Temp Heart Rate Resp BP SpO2   98.6 °F (37 °C) 102 14 134/98 98 %      Temp src Heart Rate Source Patient Position BP Location FiO2 (%)   -- -- -- -- --       Physical Exam  GENERAL: Awake, alert, no acute distress  SKIN: Warm, dry  HENT: Normocephalic, atraumatic  EYES: no scleral icterus  CV: regular rhythm, regular rate  RESPIRATORY: normal effort, lungs clear  ABDOMEN: soft, nontender, nondistended  MUSCULOSKELETAL: no deformity.  Small abrasion to the right elbow.  No bony tenderness.  NEURO: alert, moves all extremities, follows commands          LAB RESULTS  Recent Results  (from the past 24 hour(s))   CBC Auto Differential    Collection Time: 04/07/22 12:22 AM    Specimen: Arm, Left; Blood   Result Value Ref Range    WBC 13.91 (H) 3.40 - 10.80 10*3/mm3    RBC 3.59 (L) 4.14 - 5.80 10*6/mm3    Hemoglobin 12.5 (L) 13.0 - 17.7 g/dL    Hematocrit 35.3 (L) 37.5 - 51.0 %    MCV 98.3 (H) 79.0 - 97.0 fL    MCH 34.8 (H) 26.6 - 33.0 pg    MCHC 35.4 31.5 - 35.7 g/dL    RDW 13.1 12.3 - 15.4 %    RDW-SD 47.3 37.0 - 54.0 fl    MPV 9.8 6.0 - 12.0 fL    Platelets 284 140 - 450 10*3/mm3    Neutrophil % 85.4 (H) 42.7 - 76.0 %    Lymphocyte % 7.0 (L) 19.6 - 45.3 %    Monocyte % 6.3 5.0 - 12.0 %    Eosinophil % 0.5 0.3 - 6.2 %    Basophil % 0.3 0.0 - 1.5 %    Immature Grans % 0.5 0.0 - 0.5 %    Neutrophils, Absolute 11.87 (H) 1.70 - 7.00 10*3/mm3    Lymphocytes, Absolute 0.98 0.70 - 3.10 10*3/mm3    Monocytes, Absolute 0.88 0.10 - 0.90 10*3/mm3    Eosinophils, Absolute 0.07 0.00 - 0.40 10*3/mm3    Basophils, Absolute 0.04 0.00 - 0.20 10*3/mm3    Immature Grans, Absolute 0.07 (H) 0.00 - 0.05 10*3/mm3    nRBC 0.0 0.0 - 0.2 /100 WBC   Urinalysis With Culture If Indicated - Urine, Catheter    Collection Time: 04/07/22 12:24 AM    Specimen: Urine, Catheter   Result Value Ref Range    Color, UA Yellow Yellow, Straw    Appearance, UA Clear Clear    pH, UA 7.0 5.0 - 8.0    Specific Gravity, UA 1.012 1.005 - 1.030    Glucose, UA Negative Negative    Ketones, UA Negative Negative    Bilirubin, UA Negative Negative    Blood, UA Negative Negative    Protein, UA Negative Negative    Leuk Esterase, UA Small (1+) (A) Negative    Nitrite, UA Negative Negative    Urobilinogen, UA 1.0 E.U./dL 0.2 - 1.0 E.U./dL   Urinalysis, Microscopic Only - Urine, Catheter    Collection Time: 04/07/22 12:24 AM    Specimen: Urine, Catheter   Result Value Ref Range    RBC, UA 0-2 None Seen, 0-2 /HPF    WBC, UA 6-12 (A) None Seen, 0-2 /HPF    Bacteria, UA 3+ (A) None Seen /HPF    Squamous Epithelial Cells, UA 0-2 None Seen, 0-2 /HPF     Hyaline Casts, UA 0-2 None Seen /LPF    Methodology Automated Microscopy    ECG 12 Lead    Collection Time: 04/07/22 12:53 AM   Result Value Ref Range    QT Interval 405 ms   Comprehensive Metabolic Panel    Collection Time: 04/07/22  1:13 AM    Specimen: Arm, Left; Blood   Result Value Ref Range    Glucose 116 (H) 65 - 99 mg/dL    BUN 27 (H) 8 - 23 mg/dL    Creatinine 1.39 (H) 0.76 - 1.27 mg/dL    Sodium 135 (L) 136 - 145 mmol/L    Potassium 4.8 3.5 - 5.2 mmol/L    Chloride 98 98 - 107 mmol/L    CO2 27.0 22.0 - 29.0 mmol/L    Calcium 8.9 8.6 - 10.5 mg/dL    Total Protein 6.4 6.0 - 8.5 g/dL    Albumin 4.20 3.50 - 5.20 g/dL    ALT (SGPT) 12 1 - 41 U/L    AST (SGOT) 15 1 - 40 U/L    Alkaline Phosphatase 80 39 - 117 U/L    Total Bilirubin 0.7 0.0 - 1.2 mg/dL    Globulin 2.2 gm/dL    A/G Ratio 1.9 g/dL    BUN/Creatinine Ratio 19.4 7.0 - 25.0    Anion Gap 10.0 5.0 - 15.0 mmol/L    eGFR 48.8 (L) >60.0 mL/min/1.73   Troponin    Collection Time: 04/07/22  1:13 AM    Specimen: Arm, Left; Blood   Result Value Ref Range    Troponin T <0.010 0.000 - 0.030 ng/mL       Ordered the above labs and independently reviewed the results.        RADIOLOGY  CT Head Without Contrast    Result Date: 4/7/2022  CT HEAD WITHOUT CONTRAST  HISTORY: Fall with head trauma  COMPARISON: 01/27/2022  TECHNIQUE: Axial CT imaging was obtained through the brain. No IV contrast was administered.  FINDINGS: No acute intracranial hemorrhage is seen. There is diffuse atrophy. There is periventricular and deep white matter microangiopathic change. There is no midline shift or mass effect. No calvarial fracture is seen. There is some mucosal thickening noted within the ethmoid sinuses.      No acute intracranial findings.  Radiation dose reduction techniques were utilized, including automated exposure control and exposure modulation based on body size.  This report was finalized on 4/7/2022 12:52 AM by Dr. Courtney Mitchell M.D.      CT Cervical Spine Without  Contrast    Result Date: 4/7/2022  CT CERVICAL SPINE  HISTORY: Fall  COMPARISON: 01/27/2022  TECHNIQUE: Axial CT imaging was obtained through the cervical spine. Coronal and sagittal reformatted images were obtained.  FINDINGS: No acute fracture or subluxation of the cervical spine is identified. 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 The appearance is stable when compared to prior exam. There is no prevertebral soft tissue swelling.  C2-C3: There is no canal stenosis or neural foraminal narrowing. C3-C4: There is mild canal stenosis. There is no significant neural foraminal narrowing. C4-C5: There is mild canal stenosis. There is no significant neural foraminal narrowing. C5-C6: There is no significant canal stenosis. There is some mild neural foraminal narrowing on the left. C6-C7: There is no canal stenosis. There is no significant neural foraminal narrowing. C7-T1: There is no canal stenosis or neural foraminal narrowing.      No acute fracture or subluxation identified.  Radiation dose reduction techniques were utilized, including automated exposure control and exposure modulation based on body size.  This report was finalized on 4/7/2022 1:11 AM by Dr. Courtney Mitchell M.D.      XR Chest 1 View    Result Date: 4/7/2022  SINGLE VIEW OF THE CHEST  HISTORY: Weakness  COMPARISON: 01/27/2022  FINDINGS: Heart size is within normal limits for technique. Patient is status post median sternotomy with coronary artery bypass grafting. No pneumothorax or pleural effusion is seen.      No acute findings.  This report was finalized on 4/7/2022 12:54 AM by Dr. Courtney Mitchell M.D.        I ordered the above noted radiological studies. Reviewed by me and discussed with radiologist.  See dictation for official radiology  interpretation.      PROCEDURES    Procedures      MEDICATIONS GIVEN IN ER    Medications   sodium chloride 0.9 % flush 10 mL (has no administration in time range)   sodium chloride 0.9 % bolus 500 mL (has no administration in time range)   cefTRIAXone (ROCEPHIN) in SWFI 1 gram/10ml IV PUSH syringe (1 g Intravenous Given 4/7/22 0137)         PROGRESS, DATA ANALYSIS, CONSULTS, AND MEDICAL DECISION MAKING    All labs have been independently reviewed by me.  All radiology studies have been reviewed by me and discussed with radiologist dictating the report.   EKG's independently viewed and interpreted by me.  Discussion below represents my analysis of pertinent findings related to patient's condition, differential diagnosis, treatment plan and final disposition.    Differential diagnosis includes but is not limited to metabolic disturbance, UTI, hypokalemia, non-STEMI, STEMI, volume overload, dehydration, renal failure, intracranial hemorrhage, cervical spine fracture, pneumonia.    ED Course as of 04/07/22 0205   Thu Apr 07, 2022   0043 Leukocytes, UA(!): Small (1+) [TR]   0043 Bacteria, UA(!): 3+ [TR]   0043 WBC, UA(!): 6-12 [TR]   0043 WBC(!): 13.91 [TR]   0056 EKG          EKG time: 00 53  Rhythm/Rate: Normal sinus, rate 75  P waves and MI: Normal P, normal MI   QRS, axis: Narrow QRS, normal axis  ST and T waves: Normal ST and T waves    Interpreted Contemporaneously by me, independently viewed  Not significantly changed compared to prior 1/27/2022   [TR]   0159 Creatinine(!): 1.39 [TR]   0159 BUN(!): 27 [TR]   0203 I spoke with the patient's daughter, Deidre by phone.  Reviewed work-up and findings with her.  He has a UTI and is somewhat dehydrated.  Giving him IV fluids and a dose of Rocephin here.  We will discharge him home on oral antibiotics.  She agrees.  She is sending family to pick him up. [TR]      ED Course User Index  [TR] Bruce Peña MD           PPE: The patient wore a mask and I wore an N95 mask  throughout the entire patient encounter.       AS OF 02:05 EDT VITALS:    BP - 145/86  HR - 71  TEMP - 98.6 °F (37 °C)  O2 SATS - 98%        DIAGNOSIS  Final diagnoses:   Multiple falls   Dehydration   Acute UTI         DISPOSITION  ED Disposition     ED Disposition   Discharge    Condition   Stable    Comment   --                   Bruce Peña MD  04/07/22 0202

## 2022-04-07 NOTE — ED NOTES
To ER via EMS from home.  C/o fall times 3 this week.  Pt fell tonight.  Unsure why he keeps.  Some generalized weakness.   Skin tear to right elbow.   initially.      Pt in mask at time of triage.  Triage staff in appropriate PPE.

## 2022-06-02 PROBLEM — R29.6 MULTIPLE FALLS: Status: ACTIVE | Noted: 2022-01-01

## 2022-06-02 NOTE — ED PROVIDER NOTES
EMERGENCY DEPARTMENT ENCOUNTER    CHIEF COMPLAINT  Chief Complaint: Fall with associated confusion  History given by: EMS report  History limited by: Confusion  Room Number: 12/12  PMD: Rachid العلي Jr., MD      HPI:  Pt is a 88 y.o. male who presents to the emergency room tonight after an unwitnessed fall while at home.  There is no family present but they did report to EMS that the patient is normally alert and oriented x4 and is much more confused today than usual.  The patient does not remember falling today nor does he have any physical complaints.  There have been no reports fever/chills, chest pain, shortness of breath, or nausea/vomiting.    Duration: Just prior to ED arrival  Onset: Sudden  Location: No physical complaints  Quality: Unwitnessed fall  Intensity/Severity: Moderate  Progression: Unchanging  Associated Symptoms: Altered mental status  Aggravating Factors: None known  Alleviating Factors: None  Previous Episodes: Yes, history of frequent falls  Treatment before arrival: None    PAST MEDICAL HISTORY  Active Ambulatory Problems     Diagnosis Date Noted   • Closed intertrochanteric fracture of hip, left, initial encounter ([facility]) 06/30/2019   • Closed comminuted intertrochanteric fracture of proximal end of left femur ([facility]) 06/30/2019   • Ulcerative colitis ([facility]) 07/01/2019   • Coronary artery disease 07/01/2019   • Hyponatremia 07/01/2019   • Postoperative anemia due to acute blood loss 07/01/2019   • Constipation 09/27/2021   • Dysphagia 09/28/2021   • Age-related physical debility 09/29/2021   • Bacteriuria 09/29/2021   • Acute urinary tract infection 01/27/2022   • COVID-19 virus detected 01/27/2022   • DNR (do not resuscitate) 01/27/2022   • JOSE (acute kidney injury) ([facility]) 01/27/2022   • Dehydration 01/27/2022   • Cytokine release syndrome, grade 1 01/31/2022     Resolved Ambulatory Problems     Diagnosis Date Noted   • Metabolic encephalopathy 07/01/2019     Past Medical History:    Diagnosis Date   • Colon polyps    • Hyperlipemia    • Myocardial infarction (HCC) 1989       PAST SURGICAL HISTORY  Past Surgical History:   Procedure Laterality Date   • CARDIAC SURGERY  1989    CABG X 1   • COLONOSCOPY  2014   • COLONOSCOPY N/A 8/23/2016    Procedure: COLONOSCOPY INTO CECUM WITH COLD BIOPSIES;  Surgeon: Aaron Gregg MD;  Location: [facility] ENDOSCOPY;  Service:    • FEMUR IM NAILING/RODDING Left 6/30/2019    Procedure: FEMUR INTRAMEDULLARY NAILING/RODDING;  Surgeon: Jaqueline Wagoner MD;  Location: [facility] MAIN OR;  Service: Orthopedics   • FRACTURE SURGERY      upper leg w/hardware   • HIP SURGERY Left     x 3       FAMILY HISTORY  History reviewed. No pertinent family history.    SOCIAL HISTORY  Social History     Socioeconomic History   • Marital status:    Tobacco Use   • Smoking status: Never Smoker   • Smokeless tobacco: Never Used   Substance and Sexual Activity   • Alcohol use: No   • Drug use: No   • Sexual activity: Defer       ALLERGIES  Penicillins    REVIEW OF SYSTEMS  Review of Systems   Unable to perform ROS: Mental status change       PHYSICAL EXAM  ED Triage Vitals [06/02/22 0343]   Temp Heart Rate Resp BP SpO2   97.9 °F (36.6 °C) 84 16 131/62 99 %      Temp src Heart Rate Source Patient Position BP Location FiO2 (%)   Oral Monitor Lying Right arm --       Physical Exam  Vitals and nursing note reviewed.   Constitutional:       General: He is not in acute distress.  HENT:      Head: Normocephalic and atraumatic.   Eyes:      Pupils: Pupils are equal, round, and reactive to light.   Cardiovascular:      Rate and Rhythm: Normal rate and regular rhythm.      Heart sounds: Normal heart sounds.   Pulmonary:      Effort: Pulmonary effort is normal. No respiratory distress.      Breath sounds: Normal breath sounds.   Abdominal:      Palpations: Abdomen is soft.      Tenderness: There is no abdominal tenderness. There is no guarding or rebound.   Musculoskeletal:          General: Normal range of motion.      Cervical back: Normal range of motion and neck supple.   Skin:     General: Skin is warm and dry.      Comments: Upper extremity skin tears bilaterally   Neurological:      Mental Status: He is alert. He is disoriented.      Sensory: Sensation is intact.   Psychiatric:         Mood and Affect: Mood and affect normal.         LAB RESULTS  Lab Results (last 24 hours)     Procedure Component Value Units Date/Time    CBC & Differential [[id number]]  (Abnormal) Collected: 06/02/22 0412    Specimen: Blood Updated: 06/02/22 0418    Narrative:      The following orders were created for panel order CBC & Differential.  Procedure                               Abnormality         Status                     ---------                               -----------         ------                     CBC Auto Differential[[id number]]        Abnormal            Final result                 Please view results for these tests on the individual orders.    Comprehensive Metabolic Panel [[id number]]  (Abnormal) Collected: 06/02/22 0412    Specimen: Blood Updated: 06/02/22 0510     Glucose 101 mg/dL      BUN 42 mg/dL      Creatinine 1.55 mg/dL      Sodium 143 mmol/L      Potassium 3.9 mmol/L      Chloride 107 mmol/L      CO2 22.2 mmol/L      Calcium 8.7 mg/dL      Total Protein 6.0 g/dL      Albumin 4.10 g/dL      ALT (SGPT) 10 U/L      AST (SGOT) 18 U/L      Alkaline Phosphatase 67 U/L      Total Bilirubin 0.4 mg/dL      Globulin 1.9 gm/dL      A/G Ratio 2.2 g/dL      BUN/Creatinine Ratio 27.1     Anion Gap 13.8 mmol/L      eGFR 42.8 mL/min/1.73      Comment: National Kidney Foundation and American Society of Nephrology (ASN) Task Force recommended calculation based on the Chronic Kidney Disease Epidemiology Collaboration (CKD-EPI) equation refit without adjustment for race.       Narrative:      GFR Normal >60  Chronic Kidney Disease <60  Kidney Failure <15      Troponin [[id number]]  (Abnormal) Collected:  06/02/22 0412    Specimen: Blood Updated: 06/02/22 0511     Troponin T 0.048 ng/mL     Narrative:      Troponin T Reference Range:  <= 0.03 ng/mL-   Negative for AMI  >0.03 ng/mL-     Abnormal for myocardial necrosis.  Clinicians would have to utilize clinical acumen, EKG, Troponin and serial changes to determine if it is an Acute Myocardial Infarction or myocardial injury due to an underlying chronic condition.       Results may be falsely decreased if patient taking Biotin.      CBC Auto Differential [[id number]]  (Abnormal) Collected: 06/02/22 0412    Specimen: Blood Updated: 06/02/22 0418     WBC 14.62 10*3/mm3      RBC 3.33 10*6/mm3      Hemoglobin 11.5 g/dL      Hematocrit 33.7 %      .2 fL      MCH 34.5 pg      MCHC 34.1 g/dL      RDW 12.0 %      RDW-SD 44.1 fl      MPV 9.0 fL      Platelets 232 10*3/mm3      Neutrophil % 88.2 %      Lymphocyte % 4.4 %      Monocyte % 6.5 %      Eosinophil % 0.1 %      Basophil % 0.2 %      Immature Grans % 0.6 %      Neutrophils, Absolute 12.89 10*3/mm3      Lymphocytes, Absolute 0.65 10*3/mm3      Monocytes, Absolute 0.95 10*3/mm3      Eosinophils, Absolute 0.01 10*3/mm3      Basophils, Absolute 0.03 10*3/mm3      Immature Grans, Absolute 0.09 10*3/mm3      nRBC 0.0 /100 WBC     Urinalysis With Microscopic If Indicated (No Culture) - Urine, Catheter [[id number]]  (Abnormal) Collected: 06/02/22 0425    Specimen: Urine, Catheter Updated: 06/02/22 0527     Color, UA Yellow     Appearance, UA Clear     pH, UA <=5.0     Specific Gravity, UA 1.015     Glucose, UA Negative     Ketones, UA Negative     Bilirubin, UA Negative     Blood, UA Negative     Protein, UA Negative     Leuk Esterase, UA Small (1+)     Nitrite, UA Negative     Urobilinogen, UA 1.0 E.U./dL    Urinalysis, Microscopic Only - Urine, Catheter [[id number]]  (Abnormal) Collected: 06/02/22 0425    Specimen: Urine, Catheter Updated: 06/02/22 0527     RBC, UA 31-50 /HPF      WBC, UA 0-2 /HPF      Bacteria, UA None  Seen /HPF      Squamous Epithelial Cells, UA 3-6 /HPF      Hyaline Casts, UA None Seen /LPF      Calcium Oxalate Crystals, UA Large/3+ /HPF      Mucus, UA Large/3+ /HPF      Methodology Manual Light Microscopy          I ordered the above labs and reviewed the results    RADIOLOGY  XR Chest 1 View   Final Result   No acute findings.       This report was finalized on 6/2/2022 4:44 AM by Dr. Courtney Mitchell M.D.          CT Head Without Contrast   Final Result   No acute intracranial findings.       Radiation dose reduction techniques were utilized, including automated   exposure control and exposure modulation based on body size.       This report was finalized on 6/2/2022 4:49 AM by Dr. Courtney Mitchell M.D.               I ordered the above noted radiological studies. Interpreted by radiologist.  Reviewed by me in PACS.       PROCEDURES  Procedures  EKG          EKG time: 0428  Rhythm/Rate: NSR, 64  P waves and MN: nml  QRS, axis: nml, nml   ST and T waves: nml     Interpreted Contemporaneously by me, independently viewed  unchanged compared to prior 4/7/22      PROGRESS AND CONSULTS     The patient was wearing a facemask upon entrance into the room and remained in such throughout their visit.  I was wearing PPE including a facemask, eye protection, as well as gloves at any point entering the room and throughout the visit    0530  On reevaluation, the patient remains quite confused.  His CT scan of his head as well as urinalysis are both unremarkable.  However, it does not look as though he has a JOSE with a leukocytosis and an elevated troponin level.  Due to the ongoing confusion, we will admit him to the hospital for further treatment and evaluation.  All questions been answered.    0555  Case discussed with LAURA Spring for [facility], who agrees to admit the patient to the hospital for Dr. Cash.      MEDICAL DECISION MAKING  Results were reviewed/discussed with the patient and they were also made  aware of online access. Pt also made aware that some labs, such as cultures, will not be resulted during ER visit and follow up with PMD is necessary.     MDM  Number of Diagnoses or Management Options     Amount and/or Complexity of Data Reviewed  Clinical lab tests: ordered and reviewed  Tests in the radiology section of CPT®: ordered and reviewed  Review and summarize past medical records: yes (Upon medical records review, the patient was last seen and evaluated in the ED on 4/7/2022 following a fall)  Discuss the patient with other providers: yes (LAURA Spring for [facility], who will admit the patient for Dr. Cash)  Independent visualization of images, tracings, or specimens: yes (Unremarkable CT scan of the head/chest x-ray)           DIAGNOSIS  Final diagnoses:   Multiple falls   Altered mental status, unspecified altered mental status type   Leukocytosis, unspecified type   Acute kidney injury (HCC)   Elevated troponin       DISPOSITION  ADMISSION    Discussed treatment plan and reason for admission with pt/family and admitting physician.  Pt/family voiced understanding of the plan for admission for further testing/treatment as needed.         Latest Documented Vital Signs:  As of 05:59 EDT  BP- 143/67 HR- 67 Temp- 97.9 °F (36.6 °C) (Oral) O2 sat- 98%         Gabriel Lewis MD  06/02/22 0559

## 2022-06-02 NOTE — H&P
HISTORY AND PHYSICAL   [facility]        Patient Identification:  Name: Fritz Flores  Age: 88 y.o.  Sex: male  :  1933  MRN: [id number]                     Primary Care Physician: Rachid العلي Jr., MD    Chief Complaint:  Multiple falls    History of Present Illness:      The patient is a 88 y.o. male who presents to the hospital after an unwitnessed fall while at home.  There is no family present but they did report to EMS that the patient is normally alert and oriented x4 and is much more confused today than usual.  The patient does not remember falling today nor does he have any physical complaints.  There have been no reports fever/chills, chest pain, shortness of breath, or nausea/vomiting.  The patient was evaluated in the ER and admitted for further evaluation treatment with history of having frequent falls and altered mental status.  The patient was also noted to have elevated troponin level but denied having any chest pain.  His EKG did not show any acute abnormality.  The patient was admitted for further evaluation treatment.      Past Medical History:  Past Medical History:   Diagnosis Date   • Colon polyps    • Coronary artery disease    • Hyperlipemia    • Myocardial infarction (HCC)    • Ulcerative colitis (HCC)      Past Surgical History:  Past Surgical History:   Procedure Laterality Date   • CARDIAC SURGERY      CABG X 1   • COLONOSCOPY     • COLONOSCOPY N/A 2016    Procedure: COLONOSCOPY INTO CECUM WITH COLD BIOPSIES;  Surgeon: Aaron Gregg MD;  Location: [facility] ENDOSCOPY;  Service:    • FEMUR IM NAILING/RODDING Left 2019    Procedure: FEMUR INTRAMEDULLARY NAILING/RODDING;  Surgeon: Jaqueline Wagoner MD;  Location: [facility] MAIN OR;  Service: Orthopedics   • FRACTURE SURGERY      upper leg w/hardware   • HIP SURGERY Left     x 3      Home Meds:  (Not in a hospital admission)    Current meds    Current Facility-Administered Medications:   •   acetaminophen (TYLENOL) tablet 650 mg, 650 mg, Oral, Q4H PRN **OR** acetaminophen (TYLENOL) 160 MG/5ML solution 650 mg, 650 mg, Oral, Q4H PRN **OR** acetaminophen (TYLENOL) suppository 650 mg, 650 mg, Rectal, Q4H PRN, Migdalia Avila APRN  •  calcium carbonate (TUMS) chewable tablet 500 mg (200 mg elemental), 2 tablet, Oral, BID PRN, Migdalia Avila APRN  •  nitroglycerin (NITROSTAT) SL tablet 0.4 mg, 0.4 mg, Sublingual, Q5 Min PRN, Migdalia Avila APRN  •  potassium chloride (K-DUR,KLOR-CON) ER tablet 40 mEq, 40 mEq, Oral, PRN **OR** potassium chloride (KLOR-CON) packet 40 mEq, 40 mEq, Oral, PRN **OR** potassium chloride 10 mEq in 100 mL IVPB, 10 mEq, Intravenous, Q1H PRN, Andrei Olivares MD  •  [COMPLETED] Insert peripheral IV, , , Once **AND** sodium chloride 0.9 % flush 10 mL, 10 mL, Intravenous, PRN, Gabriel Lewis MD  •  sodium chloride 0.9 % flush 10 mL, 10 mL, Intravenous, Q12H, Migdalia Avila APRN  •  sodium chloride 0.9 % flush 10 mL, 10 mL, Intravenous, PRN, Migdalia Avila APRN  •  sodium chloride 0.9 % infusion, 100 mL/hr, Intravenous, Continuous, Migdalia Avila APRN, Last Rate: 100 mL/hr at 06/02/22 0907, 100 mL/hr at 06/02/22 0907    Current Outpatient Medications:   •  acetaminophen (TYLENOL) 325 MG tablet, Take 2 tablets by mouth Every 4 (Four) Hours As Needed for Mild Pain ., Disp: , Rfl:   •  aspirin 81 MG EC tablet, Take 81 mg by mouth daily., Disp: , Rfl:   •  Furosemide (LASIX PO), Take 20 mg by mouth Daily., Disp: , Rfl:   •  amLODIPine (NORVASC) 5 MG tablet, Take 1 tablet by mouth Daily. (Patient not taking: Reported on 6/2/2022), Disp: , Rfl:   •  cefdinir (OMNICEF) 300 MG capsule, Take 1 capsule by mouth 2 (Two) Times a Day., Disp: 14 capsule, Rfl: 0  •  docusate sodium (COLACE) 100 MG capsule, Take 100 mg by mouth 2 (Two) Times a Day. (Patient not taking: Reported on 6/2/2022), Disp: , Rfl:   •  ondansetron (ZOFRAN) 4 MG tablet, Take 1 tablet by  mouth Every 6 (Six) Hours As Needed for Nausea or Vomiting., Disp: , Rfl:   •  potassium chloride 10 MEQ CR tablet, Take 2 tablets by mouth Daily. (Patient not taking: Reported on 2022), Disp: , Rfl:   Allergies:  Allergies   Allergen Reactions   • Penicillins Rash     Immunizations:  Immunization History   Administered Date(s) Administered   • COVID-19 (PFIZER) PURPLE CAP 2021, 2021   • FluLaval/Fluarix/Fluzone >6 2021   • Fluzone High-Dose 65+yrs 2020     Social History:   Social History     Social History Narrative   • Not on file     Social History     Socioeconomic History   • Marital status:    Tobacco Use   • Smoking status: Never Smoker   • Smokeless tobacco: Never Used   Substance and Sexual Activity   • Alcohol use: No   • Drug use: No   • Sexual activity: Defer       Family History:  History reviewed. No pertinent family history.     Review of Systems  See history of present illness and past medical history.  Patient denies headache, dizziness, syncope, falls, trauma, change in vision, change in hearing, change in taste, changes in weight, changes in appetite, focal weakness, numbness, or paresthesia.  Patient denies chest pain, palpitations, dyspnea, orthopnea, PND, cough, sinus pressure, rhinorrhea, epistaxis, hemoptysis, nausea, vomiting, hematemesis, diarrhea, constipation or hematchezia.  Denies cold or heat intolerance, polydipsia, polyuria, polyphagia. Denies hematuria, pyuria, dysuria, hesitancy, frequency or urgency.  Denies fever, chills, sweats, night sweats.  Denies missing any routine medications. Remainder of ROS is negative.    Objective:  tMax 24 hrs: Temp (24hrs), Av.7 °F (36.5 °C), Min:97.3 °F (36.3 °C), Max:97.9 °F (36.6 °C)    Vitals Ranges:   Temp:  [97.3 °F (36.3 °C)-97.9 °F (36.6 °C)] 97.8 °F (36.6 °C)  Heart Rate:  [67-84] 70  Resp:  [16] 16  BP: (125-160)/(59-87) 125/61      Exam:  /61   Pulse 70   Temp 97.8 °F (36.6 °C) (Oral)    "Resp 16   Ht 185.4 cm (73\")   Wt 87.3 kg (192 lb 6.4 oz)   SpO2 (!) 89%   BMI 25.38 kg/m²     General Appearance:    Alert, cooperative, no distress, appears stated age   Head:    Normocephalic, without obvious abnormality, atraumatic   Eyes:    PERRL, conjunctivae/corneas clear, EOM's intact, both eyes   Ears:    Normal external ear canals, both ears   Nose:   Nares normal, septum midline, mucosa normal, no drainage    or sinus tenderness   Throat:   Lips, mucosa, and tongue normal   Neck:   Supple, symmetrical, trachea midline, no adenopathy;     thyroid:  no enlargement/tenderness/nodules; no carotid    bruit or JVD   Back:     Symmetric, no curvature, ROM normal, no CVA tenderness   Lungs:     Clear to auscultation bilaterally, respirations unlabored   Chest Wall:    No tenderness or deformity    Heart:    Regular rate and rhythm, S1 and S2 normal, no murmur, rub   or gallop   Abdomen:     Soft, nontender, bowel sounds active all four quadrants,     no masses, no hepatomegaly, no splenomegaly   Extremities:   Extremities normal, atraumatic, no cyanosis or edema   Pulses:   2+ and symmetric all extremities   Skin:   Skin color, texture, turgor normal, no rashes or lesions   Lymph nodes:   Cervical, supraclavicular, and axillary nodes normal   Neurologic:   CNII-XII intact, normal strength, sensation intact throughout      .    Data Review:  Lab Results (last 72 hours)     Procedure Component Value Units Date/Time    COVID PRE-OP / PRE-PROCEDURE SCREENING ORDER (NO ISOLATION) - Swab, Nasopharynx [[id number]]  (Normal) Collected: 06/02/22 0700    Specimen: Swab from Nasopharynx Updated: 06/02/22 0743    Narrative:      The following orders were created for panel order COVID PRE-OP / PRE-PROCEDURE SCREENING ORDER (NO ISOLATION) - Swab, Nasopharynx.  Procedure                               Abnormality         Status                     ---------                               -----------         ------              "        COVID-19,[facility] IN-HOUSE...[[id number]]  Normal              Final result                 Please view results for these tests on the individual orders.    COVID-19,[facility] IN-HOUSE CEPHEID/ANA M NP SWAB IN TRANSPORT MEDIA 8-12 HR TAT - Swab, Nasopharynx [[id number]]  (Normal) Collected: 06/02/22 0700    Specimen: Swab from Nasopharynx Updated: 06/02/22 0743     COVID19 Not Detected    Narrative:      Fact sheet for providers: https://www.fda.gov/media/160457/download     Fact sheet for patients: https://www.fda.gov/media/555921/download    Troponin [[id number]]  (Abnormal) Collected: 06/02/22 0617    Specimen: Blood Updated: 06/02/22 0705     Troponin T 0.052 ng/mL     Narrative:      Troponin T Reference Range:  <= 0.03 ng/mL-   Negative for AMI  >0.03 ng/mL-     Abnormal for myocardial necrosis.  Clinicians would have to utilize clinical acumen, EKG, Troponin and serial changes to determine if it is an Acute Myocardial Infarction or myocardial injury due to an underlying chronic condition.       Results may be falsely decreased if patient taking Biotin.      Basic Metabolic Panel [[id number]]  (Abnormal) Collected: 06/02/22 0617    Specimen: Blood Updated: 06/02/22 0704     Glucose 71 mg/dL      BUN 35 mg/dL      Creatinine 1.30 mg/dL      Sodium 142 mmol/L      Potassium 3.0 mmol/L      Chloride 109 mmol/L      CO2 20.0 mmol/L      Calcium 7.5 mg/dL      BUN/Creatinine Ratio 26.9     Anion Gap 13.0 mmol/L      eGFR 52.8 mL/min/1.73      Comment: National Kidney Foundation and American Society of Nephrology (ASN) Task Force recommended calculation based on the Chronic Kidney Disease Epidemiology Collaboration (CKD-EPI) equation refit without adjustment for race.       Narrative:      GFR Normal >60  Chronic Kidney Disease <60  Kidney Failure <15      CBC (No Diff) [[id number]]  (Abnormal) Collected: 06/02/22 0617    Specimen: Blood Updated: 06/02/22 0635     WBC 11.63 10*3/mm3      RBC 3.18 10*6/mm3       Hemoglobin 11.0 g/dL      Hematocrit 31.6 %      MCV 99.4 fL      MCH 34.6 pg      MCHC 34.8 g/dL      RDW 12.0 %      RDW-SD 43.7 fl      MPV 9.2 fL      Platelets 245 10*3/mm3     Urinalysis With Microscopic If Indicated (No Culture) - Urine, Catheter [[id number]]  (Abnormal) Collected: 06/02/22 0425    Specimen: Urine, Catheter Updated: 06/02/22 0527     Color, UA Yellow     Appearance, UA Clear     pH, UA <=5.0     Specific Gravity, UA 1.015     Glucose, UA Negative     Ketones, UA Negative     Bilirubin, UA Negative     Blood, UA Negative     Protein, UA Negative     Leuk Esterase, UA Small (1+)     Nitrite, UA Negative     Urobilinogen, UA 1.0 E.U./dL    Urinalysis, Microscopic Only - Urine, Catheter [[id number]]  (Abnormal) Collected: 06/02/22 0425    Specimen: Urine, Catheter Updated: 06/02/22 0527     RBC, UA 31-50 /HPF      WBC, UA 0-2 /HPF      Bacteria, UA None Seen /HPF      Squamous Epithelial Cells, UA 3-6 /HPF      Hyaline Casts, UA None Seen /LPF      Calcium Oxalate Crystals, UA Large/3+ /HPF      Mucus, UA Large/3+ /HPF      Methodology Manual Light Microscopy    Troponin [[id number]]  (Abnormal) Collected: 06/02/22 0412    Specimen: Blood Updated: 06/02/22 0511     Troponin T 0.048 ng/mL     Narrative:      Troponin T Reference Range:  <= 0.03 ng/mL-   Negative for AMI  >0.03 ng/mL-     Abnormal for myocardial necrosis.  Clinicians would have to utilize clinical acumen, EKG, Troponin and serial changes to determine if it is an Acute Myocardial Infarction or myocardial injury due to an underlying chronic condition.       Results may be falsely decreased if patient taking Biotin.      Comprehensive Metabolic Panel [[id number]]  (Abnormal) Collected: 06/02/22 0412    Specimen: Blood Updated: 06/02/22 0510     Glucose 101 mg/dL      BUN 42 mg/dL      Creatinine 1.55 mg/dL      Sodium 143 mmol/L      Potassium 3.9 mmol/L      Chloride 107 mmol/L      CO2 22.2 mmol/L      Calcium 8.7 mg/dL      Total  Protein 6.0 g/dL      Albumin 4.10 g/dL      ALT (SGPT) 10 U/L      AST (SGOT) 18 U/L      Alkaline Phosphatase 67 U/L      Total Bilirubin 0.4 mg/dL      Globulin 1.9 gm/dL      A/G Ratio 2.2 g/dL      BUN/Creatinine Ratio 27.1     Anion Gap 13.8 mmol/L      eGFR 42.8 mL/min/1.73      Comment: National Kidney Foundation and American Society of Nephrology (ASN) Task Force recommended calculation based on the Chronic Kidney Disease Epidemiology Collaboration (CKD-EPI) equation refit without adjustment for race.       Narrative:      GFR Normal >60  Chronic Kidney Disease <60  Kidney Failure <15      CBC & Differential [[id number]]  (Abnormal) Collected: 06/02/22 0412    Specimen: Blood Updated: 06/02/22 0418    Narrative:      The following orders were created for panel order CBC & Differential.  Procedure                               Abnormality         Status                     ---------                               -----------         ------                     CBC Auto Differential[[id number]]        Abnormal            Final result                 Please view results for these tests on the individual orders.    CBC Auto Differential [[id number]]  (Abnormal) Collected: 06/02/22 0412    Specimen: Blood Updated: 06/02/22 0418     WBC 14.62 10*3/mm3      RBC 3.33 10*6/mm3      Hemoglobin 11.5 g/dL      Hematocrit 33.7 %      .2 fL      MCH 34.5 pg      MCHC 34.1 g/dL      RDW 12.0 %      RDW-SD 44.1 fl      MPV 9.0 fL      Platelets 232 10*3/mm3      Neutrophil % 88.2 %      Lymphocyte % 4.4 %      Monocyte % 6.5 %      Eosinophil % 0.1 %      Basophil % 0.2 %      Immature Grans % 0.6 %      Neutrophils, Absolute 12.89 10*3/mm3      Lymphocytes, Absolute 0.65 10*3/mm3      Monocytes, Absolute 0.95 10*3/mm3      Eosinophils, Absolute 0.01 10*3/mm3      Basophils, Absolute 0.03 10*3/mm3      Immature Grans, Absolute 0.09 10*3/mm3      nRBC 0.0 /100 WBC                    Imaging Results (All)     Procedure  Component Value Units Date/Time    CT Head Without Contrast [[id number]] Collected: 06/02/22 0444     Updated: 06/02/22 0452    Narrative:      CT HEAD WITHOUT CONTRAST     HISTORY: Delirium     COMPARISON: 04/07/2022     TECHNIQUE: Axial CT imaging was obtained through the brain. No IV  contrast was administered.     FINDINGS:  No acute intracranial hemorrhage is seen. There is diffuse atrophy.  There is periventricular and deep white matter microangiopathic change.  No calvarial fracture is seen. There is mucosal thickening within the  ethmoid sinuses.       Impression:      No acute intracranial findings.     Radiation dose reduction techniques were utilized, including automated  exposure control and exposure modulation based on body size.     This report was finalized on 6/2/2022 4:49 AM by Dr. Courtney Mitchell M.D.       XR Chest 1 View [[id number]] Collected: 06/02/22 0443     Updated: 06/02/22 0447    Narrative:      SINGLE VIEW OF THE CHEST     HISTORY: Altered mental status     COMPARISON: 04/07/2022     FINDINGS:  Heart size is within normal limits for technique. No pneumothorax,  pleural effusion, or acute infiltrate is seen. 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 Fragmentation of  the third sternal wire is stable.       Impression:      No acute findings.     This report was finalized on 6/2/2022 4:44 AM by Dr. Courtney Mitchell M.D.           Past Medical History:   Diagnosis Date   • Colon polyps    • Coronary artery disease    • Hyperlipemia    • Myocardial infarction (HCC) 1989   • Ulcerative colitis (HCC)        Assessment:  Active Hospital Problems    Diagnosis  POA   • **Multiple falls [R29.6]  Not Applicable   • Ulcerative colitis (HCC) [K51.90]  Yes   • Coronary artery disease [I25.10]  Yes      Resolved Hospital Problems   No resolved problems to display.       Plan:  The patient is admitted to hospital get MRI brain and check echocardiogram.  We will  ask for cardiology consult since he has had elevated troponin.  We will give IV fluid for hydration and get follow-up lab studies.  Replace potassium.  We will get physical therapy evaluation.    Andrei Olivares MD  6/2/2022  10:13 EDT

## 2022-06-02 NOTE — ED TRIAGE NOTES
Patient to ED from home via [facility] after an unwitnessed fall onto carpet. No blood thinners, unknown LOC or if hit head. Patient reports slipping out of bed. Normally walks with a walker, which the walker was found tangled with a step stool tonight near the bed where patient fell. Primary caregiver is a grandson, who states the patient is staring and confused since May 30th. At baseline is oriented x 4, currently is oriented only to self.  Two skin tears, one on each distal forearm.

## 2022-06-02 NOTE — CONSULTS
Patient Name: Fritz Flores  :1933  88 y.o.    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    He was hospitalized 2022 due to falls and was found to have an acute urinary tract infection with COVID-19 and was in [facility].    We seen him once, in 2019 when he presented with hip pain and were asked to clear him for surgery.  At that time he was chest pain-free and cleared for surgery.    He presented emergency department 2022 with an unwitnessed fall at home.  The family reports the patient normally alert and oriented x4.  In ER, troponin was 0.048, white count 14.6, hemoglobin 1.5, chest x-ray showed nothing acute and CT head showed nothing acute, ECG showed normal sinus rhythm heart rate 64.    We have been asked to see him for elevated troponin.  He does not know why he came to the emergency department.  He denies chest pain or difficulty breathing.  He does have lower extremity edema.  He denies fevers, or chills.  He denies nausea or vomiting.  He occasionally coughs.  He denies headache.  He does not know what medications he takes at home.        Past Medical History:   Diagnosis Date   • Colon polyps    • Coronary artery disease    • Hyperlipemia    • Myocardial infarction (HCC)    • Ulcerative colitis (HCC)        Past Surgical History:   Procedure Laterality Date   • CARDIAC SURGERY      CABG X 1   • COLONOSCOPY     • COLONOSCOPY N/A 2016    Procedure:  COLONOSCOPY INTO CECUM WITH COLD BIOPSIES;  Surgeon: Aaron Gregg MD;  Location: [facility] ENDOSCOPY;  Service:    • FEMUR IM NAILING/RODDING Left 6/30/2019    Procedure: FEMUR INTRAMEDULLARY NAILING/RODDING;  Surgeon: Jaqueline Wagoner MD;  Location: [facility] MAIN OR;  Service: Orthopedics   • FRACTURE SURGERY      upper leg w/hardware   • HIP SURGERY Left     x 3         Prior to Admission medications    Medication Sig Start Date End Date Taking? Authorizing Provider   acetaminophen (TYLENOL) 325 MG tablet Take 2 tablets by mouth Every 4 (Four) Hours As Needed for Mild Pain . 1/31/22  Yes David Finley MD   aspirin 81 MG EC tablet Take 81 mg by mouth daily.   Yes Provider, MD Crescencio   Furosemide (LASIX PO) Take 20 mg by mouth Daily.   Yes Provider, MD Crescencio   amLODIPine (NORVASC) 5 MG tablet Take 1 tablet by mouth Daily.  Patient not taking: Reported on 6/2/2022 2/1/22   David Finley MD   cefdinir (OMNICEF) 300 MG capsule Take 1 capsule by mouth 2 (Two) Times a Day. 3/2/22   Richy Tyson PA   docusate sodium (COLACE) 100 MG capsule Take 100 mg by mouth 2 (Two) Times a Day.  Patient not taking: Reported on 6/2/2022    Crescencio Fernandez MD   ondansetron (ZOFRAN) 4 MG tablet Take 1 tablet by mouth Every 6 (Six) Hours As Needed for Nausea or Vomiting. 1/31/22   David Finley MD   potassium chloride 10 MEQ CR tablet Take 2 tablets by mouth Daily.  Patient not taking: Reported on 6/2/2022 2/1/22   David Finley MD       Allergies   Allergen Reactions   • Penicillins Rash       Social History     Socioeconomic History   • Marital status:    Tobacco Use   • Smoking status: Never Smoker   • Smokeless tobacco: Never Used   Substance and Sexual Activity   • Alcohol use: No   • Drug use: No   • Sexual activity: Defer       History reviewed. No pertinent family history.    REVIEW OF SYSTEMS:   All systems reviewed.  Pertinent positives identified in HPI.  All other  systems are negative.      Objective:     Vitals:    06/02/22 0734 06/02/22 0805 06/02/22 0806 06/02/22 0859   BP:  125/61 125/61    BP Location:       Patient Position:       Pulse: 81  70    Resp:   16    Temp:  97.3 °F (36.3 °C) 97.8 °F (36.6 °C)    TempSrc:  Oral Oral    SpO2: 100%  100% (!) 89%   Weight:       Height:         Body mass index is 25.38 kg/m².    General Appearance:    Alert, cooperative, in no acute distress   Head:    Normocephalic, without obvious abnormality, atraumatic   Eyes:            Lids and lashes normal, conjunctivae and sclerae normal, no icterus, no pallor, corneas clear   Ears:    Ears appear intact with no abnormalities noted   Throat:   No oral lesions, no thrush, oral mucosa moist   Neck:   No adenopathy, supple, trachea midline, no thyromegaly, no carotid bruit, no JVD   Back:     No kyphosis present, no scoliosis present, no skin lesions, erythema or scars, no tenderness to palpation, range of motion normal   Lungs:     Clear to auscultation, respirations regular, even and unlabored    Heart:    Regular rhythm and normal rate, normal S1 and S2, no murmur, no gallop, no rub, no click   Chest Wall:    No abnormalities observed   Abdomen:     Normal bowel sounds, no masses, no organomegaly, soft, nontender, nondistended, no guarding, no rebound  tenderness   Extremities:   Moves all extremities well, 1+ lower extremity edema edema, no cyanosis, no redness   Pulses:   Pulses palpable and equal bilaterally. Normal radial, carotid, dorsalis pedis and posterior tibial pulses bilaterally.    Skin:  Psychiatric:   No bleeding, bruising or rash    Alert and oriented x 3, normal mood and affect   Lab Review:     Results from last 7 days   Lab Units 06/02/22  0617 06/02/22  0412   SODIUM mmol/L 142 143   POTASSIUM mmol/L 3.0* 3.9   CHLORIDE mmol/L 109* 107   CO2 mmol/L 20.0* 22.2   BUN mg/dL 35* 42*   CREATININE mg/dL 1.30* 1.55*   CALCIUM mg/dL 7.5* 8.7   BILIRUBIN mg/dL  --  0.4   ALK  PHOS U/L  --  67   ALT (SGPT) U/L  --  10   AST (SGOT) U/L  --  18   GLUCOSE mg/dL 71 101*     Results from last 7 days   Lab Units 06/02/22  0617 06/02/22  0412   TROPONIN T ng/mL 0.052* 0.048*     Results from last 7 days   Lab Units 06/02/22  0617   WBC 10*3/mm3 11.63*   HEMOGLOBIN g/dL 11.0*   HEMATOCRIT % 31.6*   PLATELETS 10*3/mm3 245                                         I personally viewed and interpreted the patient's EKG/Telemetry data.        Assessment and Plan:       1. Fall and confusion, check stat D-dimer.  Etiology of this is still unknown.  2. CAD, history of CABG x1 in 1989, now elevated troponin.  Also had elevated troponin in January.  EKG shows no acute ST changes. Check echo.   3. Hyperlipidemia  4. Ulcerative colitis  5. JOSE  6. Chronic DVT-bilateral popliteal, noted on lower extremity venous ultrasound on 1/31/2022.  He is on no anticoagulation.    Awaiting for D-dimer, check troponin in the morning, replace potassium.  Await echocardiogram.    Erika Harper MD  06/02/22  11:09 EDT

## 2022-06-03 PROBLEM — E53.8 B12 DEFICIENCY: Status: ACTIVE | Noted: 2022-01-01

## 2022-06-03 PROBLEM — I10 ESSENTIAL HYPERTENSION: Status: ACTIVE | Noted: 2022-01-01

## 2022-06-03 PROBLEM — R77.8 ELEVATED TROPONIN: Status: ACTIVE | Noted: 2022-01-01

## 2022-06-03 NOTE — PROGRESS NOTES
LOS: 1 day   Patient Care Team:  Rachid العلي Jr., MD as PCP - General (Internal Medicine)    Chief Complaint:     Follow-up weakness with fall    Interval History:     He denies chest pain or difficulty breathing.  He says he is just very weak.  He is not sure how he can get stronger.  He does not feel dizzy and does not like he is in a pass out.  He says he just does not have muscle strength.    Objective   Vital Signs  Temp:  [97.4 °F (36.3 °C)] 97.4 °F (36.3 °C)  Heart Rate:  [58-61] 58  Resp:  [16] 16  BP: (149)/(66-73) 149/73    Intake/Output Summary (Last 24 hours) at 6/3/2022 0848  Last data filed at 6/3/2022 0500  Gross per 24 hour   Intake 1440 ml   Output 1175 ml   Net 265 ml       Comfortable NAD  Neck supple, no JVD or thyromegaly appreciated  S1/S2 RRR, no m/r/g  Lungs CTA B, normal effort  Abdomen S/NT/ND (+) BS, no HSM appreciated  Extremities warm, no clubbing, cyanosis, 1+ bilateral lower extremity edema  No visible or palpable skin lesions  A/Ox4, mood and affect appropriate    Results Review:      Results from last 7 days   Lab Units 06/02/22  0617 06/02/22  0412   SODIUM mmol/L 142 143   POTASSIUM mmol/L 3.0* 3.9   CHLORIDE mmol/L 109* 107   CO2 mmol/L 20.0* 22.2   BUN mg/dL 35* 42*   CREATININE mg/dL 1.30* 1.55*   GLUCOSE mg/dL 71 101*   CALCIUM mg/dL 7.5* 8.7     Results from last 7 days   Lab Units 06/02/22  0617 06/02/22  0412   TROPONIN T ng/mL 0.052* 0.048*     Results from last 7 days   Lab Units 06/02/22  0617 06/02/22  0412   WBC 10*3/mm3 11.63* 14.62*   HEMOGLOBIN g/dL 11.0* 11.5*   HEMATOCRIT % 31.6* 33.7*   PLATELETS 10*3/mm3 245 232                       I reviewed the patient's new clinical results.  I personally viewed and interpreted the patient's EKG/Telemetry data        Medication Review:   aspirin, 81 mg, Oral, Daily  docusate sodium, 100 mg, Oral, BID  potassium chloride, 20 mEq, Oral, Daily  sodium chloride, 10 mL, Intravenous, Q12H        sodium chloride, 100  mL/hr, Last Rate: Stopped (06/03/22 0250)        Assessment & Plan       Multiple falls    Ulcerative colitis (HCC)    Coronary artery disease    1. Fall and confusion, mildly elevated D-dimer.  Etiology of this is still unknown. Normal TSH.  K+ - recheck and check mag.   2. CAD, history of CABG x1 in 1989, now elevated troponin.  Also had elevated troponin in January.  EKG shows no acute ST changes. Check echo.   3. Hyperlipidemia  4. Ulcerative colitis  5. JOSE-better  6. Chronic DVT-bilateral popliteal, noted on lower extremity venous ultrasound on 1/31/2022.  He is on no anticoagulation.    Await labs.  Is on a blood pressure medications, check orthostatics.  With elevated dimer, check CTA chest.  Watch creatinine was CTA chest.    Continue IVF for 4 hours after CTA then can d/c.     Erika Harper MD  06/03/22  08:48 EDT

## 2022-06-03 NOTE — PLAN OF CARE
Goal Outcome Evaluation:  Plan of Care Reviewed With: patient           Outcome Evaluation: Pt is an 87 yo male who presented following fall at home with AMS, JOSE, elevated troponin; PMH of CAD, chronic DVT. Prior to admission, pt was living at home with spouse and independent with household mobility using rwx. Pt's family assists at home as needed. Upon exam, pt demonstrates generalized weakness, impaired balance, and decreased endurance limiting mobility. Pt requires cues for safety and positioning of rwx while ambulating in room. Pt is at increased risk of falls and will continue to benefit from PT for ongoing strengthening and mobility training. Noted family plans for pt to [place] home when medically ready; would recommend  PT for continued rehab at [place].

## 2022-06-03 NOTE — NURSING NOTE
Patient arrived to floor at time of shift change. Patient alert, with even, unlabored breathing, showing no signs or symptoms of distress. Patient attached to cardiac monitoring, gown changed. Call light within reach. Patient belongings within reach, fall precautions in place.     Patient has no IV access at this time. Prior nurse notified IV access. This RN left second message for IV access team to replace IV for medication therapy.

## 2022-06-03 NOTE — THERAPY EVALUATION
Patient Name: Fritz Flores  : 1933    MRN: [id number]                              Today's Date: 6/3/2022       Admit Date: 2022    Visit Dx:     ICD-10-CM ICD-9-CM   1. Multiple falls  R29.6 V15.88   2. Altered mental status, unspecified altered mental status type  R41.82 780.97   3. Leukocytosis, unspecified type  D72.829 288.60   4. Acute kidney injury (HCC)  N17.9 584.9   5. Elevated troponin  R77.8 790.6     Patient Active Problem List   Diagnosis   • Closed intertrochanteric fracture of hip, left, initial encounter (HCC)   • Closed comminuted intertrochanteric fracture of proximal end of left femur (HCC)   • Ulcerative colitis (HCC)   • Coronary artery disease   • Hyponatremia   • Postoperative anemia due to acute blood loss   • Constipation   • Dysphagia   • Age-related physical debility   • Bacteriuria   • Acute urinary tract infection   • COVID-19 virus detected   • DNR (do not resuscitate)   • JOSE (acute kidney injury) (HCC)   • Dehydration   • Cytokine release syndrome, grade 1   • Multiple falls     Past Medical History:   Diagnosis Date   • Colon polyps    • Coronary artery disease    • Hyperlipemia    • Myocardial infarction (HCC)    • Ulcerative colitis (HCC)      Past Surgical History:   Procedure Laterality Date   • CARDIAC SURGERY      CABG X 1   • COLONOSCOPY     • COLONOSCOPY N/A 2016    Procedure: COLONOSCOPY INTO CECUM WITH COLD BIOPSIES;  Surgeon: Aaron Gregg MD;  Location: [facility] ENDOSCOPY;  Service:    • FEMUR IM NAILING/RODDING Left 2019    Procedure: FEMUR INTRAMEDULLARY NAILING/RODDING;  Surgeon: Jaqueline Wagoner MD;  Location: [facility] OR;  Service: Orthopedics   • FRACTURE SURGERY      upper leg w/hardware   • HIP SURGERY Left     x 3      General Information     Row Name 22 1134          Physical Therapy Time and Intention    Document Type evaluation  -EE     Mode of Treatment individual therapy;physical therapy  -EE     Row Name  06/03/22 1134          General Information    Prior Level of Function independent:;all household mobility  uses rwx  -EE     Existing Precautions/Restrictions fall  -EE     Barriers to Rehab none identified  -EE     Row Name 06/03/22 1134          Living Environment    People in Home spouse  -EE     Row Name 06/03/22 1134          Home Main Entrance    Number of Stairs, Main Entrance none  ramp to enter  -EE     Row Name 06/03/22 1134          Cognition    Orientation Status (Cognition) oriented x 3  -EE     Row Name 06/03/22 1134          Safety Issues, Functional Mobility    Safety Issues Affecting Function (Mobility) insight into deficits/self-awareness;positioning of assistive device;judgment;safety precaution awareness;awareness of need for assistance  -EE     Impairments Affecting Function (Mobility) balance;endurance/activity tolerance;strength;postural/trunk control  -EE           User Key  (r) = Recorded By, (t) = Taken By, (c) = Cosigned By    Initials Name Provider Type    EE Sugar Marcus, PT Physical Therapist               Mobility     Row Name 06/03/22 1135          Bed Mobility    Comment, (Bed Mobility) not tested; pt sitting EOB upon PT arrival  -EE     Row Name 06/03/22 1135          Sit-Stand Transfer    Sit-Stand [place] (Transfers) contact guard;verbal cues  -EE     Assistive Device (Sit-Stand Transfers) walker, front-wheeled  -EE     Comment, (Sit-Stand Transfer) STS x 1 from EOB, x 1 from toilet with grab bar. Vc's for hand placement.  -EE     Row Name 06/03/22 1135          Gait/Stairs (Locomotion)    [place] Level (Gait) contact guard;minimum assist (75% patient effort);verbal cues  -EE     Assistive Device (Gait) walker, front-wheeled  -EE     Distance in Feet (Gait) 15' x 2  -EE     Deviations/Abnormal Patterns (Gait) courtney decreased;festinating/shuffling;stride length decreased  -EE     Bilateral Gait Deviations forward flexed posture;heel strike decreased;weight shift ability  decreased  -EE     Comment, (Gait/Stairs) Vc's for upright posture and increased stride length as well as to stay close to rwx for safety. Pt demos decreased safety awareness, attempting to let go of walker and reach for door frame/walls in bathroom. Cued to keep hands on rwx at all times.  -EE           User Key  (r) = Recorded By, (t) = Taken By, (c) = Cosigned By    Initials Name Provider Type    EE Sugar Marcus PT Physical Therapist               Obj/Interventions     Row Name 06/03/22 1136          Range of Motion Comprehensive    General Range of Motion bilateral lower extremity ROM WFL  -EE     Row Name 06/03/22 1136          Strength Comprehensive (MMT)    General Manual Muscle Testing (MMT) Assessment lower extremity strength deficits identified  -EE     Comment, General Manual Muscle Testing (MMT) Assessment generalized weakness, B LEs grossly 3+/5  -EE     Row Name 06/03/22 1136          Balance    Balance Assessment sitting static balance;standing static balance  -EE     Static Sitting Balance independent  -EE     Position, Sitting Balance unsupported;sitting edge of bed  -EE     Static Standing Balance contact guard;standby assist  -EE     Position/Device Used, Standing Balance supported;walker, rolling  -EE     Row Name 06/03/22 1136          Sensory Assessment (Somatosensory)    Sensory Assessment (Somatosensory) not tested  -EE           User Key  (r) = Recorded By, (t) = Taken By, (c) = Cosigned By    Initials Name Provider Type    Sugar Bagley, СВЕТЛАНА Physical Therapist               Goals/Plan     Row Name 06/03/22 1139          Bed Mobility Goal 1 (PT)    Activity/Assistive Device (Bed Mobility Goal 1, PT) sit to supine/supine to sit  -EE     [place] Level/Cues Needed (Bed Mobility Goal 1, PT) independent  -EE     Time Frame (Bed Mobility Goal 1, PT) 1 week  -EE     Progress/Outcomes (Bed Mobility Goal 1, PT) goal ongoing  -EE     Row Name 06/03/22 1139          Transfer Goal 1 (PT)     Activity/Assistive Device (Transfer Goal 1, PT) sit-to-stand/stand-to-sit;bed-to-chair/chair-to-bed;walker, rolling  -EE     [place] Level/Cues Needed (Transfer Goal 1, PT) standby assist  -EE     Time Frame (Transfer Goal 1, PT) 1 week  -EE     Progress/Outcome (Transfer Goal 1, PT) goal ongoing  -EE     Row Name 06/03/22 1139          Gait Training Goal 1 (PT)    Activity/Assistive Device (Gait Training Goal 1, PT) gait (walking locomotion);walker, rolling  -EE     [place] Level (Gait Training Goal 1, PT) standby assist  -EE     Distance (Gait Training Goal 1, PT) 150'  -EE     Time Frame (Gait Training Goal 1, PT) 1 week  -EE     Progress/Outcome (Gait Training Goal 1, PT) goal ongoing  -EE     Row Name 06/03/22 1136          Therapy Assessment/Plan (PT)    Planned Therapy Interventions (PT) balance training;bed mobility training;gait training;home exercise program;patient/family education;ROM (range of motion);strengthening;transfer training;postural re-education  -EE           User Key  (r) = Recorded By, (t) = Taken By, (c) = Cosigned By    Initials Name Provider Type    EE Sugar Marcus, СВЕТЛАНА Physical Therapist               Clinical Impression     Row Name 06/03/22 1137          Pain    Pretreatment Pain Rating 0/10 - no pain  -EE     Posttreatment Pain Rating 0/10 - no pain  -EE     Row Name 06/03/22 1133          Plan of Care Review    Plan of Care Reviewed With patient  -EE     Outcome Evaluation Pt is an 87 yo male who presented following fall at home with AMS, JOSE, elevated troponin; PMH of CAD, chronic DVT. Prior to admission, pt was living at home with spouse and independent with household mobility using rwx. Pt's family assists at home as needed. Upon exam, pt demonstrates generalized weakness, impaired balance, and decreased endurance limiting mobility. Pt requires cues for safety and positioning of rwx while ambulating in room. Pt is at increased risk of falls and will continue to benefit  from PT for ongoing strengthening and mobility training. Noted [place] plans for pt to [place] home when medically ready; would recommend  [place] for [place]hab [place].  -EE     Row Name 06/03/22 1137          Therapy Assessment/Plan (PT)    Rehab Potential (PT) good, to achieve stated therapy goals  -EE     Criteria for Skilled Interventions Met (PT) yes;meets criteria;skilled treatment is necessary  -EE     Therapy Frequency (PT) 6 times/wk  -EE     Row Name 06/03/22 1137          Positioning and Restraints    Pre-Treatment Position in bed  -EE     Post Treatment Position chair  -EE     In Chair reclined;call light within reach;encouraged to call for assist;exit alarm on;legs elevated  -EE           User Key  (r) = Recorded By, (t) = Taken By, (c) = Cosigned By    Initials Name Provider Type    Sugar Bagley PT Physical Therapist               Outcome Measures     Row Name 06/03/22 1139          How much help from another person do you currently need...    Turning from your back to your side while in flat bed without using bedrails? 4  -EE     Moving from lying on back to sitting on the side of a flat bed without bedrails? 3  -EE     Moving to and from a bed to a chair (including a wheelchair)? 3  -EE     Standing up from a chair using your arms (e.g., wheelchair, bedside chair)? 3  -EE     Climbing 3-5 steps with a railing? 2  -EE     To walk in hospital room? 3  -EE     AM-PAC 6 Clicks Score (PT) 18  -EE     Highest level of mobility 6 --> Walked 10 steps or more  -EE     Row Name 06/03/22 1134          Functional Assessment    Outcome Measure Options AM-PAC 6 Clicks Basic Mobility (PT)  -EE           User Key  (r) = Recorded By, (t) = Taken By, (c) = Cosigned By    Initials Name Provider Type    Sugar Bagley PT Physical Therapist                             Physical Therapy Education                 Title: PT OT SLP Therapies (In Progress)     Topic: Physical Therapy (In Progress)     Point: Mobility  training (Done)     Learning Progress Summary           Patient Acceptance, E, VU,NR by EE at 6/3/2022 1140                   Point: Home exercise program (Not Started)     Learner Progress:  Not documented in this visit.          Point: Body mechanics (Not Started)     Learner Progress:  Not documented in this visit.          Point: Precautions (Not Started)     Learner Progress:  Not documented in this visit.                      User Key     Initials Effective Dates Name Provider Type Discipline    EE 06/16/21 -  Sugar Marcus, PT Physical Therapist PT              PT Recommendation and Plan  Planned Therapy Interventions (PT): balance training, bed mobility training, gait training, home exercise program, patient/family education, ROM (range of motion), strengthening, transfer training, postural re-education  Plan of Care Reviewed With: patient  Outcome Evaluation: Pt is an 89 yo male who presented following fall at home with AMS, JOSE, elevated troponin; PMH of CAD, chronic DVT. Prior to admission, pt was living at home with spouse and independent with household mobility using rwx. Pt's family assists at home as needed. Upon exam, pt demonstrates generalized weakness, impaired balance, and decreased endurance limiting mobility. Pt requires cues for safety and positioning of rwx while ambulating in room. Pt is at increased risk of falls and will continue to benefit from PT for ongoing strengthening and mobility training. Noted family plans for pt to [place] home when medically ready; would recommend  PT for continued rehab at [place].     Time Calculation:    PT Charges     Row Name 06/03/22 1140             Time Calculation    Start Time 1110  -EE      Stop Time 1132  -EE      Time Calculation (min) 22 min  -EE      PT Received On 06/03/22  -EE      PT - Next Appointment 06/04/22  -EE      PT Goal Re-Cert Due Date 06/10/22  -EE              Time Calculation- PT    Total Timed Code Minutes- PT 8 minute(s)  -EE               Timed Charges    24801 - Gait Training Minutes  8  -EE              Total Minutes    Timed Charges Total Minutes 8  -EE       Total Minutes 8  -EE            User Key  (r) = Recorded By, (t) = Taken By, (c) = Cosigned By    Initials Name Provider Type    EE Sugar Marcus, PT Physical Therapist              Therapy Charges for Today     Code Description Service Date Service Provider Modifiers Qty    65488035997 HC GAIT TRAINING EA 15 MIN 6/3/2022 Sugar Marcus, PT GP 1    19740778994 HC PT EVAL MOD COMPLEXITY 2 6/3/2022 Sugar Marcus, PT GP 1          PT G-Codes  Outcome Measure Options: AM-PAC 6 Clicks Basic Mobility (PT)  AM-PAC 6 Clicks Score (PT): 18     Patient was not wearing a face mask during this therapy encounter. Therapist used appropriate personal protective equipment including mask and gloves.  Mask used was standard procedure mask. Appropriate PPE was worn during the entire therapy session. Hand hygiene was completed before and after therapy session. Patient is not in enhanced droplet precautions.       Sugar Marcus PT  6/3/2022

## 2022-06-03 NOTE — CASE MANAGEMENT/SOCIAL WORK
Discharge Planning Assessment  [facility]     Patient Name: Fritz Flores  MRN: [id number]  Today's Date: 6/3/2022    Admit Date: 6/2/2022     Discharge Needs Assessment     Row Name 06/03/22 1046       Living Environment    People in Home spouse    Name(s) of People in Home Wife Deidre Flores    Current Living Arrangements home    Primary Care Provided by self;other (see comments)  Family assists    Provides Primary Care For no one, unable/limited ability to care for self    Family Caregiver if Needed child(mary beth), adult;grandchild(mary beth), adult    Family Caregiver Names Deidre Flores [phone] daughter - other children and grandchildren help too    Quality of Family Relationships helpful    Able to Return to Prior Arrangements yes       Resource/Environmental Concerns    Resource/Environmental Concerns none       Transition Planning    Patient/Family Anticipates Transition to home with family    Patient/Family Anticipated Services at Transition home health care    Transportation Anticipated family or friend will provide       Discharge Needs Assessment    Readmission Within the Last 30 Days no previous admission in last 30 days    Equipment Currently Used at Home walker, rolling;commode;grab bar;wheelchair    Concerns to be Addressed basic needs    Anticipated Changes Related to Illness none    Equipment Needed After Discharge none    Discharge Facility/Level of Care Needs home with home health    Provided Post Acute Provider List? N/A    N/A Provider List Comment Has used [facility] in the past and would like to use them again               Discharge Plan     Row Name 06/03/22 1055       Plan    Plan Return home with spouse and family assistance and [facility] (referral pending)    Patient/Family in Agreement with Plan yes    Plan Comments Patient is not oriented.  Spoke with daughter (POA - she will bring in AD) Deidre Flores [phone] by telephone.  Patient live with wife.   A family member is always at their home to assist them.   Grandson stays 3 days a week and patient's 5 children split the other time.  He lives in a single story house with a ramp to enter.  Has a walker, BSC, grab bars and W/C.  He has used [facility] in the past and they would like to use them again - spoke with Marilin and she will evaluate.  He has been to [facility] and to [facility] for rehab but daughter states they do not want SNF, that patient will return home.  PCP is Dr. Rachid العلي and pharmacy is [facility] [address] [facility].  CCP will follow.  BHumeniuk RN              Continued Care and Services - Admitted Since 6/2/2022    Coordination has not been started for this encounter.       Expected Discharge Date and Time     Expected Discharge Date Expected Discharge Time    Jun 6, 2022          Demographic Summary     Row Name 06/03/22 1045       General Information    Admission Type inpatient    Arrived From home    Referral Source admission list    Reason for Consult discharge planning    Preferred Language English               Functional Status     Row Name 06/03/22 1045       Functional Status    Usual Activity Tolerance fair    Current Activity Tolerance poor       Functional Status, IADL    Medications completely dependent    Meal Preparation completely dependent    Housekeeping completely dependent    Laundry completely dependent    Shopping completely dependent       Mental Status    General Appearance WDL WDL       Mental Status Summary    Recent Changes in Mental Status/Cognitive Functioning unable to assess  Confused at present                         Becky S. Humeniuk, RN

## 2022-06-03 NOTE — PROGRESS NOTES
"    Name: Fritz Flores ADMIT: 2022   : 1933  PCP: Rachid العلي Jr., MD    MRN: [id number] LOS: 1 days   AGE/SEX: 88 y.o. male  ROOM: [facility]     Subjective   Subjective   Resting comfortably in bed.  When he describes a fall yesterday, he says he \"got tangled up with my walker\".  He denies prodromal dizziness, shortness of breath, chest pain, vision changes.     Review of Systems  As above     Objective   Objective   Vital Signs  Temp:  [97.4 °F (36.3 °C)] 97.4 °F (36.3 °C)  Heart Rate:  [58] 58  Resp:  [16] 16  BP: (149)/(73) 149/73     on   ;   Device (Oxygen Therapy): room air  Body mass index is 25.33 kg/m².  Physical Exam  Constitutional:       General: He is not in acute distress.     Appearance: He is not ill-appearing.   Cardiovascular:      Rate and Rhythm: Normal rate and regular rhythm.   Pulmonary:      Effort: Pulmonary effort is normal. No respiratory distress.      Breath sounds: No wheezing or rhonchi.   Abdominal:      Palpations: Abdomen is soft.      Tenderness: There is no abdominal tenderness. There is no guarding.   Musculoskeletal:      Right lower leg: Edema (1+ to mid shin) present.      Left lower leg: Edema (1+ to mid shin) present.   Skin:     Comments: Bilateral lower extremity chronic venous stasis changes   Neurological:      Mental Status: He is alert.   Psychiatric:         Mood and Affect: Mood normal.         Results Review     I reviewed the patient's new clinical results.  Results from last 7 days   Lab Units 22  1004 22  0617 22  0412   WBC 10*3/mm3 8.28 11.63* 14.62*   HEMOGLOBIN g/dL 11.2* 11.0* 11.5*   PLATELETS 10*3/mm3 221 245 232     Results from last 7 days   Lab Units 22  1004 22  0617 22  0412   SODIUM mmol/L 142 142 143   POTASSIUM mmol/L 4.5 3.0* 3.9   CHLORIDE mmol/L 109* 109* 107   CO2 mmol/L 20.2* 20.0* 22.2   BUN mg/dL 30* 35* 42*   CREATININE mg/dL 1.29* 1.30* 1.55*   GLUCOSE mg/dL 151* 71 101*   Estimated " Creatinine Clearance: 48.8 mL/min (A) (by C-G formula based on SCr of 1.29 mg/dL (H)).  Results from last 7 days   Lab Units 06/02/22  0412   ALBUMIN g/dL 4.10   BILIRUBIN mg/dL 0.4   ALK PHOS U/L 67   AST (SGOT) U/L 18   ALT (SGPT) U/L 10     Results from last 7 days   Lab Units 06/03/22  1004 06/02/22  0617 06/02/22  0412   CALCIUM mg/dL 8.8 7.5* 8.7   ALBUMIN g/dL  --   --  4.10   MAGNESIUM mg/dL 2.2  --   --        COVID19   Date Value Ref Range Status   06/02/2022 Not Detected Not Detected - Ref. Range Final   01/27/2022 Detected (C) Not Detected - Ref. Range Final     No results found for: HGBA1C, POCGLU    MRI Brain Without Contrast  Narrative: MRI EXAMINATION BRAIN WITHOUT CONTRAST     HISTORY: Altered mental status.     COMPARISON: CT of head 06/02/2022.     TECHNIQUE: A MRI examination of the brain was performed utilizing  sagittal T1, axial diffusion, T1, T2, T2 FLAIR and susceptibility  weighted imaging.     There is moderate age-appropriate atrophy. Mild small vessel ischemic  disease is noted. There is expected flow-void in the basilar artery and  in the distal aspect of internal carotid arteries bilaterally on the  axial T2 sequence. There is no evidence of restricted diffusion to  suggest acute infarction.     Impression: No evidence of restricted diffusion to suggest acute  infarction. Age-appropriate atrophy and mild small vessel ischemic  disease is noted.     This report was finalized on 6/3/2022 7:53 AM by Dr. Nawaf Marrufo M.D.       I reviewed the patient's daily medications.  Scheduled Medications  aspirin, 81 mg, Oral, Daily  docusate sodium, 100 mg, Oral, BID  enoxaparin, 40 mg, Subcutaneous, Q24H  potassium chloride, 20 mEq, Oral, Daily  sodium chloride, 10 mL, Intravenous, Q12H  vitamin B-12, 1,000 mcg, Oral, Daily    Infusions  sodium chloride, 100 mL/hr, Last Rate: Stopped (06/03/22 0250)    Diet  Diet Regular       Assessment/Plan     Active Hospital Problems    Diagnosis  POA   •  **Multiple falls [R29.6]  Not Applicable   • Essential hypertension [I10]  Yes   • Elevated troponin [R77.8]  Yes   • B12 deficiency [E53.8]  Yes   • Dehydration [E86.0]  Yes   • JOSE (acute kidney injury) (HCC) [N17.9]  Yes   • Age-related physical debility [R54]  Yes   • Ulcerative colitis (HCC) [K51.90]  Yes   • Coronary artery disease [I25.10]  Yes      Resolved Hospital Problems   No resolved problems to display.       88 y.o. male admitted with Multiple falls.    Fall  -seems mechanical based on his description- denies prodromal chest pain, dizziness, vision changes.  No loss of consciousness or head trauma  -MRI brain is negative for stroke  -check orthostatics  -Follow-up echocardiogram  -PT consult    Elevated troponin:  -Flat at 0.05.  Cardiology evaluated.  No chest pain and no ischemic changes on EKG  -Follow-up CTA chest    JOSE  -cr peaked at 1.5 and improving.  Possibly due to dehydration, continue fluids for    Hypertension  -Home amlodipine held.  Check orthostatics    B12 deficiency: Start oral supplement    Lovenox 40 mg SC daily for DVT prophylaxis.  DNR/DNI  Discussed with patient and nursing staff.  Anticipate discharge home with home health in 1-2 days.      Catalina Parekh DO  [facility]  06/03/22  13:25 EDT    Patient was placed in face mask on first look.  I wore protective equipment throughout this patient encounter including a face mask, gloves and protective eyewear.  Hand hygiene was performed before donning protective equipment and after removal when leaving the room.

## 2022-06-04 NOTE — PROGRESS NOTES
Name: Fritz Flores ADMIT: 2022   : 1933  PCP: Rachid العلي Jr., MD    MRN: [id number] LOS: 2 days   AGE/SEX: 88 y.o. male  ROOM: [facility]     Subjective   Subjective   Resting comfortably in bed.  Noted some confusion overnight.  Says he is still feeling weak but was able to walk with physical therapy yesterday.  Says he has a treadmill at home that he will start using    Review of Systems  Denies chest pain, shortness of breath, nausea, fevers     Objective   Objective   Vital Signs  Temp:  [97.4 °F (36.3 °C)-97.7 °F (36.5 °C)] 97.7 °F (36.5 °C)  Heart Rate:  [68-89] 89  Resp:  [18] 18  BP: (148-172)/(62-85) 154/67  SpO2:  [98 %-100 %] 99 %  on   ;   Device (Oxygen Therapy): room air  Body mass index is 25.33 kg/m².  Physical Exam  Constitutional:       General: He is not in acute distress.     Appearance: He is not ill-appearing.   Cardiovascular:      Rate and Rhythm: Normal rate and regular rhythm.   Pulmonary:      Effort: Pulmonary effort is normal. No respiratory distress.      Breath sounds: No wheezing or rhonchi.   Abdominal:      Palpations: Abdomen is soft.      Tenderness: There is no abdominal tenderness. There is no guarding.   Musculoskeletal:      Right lower leg: Edema (1+ to mid shin) present.      Left lower leg: Edema (1+ to mid shin) present.   Skin:     Comments: Bilateral lower extremity chronic venous stasis changes   Neurological:      Mental Status: He is alert.   Psychiatric:         Mood and Affect: Mood normal.         Results Review     I reviewed the patient's new clinical results.  Results from last 7 days   Lab Units 22  1004 22  0617 22  0412   WBC 10*3/mm3 8.28 11.63* 14.62*   HEMOGLOBIN g/dL 11.2* 11.0* 11.5*   PLATELETS 10*3/mm3 221 245 232     Results from last 7 days   Lab Units 22  0606 22  1004 22  0617 22  0412   SODIUM mmol/L 142 142 142 143   POTASSIUM mmol/L 4.6 4.5 3.0* 3.9   CHLORIDE mmol/L 109* 109* 109*  107   CO2 mmol/L 24.0 20.2* 20.0* 22.2   BUN mg/dL 26* 30* 35* 42*   CREATININE mg/dL 1.11 1.29* 1.30* 1.55*   GLUCOSE mg/dL 98 151* 71 101*   Estimated Creatinine Clearance: 56.7 mL/min (by C-G formula based on SCr of 1.11 mg/dL).  Results from last 7 days   Lab Units 06/02/22  0412   ALBUMIN g/dL 4.10   BILIRUBIN mg/dL 0.4   ALK PHOS U/L 67   AST (SGOT) U/L 18   ALT (SGPT) U/L 10     Results from last 7 days   Lab Units 06/04/22  0606 06/03/22  1004 06/02/22  0617 06/02/22  0412   CALCIUM mg/dL 8.4* 8.8 7.5* 8.7   ALBUMIN g/dL  --   --   --  4.10   MAGNESIUM mg/dL  --  2.2  --   --        COVID19   Date Value Ref Range Status   06/02/2022 Not Detected Not Detected - Ref. Range Final   01/27/2022 Detected (C) Not Detected - Ref. Range Final     No results found for: HGBA1C, POCGLU    CT Angiogram Chest  Narrative: EXAMINATION: CT ANGIOGRAM OF CHEST WITH CONTRAST     HISTORY: 88-year-old male with a history of shortness of air and a  suspected pulmonary embolism, undergoing evaluation.     TECHNIQUE: Contiguous axial images were obtained through the chest  following bolus intravenous administration of nonionic contrast. Three-D  reformatted images were produced and presented for interpretation.     COMPARISON: Chest x-ray, 04/07/2022     FINDINGS: No filling defects are seen within the pulmonary arterial  system to the level of the subsegmental pulmonary arteries. The heart  and great vessels have a normal appearance. The RV to LV ratio is less  than 1. Moderately severe atheromatous calcification is seen within the  thoracic aorta. The lungs are clear of consolidation. No pulmonary  nodules are visualized. There is minimal subpleural interstitial  opacification in the bilateral lower lobes. Marked retention of stool  seen throughout the visualized portion of the colon. Mild bilateral  renal cortical atrophy is noted.     Impression: 1. There is no evidence for a pulmonary embolism or acute  cardiothoracic  process.  2. There is mild bilateral lower lobe subpleural interstitial scarring.  3. There is marked retention of stool throughout the visualized portion  of the colon.     Radiation dose reduction techniques were utilized, including automated  exposure control and exposure modulation based on body size.     This report was finalized on 6/3/2022 4:51 PM by Dr. Mike Reyes M.D.     MRI Brain Without Contrast  Narrative: MRI EXAMINATION BRAIN WITHOUT CONTRAST     HISTORY: Altered mental status.     COMPARISON: CT of head 06/02/2022.     TECHNIQUE: A MRI examination of the brain was performed utilizing  sagittal T1, axial diffusion, T1, T2, T2 FLAIR and susceptibility  weighted imaging.     There is moderate age-appropriate atrophy. Mild small vessel ischemic  disease is noted. There is expected flow-void in the basilar artery and  in the distal aspect of internal carotid arteries bilaterally on the  axial T2 sequence. There is no evidence of restricted diffusion to  suggest acute infarction.     Impression: No evidence of restricted diffusion to suggest acute  infarction. Age-appropriate atrophy and mild small vessel ischemic  disease is noted.     This report was finalized on 6/3/2022 7:53 AM by Dr. Nawaf Marrufo M.D.       I reviewed the patient's daily medications.  Scheduled Medications  aspirin, 81 mg, Oral, Daily  enoxaparin, 40 mg, Subcutaneous, Q24H  melatonin, 5 mg, Oral, Nightly  polyethylene glycol, 17 g, Oral, Daily  potassium chloride, 20 mEq, Oral, Daily  senna-docusate sodium, 2 tablet, Oral, BID  sodium chloride, 10 mL, Intravenous, Q12H  vitamin B-12, 1,000 mcg, Oral, Daily    Infusions   Diet  Diet Regular       Assessment/Plan     Active Hospital Problems    Diagnosis  POA   • **Multiple falls [R29.6]  Not Applicable   • Essential hypertension [I10]  Yes   • Elevated troponin [R77.8]  Yes   • B12 deficiency [E53.8]  Yes   • Dehydration [E86.0]  Yes   • JOSE (acute kidney  injury) ([facility]) [N17.9]  Yes   • Age-related physical debility [R54]  Yes   • Constipation [K59.00]  Yes   • Ulcerative colitis (HCC) [K51.90]  Yes   • Coronary artery disease [I25.10]  Yes      Resolved Hospital Problems   No resolved problems to display.       88 y.o. male with history of ulcerative colitis and coronary artery disease who was brought in after a fall.    Today: CTA chest negative for PE.  He has history of chronic bilateral lower extremity DVT.  He is not on anticoagulation, check lower extremity ultrasounds.  Large amount of stool seen on CTA, start bowel regimen.  Noted some confusion last night.  Start scheduled melatonin and Seroquel as needed at bedtime for sleep/agitation.  Hopeful home tomorrow. Would like PT to work with him again today.    Fall  -seems mechanical based on his description- denies prodromal chest pain, dizziness, vision changes.  No loss of consciousness or head trauma  -MRI brain is negative for stroke.  Orthostatics are negative  -Echocardiogram showed EF 50%, mildly decreased systolic function and grade 1 diastolic dysfunction.  Some LV wall segment hypokinesis  -PT recommends home health PT    Elevated troponin:  -Flat at 0.05.  Cardiology evaluated.  No chest pain and no ischemic changes on EKG    Elevated D-dimer: CTA chest negative for PE  -He has history of chronic bilateral lower extremity DVT.  Not on anticoagulation    Lower extremity swelling  -He says this has been present for quite a while.  Home amlodipine held.  Check BNP.  Follow-up lower extremity ultrasound.  Compression stockings    JOSE: cr peaked at 1.5.  Likely due to dehydration.  Resolved with fluids    Hypertension: Home amlodipine held.  Check orthostatics    B12 deficiency: Start oral supplement    Hospital-acquired delirium: Delirium precautions.  Monitor for urinary retention and constipation    Lovenox 40 mg SC daily for DVT prophylaxis.  DNR/DNI  Discussed with patient and nursing  staff.  Anticipate discharge home with home health tomorrow      Catalina Parekh DO  [facility]  06/04/22  14:39 EDT    Patient was placed in face mask on first look.  I wore protective equipment throughout this patient encounter including a face mask, gloves and protective eyewear.  Hand hygiene was performed before donning protective equipment and after removal when leaving the room.

## 2022-06-04 NOTE — PLAN OF CARE
Goal Outcome Evaluation:              Outcome Evaluation: VSS. Pt answer orientation question appropriately, but is becoming more confused as the evening progresses. Pt can still answer orientation questions, but is getting out of the bed an has pulled out his IV. Orhtostatic complete today and charted in nursing note. IVF's completed after CTA.

## 2022-06-04 NOTE — PLAN OF CARE
Goal Outcome Evaluation:  Plan of Care Reviewed With: patient  Progress: no change  Outcome Evaluation: All needs met. Turns. Assist x 2. Urinal and incontinent care. Regular diet. VSS. Oriented to self and time only. RA. NSR. No complaints. WCTM.

## 2022-06-05 PROBLEM — I82.5Z2 LOWER LEG DVT (DEEP VENOUS THROMBOEMBOLISM), CHRONIC, LEFT (HCC): Status: ACTIVE | Noted: 2022-01-01

## 2022-06-05 NOTE — PLAN OF CARE
Goal Outcome Evaluation:  Plan of Care Reviewed With: patient  Progress: improving  Outcome Evaluation: Needs met. Turns as tolerated. Incontinent care provided. Regular diet. VSS. Disoriented to situation only, [place]. RA. NSR. No complaints. Potential to go home w/ HH today. WCTM.

## 2022-06-05 NOTE — DISCHARGE PLACEMENT REQUEST
"Fritz Herbert (88 y.o. Male)             Date of Birth   12/21/1933    Social Security Number       Address   [address] [address]    Home Phone   [phone]    MRN   [id number]       [facility]    Marital Status                               Admission Date   6/2/22    Admission Type   Emergency    Admitting Provider   Andrei Olivares MD    Attending Provider   Catalina Parekh DO    Department, Room/Bed   [address], [address]       Discharge Date       Discharge Disposition   Home-Health Care [facility]    Discharge Destination                               Attending Provider: Catalina Parekh DO    Allergies: Penicillins    Isolation: None   Infection: None   Code Status: No CPR   Advance Care Planning Activity    Ht: 185.4 cm (73\")   Wt: 87.1 kg (192 lb)    Admission Cmt: None   Principal Problem: Multiple falls [R29.6]                 Active Insurance as of 6/2/2022     Primary Coverage     Payor Plan Insurance Group Employer/Plan Group    MEDICARE MEDICARE A & B      Payor Plan Address Payor Plan Phone Number Payor Plan Fax Number Effective Dates    [address] [phone]  12/1/1998 - None Entered    [facility] [id number]       Subscriber Name Subscriber Birth Date Member ID       FRITZ HERBERT 12/21/1933 [id number]           Secondary Coverage     Payor Plan Insurance Group Employer/Plan Group    ANTHEM BLUE CROSS ANTHEM BLUE CROSS BLUE SHIELD PPO [id number]     Payor Plan Address Payor Plan Phone Number Payor Plan Fax Number Effective Dates    [address] [phone]  7/1/2005 - None Entered    [facility] [id number]       Subscriber Name Subscriber Birth Date Member ID       FRITZ HERBERT 12/21/1933 [id number]                 Emergency Contacts      (Rel.) Home Phone Work Phone Mobile Phone    Deidre Herbert (POA) (Daughter) [phone] -- --    Maria D Herbert (Spouse) [phone] -- [phone]    TusharJbMark (Grandchild) [phone] " -- [phone]    Eh Flores (Son) -- -- [phone]

## 2022-06-05 NOTE — PROGRESS NOTES
Patient is discharging today . Spoke to dtr/ POTOYA Jiang who verified face sheet information and agreeable to  home health . Has requested we call her cell for visit scheduling please [phone]. Orders in [facility] for home health PT and OT and we will evaluate for HHA. Thank you!

## 2022-06-05 NOTE — DISCHARGE SUMMARY
Patient Name: Fritz Flores  : 1933  MRN: [id number]    Date of Admission: 2022  Date of Discharge:  2022  Primary Care Physician: Rachid العلي Jr., MD      Chief Complaint:   Fall      Discharge Diagnoses     Active Hospital Problems    Diagnosis  POA   • **Multiple falls [R29.6]  Not Applicable   • Lower leg DVT (deep venous thromboembolism), chronic, left (HCC) [I82.5Z2]  Yes   • Essential hypertension [I10]  Yes   • Elevated troponin [R77.8]  Yes   • B12 deficiency [E53.8]  Yes   • Dehydration [E86.0]  Yes   • JOSE (acute kidney injury) (HCC) [N17.9]  Yes   • Age-related physical debility [R54]  Yes   • Constipation [K59.00]  Yes   • Ulcerative colitis (HCC) [K51.90]  Yes   • Coronary artery disease [I25.10]  Yes      Resolved Hospital Problems   No resolved problems to display.        Hospital Course     Mr. Flores is a 88 y.o. male with history of ulcerative colitis and coronary artery disease who was brought in after a fall. He has had multiple ER visits and admissions in the last year for falls.  Based on his description, the falls are mechanical.  He denies prodromal chest pain, dizziness, vision changes.  No loss of consciousness or head trauma.   He was also found to have JOSE, elevated troponin, elevated D-dimer and lower extremity swelling.     Recurrent falls:  During this admission, MRI brain was negative for stroke.  Orthostatics were negative.  His hospital course was complicated by delirium.  Recommend formal neurocognitive evaluation for dementia.  Evaluated by physical therapy and deemed safe for home with home health PT.     JOSE, due to dehydration: Cr peaked at 1.5.  Resolved with fluids    Elevated troponin: Flat at 0.05.  Cardiology evaluated.  No chest pain and no ischemic changes on EKG. No further work-up indicated at this time     Elevated D-dimer: CTA chest negative for PE.  Lower extremity ultrasound showed chronic left lower extremity DVT, not progressed from  prior 5 months ago.  He is not on anticoagulation.  Needs PCP follow-up.     Lower extremity swelling: BNP and echocardiogram were unremarkable.  Home amlodipine was stopped in case this is contributing.  Continue home Lasix.  Recommend compression stockings     Hypertension: Home amlodipine stopped.    Needs PCP follow-up for further medication titration     B12 deficiency: Start oral supplement     At the time of discharge patient was told to take all medications as prescribed, keep all follow-up appointments, and call their doctor or return to the hospital with any worsening or concerning symptoms.    Day of Discharge     Subjective:  Doing well this morning and no events overnight.  Eager to go home today    Review of Systems   Constitutional: Negative for chills and fever.   Respiratory: Negative for cough and shortness of breath.    Cardiovascular: Negative for chest pain and palpitations.   Gastrointestinal: Negative for nausea and vomiting.       Physical Exam:  Temp:  [97.7 °F (36.5 °C)-99.6 °F (37.6 °C)] 98.7 °F (37.1 °C)  Heart Rate:  [73-98] 73  Resp:  [18] 18  BP: (147-167)/(67-81) 147/78  Body mass index is 25.33 kg/m².  Physical Exam  Constitutional:       General: He is not in acute distress.     Appearance: He is not ill-appearing.   Cardiovascular:      Rate and Rhythm: Normal rate and regular rhythm.   Pulmonary:      Effort: Pulmonary effort is normal. No respiratory distress.      Breath sounds: No wheezing or rhonchi.   Abdominal:      Palpations: Abdomen is soft.      Tenderness: There is no abdominal tenderness. There is no guarding.   Skin:     Comments: Bilateral lower extremity chronic venous stasis changes   Neurological:      Mental Status: He is alert.      Comments: Oriented to person and date.  Not oriented to place   Psychiatric:         Mood and Affect: Mood normal.         Consultants     Consult Orders (all) (From admission, onward)     Start     Ordered    06/02/22 1007   Inpatient Cardiology Consult  Once        Specialty:  Cardiology  Provider:  Stalin Wyatt MD    06/02/22 1008    06/02/22 0908  Inpatient Case Management  Consult  Once        Provider:  (Not yet assigned)    06/02/22 0907    06/02/22 0531  [facility] (on-call MD unless specified) Details  Once        Specialty:  Hospitalist  Provider:  (Not yet assigned)    06/02/22 0530              Procedures     Imaging Results (All)     Procedure Component Value Units Date/Time    CT Angiogram Chest [[id number]] Collected: 06/03/22 1606     Updated: 06/03/22 1655    Narrative:      EXAMINATION: CT ANGIOGRAM OF CHEST WITH CONTRAST     HISTORY: 88-year-old male with a history of shortness of air and a  suspected pulmonary embolism, undergoing evaluation.     TECHNIQUE: Contiguous axial images were obtained through the chest  following bolus intravenous administration of nonionic contrast. Three-D  reformatted images were produced and presented for interpretation.     COMPARISON: Chest x-ray, 04/07/2022     FINDINGS: No filling defects are seen within the pulmonary arterial  system to the level of the subsegmental pulmonary arteries. The heart  and great vessels have a normal appearance. The RV to LV ratio is less  than 1. Moderately severe atheromatous calcification is seen within the  thoracic aorta. The lungs are clear of consolidation. No pulmonary  nodules are visualized. There is minimal subpleural interstitial  opacification in the bilateral lower lobes. Marked retention of stool  seen throughout the visualized portion of the colon. Mild bilateral  renal cortical atrophy is noted.       Impression:      1. There is no evidence for a pulmonary embolism or acute cardiothoracic  process.  2. There is mild bilateral lower lobe subpleural interstitial scarring.  3. There is marked retention of stool throughout the visualized portion  of the colon.     Radiation dose reduction techniques were utilized, including  automated  exposure control and exposure modulation based on body size.     This report was finalized on 6/3/2022 4:51 PM by Dr. Mike Reyes M.D.       MRI Brain Without Contrast [[id number]] Collected: 06/02/22 1234     Updated: 06/03/22 0757    Narrative:      MRI EXAMINATION BRAIN WITHOUT CONTRAST     HISTORY: Altered mental status.     COMPARISON: CT of head 06/02/2022.     TECHNIQUE: A MRI examination of the brain was performed utilizing  sagittal T1, axial diffusion, T1, T2, T2 FLAIR and susceptibility  weighted imaging.     There is moderate age-appropriate atrophy. Mild small vessel ischemic  disease is noted. There is expected flow-void in the basilar artery and  in the distal aspect of internal carotid arteries bilaterally on the  axial T2 sequence. There is no evidence of restricted diffusion to  suggest acute infarction.       Impression:      No evidence of restricted diffusion to suggest acute  infarction. Age-appropriate atrophy and mild small vessel ischemic  disease is noted.     This report was finalized on 6/3/2022 7:53 AM by Dr. Nawaf Marrufo M.D.       XR Shoulder 2+ View Right [[id number]] Collected: 06/02/22 1223     Updated: 06/02/22 1436    Narrative:      RIGHT SHOULDER     HISTORY: Fall, pain.     Two views of the right shoulder demonstrates moderate degenerative  disease involving the acromioclavicular joint. There is no evidence of  fracture or dislocation.     This report was finalized on 6/2/2022 2:33 PM by Dr. Nawaf Marrufo M.D.       CT Head Without Contrast [[id number]] Collected: 06/02/22 0444     Updated: 06/02/22 0452    Narrative:      CT HEAD WITHOUT CONTRAST     HISTORY: Delirium     COMPARISON: 04/07/2022     TECHNIQUE: Axial CT imaging was obtained through the brain. No IV  contrast was administered.     FINDINGS:  No acute intracranial hemorrhage is seen. There is diffuse atrophy.  There is periventricular and deep white matter microangiopathic change.  No calvarial  fracture is seen. There is mucosal thickening within the  ethmoid sinuses.       Impression:      No acute intracranial findings.     Radiation dose reduction techniques were utilized, including automated  exposure control and exposure modulation based on body size.     This report was finalized on 6/2/2022 4:49 AM by Dr. Courtney Mitchell M.D.       XR Chest 1 View [[id number]] Collected: 06/02/22 0443     Updated: 06/02/22 0447    Narrative:      SINGLE VIEW OF THE CHEST     HISTORY: Altered mental status     COMPARISON: 04/07/2022     FINDINGS:  Heart size is within normal limits for technique. No pneumothorax,  pleural effusion, or acute infiltrate is seen. 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 Fragmentation of  the third sternal wire is stable.       Impression:      No acute findings.     This report was finalized on 6/2/2022 4:44 AM by Dr. Courtney Mitchell M.D.             Pertinent Labs     Results from last 7 days   Lab Units 06/03/22  1004 06/02/22 0617 06/02/22 0412   WBC 10*3/mm3 8.28 11.63* 14.62*   HEMOGLOBIN g/dL 11.2* 11.0* 11.5*   PLATELETS 10*3/mm3 221 245 232     Results from last 7 days   Lab Units 06/04/22  0606 06/03/22  1004 06/02/22 0617 06/02/22  0412   SODIUM mmol/L 142 142 142 143   POTASSIUM mmol/L 4.6 4.5 3.0* 3.9   CHLORIDE mmol/L 109* 109* 109* 107   CO2 mmol/L 24.0 20.2* 20.0* 22.2   BUN mg/dL 26* 30* 35* 42*   CREATININE mg/dL 1.11 1.29* 1.30* 1.55*   GLUCOSE mg/dL 98 151* 71 101*   Estimated Creatinine Clearance: 56.7 mL/min (by C-G formula based on SCr of 1.11 mg/dL).  Results from last 7 days   Lab Units 06/02/22  0412   ALBUMIN g/dL 4.10   BILIRUBIN mg/dL 0.4   ALK PHOS U/L 67   AST (SGOT) U/L 18   ALT (SGPT) U/L 10     Results from last 7 days   Lab Units 06/04/22  0606 06/03/22  1004 06/02/22  0617 06/02/22  0412   CALCIUM mg/dL 8.4* 8.8 7.5* 8.7   ALBUMIN g/dL  --   --   --  4.10   MAGNESIUM mg/dL  --  2.2  --   --        Results  from last 7 days   Lab Units 06/04/22  0606 06/03/22  1004 06/02/22  1721 06/02/22  0617 06/02/22  0412   TROPONIN T ng/mL  --  0.057*  --  0.052* 0.048*   PROBNP pg/mL 1,715.0  --   --   --   --    D DIMER QUANT MCGFEU/mL  --   --  0.70*  --   --            Invalid input(s): LDLCALC        Test Results Pending at Discharge       Discharge Details        Discharge Medications      New Medications      Instructions Start Date   cyanocobalamin 1000 MCG tablet  Commonly known as: VITAMIN B-12   1,000 mcg, Oral, Daily   Start Date: June 6, 2022        Continue These Medications      Instructions Start Date   acetaminophen 325 MG tablet  Commonly known as: TYLENOL   650 mg, Oral, Every 4 Hours PRN      aspirin 81 MG EC tablet   81 mg, Oral, Daily      docusate sodium 100 MG capsule  Commonly known as: COLACE   100 mg, 2 Times Daily      LASIX PO   20 mg, Oral, Daily      ondansetron 4 MG tablet  Commonly known as: ZOFRAN   4 mg, Oral, Every 6 Hours PRN         Stop These Medications    amLODIPine 5 MG tablet  Commonly known as: NORVASC     cefdinir 300 MG capsule  Commonly known as: OMNICEF     potassium chloride 10 MEQ CR tablet            Allergies   Allergen Reactions   • Penicillins Rash         Discharge Disposition:  Home-Health Care [facility]    Discharge Diet:  Diet Order   Procedures   • Diet Regular       Discharge Activity:   As tolerated    CODE STATUS:    Code Status and Medical Interventions:   Ordered at: 06/02/22 1009     Medical Intervention Limits:    NO intubation (DNI)    NO cardioversion     Level Of Support Discussed With:    Next of Kin (If No Surrogate)     Code Status (Patient has no pulse and is not breathing):    No CPR (Do Not Attempt to Resuscitate)     Medical Interventions (Patient has pulse or is breathing):    Limited Support       No future appointments.   Follow-up Information     Leonardo Hoffmann MD Follow up.    Specialty: Orthopedic Surgery  Contact information:  [address]  [facility] [address]  [phone]             Rachid العلي Jr., MD. Schedule an appointment as soon as possible for a visit in 1 week(s).    Specialty: Internal Medicine  Why: Recommend referral for neurocognitive evaluation for dementia  Contact information:  [address]  [address] [id number] [phone]                         Time Spent on Discharge:  Greater than 30 minutes      Catalina Parekh DO  [facility]  06/05/22  08:29 EDT

## 2022-06-06 NOTE — CASE COMMUNICATION
Patient was seen by the HH Agency in February and March of this year after having COVID.  Since April patient has had multiple falls with the last one requiring a hospital admission.  Patient lives with wife and family provides them both 24/7 care.  Mostly grandson Tushar.  Patient PLOF was needing some assist/supervision with all ADL's and mobility.  At present needing more assist with transfers, bed mobility and gait.  Needs continue d instruction in HEP and home safety.  Lives in home with ramp to enter/ exit.  Got order from Dr. العلي's office for OT and HHA.  PT 2w4.

## 2022-06-06 NOTE — OUTREACH NOTE
Prep Survey    Flowsheet Row Responses   [facility] facility patient discharged from? [place]   Is LACE score < 7 ? No   Emergency Room discharge w/ pulse ox? No   Eligibility Readm Mgmt   Discharge diagnosis **Multiple falls    Does the patient have one of the following disease processes/diagnoses(primary or secondary)? Other   Does the patient have Home health ordered? Yes   What is the Home health agency?  referral pending for [facility]   Is there a DME ordered? No   Prep survey completed? Yes          WENDI RUIZ - Registered Nurse

## 2022-06-06 NOTE — CASE MANAGEMENT/SOCIAL WORK
Case Management Discharge Note      Final Note: DC'd home with [facility] . Nadiya Ross, RN    Provided Post Acute Provider List?: N/A  N/A Provider List Comment: Has used [facility] in the past and would like to use them again    Selected Continued Care - Discharged on 6/5/2022 Admission date: 6/2/2022 - Discharge disposition: Home-Health Care Svc    Destination    No services have been selected for the patient.              Durable Medical Equipment    No services have been selected for the patient.              Dialysis/Infusion    No services have been selected for the patient.              Home Medical Care Coordination complete.    Service Provider Selected Services Address Phone Fax Patient Preferred     [facility] [address] [address] [phone] --          Therapy    No services have been selected for the patient.              Community Resources    No services have been selected for the patient.              Community & DME    No services have been selected for the patient.                  Transportation Services  Private: Car    Final Discharge Disposition Code: 06 - home with home health care

## 2022-06-06 NOTE — HOME HEALTH
"Physical Therapy Evaluation   Diagnosis:  falls, HTN  Surgical Procedure and date: n/a  Pertinent Medical History:  see epic    Prior level of function:   Ambulation:  walks with rolling walker   Activities of daily living:  gets assist   Instrumental activities of daily living: family   Driving:  does not drive   Other/ Hobbies/Work:   none  Edema:   bilateral LE's  Home/ social environment:   lives with wife in home with ramp to enter/ exit, has 24/7 CG's all of them are family  Goal for Therapy: \"whatever you think necessary\"    Plan for next visit: review exercises, bed mobility    Received verbal orders from Brittany at Dr. العلي's office to see patient 2w4 as well as getting OT eval and HHA."

## 2022-06-07 NOTE — HOME HEALTH
"CURRENT SITUATION: He has had multiple falls and the most recent required hospital admission. I am familiar w/him from HH episodes in 02/2022 and 03/2022 s/p COVID-19.   SUBJECTIVE:  Agreeable to OT evaluation. Grandson: \"He just needs more help now w/everything.\"  SOCIAL & ENVIRONMENTAL SITUATION: Pt lives w/wife who has her own medical issues. Family provides 24/7 care for both pt and wife (has been for past couple years.) Ranch style home w/basement, ramp access through garage.  PATIENT'S &/OR CAREGIVER'S GOAL: \"Get him back to doing stuff for himself again.\"  INTERVENTIONS: OT Eval, ADL training, Home Safety, Therapeutic Exercise/Activity, Transfer/Mobility training, Monitor vitals including SPO2 via pulse-oximeter (notifiy MD if resting O2 <90% on room air), Patient/CG education, Falls risk prevention, Recommendations on AE, DME, AD, environmental adaptations.  ASSESSMENT, MEDICAL NECESSITY, PLAN: Pt is weak, unable to perform his ADLs as he was prior to hsopitalization. He will benefit from skilled OT service for remediation of deficits, improve safety in the home & w/functional tasks, and improve his level of function regarding his ADLs, transfers, mobilities. OT 1w1, 2w3.  PLAN FOR NEXT VISIT: introduce AE to pt for improved ease w/LBD. BUE HEP w/resistive band or hand wts."

## 2022-06-08 NOTE — CASE COMMUNICATION
Arrived at patient's home for previously scheduled PT appointment.  No answer to door or to calls to multiple phone numbers.  Will try to follow-up with patient Friday.

## 2022-06-08 NOTE — OUTREACH NOTE
Medical Week 1 Survey    Flowsheet Row Responses   [facility] patient discharged from? [place]   Does the patient have one of the following disease processes/diagnoses(primary or secondary)? Other   Week 1 attempt successful? Yes   Call start time 1658   Call end time 1700   Discharge diagnosis **Multiple falls    Person spoke with today (if not patient) and relationship Pat, daughter   Meds reviewed with patient/caregiver? Yes   Is the patient having any side effects they believe may be caused by any medication additions or changes? No   Does the patient have all medications ordered at discharge? Yes   Is the patient taking all medications as directed (includes completed medication regime)? Yes   Does the patient have a primary care provider?  Yes   Does the patient have an appointment with their PCP within 7 days of discharge? No   What is preventing the patient from scheduling follow up appointments within 7 days of discharge? Haven't had time   Nursing Interventions Advised patient to make appointment   Has the patient kept scheduled appointments due by today? N/A   What is the Home health agency?  [facility]   Has home health visited the patient within 72 hours of discharge? Yes   Psychosocial issues? No   Did the patient receive a copy of their discharge instructions? Yes   Nursing interventions Reviewed instructions with patient   What is the patient's perception of their health status since discharge? Improving   Is the patient/caregiver able to teach back signs and symptoms related to disease process for when to call PCP? Yes   Is the patient/caregiver able to teach back signs and symptoms related to disease process for when to call 911? Yes   Is the patient/caregiver able to teach back the hierarchy of who to call/visit for symptoms/problems? PCP, Specialist, Home health nurse, Urgent Care, ED, 911 Yes   Week 1 call completed? Yes          RACIEL GARCIA - Registered Nurse

## 2022-06-13 NOTE — HOME HEALTH
"Plan for next visit: gait, review exercises, add standing exercises    Subjective: \"I feel OK.”  No other issues over the weekend.    Falls since last visit: none  Home/Social Environment:   lives with wife in home with ramp to enter/ exit, has 24/7 CG's all of them are family"

## 2022-06-16 NOTE — CASE COMMUNICATION
Patient missed a HHA visit from [facility] on 6/10/2022     Reason: HHA was on vacation.       For your records only.   As per home health protocol, MD must be notified of missed/cancelled visits; therefore the prescribed frequency was not met.

## 2022-06-17 NOTE — CASE COMMUNICATION
RE: Fritz Flores  :  33    Home Health agencies are required by Federal, State, & Accreditation regulations to notify the physician of the any patient related incidents.   The above patient had an unwitnessed fall, while at home, on 22. Patient got up inthe middle of the night and fell.  Shouted for son who with help of nephew was able to get patient up and back to bed.  Patient has an abrasion right side of head and bruisi ng right armpit and back of right arm.    Please call [phone] if you have any questions.    Thank you,  Vivian Mitchell, PT, MHS  [facility]

## 2022-06-17 NOTE — HOME HEALTH
Plan for next visit: gait, review exercises, add standing exercises    Subjective:  “I didn't know that I fell.”  Falls since last visit: yes, falls report submitted, fell getting up in the middle of the night, son heard him hollering and went and found him on floor, abrasion right side of head and bruising under his right arm and back of right arm, recommended to son to get a bed alarm  Home/Social Environment:   lives with wife in home with ramp to enter/ exit, has 24/7 CG's all of them are family    Patient there with HHA upon arrival.

## 2022-06-20 NOTE — HOME HEALTH
Plan for next visit: gait, review supine and standing exercises    Subjective:  Had a lot of company over the weekend.    Falls since last visit: no  Home/Social Environment:   lives with wife in home with ramp to enter/ exit, has 24/7 CG's all of them are family    Talked to daughter in law about bed alarms.

## 2022-06-21 NOTE — HOME HEALTH
SUBJECTIVE: One fall last week in bedroom (PT documented.) CG reports another fall in BA over weekend. No medication changes reported.     PLAN FOR NEXT VISIT: ADLs, transfers, HEP

## 2022-06-22 NOTE — HOME HEALTH
Plan for next visit: gait, review supine and standing exercises    Subjective:  No complaints.      Falls since last visit: no  Home/Social Environment:   lives with wife in home with ramp to enter/ exit, has 24/7 CG's all of them are family

## 2022-06-23 NOTE — HOME HEALTH
"SUBJECTIVE: No falls or medication changes reported. \"You all are an annoying nuisance. I wish you'd all just leave me alone.\"     PLAN FOR NEXT VISIT: Review safety w/tasks, upright balance. Prepare for d/c \"PRE-D/C VISIT.\""

## 2022-06-27 NOTE — HOME HEALTH
Plan for next visit: gait, review supine and standing exercises, discharge to [facility]    Subjective:  I'm OK.      Falls since last visit: no  Home/Social Environment:   lives with wife in home with ramp to enter/ exit, has 24/7 CG's all of them are family

## 2022-06-27 NOTE — CASE COMMUNICATION
Patient missed a HHA visit from [facility] on 6/22/2022     Reason: pt refused the serivce.       For your records only.   As per home health protocol, MD must be notified of missed/cancelled visits; therefore the prescribed frequency was not met.

## 2022-06-29 NOTE — HOME HEALTH
Subjective:  Seems like everything is OK.        Falls since last visit: no  Home/Social Environment:   lives with wife in home with ramp to enter/ exit, has 24/7 CG's all of them are family

## 2022-06-29 NOTE — HOME HEALTH
"SUBJECTIVE: after asking pt why his pants/socks are wet, \"I don't know.\" No falls or medication changes reported by family.    PLAN FOR NEXT VISIT: [place]AS[place] D/C"

## 2022-07-03 NOTE — CASE COMMUNICATION
Patient missed a  HHA visit from [facility] on 6/29/2022.     Reason: no answer the phone        For your records only.   As per home health protocol, MD must be notified of missed/cancelled visits; therefore the prescribed frequency was not met.

## 2022-08-22 NOTE — HOME HEALTH
"Patient has a been declining functionally for over a year with multiple falls.  He had another fall at home 8/13/22 and suffered a occipital bone fracture.  He was at [facility] from 8/14 to 8/18.  He has a PMH of recurrent UTIs (again with this last episode), CABG, LE edema, [place].  He has suspected dementia and he presents with some Parkinsonian-type of neurological condition.   He has 24 hour care from his family, but he is allowed to \"what he can\" on his own.  He uses a rolling walker and does basic care of himself.  His wife is near total asssit from her family.      Assessment:  patient back home and close to his baseline function.  Sit to stand transfers a bit weaker.  He will remain a high falls risk. Home PT will see for home transition, again working on falls reduction and home safety.  OT to assess for additional ADL assist/bath aid. ST to review and teach swallowing precautions.  SN may be added for suture removal if PCP orders it.     Plan for next visit  1.  Home safety teaching as needed  2. Limited HEP as tolerated."

## 2022-08-23 NOTE — HOME HEALTH
"CURRENT SITUATION: He had a fall at home on 08-13-22, falling backwards and hitting back of head on [employer] hearth. He has a hx of multiple falls over the most recent years. Due to fall, he sustained a occipital bone fx which is still w/staples. He was @ [facility] 08-14 to 08-18-22. He is now home w/HH. He has 24/7 assistance via multiple different family members. PMHx: recurrent UTIs (again with this last episode), CABG, LE edema, [place], and suspected dementia. He also presents w/some Parkinsonian-type movements/characteristics.     SUBJECTIVE: \"Yes, I remember you.\" Agreeable to OT evaluation.    SOCIAL & ENVIRONMENTAL SITUATION: Pt lives w/family who offers 24/7 assistance via a variety of members. Home is single level, ramp access in garage, laminate or carpet floors throughout.    PATIENT'S &/OR CAREGIVER'S GOAL: \"Get him back to his baseline.\"    INTERVENTIONS: OT Eval, ADL training, Home Safety, Therapeutic Exercise/Activity, Transfer/Mobility training, Monitor vitals including SPO2 via pulse-oximeter (notifiy MD if resting O2 <90% on room air), Patient/CG education, Falls risk prevention, Recommendations on AE, DME, AD, environmental adaptations.    ASSESSMENT, MEDICAL NECESSITY, PLAN: I am very familiar w/this gentleman. Unfortunately, he has had another fall. Pt is cooperative and willing to participate in therapies that are being warranted and recommended. He continues to have impaired balance and requires assistance/supervision when upright. Pt either doesn't remember this safety measure or chooses to ignore it and he gets up and is mobile in the home w/o assistance or supervision. This is when falls occur. He presents this day with more hesitation and caution in his mobilities and transfers. He is moving slower and does not walk away from his walker (which is good) as he has in the past. He is weak, has limited activity tolerance, and continues to need assistance w/ADLs and functional tasks " from standing that require him to use his hands. Skilled OT is appropriate as is HHA service. OT will follow 2w1, 1w2 and he/family are in agreement. HHA is recommended 2w4 to maintain safety w/his transfers in/out of shower and assist w/dressing.     PLAN FOR NEXT VISIT: upright balance tasks with challenge of BUE use.

## 2022-08-24 NOTE — HOME HEALTH
Patient up at the kitchen table when I arrived.   Grandson present, but did not stay in the room and not available for any teaching today. He seemed to be arguing with a family member on the phone.  The patient seemed to have a urine smell and I asked if he has changed his clothes recently and he said yes.  He also still denies any incontinence or urinary issues, but he is not a reliable historian.  I will consult with the HH team about his current situation and care.      Plan for next visit  Reassess patient care and advise accordingly.  Continue to work on transfers and limited HEP.

## 2022-08-25 NOTE — CASE COMMUNICATION
Patient missed a HHA visit from [facility] on 8/24/2022    Reason: no on answer the door or phone       For your records only.   As per home health protocol, MD must be notified of missed/cancelled visits; therefore the prescribed frequency was not met.

## 2022-08-25 NOTE — HOME HEALTH
"SUBJECTIVE: \"Hello. I'm doing OK I guess. You?\" No falls or medication changes reported.     PLAN FOR NEXT VISIT: ADLs w/AE."

## 2022-08-29 NOTE — HOME HEALTH
"Arrived at my appointment today to find the patient sitting in the garage by himself.  Upon taking his vitals and assessing  his LEs (which were very edematous), I noticed his pants and socks were wet.  I was worried that he might have weeping edema, but found he was urine soaked.  I opened the house door and asked if the caregiver was present and no answer.  I took the patient back inside and assisted in changing his brief (which weighed at least 2 to 3 pounds from being urine soaked) and his soiled pants. I could her his caregiver (grandson) on the phone in his room and I spoke loud enough for him to hear the situation that the patient was going through and he did not come out of his room.  I put the patient in his lift chair and elevated his feet.  I called the patient's POA (daughter Deidre) as soon as I left and discussed the concerns over the situation that the patient was urine soaked and now this will lead to another UTI and how his lower legs were very edematous and soaked, which could lead to a breakdown of his skin. I instructed that the patient needed immediate care to help get cleaned up and put proper socks and  pants (I had to use an old pair of pants as I could not find any clean ones).  She was very responsive and apologetic about the situation and vowed to \"make changes\" to his caregiver situation as the grandson no longer seems to be working out.  I advised the OT of the situation and we will monitor for patient care issues in the future and try to educate the family on his level of care needed.     Plan for next visit  1.  Continue to assess his level of care needs and report to his POA  2.  Continue to work on basic mobility and activities to reduce falls risk as much as possible."

## 2022-08-30 NOTE — HOME HEALTH
"SUBJECTIVE: No falls or medication changes reported. \"I do a whole lot more than you think I do. I'm done for the day.\" And at this point, pt declined to participate in any more therapy today despite the attempts to persuade him otherwise by me and his daughter. (Pt had a PT appt earlier.)    PLAN FOR NEXT VISIT: OT d/c"

## 2022-08-31 NOTE — HOME HEALTH
Patient sitting outside in the garage when I arrived.  One of his daughters (Savanna) was present today and more involved in the session.  She states the family had discussion after my call to the POA last week about the found state of the patient.  Education for care of patient continued today with his daughter.   Patient had his sutures in his head removed last week and it is healed now.      Plan for next visit  1.  Continue any intervention/teaching necessary for patient safety, falls reduction and care.

## 2022-09-06 NOTE — HOME HEALTH
"SUBJECTIVE: Caregiver; No falls or medication changes reported. Pt; \"I didn't know you were coming today.\" Agreeable to OT d/c visit."

## 2022-09-08 NOTE — HOME HEALTH
"Arrived today to find the patient outside, walking with his walker on the driveway.  He evidently came down the ramp by himself.  I supervised his walk back into the house and had him sit on his lift chair.  His caregiver was not around at the time.  The patient's wife was calling out to go the bathroom and I again called out the the grandson and no answer.  The patient stated he was \"looking for Mark\" (his grandson) when he was walking around.  I eventually called the grandson's number and he was back in his bedroom, apparently asleep and not aware of the patient up by himself outside or the needs for his grandmother. I called the patient's POA (daughter Pat) to voice concerns.      Plan for next visit  Plan on agency dc and consult with Pat for concerns/recommendations."

## 2022-09-14 NOTE — CASE COMMUNICATION
Patient missed  HHA visit from [facility] on 9/14/2022    Reason: pt refused help with his p/c      For your records only.   As per home health protocol, MD must be notified of missed/cancelled visits; therefore the prescribed frequency was not met.

## 2022-09-15 NOTE — HOME HEALTH
Patient had a new caregiver present today (not new to the family but new to doing the caregiving).  I repeated to her that direct supervision is recommended needed for his safety.  She stated patient is independent and does not want her to do a lot for him.  Again, advised that letting him do on his own is good but with direct (not indirect or outside the room) supervision. I also educated that helping and encouraging consistent brief changes so he is not sitting in a soiled brief is needed to lessen his UTI chances.  Patient also has major edema still in B feet and caregiver advised of elevation as only means of trying to control.  His POA (daughter Deidre) was called and advised of the last visit today for HH services (including the bath aide).

## 2023-01-01 ENCOUNTER — APPOINTMENT (OUTPATIENT)
Dept: GENERAL RADIOLOGY | Facility: HOSPITAL | Age: 88
DRG: 684 | End: 2023-01-01
Payer: MEDICARE

## 2023-01-01 ENCOUNTER — READMISSION MANAGEMENT (OUTPATIENT)
Dept: CALL CENTER | Facility: HOSPITAL | Age: 88
End: 2023-01-01
Payer: MEDICARE

## 2023-01-01 ENCOUNTER — HOME CARE VISIT (OUTPATIENT)
Dept: HOME HEALTH SERVICES | Facility: HOME HEALTHCARE | Age: 88
End: 2023-01-01
Payer: MEDICARE

## 2023-01-01 ENCOUNTER — LAB (OUTPATIENT)
Dept: LAB | Facility: HOSPITAL | Age: 88
End: 2023-01-01
Payer: MEDICARE

## 2023-01-01 ENCOUNTER — APPOINTMENT (OUTPATIENT)
Dept: CARDIOLOGY | Facility: HOSPITAL | Age: 88
DRG: 684 | End: 2023-01-01
Payer: MEDICARE

## 2023-01-01 ENCOUNTER — HOME HEALTH ADMISSION (OUTPATIENT)
Dept: HOME HEALTH SERVICES | Facility: HOME HEALTHCARE | Age: 88
End: 2023-01-01
Payer: MEDICARE

## 2023-01-01 ENCOUNTER — APPOINTMENT (OUTPATIENT)
Dept: CT IMAGING | Facility: HOSPITAL | Age: 88
DRG: 684 | End: 2023-01-01
Payer: MEDICARE

## 2023-01-01 ENCOUNTER — TRANSCRIBE ORDERS (OUTPATIENT)
Dept: ADMINISTRATIVE | Facility: HOSPITAL | Age: 88
End: 2023-01-01
Payer: MEDICARE

## 2023-01-01 ENCOUNTER — TELEPHONE (OUTPATIENT)
Dept: CARDIOLOGY | Facility: CLINIC | Age: 88
End: 2023-01-01
Payer: MEDICARE

## 2023-01-01 ENCOUNTER — APPOINTMENT (OUTPATIENT)
Dept: GENERAL RADIOLOGY | Facility: HOSPITAL | Age: 88
DRG: 177 | End: 2023-01-01
Payer: MEDICARE

## 2023-01-01 ENCOUNTER — OFFICE VISIT (OUTPATIENT)
Dept: CARDIOLOGY | Facility: CLINIC | Age: 88
End: 2023-01-01
Payer: MEDICARE

## 2023-01-01 ENCOUNTER — APPOINTMENT (OUTPATIENT)
Dept: NEUROLOGY | Facility: HOSPITAL | Age: 88
DRG: 177 | End: 2023-01-01
Payer: MEDICARE

## 2023-01-01 ENCOUNTER — HOSPITAL ENCOUNTER (OUTPATIENT)
Dept: GENERAL RADIOLOGY | Facility: HOSPITAL | Age: 88
Discharge: HOME | End: 2023-03-08
Payer: MEDICARE

## 2023-01-01 ENCOUNTER — APPOINTMENT (OUTPATIENT)
Dept: CT IMAGING | Facility: HOSPITAL | Age: 88
DRG: 177 | End: 2023-01-01
Payer: MEDICARE

## 2023-01-01 ENCOUNTER — HOSPITAL ENCOUNTER (INPATIENT)
Facility: HOSPITAL | Age: 88
LOS: 3 days | Discharge: HOME HEALTH | DRG: 684 | End: 2023-01-16
Attending: EMERGENCY MEDICINE | Admitting: STUDENT IN AN ORGANIZED HEALTH CARE EDUCATION/TRAINING PROGRAM
Payer: MEDICARE

## 2023-01-01 ENCOUNTER — HOSPITAL ENCOUNTER (INPATIENT)
Facility: HOSPITAL | Age: 88
LOS: 8 days | DRG: 177 | End: 2023-03-29
Attending: EMERGENCY MEDICINE | Admitting: INTERNAL MEDICINE
Payer: MEDICARE

## 2023-01-01 VITALS
DIASTOLIC BLOOD PRESSURE: 50 MMHG | RESPIRATION RATE: 18 BRPM | HEART RATE: 53 BPM | TEMPERATURE: 97 F | SYSTOLIC BLOOD PRESSURE: 116 MMHG | OXYGEN SATURATION: 98 %

## 2023-01-01 VITALS
DIASTOLIC BLOOD PRESSURE: 73 MMHG | HEART RATE: 69 BPM | RESPIRATION RATE: 16 BRPM | SYSTOLIC BLOOD PRESSURE: 160 MMHG | TEMPERATURE: 97.8 F | HEIGHT: 73 IN | WEIGHT: 190.04 LBS | OXYGEN SATURATION: 91 % | BODY MASS INDEX: 25.19 KG/M2

## 2023-01-01 VITALS
OXYGEN SATURATION: 97 % | TEMPERATURE: 97.1 F | SYSTOLIC BLOOD PRESSURE: 100 MMHG | DIASTOLIC BLOOD PRESSURE: 60 MMHG | HEART RATE: 60 BPM

## 2023-01-01 VITALS
WEIGHT: 190 LBS | DIASTOLIC BLOOD PRESSURE: 70 MMHG | OXYGEN SATURATION: 99 % | HEIGHT: 72 IN | HEART RATE: 59 BPM | BODY MASS INDEX: 25.73 KG/M2 | SYSTOLIC BLOOD PRESSURE: 130 MMHG

## 2023-01-01 VITALS
SYSTOLIC BLOOD PRESSURE: 120 MMHG | HEART RATE: 69 BPM | TEMPERATURE: 97.4 F | RESPIRATION RATE: 18 BRPM | OXYGEN SATURATION: 97 % | DIASTOLIC BLOOD PRESSURE: 60 MMHG

## 2023-01-01 VITALS
OXYGEN SATURATION: 97 % | TEMPERATURE: 97.1 F | SYSTOLIC BLOOD PRESSURE: 129 MMHG | RESPIRATION RATE: 18 BRPM | DIASTOLIC BLOOD PRESSURE: 79 MMHG | HEART RATE: 67 BPM

## 2023-01-01 VITALS
WEIGHT: 184.1 LBS | HEIGHT: 73 IN | SYSTOLIC BLOOD PRESSURE: 64 MMHG | BODY MASS INDEX: 24.4 KG/M2 | OXYGEN SATURATION: 92 % | TEMPERATURE: 97.5 F | DIASTOLIC BLOOD PRESSURE: 42 MMHG

## 2023-01-01 VITALS
DIASTOLIC BLOOD PRESSURE: 82 MMHG | HEART RATE: 76 BPM | RESPIRATION RATE: 18 BRPM | OXYGEN SATURATION: 98 % | TEMPERATURE: 97.1 F | SYSTOLIC BLOOD PRESSURE: 129 MMHG

## 2023-01-01 VITALS
TEMPERATURE: 97.2 F | DIASTOLIC BLOOD PRESSURE: 70 MMHG | RESPIRATION RATE: 18 BRPM | OXYGEN SATURATION: 98 % | HEART RATE: 68 BPM | SYSTOLIC BLOOD PRESSURE: 128 MMHG

## 2023-01-01 VITALS
DIASTOLIC BLOOD PRESSURE: 68 MMHG | HEART RATE: 67 BPM | SYSTOLIC BLOOD PRESSURE: 122 MMHG | OXYGEN SATURATION: 98 % | RESPIRATION RATE: 18 BRPM | TEMPERATURE: 97.1 F

## 2023-01-01 VITALS
HEART RATE: 56 BPM | RESPIRATION RATE: 18 BRPM | DIASTOLIC BLOOD PRESSURE: 66 MMHG | OXYGEN SATURATION: 100 % | SYSTOLIC BLOOD PRESSURE: 126 MMHG | TEMPERATURE: 96.9 F

## 2023-01-01 DIAGNOSIS — R00.1 BRADYCARDIA: Primary | ICD-10-CM

## 2023-01-01 DIAGNOSIS — K51.819 OTHER ULCERATIVE COLITIS WITH COMPLICATION: Chronic | ICD-10-CM

## 2023-01-01 DIAGNOSIS — E03.9 HYPOTHYROIDISM, ADULT: Primary | ICD-10-CM

## 2023-01-01 DIAGNOSIS — E03.9 ACQUIRED HYPOTHYROIDISM: ICD-10-CM

## 2023-01-01 DIAGNOSIS — U07.1 COVID-19 VIRUS DETECTED: ICD-10-CM

## 2023-01-01 DIAGNOSIS — I82.5Z2 LOWER LEG DVT (DEEP VENOUS THROMBOEMBOLISM), CHRONIC, LEFT: ICD-10-CM

## 2023-01-01 DIAGNOSIS — R05.9 COUGH, UNSPECIFIED TYPE: ICD-10-CM

## 2023-01-01 DIAGNOSIS — E86.1 HYPOTENSION DUE TO HYPOVOLEMIA: ICD-10-CM

## 2023-01-01 DIAGNOSIS — N17.9 ACUTE KIDNEY INJURY: Primary | ICD-10-CM

## 2023-01-01 DIAGNOSIS — I10 ESSENTIAL HYPERTENSION: ICD-10-CM

## 2023-01-01 DIAGNOSIS — E03.9 HYPOTHYROIDISM, ADULT: ICD-10-CM

## 2023-01-01 DIAGNOSIS — R53.1 GENERALIZED WEAKNESS: ICD-10-CM

## 2023-01-01 DIAGNOSIS — E03.8 OTHER SPECIFIED HYPOTHYROIDISM: ICD-10-CM

## 2023-01-01 DIAGNOSIS — I95.89 HYPOTENSION DUE TO HYPOVOLEMIA: ICD-10-CM

## 2023-01-01 DIAGNOSIS — F03.A0 MILD DEMENTIA, UNSPECIFIED DEMENTIA TYPE, UNSPECIFIED WHETHER BEHAVIORAL, PSYCHOTIC, OR MOOD DISTURBANCE OR ANXIETY: ICD-10-CM

## 2023-01-01 DIAGNOSIS — I95.9 HYPOTENSION, UNSPECIFIED HYPOTENSION TYPE: ICD-10-CM

## 2023-01-01 DIAGNOSIS — J69.0 ASPIRATION PNEUMONIA, UNSPECIFIED ASPIRATION PNEUMONIA TYPE, UNSPECIFIED LATERALITY, UNSPECIFIED PART OF LUNG: ICD-10-CM

## 2023-01-01 DIAGNOSIS — I25.10 CORONARY ARTERY DISEASE INVOLVING NATIVE CORONARY ARTERY OF NATIVE HEART WITHOUT ANGINA PECTORIS: Chronic | ICD-10-CM

## 2023-01-01 DIAGNOSIS — A41.9 SEPSIS WITHOUT ACUTE ORGAN DYSFUNCTION, DUE TO UNSPECIFIED ORGANISM: ICD-10-CM

## 2023-01-01 DIAGNOSIS — R56.9 SEIZURE-LIKE ACTIVITY: Primary | ICD-10-CM

## 2023-01-01 LAB
ABO GROUP BLD: NORMAL
ALBUMIN SERPL-MCNC: 3.7 G/DL (ref 3.5–5.2)
ALBUMIN SERPL-MCNC: 3.9 G/DL (ref 3.5–5.2)
ALBUMIN SERPL-MCNC: 4.1 G/DL (ref 3.5–5.2)
ALBUMIN/GLOB SERPL: 1.2 G/DL
ALBUMIN/GLOB SERPL: 1.3 G/DL
ALBUMIN/GLOB SERPL: 1.4 G/DL
ALP SERPL-CCNC: 101 U/L (ref 39–117)
ALP SERPL-CCNC: 127 U/L (ref 39–117)
ALP SERPL-CCNC: 134 U/L (ref 39–117)
ALT SERPL W P-5'-P-CCNC: 12 U/L (ref 1–41)
ALT SERPL W P-5'-P-CCNC: 15 U/L (ref 1–41)
ALT SERPL W P-5'-P-CCNC: 15 U/L (ref 1–41)
AMMONIA BLD-SCNC: 18 UMOL/L (ref 16–60)
AMPHET+METHAMPHET UR QL: NEGATIVE
ANION GAP SERPL CALCULATED.3IONS-SCNC: 10 MMOL/L (ref 5–15)
ANION GAP SERPL CALCULATED.3IONS-SCNC: 10 MMOL/L (ref 5–15)
ANION GAP SERPL CALCULATED.3IONS-SCNC: 10.4 MMOL/L (ref 5–15)
ANION GAP SERPL CALCULATED.3IONS-SCNC: 12 MMOL/L (ref 5–15)
ANION GAP SERPL CALCULATED.3IONS-SCNC: 12.6 MMOL/L (ref 5–15)
ANION GAP SERPL CALCULATED.3IONS-SCNC: 13.1 MMOL/L (ref 5–15)
ANION GAP SERPL CALCULATED.3IONS-SCNC: 5.6 MMOL/L (ref 5–15)
ANION GAP SERPL CALCULATED.3IONS-SCNC: 8 MMOL/L (ref 5–15)
ANION GAP SERPL CALCULATED.3IONS-SCNC: 8.2 MMOL/L (ref 5–15)
ANION GAP SERPL CALCULATED.3IONS-SCNC: 9 MMOL/L (ref 5–15)
APTT PPP: 35.1 SECONDS (ref 22.7–35.4)
ARTERIAL PATENCY WRIST A: POSITIVE
ASCENDING AORTA: 3.5 CM
AST SERPL-CCNC: 13 U/L (ref 1–40)
AST SERPL-CCNC: 16 U/L (ref 1–40)
AST SERPL-CCNC: 17 U/L (ref 1–40)
ATMOSPHERIC PRESS: 750.6 MMHG
B PARAPERT DNA SPEC QL NAA+PROBE: NOT DETECTED
B PERT DNA SPEC QL NAA+PROBE: NOT DETECTED
BACTERIA SPEC AEROBE CULT: NORMAL
BARBITURATES UR QL SCN: NEGATIVE
BASE EXCESS BLDA CALC-SCNC: -1.9 MMOL/L (ref 0–2)
BASOPHILS # BLD AUTO: 0.02 10*3/MM3 (ref 0–0.2)
BASOPHILS # BLD AUTO: 0.03 10*3/MM3 (ref 0–0.2)
BASOPHILS NFR BLD AUTO: 0.1 % (ref 0–1.5)
BASOPHILS NFR BLD AUTO: 0.3 % (ref 0–1.5)
BASOPHILS NFR BLD AUTO: 0.3 % (ref 0–1.5)
BASOPHILS NFR BLD AUTO: 0.5 % (ref 0–1.5)
BDY SITE: ABNORMAL
BENZODIAZ UR QL SCN: NEGATIVE
BH CV ECHO MEAS - ACS: 1.84 CM
BH CV ECHO MEAS - AO MAX PG: 4.9 MMHG
BH CV ECHO MEAS - AO MEAN PG: 2.6 MMHG
BH CV ECHO MEAS - AO ROOT DIAM: 3.4 CM
BH CV ECHO MEAS - AO V2 MAX: 111.2 CM/SEC
BH CV ECHO MEAS - AO V2 VTI: 28.9 CM
BH CV ECHO MEAS - AVA(I,D): 2.34 CM2
BH CV ECHO MEAS - EDV(CUBED): 148.2 ML
BH CV ECHO MEAS - EDV(MOD-SP2): 131 ML
BH CV ECHO MEAS - EDV(MOD-SP4): 108 ML
BH CV ECHO MEAS - EF(MOD-BP): 44 %
BH CV ECHO MEAS - EF(MOD-SP2): 44.3 %
BH CV ECHO MEAS - EF(MOD-SP4): 43.5 %
BH CV ECHO MEAS - ESV(CUBED): 27.7 ML
BH CV ECHO MEAS - ESV(MOD-SP2): 73 ML
BH CV ECHO MEAS - ESV(MOD-SP4): 61 ML
BH CV ECHO MEAS - FS: 42.8 %
BH CV ECHO MEAS - IVS/LVPW: 1.15 CM
BH CV ECHO MEAS - IVSD: 0.86 CM
BH CV ECHO MEAS - LAT PEAK E' VEL: 9.2 CM/SEC
BH CV ECHO MEAS - LV DIASTOLIC VOL/BSA (35-75): 51.4 CM2
BH CV ECHO MEAS - LV MASS(C)D: 151.1 GRAMS
BH CV ECHO MEAS - LV MAX PG: 3 MMHG
BH CV ECHO MEAS - LV MEAN PG: 1.67 MMHG
BH CV ECHO MEAS - LV SYSTOLIC VOL/BSA (12-30): 29 CM2
BH CV ECHO MEAS - LV V1 MAX: 86.3 CM/SEC
BH CV ECHO MEAS - LV V1 VTI: 22.9 CM
BH CV ECHO MEAS - LVIDD: 5.3 CM
BH CV ECHO MEAS - LVIDS: 3 CM
BH CV ECHO MEAS - LVOT AREA: 3 CM2
BH CV ECHO MEAS - LVOT DIAM: 1.94 CM
BH CV ECHO MEAS - LVPWD: 0.75 CM
BH CV ECHO MEAS - MED PEAK E' VEL: 7.2 CM/SEC
BH CV ECHO MEAS - MV A DUR: 0.15 SEC
BH CV ECHO MEAS - MV A MAX VEL: 68.6 CM/SEC
BH CV ECHO MEAS - MV DEC SLOPE: 299.2 CM/SEC2
BH CV ECHO MEAS - MV DEC TIME: 0.23 MSEC
BH CV ECHO MEAS - MV E MAX VEL: 99.8 CM/SEC
BH CV ECHO MEAS - MV E/A: 1.46
BH CV ECHO MEAS - MV MAX PG: 4.1 MMHG
BH CV ECHO MEAS - MV MEAN PG: 1.59 MMHG
BH CV ECHO MEAS - MV P1/2T: 95.2 MSEC
BH CV ECHO MEAS - MV V2 VTI: 44.2 CM
BH CV ECHO MEAS - MVA(P1/2T): 2.31 CM2
BH CV ECHO MEAS - MVA(VTI): 1.53 CM2
BH CV ECHO MEAS - PA ACC TIME: 0.11 SEC
BH CV ECHO MEAS - PA PR(ACCEL): 29.9 MMHG
BH CV ECHO MEAS - PA V2 MAX: 86.3 CM/SEC
BH CV ECHO MEAS - PULM A REVS DUR: 0.14 SEC
BH CV ECHO MEAS - PULM A REVS VEL: 27.4 CM/SEC
BH CV ECHO MEAS - PULM DIAS VEL: 38.1 CM/SEC
BH CV ECHO MEAS - PULM S/D: 1.29
BH CV ECHO MEAS - PULM SYS VEL: 49.3 CM/SEC
BH CV ECHO MEAS - QP/QS: 1.18
BH CV ECHO MEAS - RAP SYSTOLE: 3 MMHG
BH CV ECHO MEAS - RV MAX PG: 1.65 MMHG
BH CV ECHO MEAS - RV V1 MAX: 64.3 CM/SEC
BH CV ECHO MEAS - RV V1 VTI: 17.6 CM
BH CV ECHO MEAS - RVOT DIAM: 2.4 CM
BH CV ECHO MEAS - RVSP: 26.7 MMHG
BH CV ECHO MEAS - SI(MOD-SP2): 27.6 ML/M2
BH CV ECHO MEAS - SI(MOD-SP4): 22.4 ML/M2
BH CV ECHO MEAS - SUP REN AO DIAM: 2.4 CM
BH CV ECHO MEAS - SV(LVOT): 67.5 ML
BH CV ECHO MEAS - SV(MOD-SP2): 58 ML
BH CV ECHO MEAS - SV(MOD-SP4): 47 ML
BH CV ECHO MEAS - SV(RVOT): 80 ML
BH CV ECHO MEAS - TAPSE (>1.6): 1.42 CM
BH CV ECHO MEAS - TR MAX PG: 23.7 MMHG
BH CV ECHO MEAS - TR MAX VEL: 243.2 CM/SEC
BH CV ECHO MEASUREMENTS AVERAGE E/E' RATIO: 12.17
BH CV ECHO SHUNT ASSESSMENT PERFORMED (HIDDEN SCRIPTING): 1
BH CV XLRA - RV BASE: 3.7 CM
BH CV XLRA - RV LENGTH: 7 CM
BH CV XLRA - RV MID: 4 CM
BH CV XLRA - TDI S': 7.3 CM/SEC
BILIRUB SERPL-MCNC: 0.2 MG/DL (ref 0–1.2)
BILIRUB SERPL-MCNC: 0.4 MG/DL (ref 0–1.2)
BILIRUB SERPL-MCNC: 0.4 MG/DL (ref 0–1.2)
BILIRUB UR QL STRIP: NEGATIVE
BILIRUB UR QL STRIP: NEGATIVE
BLD GP AB SCN SERPL QL: NEGATIVE
BUN SERPL-MCNC: 20 MG/DL (ref 8–23)
BUN SERPL-MCNC: 26 MG/DL (ref 8–23)
BUN SERPL-MCNC: 28 MG/DL (ref 8–23)
BUN SERPL-MCNC: 32 MG/DL (ref 8–23)
BUN SERPL-MCNC: 35 MG/DL (ref 8–23)
BUN SERPL-MCNC: 36 MG/DL (ref 8–23)
BUN SERPL-MCNC: 36 MG/DL (ref 8–23)
BUN SERPL-MCNC: 42 MG/DL (ref 8–23)
BUN SERPL-MCNC: 44 MG/DL (ref 8–23)
BUN SERPL-MCNC: 47 MG/DL (ref 8–23)
BUN/CREAT SERPL: 18.5 (ref 7–25)
BUN/CREAT SERPL: 20.7 (ref 7–25)
BUN/CREAT SERPL: 20.8 (ref 7–25)
BUN/CREAT SERPL: 21.4 (ref 7–25)
BUN/CREAT SERPL: 23 (ref 7–25)
BUN/CREAT SERPL: 23.1 (ref 7–25)
BUN/CREAT SERPL: 23.9 (ref 7–25)
BUN/CREAT SERPL: 24.3 (ref 7–25)
BUN/CREAT SERPL: 25.3 (ref 7–25)
BUN/CREAT SERPL: 25.4 (ref 7–25)
C PNEUM DNA NPH QL NAA+NON-PROBE: NOT DETECTED
CALCIUM SPEC-SCNC: 7.4 MG/DL (ref 8.6–10.5)
CALCIUM SPEC-SCNC: 7.5 MG/DL (ref 8.6–10.5)
CALCIUM SPEC-SCNC: 7.8 MG/DL (ref 8.6–10.5)
CALCIUM SPEC-SCNC: 8 MG/DL (ref 8.6–10.5)
CALCIUM SPEC-SCNC: 8.2 MG/DL (ref 8.6–10.5)
CALCIUM SPEC-SCNC: 8.3 MG/DL (ref 8.6–10.5)
CALCIUM SPEC-SCNC: 8.3 MG/DL (ref 8.6–10.5)
CALCIUM SPEC-SCNC: 8.5 MG/DL (ref 8.6–10.5)
CALCIUM SPEC-SCNC: 9.3 MG/DL (ref 8.6–10.5)
CALCIUM SPEC-SCNC: 9.4 MG/DL (ref 8.6–10.5)
CANNABINOIDS SERPL QL: NEGATIVE
CHLORIDE SERPL-SCNC: 100 MMOL/L (ref 98–107)
CHLORIDE SERPL-SCNC: 103 MMOL/L (ref 98–107)
CHLORIDE SERPL-SCNC: 105 MMOL/L (ref 98–107)
CHLORIDE SERPL-SCNC: 105 MMOL/L (ref 98–107)
CHLORIDE SERPL-SCNC: 107 MMOL/L (ref 98–107)
CHLORIDE SERPL-SCNC: 110 MMOL/L (ref 98–107)
CHLORIDE SERPL-SCNC: 111 MMOL/L (ref 98–107)
CHLORIDE SERPL-SCNC: 111 MMOL/L (ref 98–107)
CHLORIDE SERPL-SCNC: 114 MMOL/L (ref 98–107)
CHLORIDE SERPL-SCNC: 115 MMOL/L (ref 98–107)
CLARITY UR: CLEAR
CLARITY UR: CLEAR
CO2 SERPL-SCNC: 19 MMOL/L (ref 22–29)
CO2 SERPL-SCNC: 21 MMOL/L (ref 22–29)
CO2 SERPL-SCNC: 22 MMOL/L (ref 22–29)
CO2 SERPL-SCNC: 22.4 MMOL/L (ref 22–29)
CO2 SERPL-SCNC: 22.6 MMOL/L (ref 22–29)
CO2 SERPL-SCNC: 22.8 MMOL/L (ref 22–29)
CO2 SERPL-SCNC: 23 MMOL/L (ref 22–29)
CO2 SERPL-SCNC: 23 MMOL/L (ref 22–29)
CO2 SERPL-SCNC: 23.9 MMOL/L (ref 22–29)
CO2 SERPL-SCNC: 24.4 MMOL/L (ref 22–29)
COCAINE UR QL: NEGATIVE
COLOR UR: YELLOW
COLOR UR: YELLOW
CORTIS SERPL-MCNC: 24.4 MCG/DL
CORTIS SERPL-MCNC: 6.13 MCG/DL
CORTIS SERPL-MCNC: 7.72 MCG/DL
CREAT BLDA-MCNC: 1.9 MG/DL (ref 0.6–1.3)
CREAT SERPL-MCNC: 1.08 MG/DL (ref 0.76–1.27)
CREAT SERPL-MCNC: 1.25 MG/DL (ref 0.76–1.27)
CREAT SERPL-MCNC: 1.35 MG/DL (ref 0.76–1.27)
CREAT SERPL-MCNC: 1.39 MG/DL (ref 0.76–1.27)
CREAT SERPL-MCNC: 1.42 MG/DL (ref 0.76–1.27)
CREAT SERPL-MCNC: 1.44 MG/DL (ref 0.76–1.27)
CREAT SERPL-MCNC: 1.56 MG/DL (ref 0.76–1.27)
CREAT SERPL-MCNC: 1.74 MG/DL (ref 0.76–1.27)
CREAT SERPL-MCNC: 1.76 MG/DL (ref 0.76–1.27)
CREAT SERPL-MCNC: 2.2 MG/DL (ref 0.76–1.27)
D-LACTATE SERPL-SCNC: 0.6 MMOL/L (ref 0.5–2)
D-LACTATE SERPL-SCNC: 0.8 MMOL/L (ref 0.5–2)
D-LACTATE SERPL-SCNC: 1 MMOL/L (ref 0.5–2)
D-LACTATE SERPL-SCNC: 2.2 MMOL/L (ref 0.5–2)
DEPRECATED RDW RBC AUTO: 39.7 FL (ref 37–54)
DEPRECATED RDW RBC AUTO: 40.8 FL (ref 37–54)
DEPRECATED RDW RBC AUTO: 41.5 FL (ref 37–54)
DEPRECATED RDW RBC AUTO: 42.8 FL (ref 37–54)
DEPRECATED RDW RBC AUTO: 43 FL (ref 37–54)
DEPRECATED RDW RBC AUTO: 43 FL (ref 37–54)
DEPRECATED RDW RBC AUTO: 43.3 FL (ref 37–54)
DEPRECATED RDW RBC AUTO: 43.5 FL (ref 37–54)
DEPRECATED RDW RBC AUTO: 44.7 FL (ref 37–54)
DEPRECATED RDW RBC AUTO: 45.5 FL (ref 37–54)
EGFRCR SERPLBLD CKD-EPI 2021: 27.9 ML/MIN/1.73
EGFRCR SERPLBLD CKD-EPI 2021: 36.5 ML/MIN/1.73
EGFRCR SERPLBLD CKD-EPI 2021: 37 ML/MIN/1.73
EGFRCR SERPLBLD CKD-EPI 2021: 42.2 ML/MIN/1.73
EGFRCR SERPLBLD CKD-EPI 2021: 46.4 ML/MIN/1.73
EGFRCR SERPLBLD CKD-EPI 2021: 47.2 ML/MIN/1.73
EGFRCR SERPLBLD CKD-EPI 2021: 48.5 ML/MIN/1.73
EGFRCR SERPLBLD CKD-EPI 2021: 50.2 ML/MIN/1.73
EGFRCR SERPLBLD CKD-EPI 2021: 55 ML/MIN/1.73
EGFRCR SERPLBLD CKD-EPI 2021: 65.6 ML/MIN/1.73
EOSINOPHIL # BLD AUTO: 0.03 10*3/MM3 (ref 0–0.4)
EOSINOPHIL # BLD AUTO: 0.1 10*3/MM3 (ref 0–0.4)
EOSINOPHIL # BLD AUTO: 0.11 10*3/MM3 (ref 0–0.4)
EOSINOPHIL # BLD AUTO: 0.12 10*3/MM3 (ref 0–0.4)
EOSINOPHIL NFR BLD AUTO: 0.2 % (ref 0.3–6.2)
EOSINOPHIL NFR BLD AUTO: 1.3 % (ref 0.3–6.2)
EOSINOPHIL NFR BLD AUTO: 1.5 % (ref 0.3–6.2)
EOSINOPHIL NFR BLD AUTO: 1.8 % (ref 0.3–6.2)
ERYTHROCYTE [DISTWIDTH] IN BLOOD BY AUTOMATED COUNT: 11.3 % (ref 12.3–15.4)
ERYTHROCYTE [DISTWIDTH] IN BLOOD BY AUTOMATED COUNT: 11.3 % (ref 12.3–15.4)
ERYTHROCYTE [DISTWIDTH] IN BLOOD BY AUTOMATED COUNT: 11.4 % (ref 12.3–15.4)
ERYTHROCYTE [DISTWIDTH] IN BLOOD BY AUTOMATED COUNT: 11.5 % (ref 12.3–15.4)
ERYTHROCYTE [DISTWIDTH] IN BLOOD BY AUTOMATED COUNT: 11.6 % (ref 12.3–15.4)
ERYTHROCYTE [DISTWIDTH] IN BLOOD BY AUTOMATED COUNT: 12 % (ref 12.3–15.4)
ERYTHROCYTE [DISTWIDTH] IN BLOOD BY AUTOMATED COUNT: 12.1 % (ref 12.3–15.4)
ERYTHROCYTE [DISTWIDTH] IN BLOOD BY AUTOMATED COUNT: 12.3 % (ref 12.3–15.4)
ERYTHROCYTE [DISTWIDTH] IN BLOOD BY AUTOMATED COUNT: 12.3 % (ref 12.3–15.4)
ERYTHROCYTE [DISTWIDTH] IN BLOOD BY AUTOMATED COUNT: 12.7 % (ref 12.3–15.4)
ETHANOL BLD-MCNC: <10 MG/DL (ref 0–10)
ETHANOL UR QL: <0.01 %
FERRITIN SERPL-MCNC: 293 NG/ML (ref 30–400)
FLUAV SUBTYP SPEC NAA+PROBE: NOT DETECTED
FLUAV SUBTYP SPEC NAA+PROBE: NOT DETECTED
FLUBV RNA ISLT QL NAA+PROBE: NOT DETECTED
FLUBV RNA ISLT QL NAA+PROBE: NOT DETECTED
FOLATE SERPL-MCNC: 8.15 NG/ML (ref 4.78–24.2)
GAS FLOW AIRWAY: 2 LPM
GLOBULIN UR ELPH-MCNC: 2.8 GM/DL
GLOBULIN UR ELPH-MCNC: 2.9 GM/DL
GLOBULIN UR ELPH-MCNC: 3.2 GM/DL
GLUCOSE BLDC GLUCOMTR-MCNC: 100 MG/DL (ref 70–130)
GLUCOSE BLDC GLUCOMTR-MCNC: 101 MG/DL (ref 70–130)
GLUCOSE BLDC GLUCOMTR-MCNC: 105 MG/DL (ref 70–130)
GLUCOSE BLDC GLUCOMTR-MCNC: 108 MG/DL (ref 70–130)
GLUCOSE BLDC GLUCOMTR-MCNC: 110 MG/DL (ref 70–130)
GLUCOSE BLDC GLUCOMTR-MCNC: 115 MG/DL (ref 70–130)
GLUCOSE BLDC GLUCOMTR-MCNC: 120 MG/DL (ref 70–130)
GLUCOSE BLDC GLUCOMTR-MCNC: 122 MG/DL (ref 70–130)
GLUCOSE BLDC GLUCOMTR-MCNC: 129 MG/DL (ref 70–130)
GLUCOSE BLDC GLUCOMTR-MCNC: 145 MG/DL (ref 70–130)
GLUCOSE BLDC GLUCOMTR-MCNC: 88 MG/DL (ref 70–130)
GLUCOSE BLDC GLUCOMTR-MCNC: 96 MG/DL (ref 70–130)
GLUCOSE SERPL-MCNC: 100 MG/DL (ref 65–99)
GLUCOSE SERPL-MCNC: 102 MG/DL (ref 65–99)
GLUCOSE SERPL-MCNC: 115 MG/DL (ref 65–99)
GLUCOSE SERPL-MCNC: 136 MG/DL (ref 65–99)
GLUCOSE SERPL-MCNC: 83 MG/DL (ref 65–99)
GLUCOSE SERPL-MCNC: 92 MG/DL (ref 65–99)
GLUCOSE SERPL-MCNC: 93 MG/DL (ref 65–99)
GLUCOSE SERPL-MCNC: 96 MG/DL (ref 65–99)
GLUCOSE SERPL-MCNC: 98 MG/DL (ref 65–99)
GLUCOSE SERPL-MCNC: 99 MG/DL (ref 65–99)
GLUCOSE UR STRIP-MCNC: NEGATIVE MG/DL
GLUCOSE UR STRIP-MCNC: NEGATIVE MG/DL
HADV DNA SPEC NAA+PROBE: NOT DETECTED
HCO3 BLDA-SCNC: 24 MMOL/L (ref 22–28)
HCOV 229E RNA SPEC QL NAA+PROBE: NOT DETECTED
HCOV HKU1 RNA SPEC QL NAA+PROBE: NOT DETECTED
HCOV NL63 RNA SPEC QL NAA+PROBE: NOT DETECTED
HCOV OC43 RNA SPEC QL NAA+PROBE: NOT DETECTED
HCT VFR BLD AUTO: 26.6 % (ref 37.5–51)
HCT VFR BLD AUTO: 27.8 % (ref 37.5–51)
HCT VFR BLD AUTO: 29.6 % (ref 37.5–51)
HCT VFR BLD AUTO: 29.6 % (ref 37.5–51)
HCT VFR BLD AUTO: 30.2 % (ref 37.5–51)
HCT VFR BLD AUTO: 31.5 % (ref 37.5–51)
HCT VFR BLD AUTO: 31.6 % (ref 37.5–51)
HCT VFR BLD AUTO: 31.9 % (ref 37.5–51)
HCT VFR BLD AUTO: 34.4 % (ref 37.5–51)
HCT VFR BLD AUTO: 35 % (ref 37.5–51)
HGB BLD-MCNC: 10 G/DL (ref 13–17.7)
HGB BLD-MCNC: 10.3 G/DL (ref 13–17.7)
HGB BLD-MCNC: 10.4 G/DL (ref 13–17.7)
HGB BLD-MCNC: 10.9 G/DL (ref 13–17.7)
HGB BLD-MCNC: 10.9 G/DL (ref 13–17.7)
HGB BLD-MCNC: 11.3 G/DL (ref 13–17.7)
HGB BLD-MCNC: 11.6 G/DL (ref 13–17.7)
HGB BLD-MCNC: 12.6 G/DL (ref 13–17.7)
HGB BLD-MCNC: 9.1 G/DL (ref 13–17.7)
HGB BLD-MCNC: 9.4 G/DL (ref 13–17.7)
HGB UR QL STRIP.AUTO: NEGATIVE
HGB UR QL STRIP.AUTO: NEGATIVE
HMPV RNA NPH QL NAA+NON-PROBE: NOT DETECTED
HOLD SPECIMEN: NORMAL
HOLD SPECIMEN: NORMAL
HPIV1 RNA ISLT QL NAA+PROBE: NOT DETECTED
HPIV2 RNA SPEC QL NAA+PROBE: NOT DETECTED
HPIV3 RNA NPH QL NAA+PROBE: NOT DETECTED
HPIV4 P GENE NPH QL NAA+PROBE: NOT DETECTED
IMM GRANULOCYTES # BLD AUTO: 0.02 10*3/MM3 (ref 0–0.05)
IMM GRANULOCYTES # BLD AUTO: 0.03 10*3/MM3 (ref 0–0.05)
IMM GRANULOCYTES # BLD AUTO: 0.04 10*3/MM3 (ref 0–0.05)
IMM GRANULOCYTES # BLD AUTO: 0.07 10*3/MM3 (ref 0–0.05)
IMM GRANULOCYTES NFR BLD AUTO: 0.3 % (ref 0–0.5)
IMM GRANULOCYTES NFR BLD AUTO: 0.4 % (ref 0–0.5)
IMM GRANULOCYTES NFR BLD AUTO: 0.4 % (ref 0–0.5)
IMM GRANULOCYTES NFR BLD AUTO: 0.5 % (ref 0–0.5)
INR PPP: 1.07 (ref 0.9–1.1)
INR PPP: 1.18 (ref 0.9–1.1)
IRON 24H UR-MRATE: 60 MCG/DL (ref 59–158)
IRON SATN MFR SERPL: 25 % (ref 20–50)
KETONES UR QL STRIP: NEGATIVE
KETONES UR QL STRIP: NEGATIVE
L PNEUMO1 AG UR QL IA: NEGATIVE
LEFT ATRIUM VOLUME INDEX: 32.5 ML/M2
LEUKOCYTE ESTERASE UR QL STRIP.AUTO: NEGATIVE
LEUKOCYTE ESTERASE UR QL STRIP.AUTO: NEGATIVE
LYMPHOCYTES # BLD AUTO: 0.62 10*3/MM3 (ref 0.7–3.1)
LYMPHOCYTES # BLD AUTO: 0.88 10*3/MM3 (ref 0.7–3.1)
LYMPHOCYTES # BLD AUTO: 1.03 10*3/MM3 (ref 0.7–3.1)
LYMPHOCYTES # BLD AUTO: 1.3 10*3/MM3 (ref 0.7–3.1)
LYMPHOCYTES NFR BLD AUTO: 12 % (ref 19.6–45.3)
LYMPHOCYTES NFR BLD AUTO: 13.2 % (ref 19.6–45.3)
LYMPHOCYTES NFR BLD AUTO: 20 % (ref 19.6–45.3)
LYMPHOCYTES NFR BLD AUTO: 3.8 % (ref 19.6–45.3)
M PNEUMO IGG SER IA-ACNC: NOT DETECTED
MAXIMAL PREDICTED HEART RATE: 131 BPM
MAXIMAL PREDICTED HEART RATE: 131 BPM
MCH RBC QN AUTO: 33.1 PG (ref 26.6–33)
MCH RBC QN AUTO: 33.4 PG (ref 26.6–33)
MCH RBC QN AUTO: 33.7 PG (ref 26.6–33)
MCH RBC QN AUTO: 33.9 PG (ref 26.6–33)
MCH RBC QN AUTO: 34.1 PG (ref 26.6–33)
MCH RBC QN AUTO: 34.3 PG (ref 26.6–33)
MCH RBC QN AUTO: 34.6 PG (ref 26.6–33)
MCH RBC QN AUTO: 34.6 PG (ref 26.6–33)
MCH RBC QN AUTO: 34.9 PG (ref 26.6–33)
MCH RBC QN AUTO: 35 PG (ref 26.6–33)
MCHC RBC AUTO-ENTMCNC: 33.7 G/DL (ref 31.5–35.7)
MCHC RBC AUTO-ENTMCNC: 33.8 G/DL (ref 31.5–35.7)
MCHC RBC AUTO-ENTMCNC: 33.8 G/DL (ref 31.5–35.7)
MCHC RBC AUTO-ENTMCNC: 34.1 G/DL (ref 31.5–35.7)
MCHC RBC AUTO-ENTMCNC: 34.2 G/DL (ref 31.5–35.7)
MCHC RBC AUTO-ENTMCNC: 34.5 G/DL (ref 31.5–35.7)
MCHC RBC AUTO-ENTMCNC: 34.6 G/DL (ref 31.5–35.7)
MCHC RBC AUTO-ENTMCNC: 35.1 G/DL (ref 31.5–35.7)
MCHC RBC AUTO-ENTMCNC: 35.4 G/DL (ref 31.5–35.7)
MCHC RBC AUTO-ENTMCNC: 36 G/DL (ref 31.5–35.7)
MCV RBC AUTO: 102.2 FL (ref 79–97)
MCV RBC AUTO: 102.4 FL (ref 79–97)
MCV RBC AUTO: 96.4 FL (ref 79–97)
MCV RBC AUTO: 97 FL (ref 79–97)
MCV RBC AUTO: 97.1 FL (ref 79–97)
MCV RBC AUTO: 98.5 FL (ref 79–97)
MCV RBC AUTO: 98.8 FL (ref 79–97)
MCV RBC AUTO: 98.8 FL (ref 79–97)
MCV RBC AUTO: 99.1 FL (ref 79–97)
MCV RBC AUTO: 99.1 FL (ref 79–97)
METHADONE UR QL SCN: NEGATIVE
MODALITY: ABNORMAL
MONOCYTES # BLD AUTO: 0.52 10*3/MM3 (ref 0.1–0.9)
MONOCYTES # BLD AUTO: 0.56 10*3/MM3 (ref 0.1–0.9)
MONOCYTES # BLD AUTO: 0.64 10*3/MM3 (ref 0.1–0.9)
MONOCYTES # BLD AUTO: 0.72 10*3/MM3 (ref 0.1–0.9)
MONOCYTES NFR BLD AUTO: 4.5 % (ref 5–12)
MONOCYTES NFR BLD AUTO: 7.1 % (ref 5–12)
MONOCYTES NFR BLD AUTO: 7.2 % (ref 5–12)
MONOCYTES NFR BLD AUTO: 9.8 % (ref 5–12)
NEUTROPHILS NFR BLD AUTO: 14.65 10*3/MM3 (ref 1.7–7)
NEUTROPHILS NFR BLD AUTO: 4.4 10*3/MM3 (ref 1.7–7)
NEUTROPHILS NFR BLD AUTO: 5.76 10*3/MM3 (ref 1.7–7)
NEUTROPHILS NFR BLD AUTO: 6.07 10*3/MM3 (ref 1.7–7)
NEUTROPHILS NFR BLD AUTO: 67.6 % (ref 42.7–76)
NEUTROPHILS NFR BLD AUTO: 77.5 % (ref 42.7–76)
NEUTROPHILS NFR BLD AUTO: 78.7 % (ref 42.7–76)
NEUTROPHILS NFR BLD AUTO: 91 % (ref 42.7–76)
NITRITE UR QL STRIP: NEGATIVE
NITRITE UR QL STRIP: NEGATIVE
NRBC BLD AUTO-RTO: 0 /100 WBC (ref 0–0.2)
NT-PROBNP SERPL-MCNC: 152 PG/ML (ref 0–1800)
OPIATES UR QL: NEGATIVE
OXYCODONE UR QL SCN: NEGATIVE
PCO2 BLDA: 45.1 MM HG (ref 35–45)
PH BLDA: 7.33 PH UNITS (ref 7.35–7.45)
PH UR STRIP.AUTO: 6.5 [PH] (ref 5–8)
PH UR STRIP.AUTO: 6.5 [PH] (ref 5–8)
PLATELET # BLD AUTO: 171 10*3/MM3 (ref 140–450)
PLATELET # BLD AUTO: 185 10*3/MM3 (ref 140–450)
PLATELET # BLD AUTO: 186 10*3/MM3 (ref 140–450)
PLATELET # BLD AUTO: 187 10*3/MM3 (ref 140–450)
PLATELET # BLD AUTO: 199 10*3/MM3 (ref 140–450)
PLATELET # BLD AUTO: 206 10*3/MM3 (ref 140–450)
PLATELET # BLD AUTO: 211 10*3/MM3 (ref 140–450)
PLATELET # BLD AUTO: 213 10*3/MM3 (ref 140–450)
PLATELET # BLD AUTO: 222 10*3/MM3 (ref 140–450)
PLATELET # BLD AUTO: 238 10*3/MM3 (ref 140–450)
PMV BLD AUTO: 10 FL (ref 6–12)
PMV BLD AUTO: 10.4 FL (ref 6–12)
PMV BLD AUTO: 10.4 FL (ref 6–12)
PMV BLD AUTO: 10.6 FL (ref 6–12)
PMV BLD AUTO: 9.4 FL (ref 6–12)
PMV BLD AUTO: 9.6 FL (ref 6–12)
PMV BLD AUTO: 9.7 FL (ref 6–12)
PMV BLD AUTO: 9.8 FL (ref 6–12)
PMV BLD AUTO: 9.9 FL (ref 6–12)
PMV BLD AUTO: 9.9 FL (ref 6–12)
PO2 BLDA: 92 MM HG (ref 80–100)
POTASSIUM SERPL-SCNC: 3.6 MMOL/L (ref 3.5–5.2)
POTASSIUM SERPL-SCNC: 3.9 MMOL/L (ref 3.5–5.2)
POTASSIUM SERPL-SCNC: 4.2 MMOL/L (ref 3.5–5.2)
POTASSIUM SERPL-SCNC: 4.3 MMOL/L (ref 3.5–5.2)
POTASSIUM SERPL-SCNC: 4.5 MMOL/L (ref 3.5–5.2)
POTASSIUM SERPL-SCNC: 4.8 MMOL/L (ref 3.5–5.2)
POTASSIUM SERPL-SCNC: 4.9 MMOL/L (ref 3.5–5.2)
POTASSIUM SERPL-SCNC: 5.3 MMOL/L (ref 3.5–5.2)
POTASSIUM SERPL-SCNC: 5.4 MMOL/L (ref 3.5–5.2)
PROCALCITONIN SERPL-MCNC: 0.14 NG/ML (ref 0–0.25)
PROCALCITONIN SERPL-MCNC: 0.46 NG/ML (ref 0–0.25)
PROT SERPL-MCNC: 6.5 G/DL (ref 6–8.5)
PROT SERPL-MCNC: 7 G/DL (ref 6–8.5)
PROT SERPL-MCNC: 7.1 G/DL (ref 6–8.5)
PROT UR QL STRIP: NEGATIVE
PROT UR QL STRIP: NEGATIVE
PROTHROMBIN TIME: 14.1 SECONDS (ref 11.7–14.2)
PROTHROMBIN TIME: 15.2 SECONDS (ref 11.7–14.2)
QT INTERVAL: 444 MS
QT INTERVAL: 469 MS
RBC # BLD AUTO: 2.7 10*6/MM3 (ref 4.14–5.8)
RBC # BLD AUTO: 2.72 10*6/MM3 (ref 4.14–5.8)
RBC # BLD AUTO: 2.89 10*6/MM3 (ref 4.14–5.8)
RBC # BLD AUTO: 3.07 10*6/MM3 (ref 4.14–5.8)
RBC # BLD AUTO: 3.11 10*6/MM3 (ref 4.14–5.8)
RBC # BLD AUTO: 3.18 10*6/MM3 (ref 4.14–5.8)
RBC # BLD AUTO: 3.2 10*6/MM3 (ref 4.14–5.8)
RBC # BLD AUTO: 3.23 10*6/MM3 (ref 4.14–5.8)
RBC # BLD AUTO: 3.47 10*6/MM3 (ref 4.14–5.8)
RBC # BLD AUTO: 3.61 10*6/MM3 (ref 4.14–5.8)
RETICS # AUTO: 0.03 10*6/MM3 (ref 0.02–0.13)
RETICS/RBC NFR AUTO: 1.16 % (ref 0.7–1.9)
RH BLD: POSITIVE
RHINOVIRUS RNA SPEC NAA+PROBE: NOT DETECTED
RSV RNA NPH QL NAA+NON-PROBE: NOT DETECTED
S PNEUM AG SPEC QL LA: NEGATIVE
SAO2 % BLDCOA: 96.5 % (ref 92–99)
SARS-COV-2 RNA NPH QL NAA+NON-PROBE: NOT DETECTED
SARS-COV-2 RNA RESP QL NAA+PROBE: NOT DETECTED
SINUS: 3.7 CM
SODIUM SERPL-SCNC: 137 MMOL/L (ref 136–145)
SODIUM SERPL-SCNC: 138 MMOL/L (ref 136–145)
SODIUM SERPL-SCNC: 139 MMOL/L (ref 136–145)
SODIUM SERPL-SCNC: 140 MMOL/L (ref 136–145)
SODIUM SERPL-SCNC: 142 MMOL/L (ref 136–145)
SODIUM SERPL-SCNC: 142 MMOL/L (ref 136–145)
SODIUM SERPL-SCNC: 143 MMOL/L (ref 136–145)
SODIUM SERPL-SCNC: 143 MMOL/L (ref 136–145)
SP GR UR STRIP: 1.01 (ref 1–1.03)
SP GR UR STRIP: 1.01 (ref 1–1.03)
STJ: 2.5 CM
STRESS TARGET HR: 111 BPM
STRESS TARGET HR: 111 BPM
T&S EXPIRATION DATE: NORMAL
T4 FREE SERPL-MCNC: 0.8 NG/DL (ref 0.93–1.7)
T4 FREE SERPL-MCNC: 0.96 NG/DL (ref 0.93–1.7)
T4 FREE SERPL-MCNC: 1.01 NG/DL (ref 0.93–1.7)
TIBC SERPL-MCNC: 244 MCG/DL (ref 298–536)
TOTAL RATE: 20 BREATHS/MINUTE
TRANSFERRIN SERPL-MCNC: 164 MG/DL (ref 200–360)
TROPONIN T SERPL HS-MCNC: 30 NG/L
TROPONIN T SERPL-MCNC: 0.01 NG/ML (ref 0–0.03)
TROPONIN T SERPL-MCNC: 0.02 NG/ML (ref 0–0.03)
TSH SERPL DL<=0.05 MIU/L-ACNC: 12.8 UIU/ML (ref 0.27–4.2)
TSH SERPL DL<=0.05 MIU/L-ACNC: 8.07 UIU/ML (ref 0.27–4.2)
TSH SERPL DL<=0.05 MIU/L-ACNC: 8.47 UIU/ML (ref 0.27–4.2)
URATE SERPL-MCNC: 7.6 MG/DL (ref 3.4–7)
URATE SERPL-MCNC: 7.7 MG/DL (ref 3.4–7)
UROBILINOGEN UR QL STRIP: NORMAL
UROBILINOGEN UR QL STRIP: NORMAL
VENTILATOR MODE: ABNORMAL
VIT B12 BLD-MCNC: 414 PG/ML (ref 211–946)
WBC NRBC COR # BLD: 10.08 10*3/MM3 (ref 3.4–10.8)
WBC NRBC COR # BLD: 11.01 10*3/MM3 (ref 3.4–10.8)
WBC NRBC COR # BLD: 16.11 10*3/MM3 (ref 3.4–10.8)
WBC NRBC COR # BLD: 6.41 10*3/MM3 (ref 3.4–10.8)
WBC NRBC COR # BLD: 6.51 10*3/MM3 (ref 3.4–10.8)
WBC NRBC COR # BLD: 7.32 10*3/MM3 (ref 3.4–10.8)
WBC NRBC COR # BLD: 7.47 10*3/MM3 (ref 3.4–10.8)
WBC NRBC COR # BLD: 7.82 10*3/MM3 (ref 3.4–10.8)
WBC NRBC COR # BLD: 9.21 10*3/MM3 (ref 3.4–10.8)
WBC NRBC COR # BLD: 9.94 10*3/MM3 (ref 3.4–10.8)
WHOLE BLOOD HOLD COAG: NORMAL
WHOLE BLOOD HOLD SPECIMEN: NORMAL

## 2023-01-01 PROCEDURE — 82746 ASSAY OF FOLIC ACID SERUM: CPT | Performed by: HOSPITALIST

## 2023-01-01 PROCEDURE — 84439 ASSAY OF FREE THYROXINE: CPT | Performed by: NURSE PRACTITIONER

## 2023-01-01 PROCEDURE — 84439 ASSAY OF FREE THYROXINE: CPT | Performed by: INTERNAL MEDICINE

## 2023-01-01 PROCEDURE — 80307 DRUG TEST PRSMV CHEM ANLYZR: CPT | Performed by: EMERGENCY MEDICINE

## 2023-01-01 PROCEDURE — 36415 COLL VENOUS BLD VENIPUNCTURE: CPT

## 2023-01-01 PROCEDURE — 92611 MOTION FLUOROSCOPY/SWALLOW: CPT

## 2023-01-01 PROCEDURE — 25010000002 MORPHINE PER 10 MG: Performed by: STUDENT IN AN ORGANIZED HEALTH CARE EDUCATION/TRAINING PROGRAM

## 2023-01-01 PROCEDURE — 87899 AGENT NOS ASSAY W/OPTIC: CPT | Performed by: NURSE PRACTITIONER

## 2023-01-01 PROCEDURE — 25010000002 LORAZEPAM PER 2 MG: Performed by: STUDENT IN AN ORGANIZED HEALTH CARE EDUCATION/TRAINING PROGRAM

## 2023-01-01 PROCEDURE — 87636 SARSCOV2 & INF A&B AMP PRB: CPT | Performed by: EMERGENCY MEDICINE

## 2023-01-01 PROCEDURE — 92610 EVALUATE SWALLOWING FUNCTION: CPT | Performed by: SPEECH-LANGUAGE PATHOLOGIST

## 2023-01-01 PROCEDURE — 82962 GLUCOSE BLOOD TEST: CPT

## 2023-01-01 PROCEDURE — 25010000002 HYDROMORPHONE PER 4 MG: Performed by: STUDENT IN AN ORGANIZED HEALTH CARE EDUCATION/TRAINING PROGRAM

## 2023-01-01 PROCEDURE — 82533 TOTAL CORTISOL: CPT | Performed by: HOSPITALIST

## 2023-01-01 PROCEDURE — 84484 ASSAY OF TROPONIN QUANT: CPT | Performed by: PHYSICIAN ASSISTANT

## 2023-01-01 PROCEDURE — 82565 ASSAY OF CREATININE: CPT

## 2023-01-01 PROCEDURE — 80048 BASIC METABOLIC PNL TOTAL CA: CPT | Performed by: HOSPITALIST

## 2023-01-01 PROCEDURE — 84443 ASSAY THYROID STIM HORMONE: CPT | Performed by: HOSPITALIST

## 2023-01-01 PROCEDURE — 99232 SBSQ HOSP IP/OBS MODERATE 35: CPT | Performed by: INTERNAL MEDICINE

## 2023-01-01 PROCEDURE — 71046 X-RAY EXAM CHEST 2 VIEWS: CPT

## 2023-01-01 PROCEDURE — 83605 ASSAY OF LACTIC ACID: CPT | Performed by: PHYSICIAN ASSISTANT

## 2023-01-01 PROCEDURE — 25510000001 IOPAMIDOL PER 1 ML: Performed by: EMERGENCY MEDICINE

## 2023-01-01 PROCEDURE — 85610 PROTHROMBIN TIME: CPT | Performed by: EMERGENCY MEDICINE

## 2023-01-01 PROCEDURE — 93000 ELECTROCARDIOGRAM COMPLETE: CPT | Performed by: NURSE PRACTITIONER

## 2023-01-01 PROCEDURE — 93005 ELECTROCARDIOGRAM TRACING: CPT | Performed by: PHYSICIAN ASSISTANT

## 2023-01-01 PROCEDURE — 81003 URINALYSIS AUTO W/O SCOPE: CPT | Performed by: EMERGENCY MEDICINE

## 2023-01-01 PROCEDURE — 95816 EEG AWAKE AND DROWSY: CPT | Performed by: PSYCHIATRY & NEUROLOGY

## 2023-01-01 PROCEDURE — 80048 BASIC METABOLIC PNL TOTAL CA: CPT | Performed by: NURSE PRACTITIONER

## 2023-01-01 PROCEDURE — 85730 THROMBOPLASTIN TIME PARTIAL: CPT | Performed by: EMERGENCY MEDICINE

## 2023-01-01 PROCEDURE — 70496 CT ANGIOGRAPHY HEAD: CPT

## 2023-01-01 PROCEDURE — 84550 ASSAY OF BLOOD/URIC ACID: CPT | Performed by: INTERNAL MEDICINE

## 2023-01-01 PROCEDURE — 85027 COMPLETE CBC AUTOMATED: CPT | Performed by: NURSE PRACTITIONER

## 2023-01-01 PROCEDURE — 82803 BLOOD GASES ANY COMBINATION: CPT

## 2023-01-01 PROCEDURE — 97110 THERAPEUTIC EXERCISES: CPT

## 2023-01-01 PROCEDURE — 36600 WITHDRAWAL OF ARTERIAL BLOOD: CPT

## 2023-01-01 PROCEDURE — 83540 ASSAY OF IRON: CPT | Performed by: HOSPITALIST

## 2023-01-01 PROCEDURE — 93306 TTE W/DOPPLER COMPLETE: CPT

## 2023-01-01 PROCEDURE — 83605 ASSAY OF LACTIC ACID: CPT | Performed by: EMERGENCY MEDICINE

## 2023-01-01 PROCEDURE — G0299 HHS/HOSPICE OF RN EA 15 MIN: HCPCS

## 2023-01-01 PROCEDURE — 93244 EXT ECG>48HR<7D REV&INTERPJ: CPT | Performed by: INTERNAL MEDICINE

## 2023-01-01 PROCEDURE — 70450 CT HEAD/BRAIN W/O DYE: CPT

## 2023-01-01 PROCEDURE — 99232 SBSQ HOSP IP/OBS MODERATE 35: CPT | Performed by: PSYCHIATRY & NEUROLOGY

## 2023-01-01 PROCEDURE — 85025 COMPLETE CBC W/AUTO DIFF WBC: CPT | Performed by: EMERGENCY MEDICINE

## 2023-01-01 PROCEDURE — 80048 BASIC METABOLIC PNL TOTAL CA: CPT | Performed by: INTERNAL MEDICINE

## 2023-01-01 PROCEDURE — 99214 OFFICE O/P EST MOD 30 MIN: CPT | Performed by: NURSE PRACTITIONER

## 2023-01-01 PROCEDURE — P9612 CATHETERIZE FOR URINE SPEC: HCPCS

## 2023-01-01 PROCEDURE — 84484 ASSAY OF TROPONIN QUANT: CPT | Performed by: EMERGENCY MEDICINE

## 2023-01-01 PROCEDURE — 25010000002 AZITHROMYCIN PER 500 MG: Performed by: EMERGENCY MEDICINE

## 2023-01-01 PROCEDURE — 71045 X-RAY EXAM CHEST 1 VIEW: CPT

## 2023-01-01 PROCEDURE — 83605 ASSAY OF LACTIC ACID: CPT | Performed by: NURSE PRACTITIONER

## 2023-01-01 PROCEDURE — 99231 SBSQ HOSP IP/OBS SF/LOW 25: CPT | Performed by: NURSE PRACTITIONER

## 2023-01-01 PROCEDURE — G0151 HHCP-SERV OF PT,EA 15 MIN: HCPCS

## 2023-01-01 PROCEDURE — 85027 COMPLETE CBC AUTOMATED: CPT | Performed by: INTERNAL MEDICINE

## 2023-01-01 PROCEDURE — 85025 COMPLETE CBC W/AUTO DIFF WBC: CPT

## 2023-01-01 PROCEDURE — 97162 PT EVAL MOD COMPLEX 30 MIN: CPT

## 2023-01-01 PROCEDURE — 99233 SBSQ HOSP IP/OBS HIGH 50: CPT | Performed by: PSYCHIATRY & NEUROLOGY

## 2023-01-01 PROCEDURE — 93010 ELECTROCARDIOGRAM REPORT: CPT | Performed by: INTERNAL MEDICINE

## 2023-01-01 PROCEDURE — 25010000002 CEFTRIAXONE PER 250 MG: Performed by: INTERNAL MEDICINE

## 2023-01-01 PROCEDURE — 84484 ASSAY OF TROPONIN QUANT: CPT | Performed by: NURSE PRACTITIONER

## 2023-01-01 PROCEDURE — 99233 SBSQ HOSP IP/OBS HIGH 50: CPT | Performed by: NURSE PRACTITIONER

## 2023-01-01 PROCEDURE — 36415 COLL VENOUS BLD VENIPUNCTURE: CPT | Performed by: NURSE PRACTITIONER

## 2023-01-01 PROCEDURE — 86850 RBC ANTIBODY SCREEN: CPT | Performed by: EMERGENCY MEDICINE

## 2023-01-01 PROCEDURE — 85610 PROTHROMBIN TIME: CPT | Performed by: NURSE PRACTITIONER

## 2023-01-01 PROCEDURE — 97530 THERAPEUTIC ACTIVITIES: CPT

## 2023-01-01 PROCEDURE — 95816 EEG AWAKE AND DROWSY: CPT

## 2023-01-01 PROCEDURE — G0157 HHC PT ASSISTANT EA 15: HCPCS

## 2023-01-01 PROCEDURE — 84443 ASSAY THYROID STIM HORMONE: CPT | Performed by: NURSE PRACTITIONER

## 2023-01-01 PROCEDURE — 25010000002 PERFLUTREN (DEFINITY) 8.476 MG IN SODIUM CHLORIDE (PF) 0.9 % 10 ML INJECTION: Performed by: HOSPITALIST

## 2023-01-01 PROCEDURE — 82728 ASSAY OF FERRITIN: CPT | Performed by: HOSPITALIST

## 2023-01-01 PROCEDURE — 99285 EMERGENCY DEPT VISIT HI MDM: CPT

## 2023-01-01 PROCEDURE — 74230 X-RAY XM SWLNG FUNCJ C+: CPT

## 2023-01-01 PROCEDURE — 84132 ASSAY OF SERUM POTASSIUM: CPT | Performed by: NURSE PRACTITIONER

## 2023-01-01 PROCEDURE — 93005 ELECTROCARDIOGRAM TRACING: CPT | Performed by: EMERGENCY MEDICINE

## 2023-01-01 PROCEDURE — 25010000002 CEFTRIAXONE PER 250 MG: Performed by: EMERGENCY MEDICINE

## 2023-01-01 PROCEDURE — 85045 AUTOMATED RETICULOCYTE COUNT: CPT | Performed by: INTERNAL MEDICINE

## 2023-01-01 PROCEDURE — 87449 NOS EACH ORGANISM AG IA: CPT | Performed by: NURSE PRACTITIONER

## 2023-01-01 PROCEDURE — 25010000002 COSYNTROPIN PER 0.25 MG: Performed by: HOSPITALIST

## 2023-01-01 PROCEDURE — 93306 TTE W/DOPPLER COMPLETE: CPT | Performed by: INTERNAL MEDICINE

## 2023-01-01 PROCEDURE — 84145 PROCALCITONIN (PCT): CPT | Performed by: EMERGENCY MEDICINE

## 2023-01-01 PROCEDURE — 0202U NFCT DS 22 TRGT SARS-COV-2: CPT | Performed by: PHYSICIAN ASSISTANT

## 2023-01-01 PROCEDURE — 82077 ASSAY SPEC XCP UR&BREATH IA: CPT | Performed by: EMERGENCY MEDICINE

## 2023-01-01 PROCEDURE — 87040 BLOOD CULTURE FOR BACTERIA: CPT | Performed by: EMERGENCY MEDICINE

## 2023-01-01 PROCEDURE — 82140 ASSAY OF AMMONIA: CPT | Performed by: EMERGENCY MEDICINE

## 2023-01-01 PROCEDURE — 85025 COMPLETE CBC W/AUTO DIFF WBC: CPT | Performed by: PHYSICIAN ASSISTANT

## 2023-01-01 PROCEDURE — 86901 BLOOD TYPING SEROLOGIC RH(D): CPT | Performed by: EMERGENCY MEDICINE

## 2023-01-01 PROCEDURE — 80053 COMPREHEN METABOLIC PANEL: CPT | Performed by: EMERGENCY MEDICINE

## 2023-01-01 PROCEDURE — 85025 COMPLETE CBC W/AUTO DIFF WBC: CPT | Performed by: NURSE PRACTITIONER

## 2023-01-01 PROCEDURE — 81003 URINALYSIS AUTO W/O SCOPE: CPT | Performed by: PHYSICIAN ASSISTANT

## 2023-01-01 PROCEDURE — 99222 1ST HOSP IP/OBS MODERATE 55: CPT | Performed by: NURSE PRACTITIONER

## 2023-01-01 PROCEDURE — 25010000002 AZITHROMYCIN PER 500 MG: Performed by: INTERNAL MEDICINE

## 2023-01-01 PROCEDURE — 80053 COMPREHEN METABOLIC PANEL: CPT

## 2023-01-01 PROCEDURE — 99231 SBSQ HOSP IP/OBS SF/LOW 25: CPT | Performed by: PSYCHIATRY & NEUROLOGY

## 2023-01-01 PROCEDURE — 84145 PROCALCITONIN (PCT): CPT | Performed by: PHYSICIAN ASSISTANT

## 2023-01-01 PROCEDURE — 93246 EXT ECG>7D<15D RECORDING: CPT

## 2023-01-01 PROCEDURE — 87040 BLOOD CULTURE FOR BACTERIA: CPT | Performed by: PHYSICIAN ASSISTANT

## 2023-01-01 PROCEDURE — 84439 ASSAY OF FREE THYROXINE: CPT

## 2023-01-01 PROCEDURE — 83880 ASSAY OF NATRIURETIC PEPTIDE: CPT | Performed by: PHYSICIAN ASSISTANT

## 2023-01-01 PROCEDURE — 82607 VITAMIN B-12: CPT | Performed by: HOSPITALIST

## 2023-01-01 PROCEDURE — 84443 ASSAY THYROID STIM HORMONE: CPT

## 2023-01-01 PROCEDURE — 85027 COMPLETE CBC AUTOMATED: CPT | Performed by: HOSPITALIST

## 2023-01-01 PROCEDURE — 84466 ASSAY OF TRANSFERRIN: CPT | Performed by: HOSPITALIST

## 2023-01-01 PROCEDURE — 80053 COMPREHEN METABOLIC PANEL: CPT | Performed by: PHYSICIAN ASSISTANT

## 2023-01-01 PROCEDURE — 93242 EXT ECG>48HR<7D RECORDING: CPT

## 2023-01-01 PROCEDURE — 86900 BLOOD TYPING SEROLOGIC ABO: CPT | Performed by: EMERGENCY MEDICINE

## 2023-01-01 PROCEDURE — 97166 OT EVAL MOD COMPLEX 45 MIN: CPT

## 2023-01-01 PROCEDURE — 70498 CT ANGIOGRAPHY NECK: CPT

## 2023-01-01 PROCEDURE — 36415 COLL VENOUS BLD VENIPUNCTURE: CPT | Performed by: INTERNAL MEDICINE

## 2023-01-01 RX ORDER — ONDANSETRON 2 MG/ML
4 INJECTION INTRAMUSCULAR; INTRAVENOUS EVERY 6 HOURS PRN
Status: DISCONTINUED | OUTPATIENT
Start: 2023-01-01 | End: 2023-01-01 | Stop reason: HOSPADM

## 2023-01-01 RX ORDER — DOCUSATE SODIUM 100 MG/1
100 CAPSULE, LIQUID FILLED ORAL 2 TIMES DAILY
Status: DISCONTINUED | OUTPATIENT
Start: 2023-01-01 | End: 2023-01-01 | Stop reason: HOSPADM

## 2023-01-01 RX ORDER — MIDODRINE HYDROCHLORIDE 2.5 MG/1
2.5 TABLET ORAL
Status: DISCONTINUED | OUTPATIENT
Start: 2023-01-01 | End: 2023-01-01

## 2023-01-01 RX ORDER — SODIUM CHLORIDE 9 MG/ML
125 INJECTION, SOLUTION INTRAVENOUS CONTINUOUS
Status: DISCONTINUED | OUTPATIENT
Start: 2023-01-01 | End: 2023-01-01

## 2023-01-01 RX ORDER — LORAZEPAM 2 MG/ML
2 INJECTION INTRAMUSCULAR
Status: DISCONTINUED | OUTPATIENT
Start: 2023-01-01 | End: 2023-01-01 | Stop reason: HOSPADM

## 2023-01-01 RX ORDER — LEVOTHYROXINE SODIUM 0.03 MG/1
25 TABLET ORAL
Qty: 30 TABLET | Refills: 0 | Status: SHIPPED | OUTPATIENT
Start: 2023-01-01 | End: 2023-01-01

## 2023-01-01 RX ORDER — LORAZEPAM 2 MG/ML
1 INJECTION INTRAMUSCULAR
Status: DISCONTINUED | OUTPATIENT
Start: 2023-01-01 | End: 2023-01-01 | Stop reason: HOSPADM

## 2023-01-01 RX ORDER — ACETAMINOPHEN 325 MG/1
650 TABLET ORAL EVERY 4 HOURS PRN
Status: DISCONTINUED | OUTPATIENT
Start: 2023-01-01 | End: 2023-01-01 | Stop reason: HOSPADM

## 2023-01-01 RX ORDER — MORPHINE SULFATE 20 MG/ML
5 SOLUTION ORAL
Status: ACTIVE | OUTPATIENT
Start: 2023-01-01 | End: 2023-01-01

## 2023-01-01 RX ORDER — ACETAMINOPHEN 650 MG/1
650 SUPPOSITORY RECTAL EVERY 4 HOURS PRN
Status: DISCONTINUED | OUTPATIENT
Start: 2023-01-01 | End: 2023-01-01 | Stop reason: HOSPADM

## 2023-01-01 RX ORDER — MIDODRINE HYDROCHLORIDE 5 MG/1
5 TABLET ORAL 2 TIMES DAILY PRN
Status: DISCONTINUED | OUTPATIENT
Start: 2023-01-01 | End: 2023-01-01 | Stop reason: HOSPADM

## 2023-01-01 RX ORDER — FUROSEMIDE 40 MG/1
40 TABLET ORAL DAILY
Status: DISCONTINUED | OUTPATIENT
Start: 2023-01-01 | End: 2023-01-01 | Stop reason: HOSPADM

## 2023-01-01 RX ORDER — LEVOTHYROXINE SODIUM 0.03 MG/1
25 TABLET ORAL
Status: DISCONTINUED | OUTPATIENT
Start: 2023-01-01 | End: 2023-01-01 | Stop reason: HOSPADM

## 2023-01-01 RX ORDER — SODIUM CHLORIDE 9 MG/ML
100 INJECTION, SOLUTION INTRAVENOUS CONTINUOUS
Status: DISCONTINUED | OUTPATIENT
Start: 2023-01-01 | End: 2023-01-01

## 2023-01-01 RX ORDER — ASPIRIN 81 MG/1
81 TABLET ORAL DAILY
Status: DISCONTINUED | OUTPATIENT
Start: 2023-01-01 | End: 2023-01-01

## 2023-01-01 RX ORDER — ATORVASTATIN CALCIUM 20 MG/1
10 TABLET, FILM COATED ORAL NIGHTLY
Status: DISCONTINUED | OUTPATIENT
Start: 2023-01-01 | End: 2023-01-01 | Stop reason: HOSPADM

## 2023-01-01 RX ORDER — OLANZAPINE 10 MG/1
2.5 INJECTION, POWDER, LYOPHILIZED, FOR SOLUTION INTRAMUSCULAR EVERY 8 HOURS PRN
Status: DISCONTINUED | OUTPATIENT
Start: 2023-01-01 | End: 2023-01-01 | Stop reason: HOSPADM

## 2023-01-01 RX ORDER — MORPHINE SULFATE 2 MG/ML
2 INJECTION, SOLUTION INTRAMUSCULAR; INTRAVENOUS
Status: DISPENSED | OUTPATIENT
Start: 2023-01-01 | End: 2023-01-01

## 2023-01-01 RX ORDER — LORAZEPAM 2 MG/ML
0.5 CONCENTRATE ORAL
Status: DISCONTINUED | OUTPATIENT
Start: 2023-01-01 | End: 2023-01-01 | Stop reason: HOSPADM

## 2023-01-01 RX ORDER — SODIUM CHLORIDE 0.9 % (FLUSH) 0.9 %
10 SYRINGE (ML) INJECTION AS NEEDED
Status: DISCONTINUED | OUTPATIENT
Start: 2023-01-01 | End: 2023-01-01 | Stop reason: HOSPADM

## 2023-01-01 RX ORDER — NITROGLYCERIN 0.4 MG/1
0.4 TABLET SUBLINGUAL
Status: DISCONTINUED | OUTPATIENT
Start: 2023-01-01 | End: 2023-01-01 | Stop reason: HOSPADM

## 2023-01-01 RX ORDER — COSYNTROPIN 0.25 MG/ML
0.25 INJECTION, POWDER, FOR SOLUTION INTRAMUSCULAR; INTRAVENOUS ONCE
Status: COMPLETED | OUTPATIENT
Start: 2023-01-01 | End: 2023-01-01

## 2023-01-01 RX ORDER — SCOLOPAMINE TRANSDERMAL SYSTEM 1 MG/1
1 PATCH, EXTENDED RELEASE TRANSDERMAL
Status: DISCONTINUED | OUTPATIENT
Start: 2023-01-01 | End: 2023-01-01 | Stop reason: HOSPADM

## 2023-01-01 RX ORDER — MIDODRINE HYDROCHLORIDE 10 MG/1
5 TABLET ORAL 2 TIMES DAILY PRN
Qty: 60 TABLET | Refills: 0
Start: 2023-01-01 | End: 2023-01-01

## 2023-01-01 RX ORDER — SODIUM CHLORIDE 0.9 % (FLUSH) 0.9 %
10 SYRINGE (ML) INJECTION EVERY 12 HOURS SCHEDULED
Status: DISCONTINUED | OUTPATIENT
Start: 2023-01-01 | End: 2023-01-01 | Stop reason: HOSPADM

## 2023-01-01 RX ORDER — KETOROLAC TROMETHAMINE 15 MG/ML
15 INJECTION, SOLUTION INTRAMUSCULAR; INTRAVENOUS EVERY 6 HOURS PRN
Status: ACTIVE | OUTPATIENT
Start: 2023-01-01 | End: 2023-01-01

## 2023-01-01 RX ORDER — LORAZEPAM 2 MG/ML
0.5 INJECTION INTRAMUSCULAR
Status: DISCONTINUED | OUTPATIENT
Start: 2023-01-01 | End: 2023-01-01 | Stop reason: HOSPADM

## 2023-01-01 RX ORDER — HYDROMORPHONE HYDROCHLORIDE 1 MG/ML
0.5 INJECTION, SOLUTION INTRAMUSCULAR; INTRAVENOUS; SUBCUTANEOUS
Status: DISPENSED | OUTPATIENT
Start: 2023-01-01 | End: 2023-01-01

## 2023-01-01 RX ORDER — MIDODRINE HYDROCHLORIDE 10 MG/1
10 TABLET ORAL 2 TIMES DAILY PRN
Qty: 60 TABLET | Refills: 0 | Status: SHIPPED | OUTPATIENT
Start: 2023-01-01 | End: 2023-01-01

## 2023-01-01 RX ORDER — CHOLECALCIFEROL (VITAMIN D3) 125 MCG
1000 CAPSULE ORAL DAILY
Status: DISCONTINUED | OUTPATIENT
Start: 2023-01-01 | End: 2023-01-01 | Stop reason: HOSPADM

## 2023-01-01 RX ORDER — LORAZEPAM 2 MG/ML
2 CONCENTRATE ORAL
Status: DISCONTINUED | OUTPATIENT
Start: 2023-01-01 | End: 2023-01-01 | Stop reason: HOSPADM

## 2023-01-01 RX ORDER — ASPIRIN 81 MG/1
81 TABLET ORAL DAILY
Status: DISCONTINUED | OUTPATIENT
Start: 2023-01-01 | End: 2023-01-01 | Stop reason: HOSPADM

## 2023-01-01 RX ORDER — OLANZAPINE 10 MG/1
2.5 INJECTION, POWDER, LYOPHILIZED, FOR SOLUTION INTRAMUSCULAR ONCE AS NEEDED
Status: COMPLETED | OUTPATIENT
Start: 2023-01-01 | End: 2023-01-01

## 2023-01-01 RX ORDER — DEXTROSE AND SODIUM CHLORIDE 5; .9 G/100ML; G/100ML
75 INJECTION, SOLUTION INTRAVENOUS CONTINUOUS
Status: DISCONTINUED | OUTPATIENT
Start: 2023-01-01 | End: 2023-01-01

## 2023-01-01 RX ORDER — FUROSEMIDE 40 MG/1
40 TABLET ORAL DAILY
Qty: 30 TABLET | Refills: 0 | Status: SHIPPED | OUTPATIENT
Start: 2023-01-01 | End: 2023-01-01

## 2023-01-01 RX ORDER — ONDANSETRON 4 MG/1
4 TABLET, FILM COATED ORAL EVERY 6 HOURS PRN
Status: DISCONTINUED | OUTPATIENT
Start: 2023-01-01 | End: 2023-01-01 | Stop reason: HOSPADM

## 2023-01-01 RX ORDER — ASPIRIN 300 MG/1
300 SUPPOSITORY RECTAL DAILY
Status: DISCONTINUED | OUTPATIENT
Start: 2023-01-01 | End: 2023-01-01 | Stop reason: HOSPADM

## 2023-01-01 RX ORDER — LORAZEPAM 2 MG/ML
1 CONCENTRATE ORAL
Status: DISCONTINUED | OUTPATIENT
Start: 2023-01-01 | End: 2023-01-01 | Stop reason: HOSPADM

## 2023-01-01 RX ORDER — ACETAMINOPHEN 160 MG/5ML
650 SOLUTION ORAL EVERY 4 HOURS PRN
Status: DISCONTINUED | OUTPATIENT
Start: 2023-01-01 | End: 2023-01-01 | Stop reason: HOSPADM

## 2023-01-01 RX ORDER — GLYCOPYRROLATE 0.2 MG/ML
0.2 INJECTION INTRAMUSCULAR; INTRAVENOUS
Status: DISCONTINUED | OUTPATIENT
Start: 2023-01-01 | End: 2023-01-01 | Stop reason: HOSPADM

## 2023-01-01 RX ORDER — SODIUM CHLORIDE 9 MG/ML
40 INJECTION, SOLUTION INTRAVENOUS AS NEEDED
Status: DISCONTINUED | OUTPATIENT
Start: 2023-01-01 | End: 2023-01-01 | Stop reason: HOSPADM

## 2023-01-01 RX ORDER — UREA 10 %
3 LOTION (ML) TOPICAL NIGHTLY PRN
Status: DISCONTINUED | OUTPATIENT
Start: 2023-01-01 | End: 2023-01-01 | Stop reason: HOSPADM

## 2023-01-01 RX ORDER — DONEPEZIL HYDROCHLORIDE 5 MG/1
5 TABLET, FILM COATED ORAL NIGHTLY
Status: DISCONTINUED | OUTPATIENT
Start: 2023-01-01 | End: 2023-01-01

## 2023-01-01 RX ORDER — DONEPEZIL HYDROCHLORIDE 5 MG/1
5 TABLET, FILM COATED ORAL NIGHTLY
COMMUNITY

## 2023-01-01 RX ORDER — ASPIRIN 81 MG/1
81 TABLET, CHEWABLE ORAL DAILY
Status: DISCONTINUED | OUTPATIENT
Start: 2023-01-01 | End: 2023-01-01 | Stop reason: HOSPADM

## 2023-01-01 RX ORDER — GLYCOPYRROLATE 0.2 MG/ML
0.4 INJECTION INTRAMUSCULAR; INTRAVENOUS
Status: DISCONTINUED | OUTPATIENT
Start: 2023-01-01 | End: 2023-01-01 | Stop reason: HOSPADM

## 2023-01-01 RX ADMIN — SCOPALAMINE 1 PATCH: 1 PATCH, EXTENDED RELEASE TRANSDERMAL at 18:33

## 2023-01-01 RX ADMIN — OLANZAPINE 2.5 MG: 10 INJECTION, POWDER, FOR SOLUTION INTRAMUSCULAR at 21:29

## 2023-01-01 RX ADMIN — LORAZEPAM 0.5 MG: 2 INJECTION INTRAMUSCULAR; INTRAVENOUS at 14:49

## 2023-01-01 RX ADMIN — Medication 10 ML: at 08:37

## 2023-01-01 RX ADMIN — SODIUM CHLORIDE 1000 ML: 9 INJECTION, SOLUTION INTRAVENOUS at 23:35

## 2023-01-01 RX ADMIN — LEVOTHYROXINE SODIUM 25 MCG: 0.03 TABLET ORAL at 06:59

## 2023-01-01 RX ADMIN — HYDROMORPHONE HYDROCHLORIDE 0.5 MG: 1 INJECTION, SOLUTION INTRAMUSCULAR; INTRAVENOUS; SUBCUTANEOUS at 08:55

## 2023-01-01 RX ADMIN — MORPHINE SULFATE 2 MG: 2 INJECTION, SOLUTION INTRAMUSCULAR; INTRAVENOUS at 09:36

## 2023-01-01 RX ADMIN — IOPAMIDOL 90 ML: 755 INJECTION, SOLUTION INTRAVENOUS at 16:23

## 2023-01-01 RX ADMIN — MIDODRINE HYDROCHLORIDE 2.5 MG: 2.5 TABLET ORAL at 06:59

## 2023-01-01 RX ADMIN — CEFTRIAXONE SODIUM 1 G: 1 INJECTION, POWDER, FOR SOLUTION INTRAMUSCULAR; INTRAVENOUS at 17:57

## 2023-01-01 RX ADMIN — Medication 10 ML: at 16:37

## 2023-01-01 RX ADMIN — HYDROMORPHONE HYDROCHLORIDE 0.5 MG: 1 INJECTION, SOLUTION INTRAMUSCULAR; INTRAVENOUS; SUBCUTANEOUS at 06:49

## 2023-01-01 RX ADMIN — CEFTRIAXONE SODIUM 1 G: 1 INJECTION, POWDER, FOR SOLUTION INTRAMUSCULAR; INTRAVENOUS at 17:31

## 2023-01-01 RX ADMIN — LORAZEPAM 2 MG: 2 INJECTION INTRAMUSCULAR; INTRAVENOUS at 16:53

## 2023-01-01 RX ADMIN — SODIUM CHLORIDE 125 ML/HR: 9 INJECTION, SOLUTION INTRAVENOUS at 05:08

## 2023-01-01 RX ADMIN — Medication 1000 MCG: at 08:37

## 2023-01-01 RX ADMIN — Medication 10 ML: at 08:56

## 2023-01-01 RX ADMIN — SODIUM CHLORIDE 500 ML: 9 INJECTION, SOLUTION INTRAVENOUS at 17:48

## 2023-01-01 RX ADMIN — ASPIRIN 81 MG: 81 TABLET, COATED ORAL at 09:50

## 2023-01-01 RX ADMIN — ACETAMINOPHEN 650 MG: 325 TABLET, FILM COATED ORAL at 20:31

## 2023-01-01 RX ADMIN — MIDODRINE HYDROCHLORIDE 2.5 MG: 2.5 TABLET ORAL at 17:18

## 2023-01-01 RX ADMIN — Medication 10 ML: at 08:54

## 2023-01-01 RX ADMIN — DEXTROSE AND SODIUM CHLORIDE 75 ML/HR: 5; 900 INJECTION, SOLUTION INTRAVENOUS at 13:27

## 2023-01-01 RX ADMIN — Medication 10 ML: at 08:59

## 2023-01-01 RX ADMIN — DOCUSATE SODIUM 100 MG: 100 CAPSULE, LIQUID FILLED ORAL at 20:31

## 2023-01-01 RX ADMIN — AZITHROMYCIN MONOHYDRATE 500 MG: 500 INJECTION, POWDER, LYOPHILIZED, FOR SOLUTION INTRAVENOUS at 19:20

## 2023-01-01 RX ADMIN — HYDROMORPHONE HYDROCHLORIDE 0.5 MG: 1 INJECTION, SOLUTION INTRAMUSCULAR; INTRAVENOUS; SUBCUTANEOUS at 08:59

## 2023-01-01 RX ADMIN — LORAZEPAM 1 MG: 2 INJECTION INTRAMUSCULAR; INTRAVENOUS at 09:36

## 2023-01-01 RX ADMIN — Medication 10 ML: at 19:45

## 2023-01-01 RX ADMIN — Medication 10 ML: at 10:53

## 2023-01-01 RX ADMIN — GLYCOPYRROLATE 0.4 MG: 0.2 INJECTION INTRAMUSCULAR; INTRAVENOUS at 16:37

## 2023-01-01 RX ADMIN — MORPHINE SULFATE 2 MG: 2 INJECTION, SOLUTION INTRAMUSCULAR; INTRAVENOUS at 12:27

## 2023-01-01 RX ADMIN — MORPHINE SULFATE 2 MG: 2 INJECTION, SOLUTION INTRAMUSCULAR; INTRAVENOUS at 15:39

## 2023-01-01 RX ADMIN — PERFLUTREN 2 ML: 6.52 INJECTION, SUSPENSION INTRAVENOUS at 11:45

## 2023-01-01 RX ADMIN — Medication 1000 MCG: at 09:50

## 2023-01-01 RX ADMIN — Medication 10 ML: at 08:33

## 2023-01-01 RX ADMIN — GLYCOPYRROLATE 0.4 MG: 0.2 INJECTION INTRAMUSCULAR; INTRAVENOUS at 08:59

## 2023-01-01 RX ADMIN — HYDROMORPHONE HYDROCHLORIDE 0.5 MG: 1 INJECTION, SOLUTION INTRAMUSCULAR; INTRAVENOUS; SUBCUTANEOUS at 13:11

## 2023-01-01 RX ADMIN — GLYCOPYRROLATE 0.4 MG: 0.2 INJECTION INTRAMUSCULAR; INTRAVENOUS at 12:27

## 2023-01-01 RX ADMIN — DOCUSATE SODIUM 100 MG: 100 CAPSULE, LIQUID FILLED ORAL at 21:52

## 2023-01-01 RX ADMIN — ASPIRIN 81 MG: 81 TABLET, COATED ORAL at 08:37

## 2023-01-01 RX ADMIN — SODIUM CHLORIDE 500 ML: 9 INJECTION, SOLUTION INTRAVENOUS at 14:48

## 2023-01-01 RX ADMIN — ASPIRIN 300 MG: 300 SUPPOSITORY RECTAL at 17:32

## 2023-01-01 RX ADMIN — SODIUM CHLORIDE 1000 ML: 9 INJECTION, SOLUTION INTRAVENOUS at 21:58

## 2023-01-01 RX ADMIN — SODIUM CHLORIDE 100 ML/HR: 9 INJECTION, SOLUTION INTRAVENOUS at 22:22

## 2023-01-01 RX ADMIN — Medication 10 ML: at 21:52

## 2023-01-01 RX ADMIN — HYDROMORPHONE HYDROCHLORIDE 0.5 MG: 1 INJECTION, SOLUTION INTRAMUSCULAR; INTRAVENOUS; SUBCUTANEOUS at 16:53

## 2023-01-01 RX ADMIN — SCOPALAMINE 1 PATCH: 1 PATCH, EXTENDED RELEASE TRANSDERMAL at 18:29

## 2023-01-01 RX ADMIN — SODIUM CHLORIDE 500 ML: 9 INJECTION, SOLUTION INTRAVENOUS at 04:20

## 2023-01-01 RX ADMIN — LEVOTHYROXINE SODIUM 25 MCG: 0.03 TABLET ORAL at 06:37

## 2023-01-01 RX ADMIN — LORAZEPAM 2 MG: 2 INJECTION INTRAMUSCULAR; INTRAVENOUS at 16:37

## 2023-01-01 RX ADMIN — ASPIRIN 300 MG: 300 SUPPOSITORY RECTAL at 07:55

## 2023-01-01 RX ADMIN — LORAZEPAM 2 MG: 2 INJECTION INTRAMUSCULAR; INTRAVENOUS at 08:59

## 2023-01-01 RX ADMIN — AZITHROMYCIN DIHYDRATE 500 MG: 500 INJECTION, POWDER, LYOPHILIZED, FOR SOLUTION INTRAVENOUS at 20:43

## 2023-01-01 RX ADMIN — GLYCOPYRROLATE 0.4 MG: 0.2 INJECTION INTRAMUSCULAR; INTRAVENOUS at 09:36

## 2023-01-01 RX ADMIN — DOCUSATE SODIUM 100 MG: 100 CAPSULE, LIQUID FILLED ORAL at 08:37

## 2023-01-01 RX ADMIN — Medication 10 ML: at 09:50

## 2023-01-01 RX ADMIN — SODIUM CHLORIDE 100 ML/HR: 9 INJECTION, SOLUTION INTRAVENOUS at 17:23

## 2023-01-01 RX ADMIN — SODIUM CHLORIDE 50 ML/HR: 9 INJECTION, SOLUTION INTRAVENOUS at 11:54

## 2023-01-01 RX ADMIN — LORAZEPAM 2 MG: 2 INJECTION INTRAMUSCULAR; INTRAVENOUS at 21:00

## 2023-01-01 RX ADMIN — GLYCOPYRROLATE 0.4 MG: 0.2 INJECTION INTRAMUSCULAR; INTRAVENOUS at 22:37

## 2023-01-01 RX ADMIN — HYDROMORPHONE HYDROCHLORIDE 0.5 MG: 1 INJECTION, SOLUTION INTRAMUSCULAR; INTRAVENOUS; SUBCUTANEOUS at 21:01

## 2023-01-01 RX ADMIN — OLANZAPINE 2.5 MG: 10 INJECTION, POWDER, FOR SOLUTION INTRAMUSCULAR at 10:59

## 2023-01-01 RX ADMIN — SODIUM CHLORIDE 500 ML: 9 INJECTION, SOLUTION INTRAVENOUS at 17:16

## 2023-01-01 RX ADMIN — LORAZEPAM 0.5 MG: 2 INJECTION INTRAMUSCULAR; INTRAVENOUS at 22:36

## 2023-01-01 RX ADMIN — CEFTRIAXONE SODIUM 1 G: 1 INJECTION, POWDER, FOR SOLUTION INTRAMUSCULAR; INTRAVENOUS at 20:24

## 2023-01-01 RX ADMIN — LORAZEPAM 0.5 MG: 2 INJECTION INTRAMUSCULAR; INTRAVENOUS at 22:10

## 2023-01-01 RX ADMIN — CEFTRIAXONE SODIUM 1 G: 1 INJECTION, POWDER, FOR SOLUTION INTRAMUSCULAR; INTRAVENOUS at 18:03

## 2023-01-01 RX ADMIN — LORAZEPAM 1 MG: 2 INJECTION INTRAMUSCULAR; INTRAVENOUS at 12:26

## 2023-01-01 RX ADMIN — DOCUSATE SODIUM 100 MG: 100 CAPSULE, LIQUID FILLED ORAL at 08:54

## 2023-01-01 RX ADMIN — ASPIRIN 300 MG: 300 SUPPOSITORY RECTAL at 09:12

## 2023-01-01 RX ADMIN — SODIUM CHLORIDE 100 ML/HR: 9 INJECTION, SOLUTION INTRAVENOUS at 09:51

## 2023-01-01 RX ADMIN — SODIUM CHLORIDE 1000 ML: 9 INJECTION, SOLUTION INTRAVENOUS at 00:04

## 2023-01-01 RX ADMIN — DOCUSATE SODIUM 100 MG: 100 CAPSULE, LIQUID FILLED ORAL at 09:50

## 2023-01-01 RX ADMIN — Medication 1000 MCG: at 08:54

## 2023-01-01 RX ADMIN — BARIUM SULFATE 50 ML: 400 SUSPENSION ORAL at 12:08

## 2023-01-01 RX ADMIN — COSYNTROPIN 0.25 MG: 0.25 INJECTION, POWDER, LYOPHILIZED, FOR SOLUTION INTRAMUSCULAR; INTRAVENOUS at 17:47

## 2023-01-01 RX ADMIN — SODIUM CHLORIDE 100 ML/HR: 9 INJECTION, SOLUTION INTRAVENOUS at 19:46

## 2023-01-01 RX ADMIN — LORAZEPAM 2 MG: 2 INJECTION INTRAMUSCULAR; INTRAVENOUS at 13:46

## 2023-01-01 RX ADMIN — DEXTROSE AND SODIUM CHLORIDE 75 ML/HR: 5; 900 INJECTION, SOLUTION INTRAVENOUS at 09:12

## 2023-01-01 RX ADMIN — ASPIRIN 81 MG: 81 TABLET, COATED ORAL at 08:54

## 2023-01-01 RX ADMIN — Medication 10 ML: at 22:23

## 2023-01-01 RX ADMIN — HYDROMORPHONE HYDROCHLORIDE 0.5 MG: 1 INJECTION, SOLUTION INTRAMUSCULAR; INTRAVENOUS; SUBCUTANEOUS at 16:36

## 2023-01-01 RX ADMIN — DOCUSATE SODIUM 100 MG: 100 CAPSULE, LIQUID FILLED ORAL at 19:45

## 2023-01-01 RX ADMIN — Medication 10 ML: at 21:01

## 2023-01-01 RX ADMIN — LORAZEPAM 0.5 MG: 2 INJECTION INTRAMUSCULAR; INTRAVENOUS at 13:16

## 2023-01-13 PROBLEM — R00.1 BRADYCARDIA: Status: ACTIVE | Noted: 2023-01-01

## 2023-01-13 PROBLEM — I95.9 HYPOTENSION: Status: ACTIVE | Noted: 2023-01-01

## 2023-01-13 PROBLEM — D64.9 ANEMIA: Status: ACTIVE | Noted: 2023-01-01

## 2023-01-13 PROBLEM — R41.82 AMS (ALTERED MENTAL STATUS): Status: RESOLVED | Noted: 2023-01-01 | Resolved: 2023-01-01

## 2023-01-13 PROBLEM — T68.XXXA HYPOTHERMIA: Status: ACTIVE | Noted: 2023-01-01

## 2023-01-13 PROBLEM — I95.9 HYPOTENSION: Status: RESOLVED | Noted: 2023-01-01 | Resolved: 2023-01-01

## 2023-01-13 PROBLEM — R41.82 AMS (ALTERED MENTAL STATUS): Status: ACTIVE | Noted: 2023-01-01

## 2023-01-13 PROBLEM — F03.A0 MILD DEMENTIA (HCC): Status: ACTIVE | Noted: 2023-01-01

## 2023-01-13 PROBLEM — N17.9 ACUTE KIDNEY INJURY: Status: ACTIVE | Noted: 2023-01-01

## 2023-01-13 NOTE — CASE MANAGEMENT/SOCIAL WORK
Discharge Planning Assessment  [facility]     Patient Name: Fritz Flores  MRN: [id number]  Today's Date: 1/13/2023    Admit Date: 1/12/2023    Plan: Home with    Discharge Needs Assessment     Row Name 01/13/23 1614       Living Environment    People in Home child(mary beth), adult;spouse    Current Living Arrangements home    Primary Care Provided by child(mary beth)    Provides Primary Care For no one, unable/limited ability to care for self    Family Caregiver if Needed child(mary beth), adult    Quality of Family Relationships involved;helpful    Able to Return to Prior Arrangements yes       Resource/Environmental Concerns    Resource/Environmental Concerns none       Transition Planning    Patient/Family Anticipates Transition to home with family;home with help/services    Patient/Family Anticipated Services at Transition home health care    Transportation Anticipated family or friend will provide       Discharge Needs Assessment    Readmission Within the Last 30 Days no previous admission in last 30 days    Equipment Currently Used at Home power chair,(recliner lift);grab bar;shower chair;walker, rolling;wheelchair    Concerns to be Addressed adjustment to diagnosis/illness    Anticipated Changes Related to Illness none    Equipment Needed After Discharge none               Discharge Plan     Row Name 01/13/23 3696       Plan    Plan Home with     Patient/Family in Agreement with Plan yes    Plan Comments Spoke with pt daughter and POA Pat by phone, face sheet and pharmacy information confirmed. Pt lives with his wife Maria D, son and daughter in law have recently moved in to take care of them as well. Home is 1 level with no steps to enter. They employ private caregiver 2 days a week. Pt needs asst with all ADL’s, pt has lift chair, w/c, s/c, GB and walker. Would like referral to [facility] as his wife is current, sent and notified Mar[facility]. He was in SNF for covid but could not recall where. No anticipated needs, daughter  will transport. CCP will follow  -Barbara GARCIARuiz              Continued Care and Services - Admitted Since 1/12/2023     Home Medical Care     Service Provider Request Status Selected Services Address Phone Fax Patient Preferred     [facility] - Request Sent N/A [id number] [address] [phone] --              Expected Discharge Date and Time     Expected Discharge Date Expected Discharge Time    Jan 14, 2023          Demographic Summary     Row Name 01/13/23 1613       General Information    Admission Type inpatient               Functional Status     Row Name 01/13/23 1613       Functional Status    Usual Activity Tolerance moderate    Current Activity Tolerance moderate       Assessment of Health Literacy    Health Literacy Fair       Functional Status, IADL    Medications assistive person    Meal Preparation assistive person    Housekeeping assistive person    Laundry assistive person    Shopping assistive person       Mental Status Summary    Recent Changes in Mental Status/Cognitive Functioning unable to assess               Psychosocial    No documentation.                Abuse/Neglect    No documentation.                Legal     Row Name 01/13/23 1613       Financial/Legal    Who Manages Finances if Patient Unable Dtr POA               Substance Abuse    No documentation.                Patient Forms    No documentation.                   Barbara Oviedo RN

## 2023-01-13 NOTE — ED NOTES
Spoke with Ana NOVOA and informed her of saturation and HR changes and current systolic b/o 80's-90's.  She states to continue to closely observe pt and if oxygen saturations dropped again to get a STAT ABG and speak with pulmonary who has already been contacted and is aware of pt.

## 2023-01-13 NOTE — ED NOTES
Nursing report ED to floor  Fritz Flores  89 y.o.  male    HPI :   Chief Complaint   Patient presents with    Hypotension    Weakness - Generalized       Admitting doctor:   Ari Adnrade MD    Admitting diagnosis:   The primary encounter diagnosis was Acute kidney injury (HCC). Diagnoses of Generalized weakness and Hypotension, unspecified hypotension type were also pertinent to this visit.    Code status:   Current Code Status       Date Active Code Status Order ID Comments User Context       Prior            Allergies:   Penicillins    Isolation:   No active isolations    Intake and Output    Intake/Output Summary (Last 24 hours) at 1/13/2023 0104  Last data filed at 1/13/2023 0055  Gross per 24 hour   Intake 2000 ml   Output 500 ml   Net 1500 ml       Weight:   There were no vitals filed for this visit.    Most recent vitals:   Vitals:    01/12/23 2341 01/13/23 0011 01/13/23 0038 01/13/23 0043   BP: 90/51 91/45 95/54 100/57   BP Location:   Right arm Right arm   Patient Position:   Sitting Sitting   Pulse: (!) 48 (!) 47  50   Resp:    18   Temp:       TempSrc:       SpO2: 98% 98%  98%   Height:           Active LDAs/IV Access:   Lines, Drains & Airways       Active LDAs       Name Placement date Placement time Site Days    Peripheral IV 01/12/23 Anterior;Distal;Left Forearm 01/12/23  --  Forearm  1    Peripheral IV 01/12/23 2120 Left Forearm 01/12/23 2120  Forearm  less than 1    Peripheral IV Anterior;Right Forearm --  --  Forearm  --                    Labs (abnormal labs have a star):   Labs Reviewed   COMPREHENSIVE METABOLIC PANEL - Abnormal; Notable for the following components:       Result Value    Glucose 136 (*)     BUN 47 (*)     Creatinine 2.20 (*)     Calcium 8.5 (*)     eGFR 27.9 (*)     All other components within normal limits    Narrative:     GFR Normal >60  Chronic Kidney Disease <60  Kidney Failure <15    The GFR formula is only valid for adults with stable renal function between ages 18 and  "70.   LACTIC ACID, PLASMA - Abnormal; Notable for the following components:    Lactate 2.2 (*)     All other components within normal limits   CBC WITH AUTO DIFFERENTIAL - Abnormal; Notable for the following components:    RBC 3.23 (*)     Hemoglobin 11.3 (*)     Hematocrit 31.9 (*)     MCV 98.8 (*)     MCH 35.0 (*)     RDW 11.4 (*)     Neutrophil % 78.7 (*)     Lymphocyte % 12.0 (*)     All other components within normal limits   RESPIRATORY PANEL PCR W/ COVID-19 (SARS-COV-2) JESUS/RODRIGUE/CALEB/PAD/COR/MAD/ASHVIN IN-HOUSE, NP SWAB IN UT/VTP, 3-4 HR TAT - Normal    Narrative:     In the setting of a positive respiratory panel with a viral infection PLUS a negative procalcitonin without other underlying concern for bacterial infection, consider observing off antibiotics or discontinuation of antibiotics and continue supportive care. If the respiratory panel is positive for atypical bacterial infection (Bordetella pertussis, Chlamydophila pneumoniae, or Mycoplasma pneumoniae), consider antibiotic de-escalation to target atypical bacterial infection.   PROCALCITONIN - Normal    Narrative:     As a Marker for Sepsis (Non-Neonates):    1. <0.5 ng/mL represents a low risk of severe sepsis and/or septic shock.  2. >2 ng/mL represents a high risk of severe sepsis and/or septic shock.    As a Marker for Lower Respiratory Tract Infections that require antibiotic therapy:    PCT on Admission    Antibiotic Therapy       6-12 Hrs later    >0.5                Strongly Recommended  >0.25 - <0.5        Recommended   0.1 - 0.25          Discouraged              Remeasure/reassess PCT  <0.1                Strongly Discouraged     Remeasure/reassess PCT    As 28 day mortality risk marker: \"Change in Procalcitonin Result\" (>80% or <=80%) if Day 0 (or Day 1) and Day 4 values are available. Refer to http://www.[employer]-pct-calculator.com    Change in PCT <=80%  A decrease of PCT levels below or equal to 80% defines a positive change in PCT test " result representing a higher risk for 28-day all-cause mortality of patients diagnosed with severe sepsis for septic shock.    Change in PCT >80%  A decrease of PCT levels of more than 80% defines a negative change in PCT result representing a lower risk for 28-day all-cause mortality of patients diagnosed with severe sepsis or septic shock.      BNP (IN-HOUSE) - Normal    Narrative:     Among patients with dyspnea, NT-proBNP is highly sensitive for the detection of acute congestive heart failure. In addition NT-proBNP of <300 pg/ml effectively rules out acute congestive heart failure with 99% negative predictive value.    Results may be falsely decreased if patient taking Biotin.     TROPONIN (IN-HOUSE) - Normal    Narrative:     Troponin T Reference Range:  <= 0.03 ng/mL-   Negative for AMI  >0.03 ng/mL-     Abnormal for myocardial necrosis.  Clinicians would have to utilize clinical acumen, EKG, Troponin and serial changes to determine if it is an Acute Myocardial Infarction or myocardial injury due to an underlying chronic condition.       Results may be falsely decreased if patient taking Biotin.     BLOOD CULTURE   BLOOD CULTURE   URINALYSIS W/ MICROSCOPIC IF INDICATED (NO CULTURE)   LACTIC ACID, REFLEX   CBC AND DIFFERENTIAL    Narrative:     The following orders were created for panel order CBC & Differential.  Procedure                               Abnormality         Status                     ---------                               -----------         ------                     CBC Auto Differential[[id number]]        Abnormal            Final result                 Please view results for these tests on the individual orders.       EKG:   ECG 12 Lead Bradycardia   Preliminary Result   HEART RATE= 51  bpm   RR Interval= 1176  ms   UT Interval= 210  ms   P Horizontal Axis= 2  deg   P Front Axis= 15  deg   QRSD Interval= 103  ms   QT Interval= 469  ms   QRS Axis= 40  deg   T Wave Axis= 85  deg   - ABNORMAL  ECG -   Sinus rhythm   Low voltage, precordial leads   Anteroseptal infarct, old   Minimal ST elevation, inferior leads   Electronically Signed By:    Date and Time of Study: 2023-01-12 21:47:13          Meds given in ED:   Medications   sodium chloride 0.9 % bolus 1,000 mL (0 mL Intravenous Stopped 1/12/23 2310)   sodium chloride 0.9 % bolus 1,000 mL (0 mL Intravenous Stopped 1/13/23 0054)       Imaging results:  CT Head Without Contrast    Result Date: 1/12/2023  No acute intracranial findings.  Radiation dose reduction techniques were utilized, including automated exposure control and exposure modulation based on body size.  This report was finalized on 1/12/2023 11:22 PM by Dr. Courtney Mitchell M.D.      XR Chest 1 View    Result Date: 1/12/2023  No acute findings.  This report was finalized on 1/12/2023 9:49 PM by Dr. Courtney Mitchell M.D.       Ambulatory status:   - bedrest     Social issues:   Social History     Socioeconomic History    Marital status:    Tobacco Use    Smoking status: Never    Smokeless tobacco: Never   Vaping Use    Vaping Use: Never used   Substance and Sexual Activity    Alcohol use: No    Drug use: No    Sexual activity: Defer       NIH Stroke Scale:         Kirit Small RN  01/13/23 01:04 EST

## 2023-01-13 NOTE — CONSULTS
"    Patient Name: Fritz Flores  :1933  89 y.o.    Date of Admission: 2023  Date of Consultation:  23  Encounter Provider: LAURA Skinner  Place of Service: [facility] CARDIOLOGY  Referring Provider: Ari Andrade MD  Patient Care Team:  Rachid العلي Jr., MD as PCP - General (Internal Medicine)      Chief complaint: near syncope    History of Present Illness: Mr. Flores is an 89 year old man who has a remote history of coronary artery disease with prior bypass. He has not followed with cardiology has an outpatient in quite some time. He was seen in the hospital in  when he broke his hip. He did not require any cardiac testing and tolerated surgery. He was seen again in 2022. He was admitted for an unwitnessed fall. His troponin was mildly elevated. He had an echo that showed mildly depressed LVEF 46-50% with wall motion abnormalities. He had no angina and his EKG was relatively benign appearing. No PE on CTA. Chronic DVT noted.     He presented to the emergency room yesterday with complaints of lethargy.     He was in his normal state of health. He had a great day according to his daughter. He had gone out to lunch. Had normal appetite. When he returned home he started walking from the kitchen to the bedroom. He stopped suddenly to sit in a kitchen chair which did not have arms and that was very unusual for him per his daughter. He did say he felt sort of \"funny in his head\" and became lethargic and less talkative. Daughter tried obtaining an automatic blood pressure and could not get a reading. She called EMS.     EMS noted his SBP to be in the 70 and 80's. Hr in the 60's.     He was evaluated in the emergency room and noted to have acute kidney injury with creatinine 2.2. He has gotten over 3 liters of IVF resuscitation. Creatinine today down to 1.7.    Troponin 0.017 and 0.011.    EKG showed sinus bradycardia with HR 51. Overnight Hr in 40's. "     Patient is currently getting back in bed from being up. His Hr is in the 60's. Sinus. He denies any light headed or dizziness. He has not had any palpitations. He denies prior syncope.     He has not had any chest pain or pressure.     Echo June 2022  • Estimated left ventricular EF was in disagreement with the calculated left ventricular EF. Left ventricular ejection fraction appears to be 46 - 50%. Left ventricular systolic function is mildly decreased.  • Estimated right ventricular systolic pressure from tricuspid regurgitation is normal (<35 mmHg).  • Left ventricular diastolic function is consistent with (grade I) impaired relaxation.  • The following left ventricular wall segments are hypokinetic: mid anterior and basal anterior. The following left ventricular wall segments are akinetic: mid anterolateral.  • Mildly reduced right ventricular systolic function noted.           Past Medical History:   Diagnosis Date   • Colon polyps    • Coronary artery disease    • Hyperlipemia    • Myocardial infarction (HCC) 1989   • Ulcerative colitis (HCC)        Past Surgical History:   Procedure Laterality Date   • CARDIAC SURGERY  1989    CABG X 1   • COLONOSCOPY  2014   • COLONOSCOPY N/A 8/23/2016    Procedure: COLONOSCOPY INTO CECUM WITH COLD BIOPSIES;  Surgeon: Aaron Gregg MD;  Location: [facility] ENDOSCOPY;  Service:    • FEMUR IM NAILING/RODDING Left 6/30/2019    Procedure: FEMUR INTRAMEDULLARY NAILING/RODDING;  Surgeon: Jaqueline Wagoner MD;  Location: [facility] OR;  Service: Orthopedics   • FRACTURE SURGERY      upper leg w/hardware   • HIP SURGERY Left     x 3         Prior to Admission medications    Medication Sig Start Date End Date Taking? Authorizing Provider   acetaminophen (TYLENOL) 325 MG tablet Take 2 tablets by mouth Every 4 (Four) Hours As Needed for Mild Pain . 1/31/22  Yes David Finley MD   aspirin 81 MG EC tablet Take 81 mg by mouth daily.   Yes ProviderCrescencio MD    docusate sodium (COLACE) 100 MG capsule Take 100 mg by mouth 2 (Two) Times a Day.   Yes Provider, MD Crescencio   donepezil (ARICEPT) 5 MG tablet Take 5 mg by mouth Every Night.   Yes Provider, MD Crescencio   Furosemide (LASIX PO) Take 80 mg by mouth Daily. 6/10/22  Yes Crescencio Fernandez MD   ondansetron (ZOFRAN) 4 MG tablet Take 1 tablet by mouth Every 6 (Six) Hours As Needed for Nausea or Vomiting. 1/31/22  Yes David Finley MD   potassium chloride 10 MEQ CR tablet Take 2 tablets by mouth Daily. 6/5/22  Yes Catalina Parekh DO   vitamin B-12 (VITAMIN B-12) 1000 MCG tablet Take 1 tablet by mouth Daily. 6/6/22  Yes Catalina Parekh DO       Allergies   Allergen Reactions   • Penicillins Rash       Social History     Socioeconomic History   • Marital status:    Tobacco Use   • Smoking status: Never   • Smokeless tobacco: Never   Vaping Use   • Vaping Use: Never used   Substance and Sexual Activity   • Alcohol use: No   • Drug use: No   • Sexual activity: Defer       History reviewed. No pertinent family history.    REVIEW OF SYSTEMS:   All systems reviewed.  Pertinent positives identified in HPI.  All other systems are negative.      Objective:     Vitals:    01/13/23 0539 01/13/23 0559 01/13/23 0829 01/13/23 0944   BP:  108/60 111/50    BP Location:  Right arm     Patient Position:  Lying     Pulse: (!) 47 (!) 46     Resp:  18     Temp:  94.5 °F (34.7 °C)  94.4 °F (34.7 °C)   TempSrc:  Rectal  Rectal   SpO2: 99%      Weight:       Height:         Body mass index is 25.07 kg/m².    Physical Exam:  Constitutional: He is oriented to person, place, and time. He appears well-developed. He does not appear ill.   HENT:   Head: Normocephalic and atraumatic. Head is without contusion.   Right Ear: Hearing normal. No drainage.   Left Ear: Hearing normal. No drainage.   Nose: No nasal deformity. No epistaxis.   Eyes: Lids are normal. Right eye exhibits no exudate. Left eye exhibits no exudate.  Neck:  No JVD present.   Cardiovascular: Normal rate, regular rhythm and normal heart sounds.    Pulses:       Posterior tibial pulses are 2+ on the right side, and 2+ on the left side.   Pulmonary/Chest: Effort normal and breath sounds normal.   Abdominal: Soft. Normal appearance and bowel sounds are normal. There is no tenderness.   Musculoskeletal: Normal range of motion.        Right shoulder: He exhibits no deformity.        Left shoulder: He exhibits no deformity.   Neurological: He is alert and oriented to person, place, and time. He has normal strength.   Skin: Skin is warm, dry and intact. No rash noted. 3+ pedal edema  Psychiatric: He has a normal mood and affect. His behavior is normal. Thought content normal.   Vitals reviewed      Lab Review:     Results from last 7 days   Lab Units 01/13/23  0743 01/12/23  2156   SODIUM mmol/L 142 140   POTASSIUM mmol/L 4.2 4.9   CHLORIDE mmol/L 111* 103   CO2 mmol/L 22.8 23.9   BUN mg/dL 44* 47*   CREATININE mg/dL 1.74* 2.20*   CALCIUM mg/dL 7.5* 8.5*   BILIRUBIN mg/dL  --  0.2   ALK PHOS U/L  --  101   ALT (SGPT) U/L  --  12   AST (SGOT) U/L  --  17   GLUCOSE mg/dL 102* 136*     Results from last 7 days   Lab Units 01/13/23  0743 01/12/23  2156   TROPONIN T ng/mL 0.011 0.017     Results from last 7 days   Lab Units 01/13/23  0743   WBC 10*3/mm3 6.51   HEMOGLOBIN g/dL 9.4*   HEMATOCRIT % 27.8*   PLATELETS 10*3/mm3 185     Results from last 7 days   Lab Units 01/13/23  0743   INR  1.18*                     Current Facility-Administered Medications:   •  acetaminophen (TYLENOL) tablet 650 mg, 650 mg, Oral, Q4H PRN **OR** acetaminophen (TYLENOL) 160 MG/5ML solution 650 mg, 650 mg, Oral, Q4H PRN **OR** acetaminophen (TYLENOL) suppository 650 mg, 650 mg, Rectal, Q4H PRN, Ana Connolly APRN  •  aspirin EC tablet 81 mg, 81 mg, Oral, Daily, Chino Irwin MD  •  docusate sodium (COLACE) capsule 100 mg, 100 mg, Oral, BID, Chino Irwin MD  •   nitroglycerin (NITROSTAT) SL tablet 0.4 mg, 0.4 mg, Sublingual, Q5 Min PRN, Ana Connolly, APRN  •  ondansetron (ZOFRAN) injection 4 mg, 4 mg, Intravenous, Q6H PRN, Ana Connolly, APRN  •  sodium chloride 0.9 % flush 10 mL, 10 mL, Intravenous, Q12H, Ana Connolly APRN, 10 mL at 01/13/23 1053  •  sodium chloride 0.9 % flush 10 mL, 10 mL, Intravenous, PRN, Ana Connolly, APRN  •  sodium chloride 0.9 % infusion 40 mL, 40 mL, Intravenous, PRN, Ana Connolly, APRN  •  sodium chloride 0.9 % infusion, 50 mL/hr, Intravenous, Continuous, Chino Irwin MD, Last Rate: 50 mL/hr at 01/13/23 1154, 50 mL/hr at 01/13/23 1154  •  vitamin B-12 (CYANOCOBALAMIN) tablet 1,000 mcg, 1,000 mcg, Oral, Daily, Chino Irwin MD    Assessment and Plan:       1. Near syncope - secondary to hypotension.   2. Acute kidney injury status post volume resuscitation and slowly improving.   3. Sinus bradycardia - no blocks or pauses noted. Continue to monitor on tele and consider 14 day monitor at discharge. Check TSH. Bradycardia likely a confounding factor but not primary cause of syncope. No indication for PPM at this time.  4. Coronary artery disease without angina , remote CABG 1989.  5. Chronic pedal edema , unchanged from baseline. No prior history of CHF.   6. Chronic DVT  7. Hyperlipidemia     Kathy Bliss, APRN  01/13/23  12:17 EST

## 2023-01-13 NOTE — ED NOTES
Pt was sleeping when his oxygen saturation dropped to 68% O2 @ liters and HR increased to the 70's from 48.  Pt woken up and sats slowly increased back up to 99% O2 2L.  HR back to baseline 48.  Call placed to [facility].

## 2023-01-13 NOTE — DISCHARGE PLACEMENT REQUEST
"Fritz Flores (89 y.o. Male)     Date of Birth   12/21/1933    Social Security Number       Address   [address] [address]    Home Phone   [phone]    MRN   [id number]       [facility]    Marital Status                               Admission Date   1/12/23    Admission Type   Emergency    Admitting Provider   Ari Andrade MD    Attending Provider   Chino Irwin MD    Department, Room/Bed   [address], [address]       Discharge Date       Discharge Disposition       Discharge Destination                               Attending Provider: Chino Irwin MD    Allergies: Penicillins    Isolation: None   Infection: None   Code Status: No CPR    Ht: 185.4 cm (73\")   Wt: 86.2 kg (190 lb)    Admission Cmt: None   Principal Problem: Acute kidney injury (HCC) [N17.9]                 Active Insurance as of 1/12/2023     Primary Coverage     Payor Plan Insurance Group Employer/Plan Group    MEDICARE MEDICARE A & B      Payor Plan Address Payor Plan Phone Number Payor Plan Fax Number Effective Dates    [address] [phone]  12/1/1998 - None Entered    [facility] [id number]       Subscriber Name Subscriber Birth Date Member ID       FRITZ FLORES 12/21/1933 [id number]           Secondary Coverage     Payor Plan Insurance Group Employer/Plan Group    [employer] BLUE [employer]OSS [employer] BLUE CROSS BLUE [employer]IELD PPO [id number]     Payor Plan Address Payor Plan Phone Number Payor Plan Fax Number Effective Dates    [address] [phone]  7/1/2005 - None Entered    [facility] [id number]       Subscriber Name Subscriber Birth Date Member ID       FRITZ FLORES 12/21/1933 [id number]                 Emergency Contacts      (Rel.) Home Phone Work Phone Mobile Phone    Deidre Flores (POA) (Daughter) [phone] -- --    MarkEh [email or web address] (Son) -- -- [phone]              "

## 2023-01-13 NOTE — ED NOTES
Nursing report ED to floor  Fritz Flores  89 y.o.  male    HPI :   Chief Complaint   Patient presents with    Hypotension    Weakness - Generalized       Admitting doctor:   Ari Andrade MD    Admitting diagnosis:   The primary encounter diagnosis was Acute kidney injury (HCC). Diagnoses of Generalized weakness and Hypotension, unspecified hypotension type were also pertinent to this visit.    Code status:   Current Code Status       Date Active Code Status Order ID Comments User Context       1/13/2023 0347 CPR (Attempt to Resuscitate) [id number]  Ana Connolly, APRN ED        Question Answer    Code Status (Patient has no pulse and is not breathing) CPR (Attempt to Resuscitate)    Medical Interventions (Patient has pulse or is breathing) Full Support                    Allergies:   Penicillins    Isolation:   No active isolations    Intake and Output    Intake/Output Summary (Last 24 hours) at 1/13/2023 0527  Last data filed at 1/13/2023 0055  Gross per 24 hour   Intake 2000 ml   Output 500 ml   Net 1500 ml       Weight:   There were no vitals filed for this visit.    Most recent vitals:   Vitals:    01/13/23 0428 01/13/23 0441 01/13/23 0456 01/13/23 0513   BP: (!) 87/68 110/99 (!) 89/42 (!) 85/53   BP Location:       Patient Position:       Pulse: (!) 45   (!) 48   Resp:       Temp:       TempSrc:       SpO2:  94% 98% 97%   Height:           Active LDAs/IV Access:   Lines, Drains & Airways       Active LDAs       Name Placement date Placement time Site Days    Peripheral IV 01/12/23 Anterior;Distal;Left Forearm 01/12/23  --  Forearm  1    Peripheral IV 01/12/23 2120 Left Forearm 01/12/23 2120  Forearm  less than 1    Peripheral IV Anterior;Right Forearm --  --  Forearm  --                    Labs (abnormal labs have a star):   Labs Reviewed   COMPREHENSIVE METABOLIC PANEL - Abnormal; Notable for the following components:       Result Value    Glucose 136 (*)     BUN 47 (*)     Creatinine 2.20 (*)      Calcium 8.5 (*)     eGFR 27.9 (*)     All other components within normal limits    Narrative:     GFR Normal >60  Chronic Kidney Disease <60  Kidney Failure <15    The GFR formula is only valid for adults with stable renal function between ages 18 and 70.   LACTIC ACID, PLASMA - Abnormal; Notable for the following components:    Lactate 2.2 (*)     All other components within normal limits   CBC WITH AUTO DIFFERENTIAL - Abnormal; Notable for the following components:    RBC 3.23 (*)     Hemoglobin 11.3 (*)     Hematocrit 31.9 (*)     MCV 98.8 (*)     MCH 35.0 (*)     RDW 11.4 (*)     Neutrophil % 78.7 (*)     Lymphocyte % 12.0 (*)     All other components within normal limits   BLOOD GAS, ARTERIAL - Abnormal; Notable for the following components:    pH, Arterial 7.334 (*)     pCO2, Arterial 45.1 (*)     Base Excess, Arterial -1.9 (*)     All other components within normal limits   RESPIRATORY PANEL PCR W/ COVID-19 (SARS-COV-2) JESUS/RODRIGUE/CALEB/PAD/COR/MAD/ASHVIN IN-HOUSE, NP SWAB IN [facility], 3-4 HR TAT - Normal    Narrative:     In the setting of a positive respiratory panel with a viral infection PLUS a negative procalcitonin without other underlying concern for bacterial infection, consider observing off antibiotics or discontinuation of antibiotics and continue supportive care. If the respiratory panel is positive for atypical bacterial infection (Bordetella pertussis, Chlamydophila pneumoniae, or Mycoplasma pneumoniae), consider antibiotic de-escalation to target atypical bacterial infection.   URINALYSIS W/ MICROSCOPIC IF INDICATED (NO CULTURE) - Normal    Narrative:     Urine microscopic not indicated.   PROCALCITONIN - Normal    Narrative:     As a Marker for Sepsis (Non-Neonates):    1. <0.5 ng/mL represents a low risk of severe sepsis and/or septic shock.  2. >2 ng/mL represents a high risk of severe sepsis and/or septic shock.    As a Marker for Lower Respiratory Tract Infections that require antibiotic  "therapy:    PCT on Admission    Antibiotic Therapy       6-12 Hrs later    >0.5                Strongly Recommended  >0.25 - <0.5        Recommended   0.1 - 0.25          Discouraged              Remeasure/reassess PCT  <0.1                Strongly Discouraged     Remeasure/reassess PCT    As 28 day mortality risk marker: \"Change in Procalcitonin Result\" (>80% or <=80%) if Day 0 (or Day 1) and Day 4 values are available. Refer to http://[email or web address]-pct-calculator.com    Change in PCT <=80%  A decrease of PCT levels below or equal to 80% defines a positive change in PCT test result representing a higher risk for 28-day all-cause mortality of patients diagnosed with severe sepsis for septic shock.    Change in PCT >80%  A decrease of PCT levels of more than 80% defines a negative change in PCT result representing a lower risk for 28-day all-cause mortality of patients diagnosed with severe sepsis or septic shock.      BNP (IN-HOUSE) - Normal    Narrative:     Among patients with dyspnea, NT-proBNP is highly sensitive for the detection of acute congestive heart failure. In addition NT-proBNP of <300 pg/ml effectively rules out acute congestive heart failure with 99% negative predictive value.    Results may be falsely decreased if patient taking Biotin.     TROPONIN (IN-HOUSE) - Normal    Narrative:     Troponin T Reference Range:  <= 0.03 ng/mL-   Negative for AMI  >0.03 ng/mL-     Abnormal for myocardial necrosis.  Clinicians would have to utilize clinical acumen, EKG, Troponin and serial changes to determine if it is an Acute Myocardial Infarction or myocardial injury due to an underlying chronic condition.       Results may be falsely decreased if patient taking Biotin.     LACTIC ACID, REFLEX - Normal   BLOOD CULTURE   BLOOD CULTURE   BLOOD GAS, ARTERIAL   BASIC METABOLIC PANEL   CBC WITH AUTO DIFFERENTIAL   LACTIC ACID, PLASMA   PROTIME-INR   TROPONIN (IN-HOUSE)   CBC AND DIFFERENTIAL    Narrative:     The " following orders were created for panel order CBC & Differential.  Procedure                               Abnormality         Status                     ---------                               -----------         ------                     CBC Auto Differential[[id number]]        Abnormal            Final result                 Please view results for these tests on the individual orders.       EKG:   ECG 12 Lead Bradycardia   Preliminary Result   HEART RATE= 51  bpm   RR Interval= 1176  ms   IA Interval= 210  ms   P Horizontal Axis= 2  deg   P Front Axis= 15  deg   QRSD Interval= 103  ms   QT Interval= 469  ms   QRS Axis= 40  deg   T Wave Axis= 85  deg   - ABNORMAL ECG -   Sinus rhythm   Low voltage, precordial leads   Anteroseptal infarct, old   Minimal ST elevation, inferior leads   Electronically Signed By:    Date and Time of Study: 2023-01-12 21:47:13          Meds given in ED:   Medications   sodium chloride 0.9 % bolus 500 mL (500 mL Intravenous New Bag 1/13/23 0420)   sodium chloride 0.9 % flush 10 mL (has no administration in time range)   sodium chloride 0.9 % flush 10 mL (has no administration in time range)   sodium chloride 0.9 % infusion 40 mL (has no administration in time range)   nitroglycerin (NITROSTAT) SL tablet 0.4 mg (has no administration in time range)   sodium chloride 0.9 % infusion (125 mL/hr Intravenous New Bag 1/13/23 7803)   acetaminophen (TYLENOL) tablet 650 mg (has no administration in time range)     Or   acetaminophen (TYLENOL) 160 MG/5ML solution 650 mg (has no administration in time range)     Or   acetaminophen (TYLENOL) suppository 650 mg (has no administration in time range)   ondansetron (ZOFRAN) injection 4 mg (has no administration in time range)   sodium chloride 0.9 % bolus 1,000 mL (0 mL Intravenous Stopped 1/12/23 2310)   sodium chloride 0.9 % bolus 1,000 mL (0 mL Intravenous Stopped 1/13/23 0054)       Imaging results:  CT Head Without Contrast    Result Date:  1/12/2023  No acute intracranial findings.  Radiation dose reduction techniques were utilized, including automated exposure control and exposure modulation based on body size.  This report was finalized on 1/12/2023 11:22 PM by Dr. Courtney Mitchell M.D.      XR Chest 1 View    Result Date: 1/12/2023  No acute findings.  This report was finalized on 1/12/2023 9:49 PM by Dr. Courtney Mitchell M.D.       Ambulatory status:   - up assist x2      Social issues:   Social History     Socioeconomic History    Marital status:    Tobacco Use    Smoking status: Never    Smokeless tobacco: Never   Vaping Use    Vaping Use: Never used   Substance and Sexual Activity    Alcohol use: No    Drug use: No    Sexual activity: Defer       NIH Stroke Scale:         Vandana Hollis RN  01/13/23 05:27 EST

## 2023-01-13 NOTE — H&P
Patient Name:  Fritz Flores  YOB: 1933  MRN:  [id number]  Admit Date:  1/12/2023  Patient Care Team:  Rachid العلي Jr., MD as PCP - General (Internal Medicine)      Subjective   History Present Illness     Chief Complaint   Patient presents with   • Hypotension   • Weakness - Generalized       Mr. Flores is a 89 y.o. male with a history of CAD/remote CABG in 1989, prior DVT as well as mild dementia recently started on Aricept that presents to [facility] complaining of lethargy.  Most of the history obtained from patient's daughter as patient himself has poor recollection of yesterday's events.  He is not sure why he was brought into the hospital.  Daughter says that they had had a normal day, even when out to eat at [employer] last night and patient had good appetite with no complaints.  After getting home he was sitting in a chair and just sort of became more lethargic and less talkative.  He seemed sleepy and hard to arouse.  She tried to check her blood pressure on him and could not get any reading so she called EMS.  Patient was actually quite hypotensive and bradycardic throughout his time in the ED.  He received a total of 3.5 L of fluid overnight.  His BP has been more stable over the last 2 hours, running over 100 systolically now.  He is still bradycardic in the low 50s.  Overnight it got down to the low 40s though I do not see any documentation of any pauses.  Daughter says that his blood pressure typically runs a bit low and he is not on any blood pressure medications.  He does take Lasix for chronic lower extremity edema.  She is not aware of any history of bradycardia.  Patient this morning says he feels okay, denies any pain or shortness of breath.        Review of Systems   Constitutional: Positive for activity change. Negative for chills and fever.   HENT: Negative for congestion and trouble swallowing.    Eyes: Negative for visual disturbance.   Respiratory:  Negative for cough, chest tightness, shortness of breath and wheezing.    Cardiovascular: Positive for leg swelling. Negative for chest pain and palpitations.   Gastrointestinal: Negative for abdominal distention, abdominal pain, constipation, diarrhea, nausea and vomiting.   Genitourinary: Negative for dysuria, frequency and urgency.   Musculoskeletal: Negative for arthralgias.   Skin: Negative for rash.   Neurological: Negative for dizziness, weakness and headaches.   Psychiatric/Behavioral: Positive for confusion. Negative for agitation.        Personal History     Past Medical History:   Diagnosis Date   • Colon polyps    • Coronary artery disease    • Hyperlipemia    • Myocardial infarction (HCC) 1989   • Ulcerative colitis (HCC)      Past Surgical History:   Procedure Laterality Date   • CARDIAC SURGERY  1989    CABG X 1   • COLONOSCOPY  2014   • COLONOSCOPY N/A 8/23/2016    Procedure: COLONOSCOPY INTO CECUM WITH COLD BIOPSIES;  Surgeon: Aaron Gregg MD;  Location: [facility] ENDOSCOPY;  Service:    • FEMUR IM NAILING/RODDING Left 6/30/2019    Procedure: FEMUR INTRAMEDULLARY NAILING/RODDING;  Surgeon: Jaqueline Wagoner MD;  Location: [facility] MAIN OR;  Service: Orthopedics   • FRACTURE SURGERY      upper leg w/hardware   • HIP SURGERY Left     x 3     History reviewed. No pertinent family history.  Social History     Tobacco Use   • Smoking status: Never   • Smokeless tobacco: Never   Vaping Use   • Vaping Use: Never used   Substance Use Topics   • Alcohol use: No   • Drug use: No     No current facility-administered medications on file prior to encounter.     Current Outpatient Medications on File Prior to Encounter   Medication Sig Dispense Refill   • acetaminophen (TYLENOL) 325 MG tablet Take 2 tablets by mouth Every 4 (Four) Hours As Needed for Mild Pain .     • aspirin 81 MG EC tablet Take 81 mg by mouth daily.     • docusate sodium (COLACE) 100 MG capsule Take 100 mg by mouth 2 (Two) Times a Day.      • donepezil (ARICEPT) 5 MG tablet Take 5 mg by mouth Every Night.     • Furosemide (LASIX PO) Take 80 mg by mouth Daily.     • ondansetron (ZOFRAN) 4 MG tablet Take 1 tablet by mouth Every 6 (Six) Hours As Needed for Nausea or Vomiting.     • potassium chloride 10 MEQ CR tablet Take 2 tablets by mouth Daily. 60 tablet 0   • vitamin B-12 (VITAMIN B-12) 1000 MCG tablet Take 1 tablet by mouth Daily. 30 tablet 0     Allergies   Allergen Reactions   • Penicillins Rash       Objective    Objective     Vital Signs  Temp:  [94.4 °F (34.7 °C)-96.5 °F (35.8 °C)] 94.4 °F (34.7 °C)  Heart Rate:  [43-66] 46  Resp:  [16-18] 18  BP: ()/(25-99) 111/50  SpO2:  [73 %-99 %] 99 %  on  Flow (L/min):  [2] 2;   Device (Oxygen Therapy): nasal cannula  Body mass index is 25.07 kg/m².    Physical Exam  Vitals reviewed.   Constitutional:       General: He is not in acute distress.     Appearance: He is not ill-appearing.   HENT:      Head: Normocephalic and atraumatic.      Mouth/Throat:      Mouth: Mucous membranes are dry.   Cardiovascular:      Rate and Rhythm: Regular rhythm. Bradycardia present.      Heart sounds: No murmur heard.  Pulmonary:      Effort: Pulmonary effort is normal. No respiratory distress.      Breath sounds: Normal breath sounds.   Abdominal:      General: Bowel sounds are normal. There is no distension.      Palpations: Abdomen is soft.      Tenderness: There is no abdominal tenderness.   Musculoskeletal:      Right lower leg: Edema present.      Left lower leg: Edema present.      Comments: 3+   Skin:     General: Skin is warm and dry.   Neurological:      Mental Status: He is alert. He is disoriented.      Comments: Oriented x2   Psychiatric:      Comments: Calm and cooperative         Results Review:  I reviewed the patient's new clinical results.  I reviewed the patient's new imaging results and agree with the interpretation.  I reviewed the patient's other test results and agree with the interpretation  I  personally viewed and interpreted the patient's EKG/Telemetry data      Lab Results (last 24 hours)     Procedure Component Value Units Date/Time    CBC & Differential [[id number]]  (Abnormal) Collected: 01/12/23 2156    Specimen: Blood Updated: 01/12/23 2220    Narrative:      The following orders were created for panel order CBC & Differential.  Procedure                               Abnormality         Status                     ---------                               -----------         ------                     CBC Auto Differential[[id number]]        Abnormal            Final result                 Please view results for these tests on the individual orders.    Comprehensive Metabolic Panel [[id number]]  (Abnormal) Collected: 01/12/23 2156    Specimen: Blood Updated: 01/12/23 2246     Glucose 136 mg/dL      BUN 47 mg/dL      Creatinine 2.20 mg/dL      Sodium 140 mmol/L      Potassium 4.9 mmol/L      Comment: Slight hemolysis detected by analyzer. Results may be affected.        Chloride 103 mmol/L      CO2 23.9 mmol/L      Calcium 8.5 mg/dL      Total Protein 6.5 g/dL      Albumin 3.7 g/dL      ALT (SGPT) 12 U/L      AST (SGOT) 17 U/L      Comment: Slight hemolysis detected by analyzer. Results may be affected.        Alkaline Phosphatase 101 U/L      Total Bilirubin 0.2 mg/dL      Globulin 2.8 gm/dL      A/G Ratio 1.3 g/dL      BUN/Creatinine Ratio 21.4     Anion Gap 13.1 mmol/L      eGFR 27.9 mL/min/1.73      Comment: National Kidney Foundation and American Society of Nephrology (ASN) Task Force recommended calculation based on the Chronic Kidney Disease Epidemiology Collaboration (CKD-EPI) equation refit without adjustment for race.       Narrative:      GFR Normal >60  Chronic Kidney Disease <60  Kidney Failure <15    The GFR formula is only valid for adults with stable renal function between ages 18 and 70.    Procalcitonin [[id number]]  (Normal) Collected: 01/12/23 2156    Specimen: Blood Updated:  "01/12/23 2248     Procalcitonin 0.14 ng/mL     Narrative:      As a Marker for Sepsis (Non-Neonates):    1. <0.5 ng/mL represents a low risk of severe sepsis and/or septic shock.  2. >2 ng/mL represents a high risk of severe sepsis and/or septic shock.    As a Marker for Lower Respiratory Tract Infections that require antibiotic therapy:    PCT on Admission    Antibiotic Therapy       6-12 Hrs later    >0.5                Strongly Recommended  >0.25 - <0.5        Recommended   0.1 - 0.25          Discouraged              Remeasure/reassess PCT  <0.1                Strongly Discouraged     Remeasure/reassess PCT    As 28 day mortality risk marker: \"Change in Procalcitonin Result\" (>80% or <=80%) if Day 0 (or Day 1) and Day 4 values are available. Refer to http://www.[facility]-pct-calculator.com    Change in PCT <=80%  A decrease of PCT levels below or equal to 80% defines a positive change in PCT test result representing a higher risk for 28-day all-cause mortality of patients diagnosed with severe sepsis for septic shock.    Change in PCT >80%  A decrease of PCT levels of more than 80% defines a negative change in PCT result representing a lower risk for 28-day all-cause mortality of patients diagnosed with severe sepsis or septic shock.       Lactic Acid, Plasma [[id number]]  (Abnormal) Collected: 01/12/23 2156    Specimen: Blood Updated: 01/12/23 2323     Lactate 2.2 mmol/L     BNP [[id number]]  (Normal) Collected: 01/12/23 2156    Specimen: Blood Updated: 01/12/23 2248     proBNP 152.0 pg/mL     Narrative:      Among patients with dyspnea, NT-proBNP is highly sensitive for the detection of acute congestive heart failure. In addition NT-proBNP of <300 pg/ml effectively rules out acute congestive heart failure with 99% negative predictive value.    Results may be falsely decreased if patient taking Biotin.      Troponin [[id number]]  (Normal) Collected: 01/12/23 2156    Specimen: Blood Updated: 01/12/23 2248     Troponin " T 0.017 ng/mL     Narrative:      Troponin T Reference Range:  <= 0.03 ng/mL-   Negative for AMI  >0.03 ng/mL-     Abnormal for myocardial necrosis.  Clinicians would have to utilize clinical acumen, EKG, Troponin and serial changes to determine if it is an Acute Myocardial Infarction or myocardial injury due to an underlying chronic condition.       Results may be falsely decreased if patient taking Biotin.      CBC Auto Differential [[id number]]  (Abnormal) Collected: 01/12/23 2156    Specimen: Blood Updated: 01/12/23 2220     WBC 7.32 10*3/mm3      RBC 3.23 10*6/mm3      Hemoglobin 11.3 g/dL      Hematocrit 31.9 %      MCV 98.8 fL      MCH 35.0 pg      MCHC 35.4 g/dL      RDW 11.4 %      RDW-SD 41.5 fl      MPV 10.0 fL      Platelets 238 10*3/mm3      Neutrophil % 78.7 %      Lymphocyte % 12.0 %      Monocyte % 7.1 %      Eosinophil % 1.5 %      Basophil % 0.3 %      Immature Grans % 0.4 %      Neutrophils, Absolute 5.76 10*3/mm3      Lymphocytes, Absolute 0.88 10*3/mm3      Monocytes, Absolute 0.52 10*3/mm3      Eosinophils, Absolute 0.11 10*3/mm3      Basophils, Absolute 0.02 10*3/mm3      Immature Grans, Absolute 0.03 10*3/mm3      nRBC 0.0 /100 WBC     Respiratory Panel PCR w/COVID-19(SARS-CoV-2) JESUS/RODRIGUE/CALEB/PAD/COR/MAD/ASHVIN In-House, NP Swab in UTM/VTM, 3-4 HR TAT - Swab, Nasopharynx [[id number]]  (Normal) Collected: 01/12/23 2201    Specimen: Swab from Nasopharynx Updated: 01/12/23 2313     ADENOVIRUS, PCR Not Detected     Coronavirus 229E Not Detected     Coronavirus HKU1 Not Detected     Coronavirus NL63 Not Detected     Coronavirus OC43 Not Detected     COVID19 Not Detected     Human Metapneumovirus Not Detected     Human Rhinovirus/Enterovirus Not Detected     Influenza A PCR Not Detected     Influenza B PCR Not Detected     Parainfluenza Virus 1 Not Detected     Parainfluenza Virus 2 Not Detected     Parainfluenza Virus 3 Not Detected     Parainfluenza Virus 4 Not Detected     RSV, PCR Not Detected      Bordetella pertussis pcr Not Detected     Bordetella parapertussis PCR Not Detected     Chlamydophila pneumoniae PCR Not Detected     Mycoplasma pneumo by PCR Not Detected    Narrative:      In the setting of a positive respiratory panel with a viral infection PLUS a negative procalcitonin without other underlying concern for bacterial infection, consider observing off antibiotics or discontinuation of antibiotics and continue supportive care. If the respiratory panel is positive for atypical bacterial infection (Bordetella pertussis, Chlamydophila pneumoniae, or Mycoplasma pneumoniae), consider antibiotic de-escalation to target atypical bacterial infection.    Urinalysis With Microscopic If Indicated (No Culture) - Urine, Catheter [[id number]]  (Normal) Collected: 01/13/23 0055    Specimen: Urine, Catheter Updated: 01/13/23 0120     Color, UA Yellow     Appearance, UA Clear     pH, UA 6.5     Specific Gravity, UA 1.012     Glucose, UA Negative     Ketones, UA Negative     Bilirubin, UA Negative     Blood, UA Negative     Protein, UA Negative     Leuk Esterase, UA Negative     Nitrite, UA Negative     Urobilinogen, UA 1.0 E.U./dL    Narrative:      Urine microscopic not indicated.    Blood Culture - Blood, Arm, Left [[id number]] Collected: 01/13/23 0104    Specimen: Blood from Arm, Left Updated: 01/13/23 0110    STAT Lactic Acid, Reflex [[id number]]  (Normal) Collected: 01/13/23 0104    Specimen: Blood Updated: 01/13/23 0137     Lactate 0.8 mmol/L     Blood Gas, Arterial - [[id number]]  (Abnormal) Collected: 01/13/23 0227    Specimen: Arterial Blood Updated: 01/13/23 0229     Site Arterial: left brachial     Gilbert's Test Positive     pH, Arterial 7.334 pH units      pCO2, Arterial 45.1 mm Hg      pO2, Arterial 92.0 mm Hg      HCO3, Arterial 24.0 mmol/L      Base Excess, Arterial -1.9 mmol/L      O2 Saturation Calculated 96.5 %      Barometric Pressure for Blood Gas 750.6 mmHg      Comment: Sat 98% Meter: [id number]  : [id number] [facility]        Modality Cannula     Flow Rate 2 lpm      Ventilator Mode NA     Rate 20 Breaths/minute     Blood Culture - Blood, Arm, Right [[id number]] Collected: 01/13/23 0432    Specimen: Blood from Arm, Right Updated: 01/13/23 0442    Basic Metabolic Panel [[id number]]  (Abnormal) Collected: 01/13/23 0743    Specimen: Blood Updated: 01/13/23 0831     Glucose 102 mg/dL      BUN 44 mg/dL      Creatinine 1.74 mg/dL      Sodium 142 mmol/L      Potassium 4.2 mmol/L      Chloride 111 mmol/L      CO2 22.8 mmol/L      Calcium 7.5 mg/dL      BUN/Creatinine Ratio 25.3     Anion Gap 8.2 mmol/L      eGFR 37.0 mL/min/1.73      Comment: National Kidney Foundation and American Society of Nephrology (ASN) Task Force recommended calculation based on the Chronic Kidney Disease Epidemiology Collaboration (CKD-EPI) equation refit without adjustment for race.       Narrative:      GFR Normal >60  Chronic Kidney Disease <60  Kidney Failure <15    The GFR formula is only valid for adults with stable renal function between ages 18 and 70.    CBC Auto Differential [[id number]]  (Abnormal) Collected: 01/13/23 0743    Specimen: Blood Updated: 01/13/23 0809     WBC 6.51 10*3/mm3      RBC 2.72 10*6/mm3      Hemoglobin 9.4 g/dL      Hematocrit 27.8 %      .2 fL      MCH 34.6 pg      MCHC 33.8 g/dL      RDW 11.5 %      RDW-SD 42.8 fl      MPV 9.4 fL      Platelets 185 10*3/mm3      Neutrophil % 67.6 %      Lymphocyte % 20.0 %      Monocyte % 9.8 %      Eosinophil % 1.8 %      Basophil % 0.5 %      Immature Grans % 0.3 %      Neutrophils, Absolute 4.40 10*3/mm3      Lymphocytes, Absolute 1.30 10*3/mm3      Monocytes, Absolute 0.64 10*3/mm3      Eosinophils, Absolute 0.12 10*3/mm3      Basophils, Absolute 0.03 10*3/mm3      Immature Grans, Absolute 0.02 10*3/mm3      nRBC 0.0 /100 WBC     Lactic Acid, Plasma [[id number]]  (Normal) Collected: 01/13/23 0743    Specimen: Blood Updated: 01/13/23 0824     Lactate 0.6  mmol/L     Protime-INR [[id number]]  (Abnormal) Collected: 01/13/23 0743    Specimen: Blood Updated: 01/13/23 0827     Protime 15.2 Seconds      INR 1.18    Troponin [[id number]]  (Normal) Collected: 01/13/23 0743    Specimen: Blood Updated: 01/13/23 0825     Troponin T 0.011 ng/mL     Narrative:      Troponin T Reference Range:  <= 0.03 ng/mL-   Negative for AMI  >0.03 ng/mL-     Abnormal for myocardial necrosis.  Clinicians would have to utilize clinical acumen, EKG, Troponin and serial changes to determine if it is an Acute Myocardial Infarction or myocardial injury due to an underlying chronic condition.       Results may be falsely decreased if patient taking Biotin.      Cortisol [[id number]] Collected: 01/13/23 0743    Specimen: Blood Updated: 01/13/23 0952    Iron Profile [[id number]] Collected: 01/13/23 0743    Specimen: Blood Updated: 01/13/23 0952    Ferritin [[id number]] Collected: 01/13/23 0743    Specimen: Blood Updated: 01/13/23 0952          Imaging Results (Last 24 Hours)     Procedure Component Value Units Date/Time    CT Head Without Contrast [[id number]] Collected: 01/12/23 2319     Updated: 01/12/23 2325    Narrative:      CT HEAD WITHOUT CONTRAST     HISTORY: Confusion     COMPARISON: 06/02/2022     TECHNIQUE: Axial CT imaging was obtained through the brain. No IV  contrast was administered.     FINDINGS:  No acute intracranial hemorrhage is seen. There is diffuse atrophy.  There is periventricular and deep white matter microangiopathic change.  There is no midline shift or mass effect. There is mucosal thickening  within the ethmoid sinuses.       Impression:      No acute intracranial findings.     Radiation dose reduction techniques were utilized, including automated  exposure control and exposure modulation based on body size.     This report was finalized on 1/12/2023 11:22 PM by Dr. Courtney Mitchell M.D.       XR Chest 1 View [[id number]] Collected: 01/12/23 2148     Updated: 01/12/23 2152     Narrative:      SINGLE VIEW OF THE CHEST     HISTORY: Hypotension     COMPARISON: 06/02/2022     FINDINGS:  Cardiomegaly is present. There is no vascular congestion. Patient is  status post median sternotomy with coronary artery bypass grafting. No  pneumothorax or pleural effusion is seen.       Impression:      No acute findings.     This report was finalized on 1/12/2023 9:49 PM by Dr. Courtney Mitchell M.D.             Results for orders placed during the hospital encounter of 06/02/22    Adult Transthoracic Echo Complete W/ Cont if Necessary Per Protocol    Interpretation Summary  · Estimated left ventricular EF was in disagreement with the calculated left ventricular EF. Left ventricular ejection fraction appears to be 46 - 50%. Left ventricular systolic function is mildly decreased.  · Estimated right ventricular systolic pressure from tricuspid regurgitation is normal (<35 mmHg).  · Left ventricular diastolic function is consistent with (grade I) impaired relaxation.  · The following left ventricular wall segments are hypokinetic: mid anterior and basal anterior. The following left ventricular wall segments are akinetic: mid anterolateral.  · Mildly reduced right ventricular systolic function noted.      ECG 12 Lead Bradycardia   Preliminary Result   HEART RATE= 51  bpm   RR Interval= 1176  ms   AR Interval= 210  ms   P Horizontal Axis= 2  deg   P Front Axis= 15  deg   QRSD Interval= 103  ms   QT Interval= 469  ms   QRS Axis= 40  deg   T Wave Axis= 85  deg   - ABNORMAL ECG -   Sinus rhythm   Low voltage, precordial leads   Anteroseptal infarct, old   Minimal ST elevation, inferior leads   Electronically Signed By:    Date and Time of Study: 2023-01-12 21:47:13           Assessment/Plan     Active Hospital Problems    Diagnosis  POA   • **Acute kidney injury (HCC) [N17.9]  Yes   • Anemia [D64.9]  Yes   • Mild dementia [F03.A0]  Yes   • Hypothermia [T68.XXXA]  Yes   • Bradycardia [R00.1]  Yes   • JOSE (acute  kidney injury) (HCC) [N17.9]  Yes   • DNR (do not resuscitate) [Z66]  Yes   • Coronary artery disease [I25.10]  Yes      Resolved Hospital Problems    Diagnosis Date Resolved POA   • Hypotension [I95.9] 01/13/2023 Yes   • AMS (altered mental status) [R41.82] 01/13/2023 Yes       Mr. Flores is a 89 y.o. male with history of CAD/prior CABG, DVT and dementia presenting with acute onset of lethargy, JOSE, hypotension, hypothermia and bradycardia, now better after getting some fluids overnight    · Not sure of the underlying cause for this.  He does not really appear to be septic with no focus of infection and only trivially elevated lactate, no leukocytosis.  · Will check TSH, cortisol, await blood cultures.  Hold off on antibiotics for now  · BP has responded well to fluids overnight.  We will continue very gentle hydration for at least a few hours to ensure stability of the BP and plan to discontinue fluids later today  · Continue warming the patient.  · JOSE improving with hydration, will recheck in a.m.  · Will ask cardiology to see regarding the bradycardia.  No urgent reason for pacemaker that I can see currently.  Monitor on telemetry for any concerning signs of AV block  · Hemoglobin down overnight but may be hemodilutional.  He is macrocytic so we will check B12 and folate as well.  Previous history of B12 deficiency and is on oral replacement  · I discussed the patient's findings and my recommendations with patient's daughter by phone.  She is his POA.  She confirms that he is DNR/DNI.  She would be open to the idea of putting a pacemaker if felt indicated by cardiology  · We will start Lovenox for DVT prophylaxis        Chino Irwin MD  [facility]  01/13/23  10:01 EST

## 2023-01-13 NOTE — ED NOTES
Assumed care of pt at 0325.  When came in room, BP read 80/40.  Checked BP again and it was 76/44.  Repositioned pt and sat him up in bed.  Current BP 89/52 with a MAP of 70.  Current HR 48.  Have paged Dr. Ari Andrade to discuss care of pt

## 2023-01-13 NOTE — ED PROVIDER NOTES
"The ELIA and I have discussed this patient's history, physical exam, and treatment plan.  I have reviewed the documentation and personally had a face to face interaction with the patient. I affirm the documentation and agree with the treatment and plan.  The attached note describes my personal findings.      I provided a substantive portion of the care of the patient.  I personally performed the physical exam in its entirety, and below are my findings.     Brief history of present illness: 89-year-old male with a history of dementia presents for evaluation of low blood pressure and generalized weakness.  His daughter who is a retired nurse brought him in.  Normally his blood pressure runs around 110 and his heart rate in his 60s to 70s.  He was out and active today with his family but when he returned home he sat down in a chair and is been significantly more quiet than usual.  Patient ate well today has had no recent cough, fevers, nausea vomiting or diarrhea reported.    Physical exam:    BP (!) 90/39   Pulse 54   Temp 96.5 °F (35.8 °C) (Rectal)   Resp 18   Ht 185.4 cm (73\")   SpO2 97%   BMI 25.33 kg/m²       Physical Exam   Constitutional: No distress.  Nontoxic.  Pleasant and hard of hearing but does not speak much.  HENT:  Head: Normocephalic and atraumatic.   Oropharynx: Mucous membranes are moist.   Eyes: . No scleral icterus. No conjunctival pallor.  Neck: Normal range of motion. Neck supple.   Cardiovascular: Pink warm and well perfused throughout.  Bradycardic  Pulmonary/Chest: No respiratory distress.  No tachypnea or increased work of breathing appreciated.    Abdominal: Soft. There is no tenderness. There is no rebound and no guarding.   Musculoskeletal: Moves all extremities equally but somewhat weakly.  Neurological: Alert and oriented to self and daughter.  Speech intelligible.  Moves bilateral upper extremities equally.  Skin: Skin is pink, warm, and dry.   Psychiatric: Limited " evaluation  Nursing note and vitals reviewed.        MDM:    I have personally reviewed and interpreted the EKG obtained today in the emergency department at 2147 concomitant with treatment.  EKG reveals rhythm/rate -sinus bradycardia, 50. WA-KM within normal limits.  QRS-narrow complex ST/T-wave -no STEMI; QTC within normal limits.  Comparison 6-22    Agree with laboratory and radiologic evaluation while giving patient fluid bolus.  Source of hypotension and bradycardia not clearly evident initially.  Patient is not on blood pressure medications at baseline.       Cristiano Soto MD  01/12/23 2454

## 2023-01-13 NOTE — ED TRIAGE NOTES
Patient to ED per EMS from home w/ reports of weakness, low BP. 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    Patient and staff w/ appropriate PPE in place throughout encounter.

## 2023-01-13 NOTE — ED PROVIDER NOTES
EMERGENCY DEPARTMENT ENCOUNTER    Room Number:  06/06  Date seen:  1/13/2023  PCP: Rachid العلي Jr., MD    HPI:  Chief Complaint: hypotension    A complete HPI/ROS/PMH/PSH/SH/FH are unobtainable due to: n/a    Context: Fritz Flores is a 89 y.o. male presenting to the emergency department complaining low blood pressure.  The history is being obtained by the patient, his daughter and by review of the medical chart.  According to the patient's daughter, she has had generalized weakness and been fatigued throughout today.  The patient's daughter states that the patient had difficulty ambulating throughout the house today using a walker.  She states that he typically does not need to use a walker.  She also states that he was having to sit down at shorter distances than normal for him.  She states that she took his blood pressure and his SBP was in the 60s.  She then called EMS to bring him to the hospital.  When EMS arrived, they were unable to obtain a blood pressure.  He also states that the patient's heart rate is slower than normal.  She states that typically his heart rate runs in the 70s/80s.  She also states that his systolic blood pressures typically around 110.    PAST MEDICAL HISTORY  Active Ambulatory Problems     Diagnosis Date Noted   • Closed intertrochanteric fracture of hip, left, initial encounter ([facility]) 06/30/2019   • Closed comminuted intertrochanteric fracture of proximal end of left femur ([facility]) 06/30/2019   • Ulcerative colitis ([facility]) 07/01/2019   • Coronary artery disease 07/01/2019   • Hyponatremia 07/01/2019   • Postoperative anemia due to acute blood loss 07/01/2019   • Constipation 09/27/2021   • Dysphagia 09/28/2021   • Age-related physical debility 09/29/2021   • Bacteriuria 09/29/2021   • Acute urinary tract infection 01/27/2022   • COVID-19 virus detected 01/27/2022   • DNR (do not resuscitate) 01/27/2022   • JOSE (acute kidney injury) ([facility]) 01/27/2022   • Dehydration 01/27/2022   •  Cytokine release syndrome, grade 1 01/31/2022   • Multiple falls 06/02/2022   • Essential hypertension 06/03/2022   • Elevated troponin 06/03/2022   • B12 deficiency 06/03/2022   • Lower leg DVT (deep venous thromboembolism), chronic, left (HCC) 06/05/2022     Resolved Ambulatory Problems     Diagnosis Date Noted   • Metabolic encephalopathy 07/01/2019     Past Medical History:   Diagnosis Date   • Colon polyps    • Hyperlipemia    • Myocardial infarction (HCC) 1989       PAST SURGICAL HISTORY  Past Surgical History:   Procedure Laterality Date   • CARDIAC SURGERY  1989    CABG X 1   • COLONOSCOPY  2014   • COLONOSCOPY N/A 8/23/2016    Procedure: COLONOSCOPY INTO CECUM WITH COLD BIOPSIES;  Surgeon: Aaron Gregg MD;  [facility] ENDOSCOPY;  Service:    • FEMUR IM NAILING/RODDING Left 6/30/2019    Procedure: FEMUR INTRAMEDULLARY NAILING/RODDING;  Surgeon: Jaqueline Wagoner MD;  Location: [facility] OR;  Service: Orthopedics   • FRACTURE SURGERY      upper leg w/hardware   • HIP SURGERY Left     x 3       FAMILY HISTORY  History reviewed. No pertinent family history.    SOCIAL HISTORY  Social History     Socioeconomic History   • Marital status:    Tobacco Use   • Smoking status: Never   • Smokeless tobacco: Never   Vaping Use   • Vaping Use: Never used   Substance and Sexual Activity   • Alcohol use: No   • Drug use: No   • Sexual activity: Defer       ALLERGIES  Penicillins    REVIEW OF SYSTEMS  All systems reviewed and negative except for those discussed in HPI.     PHYSICAL EXAM  ED Triage Vitals   Temp Heart Rate Resp BP SpO2   01/12/23 2106 01/12/23 2050 01/12/23 2050 01/12/23 2050 01/12/23 2050   96.5 °F (35.8 °C) 50 16 (!) 81/41 96 %      Temp src Heart Rate Source Patient Position BP Location FiO2 (%)   01/12/23 2106 -- 01/12/23 2106 01/12/23 2106 --   Rectal  Lying Right arm      Physical Exam  Vitals and nursing note reviewed.   Constitutional:       Appearance: Normal appearance.    HENT:      Head: Normocephalic and atraumatic.      Mouth/Throat:      Mouth: Mucous membranes are moist.   Eyes:      Extraocular Movements: Extraocular movements intact.      Pupils: Pupils are equal, round, and reactive to light.   Cardiovascular:      Rate and Rhythm: Regular rhythm. Bradycardia present.   Pulmonary:      Effort: Pulmonary effort is normal.      Breath sounds: Normal breath sounds.   Abdominal:      General: There is no distension.      Palpations: Abdomen is soft.      Tenderness: There is no abdominal tenderness.   Musculoskeletal:      Cervical back: Normal range of motion and neck supple.   Neurological:      Mental Status: He is alert and oriented to person, place, and time. Mental status is at baseline.   Psychiatric:         Mood and Affect: Mood normal.         Behavior: Behavior normal.       LAB RESULTS  Recent Results (from the past 24 hour(s))   ECG 12 Lead Bradycardia    Collection Time: 01/12/23  9:47 PM   Result Value Ref Range    QT Interval 469 ms   Comprehensive Metabolic Panel    Collection Time: 01/12/23  9:56 PM    Specimen: Blood   Result Value Ref Range    Glucose 136 (H) 65 - 99 mg/dL    BUN 47 (H) 8 - 23 mg/dL    Creatinine 2.20 (H) 0.76 - 1.27 mg/dL    Sodium 140 136 - 145 mmol/L    Potassium 4.9 3.5 - 5.2 mmol/L    Chloride 103 98 - 107 mmol/L    CO2 23.9 22.0 - 29.0 mmol/L    Calcium 8.5 (L) 8.6 - 10.5 mg/dL    Total Protein 6.5 6.0 - 8.5 g/dL    Albumin 3.7 3.5 - 5.2 g/dL    ALT (SGPT) 12 1 - 41 U/L    AST (SGOT) 17 1 - 40 U/L    Alkaline Phosphatase 101 39 - 117 U/L    Total Bilirubin 0.2 0.0 - 1.2 mg/dL    Globulin 2.8 gm/dL    A/G Ratio 1.3 g/dL    BUN/Creatinine Ratio 21.4 7.0 - 25.0    Anion Gap 13.1 5.0 - 15.0 mmol/L    eGFR 27.9 (L) >60.0 mL/min/1.73   Procalcitonin    Collection Time: 01/12/23  9:56 PM    Specimen: Blood   Result Value Ref Range    Procalcitonin 0.14 0.00 - 0.25 ng/mL   Lactic Acid, Plasma    Collection Time: 01/12/23  9:56 PM    Specimen:  Blood   Result Value Ref Range    Lactate 2.2 (C) 0.5 - 2.0 mmol/L   BNP    Collection Time: 01/12/23  9:56 PM    Specimen: Blood   Result Value Ref Range    proBNP 152.0 0.0 - 1,800.0 pg/mL   Troponin    Collection Time: 01/12/23  9:56 PM    Specimen: Blood   Result Value Ref Range    Troponin T 0.017 0.000 - 0.030 ng/mL   CBC Auto Differential    Collection Time: 01/12/23  9:56 PM    Specimen: Blood   Result Value Ref Range    WBC 7.32 3.40 - 10.80 10*3/mm3    RBC 3.23 (L) 4.14 - 5.80 10*6/mm3    Hemoglobin 11.3 (L) 13.0 - 17.7 g/dL    Hematocrit 31.9 (L) 37.5 - 51.0 %    MCV 98.8 (H) 79.0 - 97.0 fL    MCH 35.0 (H) 26.6 - 33.0 pg    MCHC 35.4 31.5 - 35.7 g/dL    RDW 11.4 (L) 12.3 - 15.4 %    RDW-SD 41.5 37.0 - 54.0 fl    MPV 10.0 6.0 - 12.0 fL    Platelets 238 140 - 450 10*3/mm3    Neutrophil % 78.7 (H) 42.7 - 76.0 %    Lymphocyte % 12.0 (L) 19.6 - 45.3 %    Monocyte % 7.1 5.0 - 12.0 %    Eosinophil % 1.5 0.3 - 6.2 %    Basophil % 0.3 0.0 - 1.5 %    Immature Grans % 0.4 0.0 - 0.5 %    Neutrophils, Absolute 5.76 1.70 - 7.00 10*3/mm3    Lymphocytes, Absolute 0.88 0.70 - 3.10 10*3/mm3    Monocytes, Absolute 0.52 0.10 - 0.90 10*3/mm3    Eosinophils, Absolute 0.11 0.00 - 0.40 10*3/mm3    Basophils, Absolute 0.02 0.00 - 0.20 10*3/mm3    Immature Grans, Absolute 0.03 0.00 - 0.05 10*3/mm3    nRBC 0.0 0.0 - 0.2 /100 WBC   Respiratory Panel PCR w/COVID-19(SARS-CoV-2) JESUS/RODRIGUE/CALEB/PAD/COR/MAD/ASHVIN In-House, NP Swab in UTM/VTM, 3-4 HR TAT - Swab, Nasopharynx    Collection Time: 01/12/23 10:01 PM    Specimen: Nasopharynx; Swab   Result Value Ref Range    ADENOVIRUS, PCR Not Detected Not Detected    Coronavirus 229E Not Detected Not Detected    Coronavirus HKU1 Not Detected Not Detected    Coronavirus NL63 Not Detected Not Detected    Coronavirus OC43 Not Detected Not Detected    COVID19 Not Detected Not Detected - Ref. Range    Human Metapneumovirus Not Detected Not Detected    Human Rhinovirus/Enterovirus Not Detected Not  Detected    Influenza A PCR Not Detected Not Detected    Influenza B PCR Not Detected Not Detected    Parainfluenza Virus 1 Not Detected Not Detected    Parainfluenza Virus 2 Not Detected Not Detected    Parainfluenza Virus 3 Not Detected Not Detected    Parainfluenza Virus 4 Not Detected Not Detected    RSV, PCR Not Detected Not Detected    Bordetella pertussis pcr Not Detected Not Detected    Bordetella parapertussis PCR Not Detected Not Detected    Chlamydophila pneumoniae PCR Not Detected Not Detected    Mycoplasma pneumo by PCR Not Detected Not Detected     I ordered the above labs and reviewed the results.    RADIOLOGY  CT Head Without Contrast    Result Date: 1/12/2023  CT HEAD WITHOUT CONTRAST  HISTORY: Confusion  COMPARISON: 06/02/2022  TECHNIQUE: Axial CT imaging was obtained through the brain. No IV contrast was administered.  FINDINGS: No acute intracranial hemorrhage is seen. There is diffuse atrophy. There is periventricular and deep white matter microangiopathic change. There is no midline shift or mass effect. There is mucosal thickening within the ethmoid sinuses.      No acute intracranial findings.  Radiation dose reduction techniques were utilized, including automated exposure control and exposure modulation based on body size.  This report was finalized on 1/12/2023 11:22 PM by Dr. Courtney Mitchell M.D.      XR Chest 1 View    Result Date: 1/12/2023  SINGLE VIEW OF THE CHEST  HISTORY: Hypotension  COMPARISON: 06/02/2022  FINDINGS: Cardiomegaly is present. There is no vascular congestion. Patient is status post median sternotomy with coronary artery bypass grafting. No pneumothorax or pleural effusion is seen.      No acute findings.  This report was finalized on 1/12/2023 9:49 PM by Dr. Courtney Mitchell M.D.      I ordered the above noted radiological studies. Reviewed by me in PACS.      PROCEDURES  Procedures  None    MEDICATIONS GIVEN IN ER  Medications   sodium chloride 0.9 % bolus 1,000  mL (0 mL Intravenous Stopped 1/12/23 2310)   sodium chloride 0.9 % bolus 1,000 mL (0 mL Intravenous Stopped 1/13/23 0054)       MEDICAL DECISION MAKING, PROGRESS, and CONSULTS  Vital signs and nursing notes have all been reviewed by me.  All laboratory results have been independently reviewed by me.    All radiology studies have been reviewed by me and discussed with radiologist dictating the report.     EKG(s) independently viewed and interpreted by me. Results are below:      EKG time: 2147  Rhythm/Rate: regular rhythm, rate of 51  P waves and IN: normal IN interval  QRS, axis: narrow QRS  ST and T waves: no STEMI   Comparison: 06/02/2022    Orders placed during this visit:  Orders Placed This Encounter   Procedures   • Respiratory Panel PCR w/COVID-19(SARS-CoV-2) JESUS/RODRIGUE/CALEB/PAD/COR/MAD/ASHVIN In-House, NP Swab in UTM/VTM, 3-4 HR TAT - Swab, Nasopharynx   • Blood Culture - Blood,   • Blood Culture - Blood,   • XR Chest 1 View   • CT Head Without Contrast   • Comprehensive Metabolic Panel   • Urinalysis With Microscopic If Indicated (No Culture) - Urine, Catheter   • Procalcitonin   • Lactic Acid, Plasma   • BNP   • Troponin   • CBC Auto Differential   • STAT Lactic Acid, Reflex   • Cardiac Monitoring   • LHA (on-call MD unless specified) Details   • Pulmonology (on-call MD unless specified)   • ECG 12 Lead Bradycardia   • Initiate Observation Status   • CBC & Differential       Additional orders considered but not ordered:  Vasopressors, but BP improved after 2nd IVF bolus    Differential diagnosis:  Differential diagnosis includes but is not limited to:  - vital sign abnormalities such as HTN encephalopathy, hypotension, hypoxemia, hypercarbia, heat stroke  - toxic/metabolic pathology such as hypoglycemia, DKA, hypo/hyper-natremia, thyroid storm, myxedema coma, medication side effect (either intentional or accidental)  - infectious etiology  - intracranial pathology such as stroke, seizure, intracranial mass,  intracranial hemorrhage  - psychiatric pathology    Independent interpretation of labs, radiology studies, and discussions with consultants: IDALIA Rodriguez with [facility]    Discussion below represents my analysis of pertinent findings related to patient's condition, differential diagnosis, treatment plan and final disposition:      ED Course as of 01/13/23 0102   Thu Jan 12, 2023   2340 I discussed the patient's overall care with IDALIA Armstrong with A.  She recommends giving the patient a second liter of normal saline.  If his blood pressure does not improve, she states that they will not be able to take him on a telemetry bed and he will require ICU admission. [AR]   Fri Jan 13, 2023   0020 I again discussed the patient's overall care with IDALIA Armstrong with A.  She states that she is concerned that the patient needs to go to the ICU due to hypotension and potential need for pressors.  She recommends that I consult the pulmonary intensivist for admission to the ICU. [AR]   0044 I discussed the patient with Dr. Bobo, pulmonary intensivist.  He says that the patient looks very dry secondary to dehydration and recommends admission to [facility]. [AR]   0056 I discussed the patient's overall care with IDALIA Perez with A.  I have updated her that the patient's most recent blood pressure is 100/57.  She is agreeable to admission. [AR]      ED Course User Index  [AR] Stephie Carson PA            Additional sources:  - Discussed/ obtained information from independent historians: patient's daughter    - Chronic or social conditions impacting care: The patient does have generalized weakness to the point that he is unable to walk across the room.  He has not safe to go home.    PPE: The patient was placed in a face mask in first look. Patient was wearing facemask when I entered the room and throughout our encounter. I wore full protective equipment throughout this patient encounter including a face mask, eye protection and gloves.  Hand hygiene was performed before donning protective equipment and after removal when leaving the room.    DIAGNOSIS  Final diagnoses:   Acute kidney injury (HCC)   Generalized weakness   Hypotension, unspecified hypotension type       DISPOSITION  ED Disposition     ED Disposition   Decision to Admit    Condition   --    Comment   Level of Care: Telemetry [5]   Diagnosis: Acute kidney injury (HCC) [[id number]]   Admitting Physician: NATAN NDIAYE [[id number]]   Attending Physician: NATAN NDIAYE [[id number]]             RX  Medications   sodium chloride 0.9 % bolus 1,000 mL (0 mL Intravenous Stopped 1/12/23 2310)   sodium chloride 0.9 % bolus 1,000 mL (0 mL Intravenous Stopped 1/13/23 0054)          Medication List      No changes were made to your prescriptions during this visit.         Latest Documented Vital Signs:  As of 01:02 EST  BP- 100/57 HR- 50 Temp- 96.5 °F (35.8 °C) (Rectal) O2 sat- 98%    --    Please note that portions of this were completed with a voice recognition program.       Note Disclaimer: At [facility], we believe that sharing information builds trust and better relationships. You are receiving this note because you are receiving care at [facility] or recently visited. It is possible you will see health information before a provider has talked with you about it. This kind of information can be easy to misunderstand. To help you fully understand what it means for your health, we urge you to discuss this note with your provider.           Stephie Carson PA  01/13/23 0102

## 2023-01-13 NOTE — NURSING NOTE
Pt had a head CT in ER, however is ordered NPO. When he is ordered a diet, he will first need a bedside swallow study done.

## 2023-01-14 PROBLEM — E03.9 HYPOTHYROIDISM: Status: ACTIVE | Noted: 2023-01-01

## 2023-01-14 NOTE — PROGRESS NOTES
" LOS: 1 day     Name: Fritz Flores  Age: 89 y.o.  Sex: male  :  1933  MRN: [id number]         Primary Care Physician: Rachid العلي Jr., MD    Subjective   Subjective  Patient denies chest pain, shortness of breath, dizziness or lightheadedness at present.  Just returned from echocardiogram.  Overall feels better than he did upon admission.  Sitting up in the bed feeding himself lunch.  He states that he has chronic issues with lower extremity swelling and this is unchanged at present.    Objective   Vital Signs  Temp:  [97.7 °F (36.5 °C)-99 °F (37.2 °C)] 98.6 °F (37 °C)  Heart Rate:  [49-67] 49  Resp:  [16-20] 20  BP: ()/(44-75) 102/53  Body mass index is 25.19 kg/m².    Objective:  General Appearance:  Comfortable and in no acute distress (Looks his stated age, weak and deconditioned appearing).    Vital signs: (most recent): Blood pressure 102/53, pulse (!) 49, temperature 98.6 °F (37 °C), temperature source Oral, resp. rate 20, height 185 cm (72.84\"), weight 86.2 kg (190 lb 0.6 oz), SpO2 97 %.    Lungs:  Normal effort and normal respiratory rate.  He is not in respiratory distress.  There are decreased breath sounds.    Heart: Normal rate.  Regular rhythm.    Abdomen: Abdomen is soft.  Bowel sounds are normal.   There is no abdominal tenderness.     Extremities: There is dependent edema.  There is no local swelling.  (1+ pitting edema of the bilateral lower extremities up through the level of the knees)  Neurological: Patient is alert and oriented to person, place and time.    Skin:  Warm and dry.              Results Review:       I reviewed the patient's new clinical results.    Results from last 7 days   Lab Units 23  0706 23  0743 23  2156   WBC 10*3/mm3 6.41 6.51 7.32   HEMOGLOBIN g/dL 9.1* 9.4* 11.3*   PLATELETS 10*3/mm3 187 185 238     Results from last 7 days   Lab Units 23  0706 23  0743 23  2156   SODIUM mmol/L 139 142 140   POTASSIUM mmol/L 4.2 " 4.2 4.9   CHLORIDE mmol/L 111* 111* 103   CO2 mmol/L 22.4 22.8 23.9   BUN mg/dL 36* 44* 47*   CREATININE mg/dL 1.56* 1.74* 2.20*   CALCIUM mg/dL 7.4* 7.5* 8.5*   GLUCOSE mg/dL 92 102* 136*     Results from last 7 days   Lab Units 01/13/23  0743   INR  1.18*             Scheduled Meds:   aspirin, 81 mg, Oral, Daily  docusate sodium, 100 mg, Oral, BID  midodrine, 2.5 mg, Oral, BID AC  sodium chloride, 10 mL, Intravenous, Q12H  cyanocobalamin, 1,000 mcg, Oral, Daily      PRN Meds:   •  acetaminophen **OR** acetaminophen **OR** acetaminophen  •  nitroglycerin  •  ondansetron  •  sodium chloride  •  sodium chloride  •  sodium chloride  Continuous Infusions:  sodium chloride, 100 mL/hr, Last Rate: 100 mL/hr (01/14/23 0951)        Assessment & Plan   Active Hospital Problems    Diagnosis  POA   • **Acute kidney injury (HCC) [N17.9]  Yes   • Anemia [D64.9]  Yes   • Mild dementia [F03.A0]  Yes   • Hypothermia [T68.XXXA]  Yes   • Bradycardia [R00.1]  Yes   • DNR (do not resuscitate) [Z66]  Yes   • Coronary artery disease [I25.10]  Yes      Resolved Hospital Problems    Diagnosis Date Resolved POA   • Hypotension [I95.9] 01/13/2023 Yes   • AMS (altered mental status) [R41.82] 01/13/2023 Yes       Assessment & Plan    Mr. Flores is a 89 y.o. male with history of CAD/prior CABG, DVT and dementia presenting with acute onset of lethargy, JOSE, hypotension, hypothermia and bradycardia, now better after getting some fluids    -Acute kidney injury is improving.  Baseline creatinine somewhere around 1.1.  Continue gentle IV fluids and recheck tomorrow.  Will obtain uric acid from today's labs and also repeat tomorrow  -Just returned from echocardiogram.  Discussed with Dr. Harper.  Low-dose midodrine has been ordered.  No indication for pacemaker at this time.  Overall his hypotension and bradycardia have improved however blood pressure does remain on the low side.  Cardiology plans to send him out with a 2-week Zio patch upon  discharge.  -Cosyntropin stim test yesterday was unremarkable.  Hypothermia has resolved.  -TSH noted to be somewhat high at 8.  Awaiting T4 to decide upon need for initiation of low-dose thyroid replacement  -No evidence of active infectious process.  Blood cultures negative at 24 hours  -Anemia looks to be related to chronic disease.  I will check a reticulocyte count.  Already on B12 replacement  -PT    Disposition: To be determined      Cameron Landrum MD  [facility]  01/14/23  12:51 EST    Addendum: Free T4 noted to be low at 0.4.  We will start Synthroid 25 mcg daily.  He will need repeat thyroid function tests in 4 to 6 weeks.

## 2023-01-14 NOTE — PLAN OF CARE
Goal Outcome Evaluation:      Incontinent of urine and small stool. Assisted to turn and reposition q 2hrs. IV fluids per order. Confused oriented to person only. VSS. Will continue to monitor.

## 2023-01-14 NOTE — PROGRESS NOTES
LOS: 1 day   Patient Care Team:  Rachid العلي Jr., MD as PCP - General (Internal Medicine)    Chief Complaint:     Follow-up bradycardia    Interval History:     He has chest pain, difficulty breathing, feeling dizzy.    Echo 6/2/22  Interpretation Summary    • Estimated left ventricular EF was in disagreement with the calculated left ventricular EF. Left ventricular ejection fraction appears to be 46 - 50%. Left ventricular systolic function is mildly decreased.  • Estimated right ventricular systolic pressure from tricuspid regurgitation is normal (<35 mmHg).  • Left ventricular diastolic function is consistent with (grade I) impaired relaxation.  • The following left ventricular wall segments are hypokinetic: mid anterior and basal anterior. The following left ventricular wall segments are akinetic: mid anterolateral.  • Mildly reduced right ventricular systolic function noted.         Objective   Vital Signs  Temp:  [97.7 °F (36.5 °C)-99 °F (37.2 °C)] 98.6 °F (37 °C)  Heart Rate:  [49-67] 49  Resp:  [16-20] 20  BP: ()/(44-75) 102/53    Intake/Output Summary (Last 24 hours) at 1/14/2023 0958  Last data filed at 1/14/2023 0954  Gross per 24 hour   Intake 120 ml   Output 1350 ml   Net -1230 ml       Comfortable NAD  Neck supple, no JVD or thyromegaly appreciated  S1/S2 RRR, no m/r/g  Lungs CTA B, normal effort  Abdomen S/NT/ND (+) BS, no HSM appreciated  Extremities warm, no clubbing, cyanosis, 2+ pitting edema  No visible or palpable skin lesions  A/Ox4, mood and affect appropriate    Results Review:      Results from last 7 days   Lab Units 01/14/23  0706 01/13/23 0743 01/12/23 2156   SODIUM mmol/L 139 142 140   POTASSIUM mmol/L 4.2 4.2 4.9   CHLORIDE mmol/L 111* 111* 103   CO2 mmol/L 22.4 22.8 23.9   BUN mg/dL 36* 44* 47*   CREATININE mg/dL 1.56* 1.74* 2.20*   GLUCOSE mg/dL 92 102* 136*   CALCIUM mg/dL 7.4* 7.5* 8.5*     Results from last 7 days   Lab Units 01/13/23  0743 01/12/23 2156    TROPONIN T ng/mL 0.011 0.017     Results from last 7 days   Lab Units 01/14/23  0706 01/13/23  0743 01/12/23  2156   WBC 10*3/mm3 6.41 6.51 7.32   HEMOGLOBIN g/dL 9.1* 9.4* 11.3*   HEMATOCRIT % 26.6* 27.8* 31.9*   PLATELETS 10*3/mm3 187 185 238     Results from last 7 days   Lab Units 01/13/23  0743   INR  1.18*                   I reviewed the patient's new clinical results.  I personally viewed and interpreted the patient's EKG/Telemetry data        Medication Review:   aspirin, 81 mg, Oral, Daily  docusate sodium, 100 mg, Oral, BID  sodium chloride, 10 mL, Intravenous, Q12H  cyanocobalamin, 1,000 mcg, Oral, Daily        sodium chloride, 100 mL/hr, Last Rate: 100 mL/hr (01/14/23 0951)        Assessment & Plan       Acute kidney injury (HCC)    Coronary artery disease    DNR (do not resuscitate)    JOSE (acute kidney injury) (HCC)    Anemia    Mild dementia    Hypothermia    Bradycardia    1. Near syncope - secondary to hypotension.   2. Acute kidney injury status post volume resuscitation and slowly improving.   3. Sinus bradycardia - no blocks or pauses noted. Continue to monitor on tele and consider 14 day monitor at discharge. TSH high.  4. Coronary artery disease without angina , remote CABG 1989.  5. Chronic pedal edema , unchanged from baseline. No prior history of CHF.   6. Chronic DVT  7. Hyperlipidemia   8. Elevated TSH, w/u ongoing.   9.  Anemia.     Review of vitals shows the bradycardia has continued- he is on no rate modulating agents, await work-up for thyroid.  Check echocardiogram to make sure there is not cardiomyopathy or pericardial effusion.  Review of space labs/telemetry does not show any significant pauses or bradycardia that sustained.  Drops in the 40s sometimes.  There is no second-degree or higher AV hayden block.    He still remains somewhat hypotensive.  We will check an echocardiogram to make sure there is no new cardiomyopathy or pericardial effusion but he may need just low-dose  Midodrine to help keep his blood pressure a little higher and at this point I do not think he needs a pacemaker telemetry not indicated but would send him home with a 2-week Zio patch monitor.    Will see Monday. Spoke with Dr. Diallo Harper MD  01/14/23  09:58 EST

## 2023-01-14 NOTE — PLAN OF CARE
Goal Outcome Evaluation:  Plan of Care Reviewed With: patient        Progress: no change  Outcome Evaluation: vss. telemetry monitor, sb. confused, baseline. q2 turn. iv fluids continued. midodrine added for bp.

## 2023-01-15 NOTE — PROGRESS NOTES
" LOS: 2 days     Name: Fritz Flores  Age: 89 y.o.  Sex: male  :  1933  MRN: [id number]         Primary Care Physician: Rachid العلي Jr., MD    Subjective   Subjective  Had some confusion last night and pulled out his IV.  Pleasant and conversant this morning.  No complaints.    Objective   Vital Signs  Temp:  [97.4 °F (36.3 °C)-98.2 °F (36.8 °C)] 98 °F (36.7 °C)  Heart Rate:  [47-56] 56  Resp:  [16-20] 16  BP: (102-130)/(53-67) 130/67  Body mass index is 25.19 kg/m².    Objective:  General Appearance:  Comfortable, in no acute distress and ill-appearing (Elderly and chronically ill-appearing.  Looks weak and deconditioned).    Vital signs: (most recent): Blood pressure 130/67, pulse 56, temperature 98 °F (36.7 °C), temperature source Oral, resp. rate 16, height 185 cm (72.84\"), weight 86.2 kg (190 lb 0.6 oz), SpO2 97 %.    Lungs:  Normal effort and normal respiratory rate.  There are decreased breath sounds.    Heart: Normal rate.  Regular rhythm.    Abdomen: Abdomen is soft.  Bowel sounds are normal.   There is no abdominal tenderness.     Extremities: There is dependent edema.  There is no local swelling.  (1-2+ bilateral lower extremity pitting edema)  Neurological: Patient is alert and oriented to person, place and time.    Skin:  Warm and dry.              Results Review:       I reviewed the patient's new clinical results.    Results from last 7 days   Lab Units 01/15/23  0739 23  0706 23  0743 23  2156   WBC 10*3/mm3 9.94 6.41 6.51 7.32   HEMOGLOBIN g/dL 10.0* 9.1* 9.4* 11.3*   PLATELETS 10*3/mm3 199 187 185 238     Results from last 7 days   Lab Units 01/15/23  0739 23  0706 23  0743 23  2156   SODIUM mmol/L 143 139 142 140   POTASSIUM mmol/L 3.9 4.2 4.2 4.9   CHLORIDE mmol/L 114* 111* 111* 103   CO2 mmol/L 21.0* 22.4 22.8 23.9   BUN mg/dL 32* 36* 44* 47*   CREATININE mg/dL 1.39* 1.56* 1.74* 2.20*   CALCIUM mg/dL 7.8* 7.4* 7.5* 8.5*   GLUCOSE mg/dL 98 92 " 102* 136*     Results from last 7 days   Lab Units 01/13/23  0743   INR  1.18*             Scheduled Meds:   aspirin, 81 mg, Oral, Daily  docusate sodium, 100 mg, Oral, BID  levothyroxine, 25 mcg, Oral, Q AM  midodrine, 2.5 mg, Oral, BID AC  sodium chloride, 10 mL, Intravenous, Q12H  cyanocobalamin, 1,000 mcg, Oral, Daily      PRN Meds:   •  acetaminophen **OR** acetaminophen **OR** acetaminophen  •  nitroglycerin  •  ondansetron  •  sodium chloride  •  sodium chloride  •  sodium chloride  Continuous Infusions:       Assessment & Plan   Active Hospital Problems    Diagnosis  POA   • **Acute kidney injury (HCC) [N17.9]  Yes   • Hypothyroidism [E03.9]  Unknown   • Anemia [D64.9]  Yes   • Mild dementia [F03.A0]  Yes   • Hypothermia [T68.XXXA]  Yes   • Bradycardia [R00.1]  Yes   • DNR (do not resuscitate) [Z66]  Yes   • Coronary artery disease [I25.10]  Yes      Resolved Hospital Problems    Diagnosis Date Resolved POA   • Hypotension [I95.9] 01/13/2023 Yes   • AMS (altered mental status) [R41.82] 01/13/2023 Yes       Assessment & Plan    Mr. Flores is a 89 y.o. male with history of CAD/prior CABG, DVT and dementia presenting with acute onset of lethargy, JOSE, hypotension, hypothermia and bradycardia, now better after getting some fluids     -Acute kidney injury is improving at 1.3.  Baseline creatinine somewhere around 1.1.  Discontinue IV fluids and observe.  -Continue low-dose midodrine.  Blood pressure better this morning.  Echocardiogram appears to be unremarkable.  Still bradycardic at times but nothing that appears too severe.  Cardiology plans for 2-week Zio patch upon discharge.  -Started on low-dose Synthroid this morning for hypothyroidism.  He will need repeat TFTs in 4 to 6 weeks.  -Cosyntropin stim test was unremarkable.  Hypothermia has resolved.  -No evidence of active infectious process.  Blood cultures negative at 48 hours.  -Anemia of chronic disease appears stable.  -PT     Disposition: Anticipate  home with home health.  Possibly tomorrow.      Cameron Landrum MD  [facility]  01/15/23  08:48 EST

## 2023-01-15 NOTE — PLAN OF CARE
Goal Outcome Evaluation:     Patient is a pleasant 89 y.o. male admitted to [facility] for JOSE and generalized weakness on 1/12/2023. Patient is ambulatory with a walker at baseline and lives at home with spouse. Patient reports his family assist him at home with ADLs and household chores as well as have a private CG 2 days/week. Patient confirmed he is active with HH PT. Today, patient performed bed mobility with SV, required CGA for transfers, and ambulated 15' CGA with a RW. Patient reported his ambulation and mobility was near his baseline. Mild strength and balance deficits noted. Although near baseline, patient may benefit from skilled PT services acutely to address functional deficits as well as improve level of independence prior to discharge. Anticipate returning home with assist from family and HH PT upon DC.

## 2023-01-15 NOTE — PLAN OF CARE
Goal Outcome Evaluation:  Plan of Care Reviewed With: patient        Progress: no change  Outcome Evaluation: vss. telemetry monitor, sr/sb. confused. up to chair,very weak. falls precautions. iv fluids discontinued. bp better

## 2023-01-15 NOTE — THERAPY EVALUATION
Patient Name: Fritz Flores  : 1933    MRN: [id number]                              Today's Date: 1/15/2023       Admit Date: 2023    Visit Dx:     ICD-10-CM ICD-9-CM   1. Acute kidney injury (HCC)  N17.9 584.9   2. Generalized weakness  R53.1 780.79   3. Hypotension, unspecified hypotension type  I95.9 458.9     Patient Active Problem List   Diagnosis   • Closed intertrochanteric fracture of hip, left, initial encounter (HCC)   • Closed comminuted intertrochanteric fracture of proximal end of left femur (HCC)   • Ulcerative colitis (HCC)   • Coronary artery disease   • Hyponatremia   • Postoperative anemia due to acute blood loss   • Constipation   • Dysphagia   • Age-related physical debility   • Bacteriuria   • Acute urinary tract infection   • COVID-19 virus detected   • DNR (do not resuscitate)   • Dehydration   • Cytokine release syndrome, grade 1   • Multiple falls   • Essential hypertension   • Elevated troponin   • B12 deficiency   • Lower leg DVT (deep venous thromboembolism), chronic, left (HCC)   • Acute kidney injury (HCC)   • Anemia   • Mild dementia   • Hypothermia   • Bradycardia   • Hypothyroidism     Past Medical History:   Diagnosis Date   • Colon polyps    • Coronary artery disease    • Hyperlipemia    • Myocardial infarction (HCC)    • Ulcerative colitis (HCC)      Past Surgical History:   Procedure Laterality Date   • CARDIAC SURGERY      CABG X 1   • COLONOSCOPY     • COLONOSCOPY N/A 2016    Procedure: COLONOSCOPY INTO CECUM WITH COLD BIOPSIES;  Surgeon: Aaron Gregg MD;  Location: [facility] ENDOSCOPY;  Service:    • FEMUR IM NAILING/RODDING Left 2019    Procedure: FEMUR INTRAMEDULLARY NAILING/RODDING;  Surgeon: Jaqueline Wagoner MD;  Location: [facility] MAIN OR;  Service: Orthopedics   • FRACTURE SURGERY      upper leg w/hardware   • HIP SURGERY Left     x 3      General Information     Row Name 01/15/23 1201          Physical Therapy Time and Intention     Document Type evaluation  -MS     Mode of Treatment physical therapy  -MS     Row Name 01/15/23 1201          General Information    Patient Profile Reviewed yes  -MS     Prior Level of Function independent:;all household mobility;min assist:;ADL's  ambulatory with walker, family and hired CG 2 days/week help him and spouse at home, reports good setup at home, active with HH PT  -MS     Existing Precautions/Restrictions fall  -MS     Barriers to Rehab none identified  -MS     Row Name 01/15/23 1201          Living Environment    People in Home child(mary beth), adult;spouse  -MS     Row Name 01/15/23 1201          Stairs Within Home, Primary    Number of Stairs, Within Home, Primary none  -MS     Row Name 01/15/23 1201          Cognition    Orientation Status (Cognition) oriented x 4  -MS     Row Name 01/15/23 1201          Safety Issues, Functional Mobility    Safety Issues Affecting Function (Mobility) positioning of assistive device  -MS     Impairments Affecting Function (Mobility) strength;balance  -MS           User Key  (r) = Recorded By, (t) = Taken By, (c) = Cosigned By    Initials Name Provider Type    MS Chata Antoine, PT Physical Therapist               Mobility     Row Name 01/15/23 1306          Bed Mobility    Bed Mobility supine-sit;sit-supine  -MS     Supine-Sit [place] (Bed Mobility) supervision;verbal cues  -MS     Assistive Device (Bed Mobility) bed rails;head of bed elevated  -MS     Row Name 01/15/23 1306          Transfers    Comment, (Transfers) Sequencing and hand placement cues.  -MS     Row Name 01/15/23 1306          Sit-Stand Transfer    Sit-Stand [place] (Transfers) contact guard;verbal cues;nonverbal cues (demo/gesture)  -MS     Assistive Device (Sit-Stand Transfers) walker, front-wheeled  -MS     Row Name 01/15/23 1306          Gait/Stairs (Locomotion)    [place] Level (Gait) contact guard;verbal cues;nonverbal cues (demo/gesture)  -MS     Assistive Device (Gait)  walker, front-wheeled  -MS     Distance in Feet (Gait) 15'  -MS     Deviations/Abnormal Patterns (Gait) courtney decreased;gait speed decreased  -MS     Bilateral Gait Deviations forward flexed posture  -MS     Comment, (Gait/Stairs) Agreeable to sitting UIC. Reported ambulation felt like baseline.  -MS           User Key  (r) = Recorded By, (t) = Taken By, (c) = Cosigned By    Initials Name Provider Type    Chata Ortiz, PT Physical Therapist               Obj/Interventions     Row Name 01/15/23 1310          Range of Motion Comprehensive    General Range of Motion no range of motion deficits identified  -MS     Row Name 01/15/23 1310          Strength Comprehensive (MMT)    Comment, General Manual Muscle Testing (MMT) Assessment B LEs grossly 3+/5, generalized weakness noted.  -MS     Row Name 01/15/23 1310          Balance    Balance Assessment sitting static balance;standing static balance  -MS     Static Sitting Balance standby assist  -MS     Position, Sitting Balance sitting edge of bed  -MS     Static Standing Balance contact guard  -MS     Position/Device Used, Standing Balance supported;walker, rolling  -MS     Row Name 01/15/23 1310          Sensory Assessment (Somatosensory)    Sensory Assessment (Somatosensory) sensation intact  -MS           User Key  (r) = Recorded By, (t) = Taken By, (c) = Cosigned By    Initials Name Provider Type    Chata Ortiz, PT Physical Therapist               Goals/Plan     Row Name 01/15/23 1310          Bed Mobility Goal 1 (PT)    Activity/Assistive Device (Bed Mobility Goal 1, PT) bed mobility activities, all  -MS     [place] Level/Cues Needed (Bed Mobility Goal 1, PT) independent  -MS     Time Frame (Bed Mobility Goal 1, PT) 1 week  -MS     Row Name 01/15/23 1310          Transfer Goal 1 (PT)    Activity/Assistive Device (Transfer Goal 1, PT) transfers, all  -MS     [place] Level/Cues Needed (Transfer Goal 1, PT) supervision required  -MS      Time Frame (Transfer Goal 1, PT) 1 week  -MS     Row Name 01/15/23 1310          Gait Training Goal 1 (PT)    Activity/Assistive Device (Gait Training Goal 1, PT) gait (walking locomotion);walker, rolling  -MS     [place] Level (Gait Training Goal 1, PT) supervision required  -MS     Distance (Gait Training Goal 1, PT) 45'  -MS     Time Frame (Gait Training Goal 1, PT) 1 week  -MS     Row Name 01/15/23 1310          Therapy Assessment/Plan (PT)    Planned Therapy Interventions (PT) balance training;bed mobility training;gait training;home exercise program;patient/family education;postural re-education;ROM (range of motion);stair training;strengthening;transfer training  -MS           User Key  (r) = Recorded By, (t) = Taken By, (c) = Cosigned By    Initials Name Provider Type    Chata Ortiz, PT Physical Therapist               Clinical Impression     Row Name 01/15/23 1310          Pain    Pretreatment Pain Rating 0/10 - no pain  -MS     Posttreatment Pain Rating 0/10 - no pain  -MS     Row Name 01/15/23 1310          Therapy Assessment/Plan (PT)    Rehab Potential (PT) good, to achieve stated therapy goals  -MS     Criteria for Skilled Interventions Met (PT) yes;meets criteria  -MS     Therapy Frequency (PT) 3 times/wk  -MS     Row Name 01/15/23 1310          Vital Signs    O2 Delivery Pre Treatment room air  -MS     Row Name 01/15/23 1310          Positioning and Restraints    Pre-Treatment Position in bed  -MS     Post Treatment Position chair  -MS     In Chair reclined;call light within reach;encouraged to call for assist;exit alarm on  -MS           User Key  (r) = Recorded By, (t) = Taken By, (c) = Cosigned By    Initials Name Provider Type    Chata Ortiz, PT Physical Therapist               Outcome Measures     Row Name 01/15/23 1311          How much help from another person do you currently need...    Turning from your back to your side while in flat bed without using bedrails? 4   -MS     Moving from lying on back to sitting on the side of a flat bed without bedrails? 4  -MS     Moving to and from a bed to a chair (including a wheelchair)? 3  -MS     Standing up from a chair using your arms (e.g., wheelchair, bedside chair)? 3  -MS     Climbing 3-5 steps with a railing? 2  -MS     To walk in hospital room? 3  -MS     AM-PAC 6 Clicks Score (PT) 19  -MS     Highest level of mobility 6 --> Walked 10 steps or more  -MS     Row Name 01/15/23 1311          Functional Assessment    Outcome Measure Options AM-PAC 6 Clicks Basic Mobility (PT)  -MS           User Key  (r) = Recorded By, (t) = Taken By, (c) = Cosigned By    Initials Name Provider Type    MS Chata Antoine, PT Physical Therapist                             Physical Therapy Education     Title: PT OT SLP Therapies (In Progress)     Topic: Physical Therapy (In Progress)     Point: Mobility training (Done)     Learning Progress Summary           Patient Acceptance, E,TB, VU by MS at 1/15/2023 1313                   Point: Home exercise program (Not Started)     Learner Progress:  Not documented in this visit.          Point: Body mechanics (Not Started)     Learner Progress:  Not documented in this visit.          Point: Precautions (Not Started)     Learner Progress:  Not documented in this visit.                      User Key     Initials Effective Dates Name Provider Type Discipline    MS 06/16/21 -  Chata Antoine PT Physical Therapist PT              PT Recommendation and Plan  Planned Therapy Interventions (PT): balance training, bed mobility training, gait training, home exercise program, patient/family education, postural re-education, ROM (range of motion), stair training, strengthening, transfer training        Time Calculation:    PT Charges     Row Name 01/15/23 1159             Time Calculation    Start Time 1120  -MS      Stop Time 1139  -MS      Time Calculation (min) 19 min  -MS      PT Received On 01/15/23  -MS       PT - Next Appointment 01/17/23  -MS      PT Goal Re-Cert Due Date 01/22/23  -MS         Time Calculation- PT    Total Timed Code Minutes- PT 14 minute(s)  -MS            User Key  (r) = Recorded By, (t) = Taken By, (c) = Cosigned By    Initials Name Provider Type    Chata Ortiz, PT Physical Therapist              Therapy Charges for Today     Code Description Service Date Service Provider Modifiers Qty    92239351996 HC PT EVAL MOD COMPLEXITY 3 1/15/2023 Chata Antoine, PT GP 1    22636624974 HC PT THER PROC EA 15 MIN 1/15/2023 Chata Antoine, PT GP 1          PT G-Codes  Outcome Measure Options: AM-PAC 6 Clicks Basic Mobility (PT)  AM-PAC 6 Clicks Score (PT): 19  PT Discharge Summary  Anticipated Discharge Disposition (PT): home with assist, home with home health    Chata Antoine, PT  1/15/2023

## 2023-01-15 NOTE — PLAN OF CARE
Goal Outcome Evaluation:  Plan of Care Reviewed With: patient        Progress: no change  Outcome Evaluation: Pleasantly confused.  Pulled out IV, called IV team for restart.  Bed alarm in use.  HR 40-50's.

## 2023-01-16 NOTE — PROGRESS NOTES
LOS: 3 days   Patient Care Team:  Rachid العلي Jr., MD as PCP - General (Internal Medicine)    Chief Complaint:     Follow-up bradycardia    Interval History:     He feels good and is ready to go home.  He states he has chronic lower extremity edema.  His appetite has been poor.    Echo 6/2/22  Interpretation Summary    • Estimated left ventricular EF was in disagreement with the calculated left ventricular EF. Left ventricular ejection fraction appears to be 46 - 50%. Left ventricular systolic function is mildly decreased.  • Estimated right ventricular systolic pressure from tricuspid regurgitation is normal (<35 mmHg).  • Left ventricular diastolic function is consistent with (grade I) impaired relaxation.  • The following left ventricular wall segments are hypokinetic: mid anterior and basal anterior. The following left ventricular wall segments are akinetic: mid anterolateral.  • Mildly reduced right ventricular systolic function noted.         Objective   Vital Signs  Temp:  [97.2 °F (36.2 °C)-97.6 °F (36.4 °C)] 97.5 °F (36.4 °C)  Heart Rate:  [62-72] 69  Resp:  [16] 16  BP: (123-160)/(63-79) 160/73    Intake/Output Summary (Last 24 hours) at 1/16/2023 1054  Last data filed at 1/16/2023 0852  Gross per 24 hour   Intake 400 ml   Output 1125 ml   Net -725 ml       Comfortable NAD  Neck supple, no JVD or thyromegaly appreciated  S1/S2 RRR, no m/r/g  Lungs CTA B, normal effort  Abdomen S/NT/ND (+) BS, no HSM appreciated  Extremities warm, no clubbing, cyanosis, 2+ pitting edema  No visible or palpable skin lesions  A/Ox4, mood and affect appropriate    Results Review:      Results from last 7 days   Lab Units 01/16/23  0718 01/15/23  0739 01/14/23  0706   SODIUM mmol/L 140 143 139   POTASSIUM mmol/L 3.6 3.9 4.2   CHLORIDE mmol/L 107 114* 111*   CO2 mmol/L 22.6 21.0* 22.4   BUN mg/dL 28* 32* 36*   CREATININE mg/dL 1.35* 1.39* 1.56*   GLUCOSE mg/dL 96 98 92   CALCIUM mg/dL 8.0* 7.8* 7.4*     Results from  last 7 days   Lab Units 01/13/23  0743 01/12/23  2156   TROPONIN T ng/mL 0.011 0.017     Results from last 7 days   Lab Units 01/16/23  0718 01/15/23  0739 01/14/23  0706   WBC 10*3/mm3 10.08 9.94 6.41   HEMOGLOBIN g/dL 10.4* 10.0* 9.1*   HEMATOCRIT % 29.6* 29.6* 26.6*   PLATELETS 10*3/mm3 206 199 187     Results from last 7 days   Lab Units 01/13/23  0743   INR  1.18*                   I reviewed the patient's new clinical results.  I personally viewed and interpreted the patient's EKG/Telemetry data        Medication Review:   aspirin, 81 mg, Oral, Daily  docusate sodium, 100 mg, Oral, BID  levothyroxine, 25 mcg, Oral, Q AM  sodium chloride, 10 mL, Intravenous, Q12H  cyanocobalamin, 1,000 mcg, Oral, Daily             Assessment & Plan       Acute kidney injury (HCC)    Coronary artery disease    DNR (do not resuscitate)    Anemia    Mild dementia    Hypothermia    Bradycardia    Hypothyroidism    1. Near syncope - secondary to hypotension.  BP now little elevated.  Midodrine is on hold.  2. Acute kidney injury status post volume resuscitation; at baseline.   3. Sinus bradycardia - no blocks or pauses noted. Continue to monitor on tele and consider 14 day monitor at discharge. TSH high; he has been started on levothyroxine.  4. Coronary artery disease without angina , remote CABG 1989.  Chest pain is resolved.  5. Chronic pedal edema , unchanged from baseline. No prior history of CHF.   6. Chronic DVT  7. Hyperlipidemia   8. Elevated TSH, w/u ongoing.  Thyroxine has been started.  9.  Anemia.     His echocardiogram was unremarkable.  He had elevated blood pressure today and midodrine was held.  I will restart his Lasix but at 40 mg daily given his decreased appetite.  I recommend he follow-up in our office in 1 week to reassess his volume status.  I will change midodrine to 2.5 mg as needed twice a day for blood pressure less than 110.  Okay to discharge from a cardiology standpoint.  Our office will contact him to  schedule follow-up.    Erika Joshua, APRN  01/16/23  10:54 EST

## 2023-01-16 NOTE — PROGRESS NOTES
[facility] to provide home PT.  Spoke with Dtr Pat and please call her for all scheduling [phone].  Received verbal auth per Sherry for PCP Dr Severiano Parekh for home health needs.  Address is correct on facesheet however may remove the number listed as home for now and staff call Dtr Pat for all care needs.  Noted order in epic.  Plan to D/C later today.

## 2023-01-16 NOTE — PLAN OF CARE
Goal Outcome Evaluation:  Plan of Care Reviewed With: patient        Progress: improving  Outcome Evaluation: vss. telemetry monitor, sr. confused, up with assist. falls precautions. scds on. attempted to call daughter twice for , patient discharged. awaiting call back

## 2023-01-16 NOTE — DISCHARGE SUMMARY
Patient Name: Fritz Flores  : 1933  MRN: [id number]    Date of Admission: 2023  Date of Discharge:  2023  Primary Care Physician: Rachid العلي Jr., MD      Chief Complaint:   Hypotension and Weakness - Generalized      Discharge Diagnoses     Active Hospital Problems    Diagnosis  POA   • **Acute kidney injury (HCC) [N17.9]  Yes   • Hypothyroidism [E03.9]  Unknown   • Anemia [D64.9]  Yes   • Mild dementia [F03.A0]  Yes   • Hypothermia [T68.XXXA]  Yes   • Bradycardia [R00.1]  Yes   • DNR (do not resuscitate) [Z66]  Yes   • Coronary artery disease [I25.10]  Yes      Resolved Hospital Problems    Diagnosis Date Resolved POA   • Hypotension [I95.9] 2023 Yes   • AMS (altered mental status) [R41.82] 2023 Yes        Hospital Course     Mr. Flores is a 89 y.o. male with history of CAD/prior CABG, DVT and dementia presenting with acute onset of lethargy, JOSE, hypotension, hypothermia and bradycardia.  Patient was found to have hypothyroidism, with TSH of 8.4, and free T4 of 0.80.  Home Lasix was held..  Hypothermia resolved.  Hypotension improved with IV fluids and initiation of midodrine.  Bradycardia improved after initiation of levothyroxine and IV fluids.  Cosyntropin  test was performed to evaluate for possible adrenal insufficiency, which was unremarkable.  There was no signs of infection and work-up was negative..  In addition patient was also found to have JOSE on CKD, which also improved with IV fluids.  Creatinine was 2.2 on admission, improved to 1.35 at discharge,Around baseline.  Patient was evaluated by cardiology.  Patient was discharged on low-dose of Lasix, 40 mg daily per cardiology recommendations.  Zio patch placed prior to discharge.  Discharged on midodrine low-dose as needed for systolic blood pressure less than 110.  For hypothyroidism discharged on levothyroxine 25 mcg daily.  Will need repeat thyroid hormone levels in 4 to 6 weeks to monitor response and  adjustment of levothyroxine dose as indicated..  Repeat BMP in 5 days to monitor renal function.  Follow-up with cardiology in 1 week.    At the time of discharge patient was told to take all medications as prescribed, keep all follow-up appointments, and call their doctor or return to the hospital with any worsening or concerning symptoms.              Day of Discharge     Subjective:  Seen this morning.  No acute events overnight.  Blood pressure on the higher side this morning, morning midodrine was held.  Patient denies chest pain, palpitations, shortness of breath, cough.  Heart rate in 60s while in the room.      Physical Exam:  Temp:  [97.2 °F (36.2 °C)-97.6 °F (36.4 °C)] 97.5 °F (36.4 °C)  Heart Rate:  [62-72] 69  Resp:  [16] 16  BP: (123-160)/(63-79) 160/73  Body mass index is 25.19 kg/m².  Physical Exam    General: Alert, oriented to name and place, not to time, chronically ill-appearing, elderly  HEENT: Normocephalic, atraumatic  CV: Regular rate and rhythm, no murmurs rubs or gallops  Lungs: Decreased at bases, no wheezing, nonlabored breathing, on room air  Abdomen: Soft, nontender, nondistended  Extremities: 1+ bilateral ankle edema,, no cyanosis     Consultants     Consult Orders (all) (From admission, onward)     Start     Ordered    01/13/23 0813  Inpatient Cardiology Consult  Once        Specialty:  Cardiology  Provider:  Michelle Collazo MD    01/13/23 0812    01/13/23 0625  Inpatient Case Management  Consult  Once        Provider:  (Not yet assigned)    01/13/23 0624    01/13/23 0036  Pulmonology (on-call MD unless specified)  Once        Specialty:  Pulmonary Disease  Provider:  Gino Bobo MD    01/13/23 0035    01/12/23 2308  [facility] (on-call MD unless specified) Details  Once        Specialty:  Hospitalist  Provider:  (Not yet assigned)    01/12/23 2307              Procedures     * Surgery not found *      Imaging Results (All)     Procedure Component Value Units Date/Time    CT Head  Without Contrast [[id number]] Collected: 01/12/23 2319     Updated: 01/12/23 2325    Narrative:      CT HEAD WITHOUT CONTRAST     HISTORY: Confusion     COMPARISON: 06/02/2022     TECHNIQUE: Axial CT imaging was obtained through the brain. No IV  contrast was administered.     FINDINGS:  No acute intracranial hemorrhage is seen. There is diffuse atrophy.  There is periventricular and deep white matter microangiopathic change.  There is no midline shift or mass effect. There is mucosal thickening  within the ethmoid sinuses.       Impression:      No acute intracranial findings.     Radiation dose reduction techniques were utilized, including automated  exposure control and exposure modulation based on body size.     This report was finalized on 1/12/2023 11:22 PM by Dr. Courtney Mitchell M.D.       XR Chest 1 View [[id number]] Collected: 01/12/23 2148     Updated: 01/12/23 2152    Narrative:      SINGLE VIEW OF THE CHEST     HISTORY: Hypotension     COMPARISON: 06/02/2022     FINDINGS:  Cardiomegaly is present. There is no vascular congestion. Patient is  status post median sternotomy with coronary artery bypass grafting. No  pneumothorax or pleural effusion is seen.       Impression:      No acute findings.     This report was finalized on 1/12/2023 9:49 PM by Dr. Courtney Mitchell M.D.           Results for orders placed during the hospital encounter of 06/02/22    Duplex Venous Lower Extremity - Bilateral CAR    Interpretation Summary  · Chronic left lower extremity deep vein thrombosis noted in the gastrocnemius.  · Chronic left lower extremity superficial thrombophlebitis noted in the small saphenous.  · Chronic right lower extremity superficial thrombophlebitis noted in the small saphenous.  · All other veins appeared normal bilaterally.    Results for orders placed during the hospital encounter of 01/12/23    Adult Transthoracic Echo Complete W/ Cont if Necessary Per Protocol    Interpretation Summary  •   Left ventricular systolic function is normal. Left ventricular ejection fraction appears to be 56 - 60%.  •  Estimated right ventricular systolic pressure from tricuspid regurgitation is normal (<35 mmHg).    Pertinent Labs     Results from last 7 days   Lab Units 01/16/23  0718 01/15/23  0739 01/14/23  0706 01/13/23  0743   WBC 10*3/mm3 10.08 9.94 6.41 6.51   HEMOGLOBIN g/dL 10.4* 10.0* 9.1* 9.4*   PLATELETS 10*3/mm3 206 199 187 185     Results from last 7 days   Lab Units 01/16/23  0718 01/15/23  0739 01/14/23  0706 01/13/23  0743   SODIUM mmol/L 140 143 139 142   POTASSIUM mmol/L 3.6 3.9 4.2 4.2   CHLORIDE mmol/L 107 114* 111* 111*   CO2 mmol/L 22.6 21.0* 22.4 22.8   BUN mg/dL 28* 32* 36* 44*   CREATININE mg/dL 1.35* 1.39* 1.56* 1.74*   GLUCOSE mg/dL 96 98 92 102*   Estimated Creatinine Clearance: 45.2 mL/min (A) (by C-G formula based on SCr of 1.35 mg/dL (H)).  Results from last 7 days   Lab Units 01/12/23  2156   ALBUMIN g/dL 3.7   BILIRUBIN mg/dL 0.2   ALK PHOS U/L 101   AST (SGOT) U/L 17   ALT (SGPT) U/L 12     Results from last 7 days   Lab Units 01/16/23  0718 01/15/23  0739 01/14/23  0706 01/13/23  0743 01/12/23  2156   CALCIUM mg/dL 8.0* 7.8* 7.4* 7.5* 8.5*   ALBUMIN g/dL  --   --   --   --  3.7       Results from last 7 days   Lab Units 01/13/23  0743 01/12/23  2156   TROPONIN T ng/mL 0.011 0.017   PROBNP pg/mL  --  152.0     Results from last 7 days   Lab Units 01/15/23  0739   URIC ACID mg/dL 7.6*         Invalid input(s): LDLCALC  Results from last 7 days   Lab Units 01/13/23  0432 01/13/23  0104   BLOODCX  No growth at 3 days No growth at 3 days     Results from last 7 days   Lab Units 01/12/23  2201   COVID19  Not Detected       Test Results Pending at Discharge     Pending Labs     Order Current Status    Blood Culture - Blood, Arm, Left Preliminary result    Blood Culture - Blood, Arm, Right Preliminary result          Discharge Details        Discharge Medications      New Medications       Instructions Start Date   levothyroxine 25 MCG tablet  Commonly known as: SYNTHROID, LEVOTHROID   25 mcg, Oral, Every Early Morning   Start Date: January 17, 2023     midodrine 10 MG tablet  Commonly known as: PROAMATINE   10 mg, Oral, 2 Times Daily PRN         Changes to Medications      Instructions Start Date   furosemide 40 MG tablet  Commonly known as: LASIX  What changed:   · medication strength  · how much to take   40 mg, Oral, Daily         Continue These Medications      Instructions Start Date   acetaminophen 325 MG tablet  Commonly known as: TYLENOL   650 mg, Oral, Every 4 Hours PRN      aspirin 81 MG EC tablet   81 mg, Oral, Daily      cyanocobalamin 1000 MCG tablet  Commonly known as: VITAMIN B-12   1,000 mcg, Oral, Daily      docusate sodium 100 MG capsule  Commonly known as: COLACE   100 mg, Oral, 2 Times Daily      donepezil 5 MG tablet  Commonly known as: ARICEPT   5 mg, Oral, Nightly      ondansetron 4 MG tablet  Commonly known as: ZOFRAN   4 mg, Oral, Every 6 Hours PRN      potassium chloride 10 MEQ CR tablet   20 mEq, Oral, Daily             Allergies   Allergen Reactions   • Penicillins Rash       Discharge Disposition:  Home-Health Care Svc      Discharge Diet:  Diet Order   Procedures   • Diet: Regular/House Diet, Cardiac Diets; Healthy Heart (2-3 Na+); Texture: Regular Texture (IDDSI 7); Fluid Consistency: Thin (IDDSI 0)       Discharge Activity:       CODE STATUS:    Code Status and Medical Interventions:   Ordered at: 01/13/23 1009     Medical Intervention Limits:    NO intubation (DNI)     Code Status (Patient has no pulse and is not breathing):    No CPR (Do Not Attempt to Resuscitate)     Medical Interventions (Patient has pulse or is breathing):    Limited Support     Release to patient:    Routine Release       No future appointments.  Additional Instructions for the Follow-ups that You Need to Schedule     Ambulatory Referral to Home Health (Hospital)   As directed      Face to Face  Visit Date: 1/16/2023    Follow-up provider for Plan of Care?: I treated the patient in an acute care facility and will not continue treatment after discharge.    Follow-up provider: MERLINE FARNSWORTH JR. [[id number]]    Reason/Clinical Findings: Hypothyroidism    Describe mobility limitations that make leaving home difficult: generalized weakness    Nursing/Therapeutic Services Requested: Physical Therapy    PT orders: Total joint pathway Therapeutic exercise Gait Training Transfer training Strengthening    Weight Bearing Status: As Tolerated    Frequency: 1 Week 1            Contact information for follow-up providers     Merline Farnsworth Jr., MD Follow up in 1 week(s).    Specialty: Internal Medicine  Contact information:  [address]  [facility] [address]  [phone]             Erika Joshua APRN Follow up in 1 week(s).    Specialties: Nurse Practitioner, Cardiology  Contact information:  [address]  [facility] [place] [facility] [address]  [phone]                   Contact information for after-discharge care     Home Medical Care     [facility] .    Service: Home Health Services  Contact information:  [address]  [facility] [facility] [address] [phone]                             Additional Instructions for the Follow-ups that You Need to Schedule     Ambulatory Referral to Home Health (Hospital)   As directed      Face to Face Visit Date: 1/16/2023    Follow-up provider for Plan of Care?: I treated the patient in an acute care facility and will not continue treatment after discharge.    Follow-up provider: MERLINE FARNSWORTH JR. [[id number]]    Reason/Clinical Findings: Hypothyroidism    Describe mobility limitations that make leaving home difficult: generalized weakness    Nursing/Therapeutic Services Requested: Physical Therapy    PT orders: Total joint pathway Therapeutic exercise Gait Training Transfer training Strengthening    Weight  Bearing Status: As Tolerated    Frequency: 1 Week 1           Time Spent on Discharge:  Greater than 30 minutes      Jameson Isaacs MD  [facility]  01/16/23  11:03 EST

## 2023-01-16 NOTE — PLAN OF CARE
Goal Outcome Evaluation:  Plan of Care Reviewed With: patient        Progress: no change  Outcome Evaluation:     Alert to Self only. VSS. SR/SB on telemetry. Room air.     Uses urinal with assist x1 at bedside.     SCD's on.      No complaints of pain. No signs of distress.     Will continue to monitor.

## 2023-01-16 NOTE — CASE MANAGEMENT/SOCIAL WORK
Continued Stay Note  [facility]     Patient Name: Fritz Flores  MRN: [id number]  Today's Date: 1/16/2023    Admit Date: 1/12/2023    Plan: Home with [facility]   Discharge Plan     Row Name 01/16/23 0945       Plan    Plan Home with [facility]    Patient/Family in Agreement with Plan yes    Plan Comments Spoke with patient, he confirms no further d/c needs. Notified [facility] of d/c, MD aware of need for hH orders, family to transport. -Barbara VERONICA    Final Discharge Disposition Code 06 - home with home health care    Final Note Home with [facility] via pv               Discharge Codes    No documentation.               Expected Discharge Date and Time     Expected Discharge Date Expected Discharge Time    Jan 16, 2023             Barbara Oviedo RN

## 2023-01-17 NOTE — OUTREACH NOTE
Prep Survey    Flowsheet Row Responses   [facility] facility patient discharged from? [place]   Is LACE score < 7 ? No   Eligibility Readm Mgmt   Discharge diagnosis Acute kidney injury    Does the patient have one of the following disease processes/diagnoses(primary or secondary)? Other   Does the patient have Home health ordered? Yes   What is the Home health agency?  [facility]   Is there a DME ordered? No   Prep survey completed? Yes          WENDI RUIZ - Registered Nurse

## 2023-01-18 NOTE — HOME HEALTH
Patient went to the hospital on 1/12/23 after a day the family took him out for a longer community outing.  He was lethargic and was found to be hypotensive, hypothermic and bradycardic.  Meds adjusted and fluids given and he returned back to near baseline, referred to home PT due to the resultant weakness.  He has a history of CAD with CABG, chronic LE swelling and dementia.  He lives with his wife who is essentially bedbound.  He has family that lives with him and provides care.  His PLOF was assistance for all ADLs, but his family reports he was walking with his walker ad cristo in the home with distant supervision (and at home now using the wheelchair only).      Assessment:  This patient is familar to [facility] from multiple interventions.  At his age and comorbidities he will remain at high risk for hospitalization.  He has had a change of caregivers in the last few months and this has been a good change as his son and D-I-L are much more attentive to his basic needs and this will decrease his risks. FOC will be his weakness from his JOSE, hypothyroid and hypotension episode.  Home PT needed to help with transition back home to monitor vitals, progress a basic HEP/mobility program and get him back to mobilizing at his baseline again.     Plan for next visit  Teaching with patient/caregiver basic seated HEP  Check response to short walk in the home and guide family on walking program.

## 2023-01-20 NOTE — OUTREACH NOTE
Medical Week 1 Survey    Flowsheet Row Responses   [facility] patient discharged from? [place]   Does the patient have one of the following disease processes/diagnoses(primary or secondary)? Other   Week 1 attempt successful? Yes   Call start time 1204   Call end time 1205   Discharge diagnosis Acute kidney injury    Is patient permission given to speak with other caregiver? Yes   List who call center can speak with daughter- Pat   Person spoke with today (if not patient) and relationship daughter   Does the patient have all medications ordered at discharge? Yes   Is the patient taking all medications as directed (includes completed medication regime)? Yes   Comments regarding appointments has a hospital f/u with cardiology on 1/23   Does the patient have a primary care provider?  Yes   Has the patient kept scheduled appointments due by today? N/A   What is the Home health agency?  [facility]   Has home health visited the patient within 72 hours of discharge? Yes   Did the patient receive a copy of their discharge instructions? Yes   What is the patient's perception of their health status since discharge? Improving   Is the patient/caregiver able to teach back the hierarchy of who to call/visit for symptoms/problems? PCP, Specialist, Home health nurse, Urgent Care, ED, 911 Yes   Week 1 call completed? Yes   Wrap up additional comments Per daughter, patient is doing well, no questions.          SHERRI FINCH - Registered Nurse

## 2023-01-20 NOTE — HOME HEALTH
Son reports patient is about 75% of PLOF. Has began ambulating to the bathroom again with supervision.     FALLS SINCE LAST VISIT: No    MEDICATION CHANGES: No    PLAN FOR NEXT VISIT:  Review fall risk and safety management strategies  Review/progress home exercise program  gait training  transfer training

## 2023-01-25 NOTE — HOME HEALTH
Patient has no questions and reports no new concerns since last visit. Good adherence to HEP reported.      FALLS SINCE LAST VISIT: No    MEDICATION CHANGES: No    PLAN FOR NEXT VISIT:  Review fall risk and safety management strategies  Review/progress home exercise program  Gait training

## 2023-01-26 NOTE — PROGRESS NOTES
Enter Query Response Below      Query Response:     Stage 3 chronic kidney disease          If applicable, please update the problem list.     Patient: Fritz Flores        : 1933  Account: [id number]           Admit Date: 2023        How to Respond to this query:       a. Click New Note     b. Answer query within the yellow box.                c. Update the Problem List, if applicable.      If you have any questions about this query contact me at: [email or web address]     Dr. Isaacs:     Patient with a history of chronic kidney disease was admitted for acute kidney injury. Labs during this hospital stay were as follows: BUN 47, creatinine 2.20, and eGFR 27.9 on ; BUN 36, creatinine 1.56, and eGFR 42.2 on ; and BUN 28, creatinine 1.35, and eGFR 50.2 on . Discharge summary noted that patient's labs had improved to around baseline. Patient was treated with lab monitoring and IV fluids.     After study, can you further clarify the stage of chronic kidney disease as:    Stage 3 chronic kidney disease   Chronic kidney disease (please include stage if known) ______________  Other (please specify) _____________  Clinically indeterminable     By submitting this query, we are merely seeking further clarification of documentation to accurately reflect all conditions that you are monitoring, evaluating, treating or that extend the hospitalization or utilize additional resources of care. Please utilize your independent clinical judgment when addressing the question(s) above.     This query and your response, once completed, will be entered into the legal medical record.    Sincerely,  Rajesh BEARDEN, RN, CCDS   Clinical Documentation Integrity Program

## 2023-01-28 NOTE — HOME HEALTH
Family and patient both report continued progress. Patient states he feels he is almost at his PLOF with mobility throughout the home. Discussion of DC next week. Fair adherence to HEP reported. All vitals have been WNL after activity/HEP throughout POC    FALLS SINCE LAST VISIT: No    MEDICATION CHANGES: No    PLAN FOR NEXT VISIT:  Review fall risk and safety management strategies  Review/progress home exercise program  Progress gait program  Transfer safety

## 2023-01-30 NOTE — OUTREACH NOTE
Medical Week 2 Survey    Flowsheet Row Responses   [facility] patient discharged from? [place]   Does the patient have one of the following disease processes/diagnoses(primary or secondary)? Other   Week 2 attempt successful? No   Unsuccessful attempts Attempt 1  [attempted home as well as dtr]          MARK ARROYO - Registered Nurse

## 2023-02-01 NOTE — HOME HEALTH
Reviewed all meds, updated accordingly, patient seems to be taking as instructed.  CP assessment WNL  Just put in for two additonal visits to monitor blood pressure due to recent hypotension and educate on meds.

## 2023-02-02 NOTE — OUTREACH NOTE
Medical Week 2 Survey    Flowsheet Row Responses   [facility] patient discharged from? [place]   Does the patient have one of the following disease processes/diagnoses(primary or secondary)? Other   Week 2 attempt successful? Yes   Call start time 1415   Call end time 1417   Person spoke with today (if not patient) and relationship Daughter-Pat.   Meds reviewed with patient/caregiver? Yes   Is the patient having any side effects they believe may be caused by any medication additions or changes? No   Does the patient have all medications ordered at discharge? Yes   Is the patient taking all medications as directed (includes completed medication regime)? Yes   Does the patient have a primary care provider?  Yes   Has the patient kept scheduled appointments due by today? Yes   Has home health visited the patient within 72 hours of discharge? Yes   Home health comments  continues to visit-nurse visited today.   Psychosocial issues? No   What is the patient's perception of their health status since discharge? Improving   Week 2 Call Completed? Yes   Graduated Yes   Is the patient interested in additional calls from an ambulatory ?  NOTE:  applies to high risk patients requiring additional follow-up. No   Graduated/Revoked comments Brief call with daughter who states patient is doing well.  nurse visited today. Denies any needs or concerns.          DANIEL BELL - Registered Nurse

## 2023-02-03 NOTE — HOME HEALTH
"Patient up in his wheelchair when I arrived.  Edema in LEs about at baseline, caregiver states he gets his leg pumps on intermittently and it \"really helps.\"  Caregiver had no questions for his meds and seems to think the patient is doing fine.  Caregiver educated again on the need for direct supervision due to patient's mobility issues.  Home health nurse has become involved in his care now and decision for HH PT to discharge today vs next week."

## 2023-02-08 NOTE — PROGRESS NOTES
"      [facility] CARDIOLOGY  [address]  [facility] [address]  [facility] [address]  Phone: [phone]  Patient Name: Fritz Flores  :1933  Age: 89 y.o.  Primary Cardiologist: Erika Harper MD  Encounter Provider:  LAURA Campo    History of Present Illness     Fritz Flores is a 89 y.o.  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  He was evaluated by Dr. Harper when admitted in 2022 for hypotension and bradycardia. I have reviewed the past medical records in preparation of today's visit.     23 Follow-up:  He is here for hospital follow-up today.  He was admitted in 2023 with weakness related to hypotension and bradycardia.  He was not on any rate lowering medications; his echocardiogram was unremarkable.  Blood pressure improved with IV fluids and he was temporarily treated with midodrine which was then stopped when BP became high.  His Lasix was decreased to 40 mg daily given dehydration.  Since being home he has not required any midodrine.  His blood pressure is usually running 130s/70s; it has been checked by home health.  He has stable leg swelling on 40 mg Lasix daily.  He has been evaluated by PCP, Dr. العلي, who has instructed to take an extra dose of 40 mg Lasix if there is more swelling.  His family does have SCDs for him to use at home to help with edema.  His appetite is good.  He denies chest pain, dyspnea with exertion, orthopnea, palpitations, or lightheadedness.  He is taking his medications as prescribed.  He wore a Zio patch monitor but has not yet mailed it back.    Data Review     The following data was reviewed by LAURA Campo on 23:    Vital Signs:   /70   Pulse 59   Ht 182.9 cm (72\")   Wt 86.2 kg (190 lb)   SpO2 99%   BMI 25.77 kg/m²       Weight:  Wt Readings from Last 3 " Encounters:   02/08/23 86.2 kg (190 lb)   01/14/23 86.2 kg (190 lb 0.6 oz)   06/02/22 87.1 kg (192 lb)     Body mass index is 25.77 kg/m².    Below is a summary of pertinent cardiology findings:  • 1990 he had CABG x1; he does not follow with cardiology but follows with his PCP, Dr. العلي.  • June 2019 he was admitted for left hip pain and evaluated for preop cardiac risk.  He was not having symptoms and no testing was recommended prior to surgery.  He was recommended to establish care with a cardiologist.  • January 2022 admitted with weakness, falls, COVID-19 infection, JOSE, and UTI.  • June 2022 he was admitted after having an unwitnessed fall.  His troponin was noted to be 0.048 and D-dimer was mildly elevated but exam was otherwise unremarkable.  Echocardiogram showed EF 46 to 50%, grade 1 LV diastolic dysfunction, hypokinesis of the mid anterior and basal anterior wall, akinesis of the mid anterolateral wall, mildly reduced RV systolic function, and normal RVSP; he had normal LV cavity size and wall thickness and normal RV size.  • June 2022 CTA chest showed no evidence of PE but there was moderately severe atheromatous calcification seen within the thoracic aorta and mild bilateral lower lobe subpleural interstitial scarring.  He was also noted to have chronic DVT of the right and left popliteal veins.  • January 2023 admitted for lethargy and hypotension; noted to have elevated creatinine on admission he was treated with IV fluids.  He eventually required midodrine for blood pressure. His troponin was mildly elevated but stable.  He was also noted to be mildly bradycardic on no rate lowering medications; there was also no evidence of AV block on telemetry.  His TSH was elevated and he was started on low-dose levothyroxine.  He was discharged home with a Zio patch monitor.  His Lasix dose was reduced to 40 mg daily.  • January 2023 echocardiogram showed EF 56 to 60% and normal RVSP.    Labs:  • 01/13/2023:   cr 1.7, K 4.2, otherwise unremarkable BMP, troponin 0.011, TSH 8.470, free T40.8,  Hgb 9.4, Plt 185  • 01/16/2023: cr 1.3, K3.6, otherwise unremarkable CMP      ECG 12 Lead    Date/Time: 2/8/2023 2:52 PM  Performed by: Erika Joshua APRN  Authorized by: Erika Joshua APRN   Comparison: compared with previous ECG from 1/12/2023  Comparison to previous ECG: T wave changes similar to EKG's from April 2022, September 2021, January 2021  Rhythm: sinus rhythm  Rate: normal  BPM: 62  Q waves: I, V1 and V2    T inversion: III  T flattening: aVF, V1, V3, V4, V5 and V6    Clinical impression: non-specific ECG            Medications     Allergies as of 02/08/2023 - Reviewed 02/08/2023   Allergen Reaction Noted   • Penicillins Rash 08/23/2016   Lasix was reduced due to dehydration.      Current Outpatient Medications:   •  acetaminophen (TYLENOL) 325 MG tablet, Take 2 tablets by mouth Every 4 (Four) Hours As Needed for Mild Pain ., Disp: , Rfl:   •  aspirin 81 MG EC tablet, Take 81 mg by mouth daily., Disp: , Rfl:   •  docusate sodium (COLACE) 100 MG capsule, Take 100 mg by mouth 2 (Two) Times a Day., Disp: , Rfl:   •  donepezil (ARICEPT) 5 MG tablet, Take 5 mg by mouth Every Night., Disp: , Rfl:   •  furosemide (LASIX) 40 MG tablet, Take 1 tablet by mouth Daily for 30 days., Disp: 30 tablet, Rfl: 0  •  levothyroxine (SYNTHROID, LEVOTHROID) 25 MCG tablet, Take 1 tablet by mouth Every Morning for 30 days., Disp: 30 tablet, Rfl: 0  •  midodrine (PROAMATINE) 10 MG tablet, Take 0.5 tablets by mouth 2 (Two) Times a Day As Needed (for systolic blood pressure less than 110.) for up to 30 days., Disp: 60 tablet, Rfl: 0  •  ondansetron (ZOFRAN) 4 MG tablet, Take 1 tablet by mouth Every 6 (Six) Hours As Needed for Nausea or Vomiting., Disp: , Rfl:   •  potassium chloride 10 MEQ CR tablet, Take 2 tablets by mouth Daily., Disp: 60 tablet, Rfl: 0  •  simvastatin (ZOCOR) 10 MG tablet, Take 10 mg by mouth Every  Night., Disp: , Rfl:   •  vitamin B-12 (VITAMIN B-12) 1000 MCG tablet, Take 1 tablet by mouth Daily., Disp: 30 tablet, Rfl: 0     Past History, Review of Systems, Exam     Past Medical History:   Diagnosis Date   • Colon polyps    • Coronary artery disease    • Hyperlipemia    • Myocardial infarction (HCC) 1989   • Ulcerative colitis (HCC)        Past Surgical History:   has a past surgical history that includes Colonoscopy (2014); Cardiac surgery (1989); Colonoscopy (N/A, 8/23/2016); Hip surgery (Left); Fracture surgery; and Femur IM Nailing/Rodding (Left, 6/30/2019).     Social History     Socioeconomic History   • Marital status:    Tobacco Use   • Smoking status: Never   • Smokeless tobacco: Never   Vaping Use   • Vaping Use: Never used   Substance and Sexual Activity   • Alcohol use: No   • Drug use: No   • Sexual activity: Defer       Review of Systems   Cardiovascular: Positive for leg swelling. Negative for chest pain, claudication, cyanosis, dyspnea on exertion, irregular heartbeat, near-syncope, orthopnea, palpitations, paroxysmal nocturnal dyspnea and syncope.       Vitals reviewed.   Constitutional:       Appearance: Not in distress.   Eyes:      Conjunctiva/sclera: Conjunctivae normal.      Pupils: Pupils are equal, round, and reactive to light.   HENT:      Head: Normocephalic.      Nose: Nose normal.    Mouth/Throat:      Pharynx: Oropharynx is clear.   Neck:      Vascular: JVD normal.   Pulmonary:      Effort: Pulmonary effort is normal.      Breath sounds: Normal breath sounds. No wheezing. No rhonchi. No rales.   Cardiovascular:      Normal rate. Regular rhythm. Normal S1. Normal S2.      Murmurs: There is no murmur.   Pulses:     Intact distal pulses.   Edema:     Ankle: bilateral 1+ edema of the ankle.  Abdominal:      General: Bowel sounds are normal. There is no distension.      Palpations: Abdomen is soft.      Tenderness: There is no abdominal tenderness.   Musculoskeletal: Normal  range of motion.      Cervical back: Normal range of motion and neck supple. Skin:     General: Skin is warm and dry.   Neurological:      Mental Status: Alert and oriented to person, place and time.   Psychiatric:         Attention and Perception: Attention normal.         Mood and Affect: Mood normal.         Speech: Speech normal.         Behavior: Behavior is cooperative.          Assessment and Plan     Assessment:  1. Bradycardia    2. Coronary artery disease involving native coronary artery of native heart without angina pectoris    3. Essential hypertension    4. Lower leg DVT (deep venous thromboembolism), chronic, left (HCC)    5. Acquired hypothyroidism    6. Other ulcerative colitis with complication (HCC)    7. Mild dementia, unspecified dementia type, unspecified whether behavioral, psychotic, or mood disturbance or anxiety    8. COVID-19 virus detected    9. Hypotension due to hypovolemia         1. Bradycardia: He was noted to have heart rate in the 40s during hospitalization despite being on rate lowering medications; he was noted to have hypothyroidism and started on levothyroxine..  His echocardiogram was unremarkable.  Today EKG shows sinus rhythm with heart rate 62.  Zio patch was ordered to evaluate for AV block; it has not yet been mailed back.  2. Coronary artery disease: He had CABG x1 in 1990 and has been following with his PCP Dr. العلي for follow-up.  He had mildly elevated troponin when admitted in June 2022 and again in January 2023.  Echocardiogram in June 2022 showed EF 46 to 50%, grade 1 LV diastolic dysfunction, hypokinesis of the anterior wall and akinesis of the anterolateral wall; echocardiogram was unremarkable when checked again in January 2023.  There are no ischemic changes noted on his EKG today.  His medical therapy includes 10 mg simvastatin daily and 81 mg aspirin daily.  3. Hypertension: He was hypotensive in December 2022 requiring IV fluids and midodrine.  He has  not required midodrine since hospital discharge.  Blood pressure is stable without rate lowering medications.  He is on 40 mg Lasix daily.  4. Chronic bilateral DVT: He has known chronic bilateral DVTs in the popliteal veins.  5. Hypothyroidism: This was noted during January 2023 hospitalization and he was started on 25 mg levothyroxine daily.  6. Ulcerative colitis: No recent issues reported per his family.  7. Mild dementia: He is treated with 5 mg Aricept nightly.  8. History of COVID-19: He was hospitalized in January 2022 with falls, COVID-19, JOSE, and acute UTI.  9. Hypotension: Likely due to hypovolemia.  This occurred in December 2022; BP improved with IV fluids and temporary use of midodrine.    Mr. Flores is a patient of Dr. Harper's who presented in January 2022 with hypotension related to hypovolemia and bradycardia.  Blood pressure improved with IV fluids, temporary use of midodrine, and reduced dose of Lasix at 40 mg daily.  A Zio patch monitor is pending.  He has history of CABG x1 in 1990 and is on aspirin and simvastatin.  June 2022 echocardiogram showed hypokinesis of the anterior wall segments and akinesis of the anterolateral wall; this was normal on January 2023 echocardiogram.    He looks good today and I agree that he should continue 40 mg Lasix daily but can take an extra 40 mg on days when he has more leg swelling.  I will have him see Dr. Harper in 6 months.    Return in about 6 months (around 8/8/2023) for Follow-up with Dr. Harper.  Orders Placed This Encounter   Procedures   • ECG 12 Lead      New Medications Ordered This Visit   Medications   • midodrine (PROAMATINE) 10 MG tablet     Sig: Take 0.5 tablets by mouth 2 (Two) Times a Day As Needed (for systolic blood pressure less than 110.) for up to 30 days.     Dispense:  60 tablet     Refill:  0         Thank you for the opportunity to participate in this patient's care.    LAURA Bowers    This office note has been  dictated.

## 2023-03-06 NOTE — TELEPHONE ENCOUNTER
Please let him know that there was no sign of bradycardia or AV block on his heart monitor.    Thanks!  LAURA Bowers

## 2023-03-06 NOTE — TELEPHONE ENCOUNTER
Called primary number, daughter Deidre, answered.  She is not currently with the patient.  I can not find any verbal releases in the chart to give her any results.  She is going to call the office back when she is with her dad to get the results.    Lou Dunn RN  [facility] T[facility]  03/06/23 13:38 EST

## 2023-03-21 PROBLEM — R56.9 SEIZURE-LIKE ACTIVITY: Status: ACTIVE | Noted: 2023-01-01

## 2023-03-21 NOTE — ED PROVIDER NOTES
ED Course as of 03/21/23 1828   Tue Mar 21, 2023   1600 Patient presents with concern for stroke, immediately went to evaluate patient. [JG]   1605 Activating team D [JG]   1608 Phone call with stroke neurology on-call.  Discussed the patient, relevant history, exam, diagnostics, ED findings/progress, and concerns.  They agree that presentation is strange and concerning for possibility of seizure, they do not recommend tPA, they do recommend CT imaging with CT angiograms head and neck but no CT perfusion has no signs of large vessel occlusion.  They agree to consult.    [JG]   1642 I reviewed CT head imaging in PACS, negative for intracranial hemorrhage per my read. [JG]   1643 EKG independently viewed and contemporaneously interpreted by ED physician. Time: 1641.  Rate 66.  Interpretation: Normal sinus rhythm, normal axis, nonspecific intraventricular conduction delay, no acute ST changes. [JG]   1651 Patient with leukocytosis with slight left shift.  Ordering blood cultures and lactic acid, chest x-ray and urinalysis previously ordered. [JG]   1704 Dr. Cohen, stroke neurology at bedside.  She reports that she has reviewed imaging studies and images unremarkable.  She recommends admission for EEG and further evaluation. [JG]   1705 Chest x-ray shows left lower lobe pneumonia, patient has leukocytosis with left shift, treat with ceftriaxone and azithromycin, blood cultures and lactic acid previously ordered. [JG]   1745 Disposition pending CMP.  Call lab to request updated, informed will be at least 20 more minutes. [JG]   1826 I discussed the case with MD Suzan with [facility] at this time regarding the patient.  I discussed work-up, results, concerns.  I discussed the consulting provider's desire for tele admit.   [DC]      ED Course User Index  [DC] Branden Armas PA  [JG] Miguel Ángel Newberry MD Creason, David J, PA  03/21/23 1828

## 2023-03-21 NOTE — ED NOTES
Pt to ED via ambulance. Stated that pt was sitting at kitchen table with cup to mouth not trying to drink, just staring off into space. Family reports pt was confused and could not articulate words, pt unable to follow commands upon arrival. Last known normal was around 1400 today. No previous strokes reported. Pt normally A&O x4, walks independently.

## 2023-03-21 NOTE — CONSULTS
Neurology Consult Note    Referring Provider: Dr. Newberry  Reason for Consultation: stroke like symptoms    History of present illness:    The patient is an 89 year old man who had a 20 minute episode of staring with decreased responsiveness today. Family also feels that he demonstrates right facial weakness and a change in speech since this episode. This started 2 hours ago. Due to the possibility of acute stroke, Dr. Newberry called me and we did activate stroke alert. Head CT showed no acute pathology and CTA showed no large vessel occlusion.    Daughter Deidre has accompanied the patient. She reports that he lives with his wife with 24 hour supervision due to mobility issues. Cognitively he is fairly intact, with mild impairment. He is on donepezil.  He has never had a stroke as far as she is aware.  He does have trouble maintaining blood pressure and is on Proamatine.     He was hospitalized in 1/2023 for hypothyroidism, hypotension and bradycardia. He has had acute on chronic kidney injury.    The patient is on aspirin 81 mg therapy at home.    Past Medical History  Past Medical History:   Diagnosis Date   • Colon polyps    • Coronary artery disease    • Hyperlipemia    • Myocardial infarction (HCC) 1989   • Ulcerative colitis (HCC)        Past Surgical History  Past Surgical History:   Procedure Laterality Date   • CARDIAC SURGERY  1989    CABG X 1   • COLONOSCOPY  2014   • COLONOSCOPY N/A 8/23/2016    Procedure: COLONOSCOPY INTO CECUM WITH COLD BIOPSIES;  Surgeon: Aaron Gregg MD;  Location: [facility] ENDOSCOPY;  Service:    • FEMUR IM NAILING/RODDING Left 6/30/2019    Procedure: FEMUR INTRAMEDULLARY NAILING/RODDING;  Surgeon: Jaqueline Wagoner MD;  Location: [facility] MAIN OR;  Service: Orthopedics   • FRACTURE SURGERY      upper leg w/hardware   • HIP SURGERY Left     x 3     Allergies   Allergen Reactions   • Penicillins Rash     Social History  , lives with wife, no alcohol or tobacco  use    Review of Systems   Constitutional: Negative for chills and fever.   HENT: Positive for trouble swallowing.    Respiratory: Positive for cough.         Productive   Cardiovascular: Negative.    Genitourinary: Negative for difficulty urinating.   Musculoskeletal: Positive for gait problem.   Neurological: Positive for speech difficulty and weakness.   Hematological: Bruises/bleeds easily.   Psychiatric/Behavioral: Positive for confusion.   All other systems reviewed and are negative.      Medications  Scheduled Meds:azithromycin, 500 mg, Intravenous, Once  cefTRIAXone, 1 g, Intravenous, Once  sodium chloride, 500 mL, Intravenous, Once      Continuous Infusions:   PRN Meds:.•  sodium chloride    Vital Signs   Temp:  [96 °F (35.6 °C)] 96 °F (35.6 °C)  Heart Rate:  [66-73] 66  Resp:  [18] 18  BP: (109)/(57) 109/57    Examination:  General: Well-groomed, well-nourished, appears comfortable  HEENT:  Neck supple, no rash   CVS:  Regular rate and rhythm.   Resp: non labored breathing  Extremities:  Moderate BLE edema, chronic  Skin:  No rash  Neurologic:   Alert, poor concentration and attention   Oriented to place and situation, not to date   No facial asymmetry   Moderate dysarthria   Able to repeat and name   Able to follow one step commands   Moves all extremities with at least anti-gravity power   No tremor or rigidity  Psychiatric: no anxiety    Results Review:  Results from last 7 days   Lab Units 03/21/23  1612   WBC 10*3/mm3 16.11*   HEMOGLOBIN g/dL 12.6*   HEMATOCRIT % 35.0*   PLATELETS 10*3/mm3 213           Invalid input(s): LABALBU, PROT   Lab Results   Component Value Date    [id number] 414 01/13/2023     Lab Results   Component Value Date    TSH 12.800 (H) 03/08/2023     Radiology  images reviewed independently  Head CT shows no acute pathology, with moderate atrophy.    CTA head/neck shows calcification with no significant stenosis.    Medical Decision Making and Recommendations  Transient episode of  altered awareness  Consider hypotension with hypoperfusion versus small seizure   Will follow for recurrence, as may have been triggered by underlying pneumonia    Dysarthria, new  No facial asymmetry on exam currently  Could represent new small stroke not seen on head CT versus effect of pneumonia  Will follow for improvement  Would not pursue brain MRI at this juncture, will consider if worsens or does not return to baseline  Continue low dose aspirin, give rectally if NPO    Relative hypotension  IVF   Resume proamatine as soon as swallow cleared.    CKD  500 cc IVF given in ED after contrasted CTs    Neurocognitive disorder  Will be at risk fo hospital acquired delirium  Implement evidence-based delirium precautions includin. Frequent reorientation, reminding patient not questioning patient  2. Shades up during day/down at night  3. TV off except for soft music only  4. Familiar objects at bedside  5. Encourage friends/family to spend the night  6. Minimize anticholingeric medications  7. Minimize polypharmacy  8. Minimize restraints, includes lines and/or foleys and/or feeding tubes as able  9. Minimize overnight checks/vitals to encourage restful consistent sleep patterns  10. Out of bed during day as much as possible    I discussed these findings and my recommendations with patient and daughter Pat.       Loren Montaño MD  23  17:11 EDT

## 2023-03-21 NOTE — ED NOTES
"Nursing report ED to floor  Fritz Flores  89 y.o.  male    HPI :   Chief Complaint   Patient presents with    Altered Mental Status       Admitting doctor:   Ana Mares MD    Admitting diagnosis:   The primary encounter diagnosis was Seizure-like activity (HCC). Diagnoses of Aspiration pneumonia, unspecified aspiration pneumonia type, unspecified laterality, unspecified part of lung (HCC) and Sepsis without acute organ dysfunction, due to unspecified organism (HCC) were also pertinent to this visit.    Code status:   Current Code Status       Date Active Code Status Order ID Comments User Context       3/21/2023 1828 CPR (Attempt to Resuscitate) [id number]  Ana Mares MD ED        Question Answer    Code Status (Patient has no pulse and is not breathing) CPR (Attempt to Resuscitate)    Medical Interventions (Patient has pulse or is breathing) Full                    Allergies:   Penicillins    Isolation:   No active isolations    Intake and Output  No intake or output data in the 24 hours ending 03/21/23 1843    Weight:       03/21/23  1640   Weight: 86.1 kg (189 lb 14.4 oz)       Most recent vitals:   Vitals:    03/21/23 1609 03/21/23 1615 03/21/23 1640 03/21/23 1700   BP:       Pulse: 66      Resp:  18     Temp:    96 °F (35.6 °C)   TempSrc:    Tympanic   SpO2: 97%      Weight:   86.1 kg (189 lb 14.4 oz)    Height:   185.4 cm (73\")        Active LDAs/IV Access:   Lines, Drains & Airways       Active LDAs       Name Placement date Placement time Site Days    Peripheral IV 03/21/23 1749 Anterior;Right Forearm 03/21/23  1749  Forearm  less than 1                    Labs (abnormal labs have a star):   Labs Reviewed   COMPREHENSIVE METABOLIC PANEL - Abnormal; Notable for the following components:       Result Value    BUN 42 (*)     Creatinine 1.76 (*)     Potassium 5.3 (*)     Alkaline Phosphatase 134 (*)     eGFR 36.5 (*)     All other components within normal limits    Narrative:     GFR Normal " >60  Chronic Kidney Disease <60  Kidney Failure <15    The GFR formula is only valid for adults with stable renal function between ages 18 and 70.   SINGLE HSTROPONIN T - Abnormal; Notable for the following components:    HS Troponin T 30 (*)     All other components within normal limits    Narrative:     High Sensitive Troponin T Reference Range:  <10.0 ng/L- Negative Female for AMI  <15.0 ng/L- Negative Male for AMI  >=10 - Abnormal Female indicating possible myocardial injury.  >=15 - Abnormal Male indicating possible myocardial injury.   Clinicians would have to utilize clinical acumen, EKG, Troponin, and serial changes to determine if it is an Acute Myocardial Infarction or myocardial injury due to an underlying chronic condition.        CBC WITH AUTO DIFFERENTIAL - Abnormal; Notable for the following components:    WBC 16.11 (*)     RBC 3.61 (*)     Hemoglobin 12.6 (*)     Hematocrit 35.0 (*)     MCH 34.9 (*)     MCHC 36.0 (*)     RDW 12.1 (*)     Neutrophil % 91.0 (*)     Lymphocyte % 3.8 (*)     Monocyte % 4.5 (*)     Eosinophil % 0.2 (*)     Neutrophils, Absolute 14.65 (*)     Lymphocytes, Absolute 0.62 (*)     Immature Grans, Absolute 0.07 (*)     All other components within normal limits   PROCALCITONIN - Abnormal; Notable for the following components:    Procalcitonin 0.46 (*)     All other components within normal limits    Narrative:     As a Marker for Sepsis (Non-Neonates):    1. <0.5 ng/mL represents a low risk of severe sepsis and/or septic shock.  2. >2 ng/mL represents a high risk of severe sepsis and/or septic shock.    As a Marker for Lower Respiratory Tract Infections that require antibiotic therapy:    PCT on Admission    Antibiotic Therapy       6-12 Hrs later    >0.5                Strongly Recommended  >0.25 - <0.5        Recommended   0.1 - 0.25          Discouraged              Remeasure/reassess PCT  <0.1                Strongly Discouraged     Remeasure/reassess PCT    As 28 day  "mortality risk marker: \"Change in Procalcitonin Result\" (>80% or <=80%) if Day 0 (or Day 1) and Day 4 values are available. Refer to http://www.[email or web address] [employer] Groupp[employer]-calculator.com    Change in PCT <=80%  A decrease of PCT levels below or equal to 80% defines a positive change in PCT test result representing a higher risk for 28-day all-cause mortality of patients diagnosed with severe sepsis for septic shock.    Change in PCT >80%  A decrease of PCT levels of more than 80% defines a negative change in PCT result representing a lower risk for 28-day all-cause mortality of patients diagnosed with severe sepsis or septic shock.      COVID-19 AND FLU A/B, NP SWAB IN TRANSPORT MEDIA 8-12 HR TAT - Normal    Narrative:     Fact sheet for providers: https://www.fda.gov/media/606854/download    Fact sheet for patients: https://www.[employer].gov/media/360735/download    Test performed by PCR.   PROTIME-INR - Normal   APTT - Normal   URINALYSIS W/ MICROSCOPIC IF INDICATED (NO CULTURE) - Normal    Narrative:     Urine microscopic not indicated.   AMMONIA - Normal   URINE DRUG SCREEN - Normal    Narrative:     Negative Thresholds Per Drugs Screened:    Amphetamines                 500 ng/ml  Barbiturates                 200 ng/ml  Benzodiazepines              100 ng/ml  Cocaine                      300 ng/ml  Methadone                    300 ng/ml  Opiates                      300 ng/ml  Oxycodone                    100 ng/ml  THC                           50 ng/ml    The Normal Value for all drugs tested is negative. This report includes final unconfirmed screening results to be used for medical treatment purposes only. Unconfirmed results must not be used for non-medical purposes such as employment or legal testing. Clinical consideration should be applied to any drug of abuse test, particularly when unconfirmed results are used.           LACTIC ACID, PLASMA - Normal   BLOOD CULTURE   BLOOD CULTURE   RAINBOW DRAW    Narrative:     The " following orders were created for panel order [place] Draw.  Procedure                               Abnormality         Status                     ---------                               -----------         ------                     Green Top (Gel)[[id number]]                                  Final result               Lavender Top[[id number]]                                     Final result               Gold Top - SST[[id number]]                                   Final result               Light Blue Top[[id number]]                                   Final result                 Please view results for these tests on the individual orders.   ETHANOL   POCT GLUCOSE FINGERSTICK   TYPE AND SCREEN   CBC AND DIFFERENTIAL    Narrative:     The following orders were created for panel order CBC & Differential.  Procedure                               Abnormality         Status                     ---------                               -----------         ------                     CBC Auto Differential[[id number]]        Abnormal            Final result                 Please view results for these tests on the individual orders.   GREEN TOP   LAVENDER TOP   GOLD TOP - SST   LIGHT BLUE TOP       EKG:   ECG 12 Lead Stroke Evaluation   Final Result   HEART RATE= 66  bpm   RR Interval= 909  ms   CT Interval= 199  ms   P Horizontal Axis= 9  deg   P Front Axis= 7  deg   QRSD Interval= 121  ms   QT Interval= 444  ms   QRS Axis= 15  deg   T Wave Axis= 90  deg   - ABNORMAL ECG -   Sinus rhythm   Nonspecific intraventricular conduction delay   No change from previous tracing   Electronically Signed By: Xiao Ford ([facility]) 21-Mar-2023 17:36:23   Date and Time of Study: 2023-03-21 16:41:26          Meds given in ED:   Medications   sodium chloride 0.9 % flush 10 mL (has no administration in time range)   sodium chloride 0.9 % bolus 500 mL (500 mL Intravenous New Bag 3/21/23 0006)   azithromycin (ZITHROMAX) 500 mg in sodium chloride 0.9  % 250 mL IVPB-VTB (has no administration in time range)   acetaminophen (TYLENOL) tablet 650 mg (has no administration in time range)   ondansetron (ZOFRAN) tablet 4 mg (has no administration in time range)     Or   ondansetron (ZOFRAN) injection 4 mg (has no administration in time range)   melatonin tablet 3 mg (has no administration in time range)   iopamidol (ISOVUE-370) 76 % injection 100 mL (90 mL Intravenous Given by Other 3/21/23 1623)   cefTRIAXone (ROCEPHIN) 1 g in sodium chloride 0.9 % 100 mL IVPB-VTB (1 g Intravenous New Bag 3/21/23 6913)       Imaging results:  XR Chest 1 View    Result Date: 3/21/2023  New area of density in the retrocardiac left lung base may represent atelectasis, aspiration pneumonitis or pneumonia.  This report was finalized on 3/21/2023 5:13 PM by Dr. Richy Anderson M.D.      CT Angiogram Head w AI Analysis of LVO    Result Date: 3/21/2023  Chronic changes as described. No acute abnormality identified.  This report was finalized on 3/21/2023 5:13 PM by Dr. Richy Anderson M.D.       Ambulatory status:   - assist x1-2    Social issues:   Social History     Socioeconomic History    Marital status:    Tobacco Use    Smoking status: Never    Smokeless tobacco: Never   Vaping Use    Vaping Use: Never used   Substance and Sexual Activity    Alcohol use: No    Drug use: No    Sexual activity: Defer       NIH Stroke Scale:  Interval: baseline      Migdalia Prescott RN  03/21/23 18:43 EDT

## 2023-03-21 NOTE — ED PROVIDER NOTES
2 EMERGENCY DEPARTMENT ENCOUNTER  I wore full protective equipment throughout this patient encounter including a N95 mask, eye shield, gown and gloves. Hand hygiene was performed before donning protective equipment and after removal when leaving the room.    Room Number:  P493/1  Date of encounter:  3/26/2023  PCP: Rachid العلي Jr., MD  Patient Care Team:  Rachid العلي Jr., MD as PCP - General (Internal Medicine)     HPI:  Context: Fritz Flores is a 89 y.o. male who presents to the ED c/o chief complaint of altered mental status.  Patient unable give history, history supplied by EMS.  EMS reports that patient coming from home, reportedly had symptom onset approximately 2 PM, was sitting at a table holding a cup, became unresponsive, stayed in the position for approximately 20 minutes.  EMS reports that afterwards patient was altered which is not patient's baseline and patient has slurred speech, questionable right-sided facial droop.  Patient EMS denies any other deficits, reports blood pressure was borderline low throughout transport, blood glucose 125.  Patient unable to give any history.    MEDICAL HISTORY REVIEW  Reviewed in EPIC    PAST MEDICAL HISTORY  Active Ambulatory Problems     Diagnosis Date Noted    Closed intertrochanteric fracture of hip, left, initial encounter (HCC) 06/30/2019    Closed comminuted intertrochanteric fracture of proximal end of left femur ([facility]) 06/30/2019    Ulcerative colitis ([facility]) 07/01/2019    Coronary artery disease 07/01/2019    Hyponatremia 07/01/2019    Postoperative anemia due to acute blood loss 07/01/2019    Constipation 09/27/2021    Oropharyngeal dysphagia 09/28/2021    Age-related physical debility 09/29/2021    Bacteriuria 09/29/2021    Acute urinary tract infection 01/27/2022    COVID-19 virus detected 01/27/2022    DNR (do not resuscitate) 01/27/2022    Dehydration 01/27/2022    Cytokine release syndrome, grade 1 01/31/2022    Multiple falls 06/02/2022     Essential hypertension 06/03/2022    Elevated troponin 06/03/2022    B12 deficiency 06/03/2022    Lower leg DVT (deep venous thromboembolism), chronic, left (HCC) 06/05/2022    Acute kidney injury (HCC) 01/13/2023    Anemia 01/13/2023    Mild dementia 01/13/2023    Hypothermia 01/13/2023    Bradycardia 01/13/2023    Hypothyroidism 01/14/2023     Resolved Ambulatory Problems     Diagnosis Date Noted    Metabolic encephalopathy 07/01/2019    Hypotension 01/13/2023    AMS (altered mental status) 01/13/2023     Past Medical History:   Diagnosis Date    Colon polyps     Disease of thyroid gland     Hyperlipemia     Myocardial infarction (HCC) 1989       PAST SURGICAL HISTORY  Past Surgical History:   Procedure Laterality Date    CARDIAC SURGERY  1989    CABG X 1    COLONOSCOPY  2014    COLONOSCOPY N/A 8/23/2016    Procedure: COLONOSCOPY INTO CECUM WITH COLD BIOPSIES;  Surgeon: Aaron Gregg MD;  Location: [facility] ENDOSCOPY;  Service:     FEMUR IM NAILING/RODDING Left 6/30/2019    Procedure: FEMUR INTRAMEDULLARY NAILING/RODDING;  Surgeon: Jaqueline Wagoner MD;  Location: [facility] MAIN OR;  Service: Orthopedics    FRACTURE SURGERY      upper leg w/hardware    HIP SURGERY Left     x 3       FAMILY HISTORY  History reviewed. No pertinent family history.    SOCIAL HISTORY  Social History     Socioeconomic History    Marital status:    Tobacco Use    Smoking status: Never    Smokeless tobacco: Never   Vaping Use    Vaping Use: Never used   Substance and Sexual Activity    Alcohol use: No    Drug use: No    Sexual activity: Defer       ALLERGIES  Penicillins    The patient's allergies have been reviewed    REVIEW OF SYSTEMS  Unable to obtain review of systems secondary to altered mental status    PHYSICAL EXAM  I have reviewed the triage vital signs and nursing notes.  ED Triage Vitals   Temp Pulse Resp BP SpO2   -- -- -- -- --      Temp src Heart Rate Source Patient Position BP Location FiO2 (%)   -- -- -- -- --        General: No acute distress.  HENT: NCAT, PERRL, Nares patent.  Eyes: no scleral icterus.  Neck: trachea midline, no ROM limitations.  CV: regular rhythm, regular rate.  Respiratory: normal effort, CTAB.  Abdomen: soft, nondistended, NTTP, no rebound tenderness, no guarding or rigidity.  Musculoskeletal: no deformity.  Neuro: alert and oriented x2, patient will not comply with extraocular movements but appears to be looking out around the room normally, questionable right-sided facial droop which is slight decrease and smile on the right corner of his mouth, patient has dysarthria, no aphasia, 5 out of 5 strength and all 4 extremities with no pronator drift of bilateral upper extremities, sensation intact to light touch.  Skin: warm, dry.    NIHSS:  0-->Alert: keenly responsive  0-->Answers both questions correctly  0-->Performs both tasks correctly  0=normal  0=No visual loss  1=Minor paralysis (flattened nasolabial fold, asymmetric on smiling)  0-->No drift: limb holds 90 (or 45) degrees for full 10 secs  0-->No drift: limb holds 90 (or 45) degrees for full 10 secs  0-->No drift: limb holds 90 (or 45) degrees for full 10 secs  0-->No drift: limb holds 90 (or 45) degrees for full 10 secs  0=Absent  0=Normal; no sensory loss  0=No aphasia, normal  1=Mild to moderate, patient slurs at least some words and at worst, can be understood with some difficulty  0=No abnormality  Total score: 2      LAB RESULTS  No results found for this or any previous visit (from the past 24 hour(s)).    I ordered the above labs and reviewed the results.    RADIOLOGY  No Radiology Exams Resulted Within Past 24 Hours    I ordered the above noted radiological studies. I reviewed the images and results. I agree with the radiologist interpretation.    PROCEDURES  Procedures    MEDICATIONS GIVEN IN ER  Medications   sodium chloride 0.9 % flush 10 mL (has no administration in time range)   acetaminophen (TYLENOL) tablet 650 mg (has no  administration in time range)   ondansetron (ZOFRAN) tablet 4 mg (has no administration in time range)     Or   ondansetron (ZOFRAN) injection 4 mg (has no administration in time range)   melatonin tablet 3 mg (has no administration in time range)   cefTRIAXone (ROCEPHIN) 1 g in sodium chloride 0.9 % 100 mL IVPB-VTB (1 g Intravenous Not Given 3/25/23 1814)   docusate sodium (COLACE) capsule 100 mg (100 mg Oral Not Given 3/25/23 2021)   atorvastatin (LIPITOR) tablet 10 mg (10 mg Oral Not Given 3/25/23 2020)   midodrine (PROAMATINE) tablet 5 mg (has no administration in time range)   vitamin B-12 (CYANOCOBALAMIN) tablet 1,000 mcg (1,000 mcg Oral Not Given 3/25/23 0802)   dextrose 5 % and sodium chloride 0.9 % infusion (75 mL/hr Intravenous New Bag 3/24/23 0912)   levothyroxine (SYNTHROID, LEVOTHROID) tablet 25 mcg (25 mcg Oral Not Given 3/26/23 0500)   aspirin chewable tablet 81 mg (81 mg Oral Not Given 3/25/23 0802)     Or   aspirin suppository 300 mg ( Rectal Not Given:  See Alt 3/25/23 0802)   OLANZapine (zyPREXA) injection 2.5 mg (2.5 mg Intramuscular Given 3/23/23 2129)   LORazepam (ATIVAN) injection 0.5 mg (0.5 mg Intravenous Given 3/24/23 2210)     Or   LORazepam (ATIVAN) injection 0.5 mg ( Subcutaneous Not Given:  See Alt 3/24/23 2210)     Or   LORazepam (ATIVAN) 2 MG/ML concentrated solution 0.5 mg ( Sublingual Not Given:  See Alt 3/24/23 2210)   Glycerin-Hypromellose- (ARTIFICIAL TEARS) 0.2-0.2-1 % ophthalmic solution solution 1 drop (has no administration in time range)   acetaminophen (TYLENOL) tablet 650 mg (has no administration in time range)     Or   acetaminophen (TYLENOL) 160 MG/5ML solution 650 mg (has no administration in time range)     Or   acetaminophen (TYLENOL) suppository 650 mg (has no administration in time range)   HYDROmorphone (DILAUDID) injection 0.5 mg ( Intravenous Not Given:  See Alt 3/24/23 1539)     Or   morphine injection 2 mg (2 mg Intravenous Given 3/24/23 1539)     Or    morphine concentrated solution 5 mg ( Sublingual Not Given:  See Alt 3/24/23 1539)     Or   ketorolac (TORADOL) injection 15 mg ( Intravenous Not Given:  See Alt 3/24/23 1539)   ondansetron (ZOFRAN) tablet 4 mg (has no administration in time range)     Or   ondansetron (ZOFRAN) injection 4 mg (has no administration in time range)   glycopyrrolate (ROBINUL) injection 0.2 mg (has no administration in time range)     Or   glycopyrrolate (ROBINUL) injection 0.4 mg (has no administration in time range)   scopolamine patch 1 mg/72 hr (1 patch Transdermal Medication Applied 3/25/23 1833)   sodium chloride 0.9 % bolus 500 mL (0 mL Intravenous Stopped 3/21/23 1916)   iopamidol (ISOVUE-370) 76 % injection 100 mL (90 mL Intravenous Given by Other 3/21/23 1623)   azithromycin (ZITHROMAX) 500 mg in sodium chloride 0.9 % 250 mL IVPB-VTB (0 mg Intravenous Stopped 3/21/23 2020)   cefTRIAXone (ROCEPHIN) 1 g in sodium chloride 0.9 % 100 mL IVPB-VTB (0 g Intravenous Stopped 3/21/23 1827)   OLANZapine (zyPREXA) injection 2.5 mg (2.5 mg Intramuscular Given 3/23/23 1059)   barium sulfate oral suspension 50 mL (50 mL Oral Given 3/23/23 1208)       PROGRESS, DATA ANALYSIS, CONSULTS, AND MEDICAL DECISION MAKING  A complete history and physical exam have been performed.  All available laboratory and imaging results have been reviewed by myself prior to disposition.    MDM  After the initial H&P, I discussed pertinent information from history and physical exam with patient/family.  Discussed differential diagnosis.  Discussed plan for ED evaluation/workup/treatment.  All questions answered.  Patient/family is agreeable with plan.  ED Course as of 03/26/23 0622   Tue Mar 21, 2023   1600 Patient presents with concern for stroke, immediately went to evaluate patient. [JG]   9868 Activating team D [JG]   8589 Phone call with stroke neurology on-call.  Discussed the patient, relevant history, exam, diagnostics, ED findings/progress, and  concerns.  They agree that presentation is strange and concerning for possibility of seizure, they do not recommend tPA, they do recommend CT imaging with CT angiograms head and neck but no CT perfusion has no signs of large vessel occlusion.  They agree to consult.    [JG]   1642 I reviewed CT head imaging in PACS, negative for intracranial hemorrhage per my read. [JG]   1643 EKG independently viewed and contemporaneously interpreted by ED physician. Time: 1641.  Rate 66.  Interpretation: Normal sinus rhythm, normal axis, nonspecific intraventricular conduction delay, no acute ST changes. [JG]   1651 Patient with leukocytosis with slight left shift.  Ordering blood cultures and lactic acid, chest x-ray and urinalysis previously ordered. [JG]   1704 Dr. Cohen, stroke neurology at bedside.  She reports that she has reviewed imaging studies and images unremarkable.  She recommends admission for EEG and further evaluation. [JG]   1705 Chest x-ray shows left lower lobe pneumonia, patient has leukocytosis with left shift, treat with ceftriaxone and azithromycin, blood cultures and lactic acid previously ordered. [JG]   1745 Disposition pending CMP.  Call lab to request updated, informed will be at least 20 more minutes. [JG]   1826 I discussed the case with MD Suzan with [facility] at this time regarding the patient.  I discussed work-up, results, concerns.  I discussed the consulting provider's desire for tele admit.   [DC]      ED Course User Index  [DC] Branden Armas PA  [JG] Miguel Ángel Newberry MD       AS OF 06:22 EDT VITALS:    BP - 148/53  HR - (!) 45  TEMP - 97.5 °F (36.4 °C) (Oral)  O2 SATS - 100%    DIAGNOSIS  Final diagnoses:   Seizure-like activity (HCC)   Aspiration pneumonia, unspecified aspiration pneumonia type, unspecified laterality, unspecified part of lung (HCC)   Sepsis without acute organ dysfunction, due to unspecified organism (HCC)         DISPOSITION  ADMISSION    Discussed treatment plan and  reason for admission with pt/family and admitting physician.  Pt/family voiced understanding of the plan for admission for further testing/treatment as needed.

## 2023-03-21 NOTE — Clinical Note
Level of Care: Telemetry [5]   Diagnosis: Seizure-like activity (HCC) [143244]   Admitting Physician: STEFAN ROGEL [[id number]]   Attending Physician: STEFAN ROGEL [[id number]]

## 2023-03-22 PROBLEM — J18.9 PNEUMONIA: Status: ACTIVE | Noted: 2023-01-01

## 2023-03-22 PROBLEM — N18.31 STAGE 3A CHRONIC KIDNEY DISEASE (HCC): Status: ACTIVE | Noted: 2023-01-01

## 2023-03-22 NOTE — THERAPY EVALUATION
Acute Care - Speech Language Pathology   Swallow Initial Evaluation [facility]     Patient Name: Fritz Flores  : 1933  MRN: [id number]  Today's Date: 3/22/2023               Admit Date: 3/21/2023    Visit Dx:     ICD-10-CM ICD-9-CM   1. Seizure-like activity ([facility])  R56.9 780.39   2. Aspiration pneumonia, unspecified aspiration pneumonia type, unspecified laterality, unspecified part of lung ([facility])  J69.0 507.0   3. Sepsis without acute organ dysfunction, due to unspecified organism ([facility])  A41.9 038.9     995.91     Patient Active Problem List   Diagnosis   • Closed intertrochanteric fracture of hip, left, initial encounter ([facility])   • Closed comminuted intertrochanteric fracture of proximal end of left femur ([facility])   • Ulcerative colitis (HCC)   • Coronary artery disease   • Hyponatremia   • Postoperative anemia due to acute blood loss   • Constipation   • Dysphagia   • Age-related physical debility   • Bacteriuria   • Acute urinary tract infection   • COVID-19 virus detected   • DNR (do not resuscitate)   • Dehydration   • Cytokine release syndrome, grade 1   • Multiple falls   • Essential hypertension   • Elevated troponin   • B12 deficiency   • Lower leg DVT (deep venous thromboembolism), chronic, left (HCC)   • Acute kidney injury (HCC)   • Anemia   • Mild dementia   • Hypothermia   • Bradycardia   • Hypothyroidism   • Seizure-like activity (HCC)   • Pneumonia   • Stage 3a chronic kidney disease (HCC)     Past Medical History:   Diagnosis Date   • Colon polyps    • Coronary artery disease    • Disease of thyroid gland    • Hyperlipemia    • Myocardial infarction (HCC)    • Ulcerative colitis (HCC)      Past Surgical History:   Procedure Laterality Date   • CARDIAC SURGERY      CABG X 1   • COLONOSCOPY     • COLONOSCOPY N/A 2016    Procedure: COLONOSCOPY INTO CECUM WITH COLD BIOPSIES;  Surgeon: Aaron Gregg MD;  Location: [facility] ENDOSCOPY;  Service:    • FEMUR IM NAILING/RODDING Left  6/30/2019    Procedure: FEMUR INTRAMEDULLARY NAILING/RODDING;  Surgeon: Jaqueline Wagoner MD;  Location: [facility] OR;  Service: Orthopedics   • FRACTURE SURGERY      upper leg w/hardware   • HIP SURGERY Left     x 3       SLP Recommendation and Plan  SLP Swallowing Diagnosis: suspected pharyngeal dysphagia (03/22/23 1206)  SLP Diet Recommendation: NPO (03/22/23 1206)     SLP Rec. for Method of Medication Administration: meds via alternate route (03/22/23 1206)     Monitor for Signs of Aspiration: yes, notify SLP if any concerns (03/22/23 1206)  Recommended Diagnostics: VFSS ([facility]) (03/22/23 1206)  Swallow Criteria for Skilled Therapeutic Interventions Met: demonstrates skilled criteria (03/22/23 1206)  Anticipated Discharge Disposition (SLP): unknown (03/22/23 1206)  Rehab Potential/Prognosis, Swallowing: adequate, monitor progress closely (03/22/23 1206)  Therapy Frequency (Swallow): PRN (03/22/23 1206)  Predicted Duration Therapy Intervention (Days): until discharge (03/22/23 1206)                                        Plan of Care Reviewed With: patient  Outcome Evaluation: Clinical swallow evaluation completed, recommend NPO and meds via alternate route. VFSS 3/23 to further assess swallow function.      SWALLOW EVALUATION (last 72 hours)     SLP Adult Swallow Evaluation     Row Name 03/22/23 1206                   Rehab Evaluation    Document Type evaluation  -KA        Subjective Information no complaints  -KA        Patient Observations alert;cooperative  -KA        Patient Effort good  -KA        Symptoms Noted During/After Treatment none  -KA           General Information    Patient Profile Reviewed yes  -KA        Pertinent History Of Current Problem seizure activity. Chest XRAY=new area of density left lung base may represent atelectasis, aspiration PNA or PNA. VFSS 9/29/21 revealed aspiration of thins and NTL. Forked  mash and HTL via spoon recommended.  -KA        Current Method of Nutrition  NPO  -KA        Precautions/Limitations, Vision WFL;for purposes of eval  -KA        Precautions/Limitations, Hearing hearing impairment, bilaterally  -KA        Prior Level of Function-Communication cognitive-linguistic impairment  -KA        Prior Level of Function-Swallowing other (see comments)  see hx above, pt unable to provide any history regarding if he was compliant with thickening liquids  -KA        Plans/Goals Discussed with patient  -KA        Barriers to Rehab medically complex;previous functional deficit;cognitive status  -KA        Patient's Goals for Discharge patient did not state  -KA           Oral Motor Structure and Function    Dentition Assessment missing teeth  -KA        Secretion Management dried secretions in oral cavity  -KA        Mucosal Quality dry  -KA        Volitional Swallow unable to elicit  -KA        Volitional Cough weak  -KA           Oral Musculature and Cranial Nerve Assessment    Oral Motor General Assessment generalized oral motor weakness  -KA           General Eating/Swallowing Observations    Respiratory Support Currently in Use room air  -KA        Eating/Swallowing Skills fed by SLP  -KA        Positioning During Eating upright in bed  -KA        Utensils Used spoon  -KA        Consistencies Trialed ice chips;honey-thick liquids  -KA           Clinical Swallow Eval    Clinical Swallow Evaluation Summary Patient demonstrated baseline coughing prior to any PO. Patient demonstrated consistent congested coughing after 3 ice chip trials and spoonful sip of HTL. Unable to r/o aspiration risks at the bedside, recommend VFSS to further assess swallow. Laryngeal elevation felt slightly reduced/age appropriate upon neck palpation.  -KA           SLP Evaluation Clinical Impression    SLP Swallowing Diagnosis suspected pharyngeal dysphagia  -KA        Functional Impact risk of aspiration/pneumonia  -KA        Rehab Potential/Prognosis, Swallowing adequate, monitor progress  closely  -        Swallow Criteria for Skilled Therapeutic Interventions Met demonstrates skilled criteria  -           Recommendations    Therapy Frequency (Swallow) PRN  -KA        Predicted Duration Therapy Intervention (Days) until discharge  -        SLP Diet Recommendation NPO  -KA        Recommended Diagnostics VFSS ([facility])  -        Oral Care Recommendations Oral Care BID/PRN  -        SLP Rec. for Method of Medication Administration meds via alternate route  -        Monitor for Signs of Aspiration yes;notify SLP if any concerns  -        Anticipated Discharge Disposition (SLP) unknown  -              User Key  (r) = Recorded By, (t) = Taken By, (c) = Cosigned By    Initials Name Effective Dates    Bernard Osullivan MA,CCC-SLP 06/02/22 -                 EDUCATION  The patient has been educated in the following areas:   Dysphagia (Swallowing Impairment).              Time Calculation:    Time Calculation- SLP     Row Name 03/22/23 1254             Time Calculation- SLP    SLP Start Time 1000  -KA      SLP Received On 03/22/23  -         Untimed Charges    [id number] Eval Oral Pharyng Swallow Minutes 45  -KA         Total Minutes    Untimed Charges Total Minutes 45  -KA       Total Minutes 45  -KA            User Key  (r) = Recorded By, (t) = Taken By, (c) = Cosigned By    Initials Name Provider Type    Bernard Osullivan MA,CCC-SLP Speech and Language Pathologist                Therapy Charges for Today     Code Description Service Date Service Provider Modifiers Qty    [id number] HC ST EVAL ORAL PHARYNG SWALLOW 3 3/22/2023 Bernard Colunga MA,CCC-SLP GN 1               Bernard Colunga MA,CCC-SLP  3/22/2023

## 2023-03-22 NOTE — PLAN OF CARE
Goal Outcome Evaluation:  Plan of Care Reviewed With: patient           Outcome Evaluation: Pt seen for OT afsaneh this AM. Admitted after episode of staring off into space for ~20 minutes, confused unable to speak or follow commands. Per chart pt with hx of mild cognitive impairment. Today pt able to state his name, he was at [facility] and the year. Had difficulty answering PLOF questions. Per chart pt lives with his wife, son and daughter in law in a one level home with no JOHN. Someone is with him 24/7. He has a rwx, wc, shower chair and grab bars. Pt reports he was indep with ADLs prior. Today pt required min-mod A with bed mobility with increased time and cues to complete. Sat EOB with SBA for static sitting balance and CGA-min with dynamic balance. Dep A to don socks, anticipate max A-dep for toileting needs at this time. SBA with simple grooming task. Pt with BUE coordination difficulty when reaching out to touch therapists finger. Performed STS with mod A and use of rwx. Pt with knees flexed and weakness noted. Took a couple side steps towards HOB. Pt presents with decreased cognition, endurance, balance, ADL performance and functional mobility.

## 2023-03-22 NOTE — CASE MANAGEMENT/SOCIAL WORK
Discharge Planning Assessment  [facility]     Patient Name: Fritz Flores  MRN: [id number]  Today's Date: 3/22/2023    Admit Date: 3/21/2023    Plan: Home with family to transport- PT eval pending   Discharge Needs Assessment     Row Name 03/22/23 1148       Living Environment    People in Home spouse;child(mary beth), adult    Name(s) of People in Home Spouse, Son, and Daughter-in-law    Current Living Arrangements home    Potentially Unsafe Housing Conditions none    Primary Care Provided by self    Provides Primary Care For no one    Family Caregiver if Needed child(mary beth), adult;spouse    Family Caregiver Names POA/DaughterDeidre [phone] ; SonEh    Quality of Family Relationships helpful;involved;supportive    Able to Return to Prior Arrangements yes       Resource/Environmental Concerns    Resource/Environmental Concerns none    Transportation Concerns none       Transition Planning    Patient/Family Anticipates Transition to home with family    Transportation Anticipated family or friend will provide       Discharge Needs Assessment    Equipment Currently Used at Home wheelchair;walker, standard;shower chair;grab bar;lift device    Concerns to be Addressed discharge planning               Discharge Plan     Row Name 03/22/23 1151       Plan    Plan Home with family to transport- PT eval pending    Patient/Family in Agreement with Plan yes    Plan Comments CCP spoke with patient's POA/daughter, Deidre, via phone due to patient's orientation status. Patient resides with wife, son, and daughter-in-law in one level home with no steps to enter. POA states family are in the home with patient 24/7. Patient uses walker, wheelchair, shower chair, grab bars, and lift device. Patient has used [facility] in the past and is agreeable to use again if patient needs HH in the future. POA is not waiting SNF at this time due to family living in home with patient. PT eval pending. Plan is home with family; family will transport  home at discharge. CCP following for discharge planning needs. Shiva ARROYO LCSW              Continued Care and Services - Admitted Since 3/21/2023    Coordination has not been started for this encounter.     Selected Continued Care - Prior Encounters Includes continued care and service providers with selected services from prior encounters from 12/21/2022 to 3/22/2023    Discharged on 1/16/2023 Admission date: 1/12/2023 - Discharge disposition: Home-Health Care Svc    Home Medical Care     Service Provider Selected Services Address Phone Fax Patient Preferred     [facility] [address] [address] [phone] --                       Demographic Summary     Row Name 03/22/23 1145       General Information    Admission Type inpatient    Arrived From home    Reason for Consult discharge planning    Preferred Language English               Functional Status     Row Name 03/22/23 1148       Functional Status    Usual Activity Tolerance moderate    Current Activity Tolerance moderate       Functional Status, IADL    Medications assistive equipment    Meal Preparation assistive equipment    Housekeeping assistive equipment    Laundry assistive equipment    Shopping assistive equipment               Psychosocial    No documentation.                Abuse/Neglect    No documentation.                Legal    No documentation.                Substance Abuse    No documentation.                Patient Forms    No documentation.                   Shiva Berman

## 2023-03-22 NOTE — PLAN OF CARE
Goal Outcome Evaluation:  Plan of Care Reviewed With: patient           Outcome Evaluation: A&O x 1, VSS, RA, no complaints of pain, patient resting comfortably with no questions or concerns at this time. Will continue to monitor.

## 2023-03-22 NOTE — PLAN OF CARE
Goal Outcome Evaluation:  Plan of Care Reviewed With: patient           Outcome Evaluation: Clinical swallow evaluation completed, recommend NPO and meds via alternate route. VFSS 3/23 to further assess swallow function.

## 2023-03-22 NOTE — H&P
HISTORY AND PHYSICAL   [facility]        Date of Admission: 3/21/2023  Patient Identification:  Name: Fritz Flores  Age: 89 y.o.  Sex: male  :  1933  MRN: [id number]                     Primary Care Physician: Rachid العلي Jr., MD    Chief Complaint:  89 year old gentleman who presented to the ED from home after he had an episode of staring off into space for 20 minutes; he was confused and unable to speak and had a facial droop; he was not able to follow commands when he arrived; neurology was called and thought the events were more consistent with seizure like activity; he has mild cognitive impairment; he was recently seen by his pcp for an upper respiratory infection; a daughter is present at the bedside to help with the history     History of Present Illness:   As above    Past Medical History:  Past Medical History:   Diagnosis Date   • Colon polyps    • Coronary artery disease    • Disease of thyroid gland    • Hyperlipemia    • Myocardial infarction (HCC)    • Ulcerative colitis (HCC)      Past Surgical History:  Past Surgical History:   Procedure Laterality Date   • CARDIAC SURGERY      CABG X 1   • COLONOSCOPY     • COLONOSCOPY N/A 2016    Procedure: COLONOSCOPY INTO CECUM WITH COLD BIOPSIES;  Surgeon: Aaron Gregg MD;  Location: [facility] ENDOSCOPY;  Service:    • FEMUR IM NAILING/RODDING Left 2019    Procedure: FEMUR INTRAMEDULLARY NAILING/RODDING;  Surgeon: Jaqueline Wagoner MD;  Location: [facility] OR;  Service: Orthopedics   • FRACTURE SURGERY      upper leg w/hardware   • HIP SURGERY Left     x 3      Home Meds:  Medications Prior to Admission   Medication Sig Dispense Refill Last Dose   • aspirin 81 MG EC tablet Take 1 tablet by mouth Daily.   3/20/2023 at 0800   • docusate sodium (COLACE) 100 MG capsule Take 1 capsule by mouth 2 (Two) Times a Day.   Past Week   • donepezil (ARICEPT) 5 MG tablet Take 1 tablet by mouth Every Night.    3/20/2023 at 2000   • potassium chloride 10 MEQ CR tablet Take 2 tablets by mouth Daily. 60 tablet 0 3/20/2023 at 0800   • simvastatin (ZOCOR) 10 MG tablet Take 1 tablet by mouth Every Night.   3/20/2023 at 2000   • vitamin B-12 (VITAMIN B-12) 1000 MCG tablet Take 1 tablet by mouth Daily. 30 tablet 0 3/20/2023 at 0800   • acetaminophen (TYLENOL) 325 MG tablet Take 2 tablets by mouth Every 4 (Four) Hours As Needed for Mild Pain .   More than a month   • furosemide (LASIX) 40 MG tablet Take 1 tablet by mouth Daily for 30 days. 30 tablet 0    • levothyroxine (SYNTHROID, LEVOTHROID) 25 MCG tablet Take 1 tablet by mouth Every Morning for 30 days. 30 tablet 0    • midodrine (PROAMATINE) 10 MG tablet Take 0.5 tablets by mouth 2 (Two) Times a Day As Needed (for systolic blood pressure less than 110.) for up to 30 days. 60 tablet 0    • ondansetron (ZOFRAN) 4 MG tablet Take 1 tablet by mouth Every 6 (Six) Hours As Needed for Nausea or Vomiting. (Patient not taking: Reported on 3/21/2023)   Not Taking       Allergies:  Allergies   Allergen Reactions   • Penicillins Rash     Immunizations:  Immunization History   Administered Date(s) Administered   • COVID-19 (PFIZER) BIVALENT BOOSTER 12+YRS 11/16/2022   • COVID-19 (PFIZER) PURPLE CAP 03/26/2021, 04/17/2021   • FluLaval/Fluzone >6mos 09/30/2021   • Fluzone High-Dose 65+yrs 11/18/2020     Social History:   Social History     Social History Narrative   • Not on file     Social History     Socioeconomic History   • Marital status:    Tobacco Use   • Smoking status: Never   • Smokeless tobacco: Never   Vaping Use   • Vaping Use: Never used   Substance and Sexual Activity   • Alcohol use: No   • Drug use: No   • Sexual activity: Defer       Family History:  History reviewed. No pertinent family history.     Review of Systems  See history of present illness and past medical history.  Patient denies headache, dizziness, syncope, falls, trauma, change in vision, change in  "hearing, change in taste, changes in weight, changes in appetite, focal weakness, numbness, or paresthesia.  Patient denies chest pain, palpitations, orthopnea, PND,  sinus pressure, rhinorrhea, epistaxis, hemoptysis, nausea, vomiting,hematemesis, diarrhea, constipation or hematchezia.  Denies cold or heat intolerance, polydipsia, polyuria, polyphagia. Denies hematuria, pyuria, dysuria, hesitancy, frequency or urgency. Denies consumption of raw and under cooked meats foods or change in water source.  Denies fever, chills, sweats, night sweats.  Denies missing any routine medications. Remainder of ROS is negative.    Objective:  T Max 24 hrs: Temp (24hrs), Av °F (35.6 °C), Min:96 °F (35.6 °C), Max:96 °F (35.6 °C)    Vitals Ranges:   Temp:  [96 °F (35.6 °C)] 96 °F (35.6 °C)  Heart Rate:  [59-73] 62  Resp:  [18] 18  BP: (109-150)/(56-77) 143/68      Exam:  /68   Pulse 62   Temp 96 °F (35.6 °C) (Tympanic)   Resp 18   Ht 185.4 cm (73\")   Wt 86.1 kg (189 lb 14.4 oz)   SpO2 95%   BMI 25.05 kg/m²     General Appearance:    Alert, cooperative, no distress, appears stated age   Head:    Normocephalic, without obvious abnormality, atraumatic   Eyes:    PERRL, conjunctivae/corneas clear, EOM's intact, both eyes   Ears:    Normal external ear canals, both ears   Nose:   Nares normal, septum midline, mucosa normal, no drainage    or sinus tenderness   Throat:   Lips, mucosa, and tongue normal   Neck:   Supple, symmetrical, trachea midline, no adenopathy;     thyroid:  no enlargement/tenderness/nodules; no carotid    bruit or JVD   Back:     Symmetric, no curvature, ROM normal, no CVA tenderness   Lungs:     Decreased breath sounds bilaterally, respirations unlabored   Chest Wall:    No tenderness or deformity    Heart:    Regular rate and rhythm, S1 and S2 normal, no murmur, rub   or gallop   Abdomen:     Soft, nontender, bowel sounds active all four quadrants,     no masses, no hepatomegaly, no splenomegaly "   Extremities:   Extremities normal, atraumatic, no cyanosis or edema   Pulses:   2+ and symmetric all extremities   Skin:   Skin color, texture, turgor normal, no rashes or lesions    .    Data Review:  Labs in chart were reviewed.  WBC   Date Value Ref Range Status   03/21/2023 16.11 (H) 3.40 - 10.80 10*3/mm3 Final     Hemoglobin   Date Value Ref Range Status   03/21/2023 12.6 (L) 13.0 - 17.7 g/dL Final     Hematocrit   Date Value Ref Range Status   03/21/2023 35.0 (L) 37.5 - 51.0 % Final     Platelets   Date Value Ref Range Status   03/21/2023 213 140 - 450 10*3/mm3 Final     Sodium   Date Value Ref Range Status   03/21/2023 137 136 - 145 mmol/L Final     Potassium   Date Value Ref Range Status   03/21/2023 5.3 (H) 3.5 - 5.2 mmol/L Final     Comment:     Slight hemolysis detected by analyzer. Results may be affected.     Chloride   Date Value Ref Range Status   03/21/2023 100 98 - 107 mmol/L Final     CO2   Date Value Ref Range Status   03/21/2023 24.4 22.0 - 29.0 mmol/L Final     BUN   Date Value Ref Range Status   03/21/2023 42 (H) 8 - 23 mg/dL Final     Creatinine   Date Value Ref Range Status   03/21/2023 1.76 (H) 0.76 - 1.27 mg/dL Final     Glucose   Date Value Ref Range Status   03/21/2023 93 65 - 99 mg/dL Final     Calcium   Date Value Ref Range Status   03/21/2023 9.3 8.6 - 10.5 mg/dL Final     AST (SGOT)   Date Value Ref Range Status   03/21/2023 16 1 - 40 U/L Final     Comment:     Slight hemolysis detected by analyzer. Results may be affected.     ALT (SGPT)   Date Value Ref Range Status   03/21/2023 15 1 - 41 U/L Final     Alkaline Phosphatase   Date Value Ref Range Status   03/21/2023 134 (H) 39 - 117 U/L Final                Imaging Results (All)     Procedure Component Value Units Date/Time    CT Angiogram Head w AI Analysis of LVO [[id number]] Collected: 03/21/23 1701     Updated: 03/21/23 1716    Narrative:      CT ANGIOGRAM HEAD AND NECK     HISTORY: New onset of altered mental status.      TECHNIQUE: Precontrast head CT was performed, I discussed the findings  with the stroke team physician on call at around 4:20 PM who asked that  we proceed with CT angiogram head and neck. That was performed and I  subsequently had a follow-up call with the stroke team physician at 4:34  PM. 90 mL of Isovue-370 IV contrast was used for CT angiogram head and  neck with postcontrast CT images also acquired. Correlation with CT  01/12/2023. Multiplanar and 3-dimensional reformatted images were  produced at a separate workstation. AI analysis of LVO was utilized.     Radiation dose reduction techniques were utilized, including automated  exposure control and exposure modulation based on body size.     FINDINGS: Pre and post IV contrast head CT images show loss of cerebral  parenchymal volume similar to that observed previously and as is typical  for the age group. There is no hemorrhage, abnormal contrast  enhancement, or acute ischemic change. Extra-axial spaces appear normal.  Paranasal sinuses are clear.     The visualized thoracic aorta appears normal. Brachiocephalic and  subclavian arteries are patent. There is minimal atheromatous plaque at  the carotid bulbs with less than 50% right proximal ICA stenosis and  less than 20% luminal stenosis at the left proximal ICA using NASCET  criteria. The vertebral arteries are symmetrical and patent throughout  their length with both terminating at the normal-caliber and widely  patent basilar artery.     There is calcification along the carotid siphons. Anterior, middle, and  posterior cerebral arteries and branches are patent. No aneurysm or  vascular cut-off identified. Visualized intracranial venous sinuses  enhance as expected.     There is degenerative change in the spine.       Impression:      Chronic changes as described. No acute abnormality  identified.     This report was finalized on 3/21/2023 5:13 PM by Dr. Richy Anderson M.D.       XR Chest 1 View  [[id number]] Collected: 03/21/23 1702     Updated: 03/21/23 1716    Narrative:      PORTABLE CHEST X-RAY     HISTORY: Acute stroke protocol.     TECHNIQUE: Portable chest x-ray is correlated with chest x-ray  03/08/2023.     FINDINGS: Sternal wires with cardiomegaly and normal pulmonary vascular  volume. Asymmetric density at the left retrocardiac lung base appears  new and suspicious for pneumonia. The lungs are otherwise clear. Bones  are demineralized.       Impression:      New area of density in the retrocardiac left lung base may  represent atelectasis, aspiration pneumonitis or pneumonia.     This report was finalized on 3/21/2023 5:13 PM by Dr. Richy Anderson M.D.       CT Angiogram Neck [[id number]] Resulted: 03/21/23 1624     Updated: 03/21/23 1629            Assessment:  Active Hospital Problems    Diagnosis  POA   • **Seizure-like activity (HCC) [R56.9]  Yes      Resolved Hospital Problems   No resolved problems to display.   pneumonia  Dementia  Cad  Hypothyroidism  Hyperkalemia  ckd3    Plan:  antibiotics  Neurology input reviewed  Hold off on mri  Trend labs  Monitor on telemetry  dw patient, family and ED provider  Ana Mares MD  3/21/2023  21:34 EDT

## 2023-03-22 NOTE — CONSULTS
Nutrition Services    Patient Name:  Fritz Flores  YOB: 1933  MRN: [id number]  Admit Date:  3/21/2023      Assessment Date:  03/22/23    Comment: Nutrition consult per nurse admission screen    88 y/o male admitted after having 20 minute episode of decreased responsiveness along w/ report of R facial weakness and change in speech. Head CT showed no acute pathology and CTA showed no large vessel occlusion. Pt is currently A/Ox1, unable to give any nutrition-related history. Reviewed wt history- no wt loss. SLP evaluated pt today and recommends NPO, plans for VFSS tomorrow.     Recommendations  1. If pt to remain NPO per SLP and enteral nutrition aligns w/ GOC, would use Isosource 1.5 - start @ 20ml/hr and advance 10ml q 8h towards goal rate 60ml/hr (2160kcal, 97gm protein, 1094mL free water) + free water flushes 30ml q 4h    2. Advance PO diet as per SLP    RD following.       CLINICAL NUTRITION ASSESSMENT      Reason for Assessment Nurse Admission Screen   Diagnosis/Problem Seizure-like activity   Medical & Surgical Hx Past Medical History:   Diagnosis Date   • Colon polyps    • Coronary artery disease    • Disease of thyroid gland    • Hyperlipemia    • Myocardial infarction (HCC) 1989   • Ulcerative colitis (HCC)        Past Surgical History:   Procedure Laterality Date   • CARDIAC SURGERY  1989    CABG X 1   • COLONOSCOPY  2014   • COLONOSCOPY N/A 8/23/2016    Procedure: COLONOSCOPY INTO CECUM WITH COLD BIOPSIES;  Surgeon: Aaron Gregg MD;  Location: [facility] ENDOSCOPY;  Service:    • FEMUR IM NAILING/RODDING Left 6/30/2019    Procedure: FEMUR INTRAMEDULLARY NAILING/RODDING;  Surgeon: Jaqueline Wagoner MD;  Location: [facility] OR;  Service: Orthopedics   • FRACTURE SURGERY      upper leg w/hardware   • HIP SURGERY Left     x 3          Encounter Information        Nutrition History    Food Preferences    Supplements    Factors Affecting Intake swallow impairment   Tests/Procedures CT  "scan CTA head-no acute abnormality     Anthropometrics        Current Height   Current Weight  BMI kg/m2 Height: 185.4 cm (73\")  Weight: 85.7 kg (189 lb) (03/22/23 0600)  Body mass index is 24.94 kg/m².     Adj BMI (if applicable)    Admission Weight 86.1kg   Ideal Body Weight (IBW) 79.9kg     Adj IBW (if applicable)    Usual Body Weight (UBW)    Weight Change/Trend Stable       Weight history: Wt Readings from Last 30 Encounters:   03/22/23 0600 85.7 kg (189 lb)   03/21/23 2103 85.7 kg (189 lb)   03/21/23 1640 86.1 kg (189 lb 14.4 oz)   02/08/23 1422 86.2 kg (190 lb)   01/14/23 1110 86.2 kg (190 lb 0.6 oz)   01/13/23 0534 86.2 kg (190 lb)   06/02/22 1619 87.1 kg (192 lb)   06/02/22 0703 87.3 kg (192 lb 6.4 oz)   01/29/22 0150 83.9 kg (184 lb 15.5 oz)   09/29/21 1244 83.9 kg (184 lb 15.5 oz)   09/29/21 0437 83.9 kg (184 lb 15.5 oz)   09/28/21 0510 84.1 kg (185 lb 8 oz)   09/27/21 1150 88.9 kg (196 lb)   09/17/21 0812 81.6 kg (180 lb)   07/25/21 1900 79.4 kg (175 lb)   06/11/21 0850 81.6 kg (180 lb)   06/30/19 0608 81.6 kg (179 lb 14.3 oz)   06/30/19 0300 84.4 kg (186 lb)   01/03/18 1606 83.5 kg (184 lb)   08/23/16 1056 82.5 kg (181 lb 12.8 oz)        Estimated/Assessed Needs        Energy Requirements    Height for Calculation  Height: 185.4 cm (73\")   Weight for Calculation 86.1kg   Method for Estimation  25 kcal/kg, 30 kcal/kg   EST Needs (kcal/day) 5689-2904       Protein Requirements    Weight for Calculation 86.1kg   EST Protein Needs (g/kg) 1.0 - 1.2 gm/kg   EST Daily Needs (g/day) 86-103g       Fluid Requirements     Method for Estimation 1 mL/kcal    Estimated Needs (mL/day)          Labs        Pertinent Labs    Results from last 7 days   Lab Units 03/22/23  0447 03/21/23  1612   SODIUM mmol/L 138 137   POTASSIUM mmol/L 4.5 5.3*   CHLORIDE mmol/L 105 100   CO2 mmol/L 23.0 24.4   BUN mg/dL 36* 42*   CREATININE mg/dL 1.42* 1.76*   CALCIUM mg/dL 8.2* 9.3   BILIRUBIN mg/dL  --  0.4   ALK PHOS U/L  --  134* "   ALT (SGPT) U/L  --  15   AST (SGOT) U/L  --  16   GLUCOSE mg/dL 83 93     Results from last 7 days   Lab Units 03/22/23  0447 03/21/23  1612   HEMOGLOBIN g/dL 10.9* 12.6*   HEMATOCRIT % 31.5* 35.0*   WBC 10*3/mm3 11.01* 16.11*   ALBUMIN g/dL  --  3.9     Results from last 7 days   Lab Units 03/22/23  0447 03/21/23  1612   INR   --  1.07   APTT seconds  --  35.1   PLATELETS 10*3/mm3 171 213     COVID19   Date Value Ref Range Status   03/21/2023 Not Detected Not Detected - Ref. Range Final     No results found for: HGBA1C       Medications            Scheduled Medications aspirin, 81 mg, Oral, Daily  atorvastatin, 10 mg, Oral, Nightly  azithromycin, 500 mg, Intravenous, Q24H  cefTRIAXone, 1 g, Intravenous, Q24H  docusate sodium, 100 mg, Oral, BID  donepezil, 5 mg, Oral, Nightly  [START ON 3/23/2023] levothyroxine, 25 mcg, Oral, Q AM  cyanocobalamin, 1,000 mcg, Oral, Daily        Infusions dextrose 5 % and sodium chloride 0.9 %, 75 mL/hr, Last Rate: 75 mL/hr (03/22/23 1327)        PRN Medications •  acetaminophen  •  melatonin  •  midodrine  •  ondansetron **OR** ondansetron  •  sodium chloride     Physical Findings          Physical Appearance alert, confused, disoriented   Oral/Mouth Cavity dental caries   Edema  3+ (moderate)   Gastrointestinal last bowel movement:3/20   Skin  bruising   Tubes/Drains none   NFPE Not applicable at this time   --  Current Nutrition Orders & Evaluation of Intake       Oral Nutrition     Food Allergies NKFA   Current PO Diet NPO Diet NPO Type: Strict NPO   Supplement    PO Evaluation     Trending % PO Intake      Nutrition Diagnosis        Nutrition Dx Problem 1 Problem: Swallowing Difficulty  Etiology: Medical Diagnosis current medical course  Signs/Symptoms: NPO and SLP/Swallow Evaluation       INTERVENTION / PLAN OF CARE  Intervention Goal        Intervention Goal(s) Disease management/therapy, Initiate feeding/diet and Maintain weight     Nutrition Intervention        RD Action  Await begin PO diet, Await initiation of EN/PN and Follow Tx Progress     Prescription           Enteral Prescription:     Enteral Route tube placement pending    TF Delivery Method Continuous    Enteral Product Isosource 1.5    Modular None    Propofol Rate/Kcal     TF Start Rate 20    TF Goal Rate 60    Free Water Flush 30ml q 4h    TF Provision at Goal: 2160kcal, 97gm protein, 1094mL free water + 180mL water flushes         Calories 100% needs met         Protein  100% needs met         Fluid (mL) 1274ml    Prescription Ordered No, recommended, No changes at this time     Monitor/Evaluation        Monitor Per protocol, Weight, Swallow function   Discharge Plan No discharge needs identified at this time, Pending clinical course   Education Education not appropriate at this time     RD to follow per protocol.       Electronically signed by:  Kenna Iyer RD  03/22/23 13:31 EDT

## 2023-03-22 NOTE — THERAPY EVALUATION
Patient Name: Fritz Flores  : 1933    MRN: [id number]                              Today's Date: 3/22/2023       Admit Date: 3/21/2023    Visit Dx:     ICD-10-CM ICD-9-CM   1. Seizure-like activity ([facility])  R56.9 780.39   2. Aspiration pneumonia, unspecified aspiration pneumonia type, unspecified laterality, unspecified part of lung ([facility])  J69.0 507.0   3. Sepsis without acute organ dysfunction, due to unspecified organism ([facility])  A41.9 038.9     995.91     Patient Active Problem List   Diagnosis   • Closed intertrochanteric fracture of hip, left, initial encounter ([facility])   • Closed comminuted intertrochanteric fracture of proximal end of left femur ([facility])   • Ulcerative colitis ([facility])   • Coronary artery disease   • Hyponatremia   • Postoperative anemia due to acute blood loss   • Constipation   • Dysphagia   • Age-related physical debility   • Bacteriuria   • Acute urinary tract infection   • COVID-19 virus detected   • DNR (do not resuscitate)   • Dehydration   • Cytokine release syndrome, grade 1   • Multiple falls   • Essential hypertension   • Elevated troponin   • B12 deficiency   • Lower leg DVT (deep venous thromboembolism), chronic, left ([facility])   • Acute kidney injury (HCC)   • Anemia   • Mild dementia   • Hypothermia   • Bradycardia   • Hypothyroidism   • Seizure-like activity ([facility])     Past Medical History:   Diagnosis Date   • Colon polyps    • Coronary artery disease    • Disease of thyroid gland    • Hyperlipemia    • Myocardial infarction ([facility])    • Ulcerative colitis ([facility])      Past Surgical History:   Procedure Laterality Date   • CARDIAC SURGERY      CABG X 1   • COLONOSCOPY     • COLONOSCOPY N/A 2016    Procedure: COLONOSCOPY INTO CECUM WITH COLD BIOPSIES;  Surgeon: Aaron Gregg MD;  Location: [facility] ENDOSCOPY;  Service:    • FEMUR IM NAILING/RODDING Left 2019    Procedure: FEMUR INTRAMEDULLARY NAILING/RODDING;  Surgeon: Jaqueline Wagoner MD;  Location: [facility]  MAIN OR;  Service: Orthopedics   • FRACTURE SURGERY      upper leg w/hardware   • HIP SURGERY Left     x 3      General Information     Row Name 03/22/23 1138          OT Time and Intention    Document Type evaluation  -     Mode of Treatment individual therapy;occupational therapy  -     Row Name 03/22/23 1138          General Information    Patient Profile Reviewed yes  -     Prior Level of Function independent:;ADL's  Pt reported he was indep with ADLs at home with assistance to get into the shower. Pt difficult at times to obtain information from. Stated he used a rwx  -     Existing Precautions/Restrictions fall  -     Row Name 03/22/23 1138          Living Environment    People in Home spouse  -     Row Name 03/22/23 1138          Cognition    Orientation Status (Cognition) oriented to;person;place  Appeared confused throughout at times and difficulty answering PLOF questions.  -     Row Name 03/22/23 1138          Safety Issues, Functional Mobility    Impairments Affecting Function (Mobility) balance;cognition;endurance/activity tolerance;strength;coordination  -     Comment, Safety Issues/Impairments (Mobility) Nonskid socks and gait belt donned  -           User Key  (r) = Recorded By, (t) = Taken By, (c) = Cosigned By    Initials Name Provider Type    Opal Jose, OT Occupational Therapist                 Mobility/ADL's     Row Name 03/22/23 1143          Bed Mobility    Bed Mobility supine-sit;sit-supine  -     Supine-Sit [place] (Bed Mobility) minimum assist (75% patient effort);moderate assist (50% patient effort)  -     Sit-Supine [place] (Bed Mobility) minimum assist (75% patient effort);moderate assist (50% patient effort)  assistance with LE's  -ABHINAV     Comment, (Bed Mobility) increased time required  -     Row Name 03/22/23 1143          Transfers    Transfers sit-stand transfer  -     Row Name 03/22/23 1143          Sit-Stand Transfer    Sit-Stand  [place] (Transfers) moderate assist (50% patient effort)  -     Assistive Device (Sit-Stand Transfers) walker, front-wheeled  -     Comment, (Sit-Stand Transfer) --  LE weakness noted with STS  -     Row Name 03/22/23 1143          Functional Mobility    Functional Mobility- Comment Unable to attempt. Pt with noted LE weakness upon stand. Knees flexed. Maxed cues for a couple side steps to HOB  -     Row Name 03/22/23 1143          Activities of Daily Living    BADL Assessment/Intervention lower body dressing;toileting;grooming  -KA     Row Name 03/22/23 1143          Lower Body Dressing Assessment/Training    [place] Level (Lower Body Dressing) lower body dressing skills;doff;don;maximum assist (25% patient effort);socks  -[facility] Name 03/22/23 1143          Toileting Assessment/Training    [place] Level (Toileting) toileting skills;maximum assist (25% patient effort);perform perineal hygiene;adjust/manage clothing  -     Position (Toileting) supine  -[facility] Name 03/22/23 1143          Grooming Assessment/Training    [place] Level (Grooming) grooming skills;wash face, hands;standby assist  -     Position (Grooming) edge of bed sitting  -           User Key  (r) = Recorded By, (t) = Taken By, (c) = Cosigned By    Initials Name Provider Type    Opal Jose OT Occupational Therapist               Obj/Interventions     [facility] Name 03/22/23 1153          Sensory Assessment (Somatosensory)    Sensory Assessment reported no numbness or tingling in BUE  -[facility] Name 03/22/23 1153          Range of Motion Comprehensive    General Range of Motion bilateral upper extremity ROM WFL  -[facility] Name 03/22/23 1153          Strength Comprehensive (MMT)    General Manual Muscle Testing (MMT) Assessment upper extremity strength deficits identified  -     Comment, General Manual Muscle Testing (MMT) Assessment --  BUE 3+/5  -     Row Name 03/22/23 1153          Motor Skills     Motor Skills functional endurance  -     Functional Endurance Fatigued quickly with activity  -     Row Name 03/22/23 1153          Balance    Balance Assessment sitting static balance;sitting dynamic balance  -KA     Static Sitting Balance standby assist  -     Dynamic Sitting Balance contact guard;minimal assist  posterior lean EOB  -KA     Position, Sitting Balance sitting edge of bed  -     Balance Interventions sitting;standing;occupation based/functional task  -           User Key  (r) = Recorded By, (t) = Taken By, (c) = Cosigned By    Initials Name Provider Type    Opal Jose, OT Occupational Therapist               Goals/Plan     Row Name 03/22/23 1223          Transfer Goal 1 (OT)    Activity/Assistive Device (Transfer Goal 1, OT) toilet  -KA     [place] Level/Cues Needed (Transfer Goal 1, OT) minimum assist (75% or more patient effort)  -KA     Time Frame (Transfer Goal 1, OT) short term goal (STG);2 weeks  -KA     Progress/Outcome (Transfer Goal 1, OT) goal ongoing  -     Row Name 03/22/23 1223          Bathing Goal 1 (OT)    Activity/Device (Bathing Goal 1, OT) bathing skills, all  -KA     [place] Level/Cues Needed (Bathing Goal 1, OT) minimum assist (75% or more patient effort)  -KA     Time Frame (Bathing Goal 1, OT) short term goal (STG);2 weeks  -KA     Progress/Outcomes (Bathing Goal 1, OT) goal ongoing  -     Row Name 03/22/23 1223          Toileting Goal 1 (OT)    Activity/Device (Toileting Goal 1, OT) toileting skills, all  -KA     [place] Level/Cues Needed (Toileting Goal 1, OT) moderate assist (50-74% patient effort)  -KA     Time Frame (Toileting Goal 1, OT) short term goal (STG);2 weeks  -KA     Progress/Outcome (Toileting Goal 1, OT) goal ongoing  -     Row Name 03/22/23 1223          Grooming Goal 1 (OT)    Activity/Device (Grooming Goal 1, OT) grooming skills, all  -KA     [place] (Grooming Goal 1, OT) standby assist  -KA     Time Frame  (Grooming Goal 1, OT) short term goal (STG);2 weeks  -ABHINAV     Progress/Outcome (Grooming Goal 1, OT) goal ongoing  -     Row Name 03/22/23 1223          Therapy Assessment/Plan (OT)    Planned Therapy Interventions (OT) activity tolerance training;functional balance retraining;occupation/activity based interventions;ROM/therapeutic exercise;strengthening exercise;transfer/mobility retraining;patient/caregiver education/training;BADL retraining  -           User Key  (r) = Recorded By, (t) = Taken By, (c) = Cosigned By    Initials Name Provider Type    Opal Jose OT Occupational Therapist               Clinical Impression     Row Name 03/22/23 1206          Pain Assessment    Pretreatment Pain Rating 0/10 - no pain  -     Posttreatment Pain Rating 0/10 - no pain  -     Row Name 03/22/23 1206          Plan of Care Review    Plan of Care Reviewed With patient  -     Outcome Evaluation Pt seen for OT afsaneh this AM. Admitted after episode of staring off into space for ~20 minutes, confused unable to speak or follow commands. Per chart pt with hx of mild cognitive impairment. Today pt able to state his name, he was at [facility] and the year. Had difficulty answering PLOF questions. Per chart pt lives with his wife, son and daughter in law in a one level home with no JOHN. Someone is with him 24/7. He has a rwx, wc, shower chair and grab bars. Pt reports he was indep with ADLs prior. Today pt required min-mod A with bed mobility with increased time and cues to complete. Sat EOB with SBA for static sitting balance and CGA-min with dynamic balance. Dep A to don socks, anticipate max A-dep for toileting needs at this time. SBA with simple grooming task. Pt with BUE coordination difficulty when reaching out to touch therapists finger. Performed STS with mod A and use of rwx. Pt with knees flexed and weakness noted. Took a couple side steps towards HOB. Pt presents with decreased cognition, endurance, balance,  ADL performance and functional mobility.  -     Row Name 03/22/23 1206          Therapy Assessment/Plan (OT)    Therapy Frequency (OT) 5 times/wk  -     Row Name 03/22/23 1206          Therapy Plan Review/Discharge Plan (OT)    Anticipated Discharge Disposition (OT) skilled nursing facility;home with 24/7 care;home with home health  SNF vs home with 24/7 care pending on support and assistance at home  -     Row Name 03/22/23 1206          Vital Signs    Pre Patient Position Supine  -KA     Intra Patient Position Standing  -KA     Post Patient Position Supine  -     Row Name 03/22/23 1206          Positioning and Restraints    Pre-Treatment Position in bed  -KA     Post Treatment Position bed  -KA     In Bed supine;call light within reach;encouraged to call for assist;exit alarm on  -KA           User Key  (r) = Recorded By, (t) = Taken By, (c) = Cosigned By    Initials Name Provider Type    Opal Jose OT Occupational Therapist               Outcome Measures     Row Name 03/22/23 1224          How much help from another is currently needed...    Putting on and taking off regular lower body clothing? 1  -KA     Bathing (including washing, rinsing, and drying) 2  -KA     Toileting (which includes using toilet bed pan or urinal) 1  -KA     Putting on and taking off regular upper body clothing 2  -KA     Taking care of personal grooming (such as brushing teeth) 3  -KA     Eating meals 3  -KA     AM-PAC 6 Clicks Score (OT) 12  -KA     Row Name 03/22/23 1224          Functional Assessment    Outcome Measure Options AM-PAC 6 Clicks Daily Activity (OT)  -           User Key  (r) = Recorded By, (t) = Taken By, (c) = Cosigned By    Initials Name Provider Type    Opal Jose OT Occupational Therapist                Occupational Therapy Education     Title: PT OT SLP Therapies (Done)     Topic: Occupational Therapy (Done)     Point: ADL training (Done)     Description:   Instruct learner(s) on proper  safety adaptation and remediation techniques during self care or transfers.   Instruct in proper use of assistive devices.              Learning Progress Summary           Patient Acceptance, E, VU,NR by ABHINAV at 3/22/2023 1225                   Point: Home exercise program (Done)     Description:   Instruct learner(s) on appropriate technique for monitoring, assisting and/or progressing therapeutic exercises/activities.              Learning Progress Summary           Patient Acceptance, E, VU,NR by ABHINAV at 3/22/2023 1225                               User Key     Initials Effective Dates Name Provider Type Discipline    ABHINAV 09/22/22 -  Opal Borja OT Occupational Therapist OT              OT Recommendation and Plan  Planned Therapy Interventions (OT): activity tolerance training, functional balance retraining, occupation/activity based interventions, ROM/therapeutic exercise, strengthening exercise, transfer/mobility retraining, patient/caregiver education/training, BADL retraining  Therapy Frequency (OT): 5 times/wk  Plan of Care Review  Plan of Care Reviewed With: patient  Outcome Evaluation: Pt seen for OT eval this AM. Admitted after episode of staring off into space for ~20 minutes, confused unable to speak or follow commands. Per chart pt with hx of mild cognitive impairment. Today pt able to state his name, he was at [facility] and the year. Had difficulty answering PLOF questions. Per chart pt lives with his wife, son and daughter in law in a one level home with no JOHN. Someone is with him 24/7. He has a rwx, wc, shower chair and grab bars. Pt reports he was indep with ADLs prior. Today pt required min-mod A with bed mobility with increased time and cues to complete. Sat EOB with SBA for static sitting balance and CGA-min with dynamic balance. Dep A to don socks, anticipate max A-dep for toileting needs at this time. SBA with simple grooming task. Pt with BUE coordination difficulty when reaching out to touch  therapists finger. Performed STS with mod A and use of rwx. Pt with knees flexed and weakness noted. Took a couple side steps towards HOB. Pt presents with decreased cognition, endurance, balance, ADL performance and functional mobility.     Time Calculation:    Time Calculation- OT     Row Name 03/22/23 1225             Time Calculation- OT    OT Start Time 1035  -KA      OT Stop Time 1100  -KA      OT Time Calculation (min) 25 min  -KA      Total Timed Code Minutes- OT 15 minute(s)  -KA      OT Received On 03/22/23  -KA      OT - Next Appointment 03/23/23  -KA      OT Goal Re-Cert Due Date 04/05/23  -KA         Timed Charges    57079 - OT Therapeutic Activity Minutes 15  -KA         Untimed Charges    OT Eval/Re-eval Minutes 10  -KA         Total Minutes    Timed Charges Total Minutes 15  -KA      Untimed Charges Total Minutes 10  -KA       Total Minutes 25  -KA            User Key  (r) = Recorded By, (t) = Taken By, (c) = Cosigned By    Initials Name Provider Type    Opal Jose OT Occupational Therapist              Therapy Charges for Today     Code Description Service Date Service Provider Modifiers Qty    [id number]  OT THERAPEUTIC ACT EA 15 MIN 3/22/2023 Opal Borja OT GO 1    [id number] HC OT EVAL MOD COMPLEXITY 2 3/22/2023 Opal Borja OT GO 1               Opal Borja OT  3/22/2023

## 2023-03-22 NOTE — PROGRESS NOTES
DOS: 3/22/2023  NAME: Fritz Flores   : 1933  PCP: Rachid العلي Jr., MD    Chief Complaint   Patient presents with   • Altered Mental Status        Stroke    Subjective: Pt seen in follow up, however the problem is new to me.  Patient sitting up in bed resting comfortably.  He states he thinks he feels okay.  He has had a lot of coughing.  He denies any new weakness, numbness, speech or visual disturbances, or headaches.  No family at bedside.    Objective:  Vital signs:      Vitals:    23 0118 23 0426 23 0600 23 0700   BP: 116/57 125/67  104/65   BP Location:    Left arm   Patient Position:    Lying   Pulse:  66  62   Resp:    18   Temp: 97.6 °F (36.4 °C) 97.6 °F (36.4 °C)  97.8 °F (36.6 °C)   TempSrc: Oral Oral  Oral   SpO2: 92% 96%  97%   Weight:   85.7 kg (189 lb)    Height:           Current Facility-Administered Medications:   •  acetaminophen (TYLENOL) tablet 650 mg, 650 mg, Oral, Q4H PRN, Ana Mares MD  •  aspirin EC tablet 81 mg, 81 mg, Oral, Daily, Ana Mares MD  •  atorvastatin (LIPITOR) tablet 10 mg, 10 mg, Oral, Nightly, Ana Mares MD  •  azithromycin (ZITHROMAX) 500 mg in sodium chloride 0.9 % 250 mL IVPB-VTB, 500 mg, Intravenous, Q24H, Ana Mares MD  •  cefTRIAXone (ROCEPHIN) 1 g in sodium chloride 0.9 % 100 mL IVPB-VTB, 1 g, Intravenous, Q24H, Ana Mares MD  •  docusate sodium (COLACE) capsule 100 mg, 100 mg, Oral, BID, Ana Mares MD  •  donepezil (ARICEPT) tablet 5 mg, 5 mg, Oral, Nightly, Ana Mares MD  •  melatonin tablet 3 mg, 3 mg, Oral, Nightly PRN, Ana Mares MD  •  midodrine (PROAMATINE) tablet 5 mg, 5 mg, Oral, BID PRN, Ana Mares MD  •  ondansetron (ZOFRAN) tablet 4 mg, 4 mg, Oral, Q6H PRN **OR** ondansetron (ZOFRAN) injection 4 mg, 4 mg, Intravenous, Q6H PRN, Ana Mares MD  •  sodium chloride 0.9 % flush 10 mL, 10 mL, Intravenous,  PRN, Miguel Ángel Newberry MD  •  vitamin B-12 (CYANOCOBALAMIN) tablet 1,000 mcg, 1,000 mcg, Oral, Daily, Ana Mares MD    PRN meds  •  acetaminophen  •  melatonin  •  midodrine  •  ondansetron **OR** ondansetron  •  sodium chloride    No current facility-administered medications on file prior to encounter.     Current Outpatient Medications on File Prior to Encounter   Medication Sig   • aspirin 81 MG EC tablet Take 1 tablet by mouth Daily.   • docusate sodium (COLACE) 100 MG capsule Take 1 capsule by mouth 2 (Two) Times a Day.   • donepezil (ARICEPT) 5 MG tablet Take 1 tablet by mouth Every Night.   • potassium chloride 10 MEQ CR tablet Take 2 tablets by mouth Daily.   • simvastatin (ZOCOR) 10 MG tablet Take 1 tablet by mouth Every Night.   • vitamin B-12 (VITAMIN B-12) 1000 MCG tablet Take 1 tablet by mouth Daily.   • acetaminophen (TYLENOL) 325 MG tablet Take 2 tablets by mouth Every 4 (Four) Hours As Needed for Mild Pain .   • furosemide (LASIX) 40 MG tablet Take 1 tablet by mouth Daily for 30 days.   • levothyroxine (SYNTHROID, LEVOTHROID) 25 MCG tablet Take 1 tablet by mouth Every Morning for 30 days.   • midodrine (PROAMATINE) 10 MG tablet Take 0.5 tablets by mouth 2 (Two) Times a Day As Needed (for systolic blood pressure less than 110.) for up to 30 days.   • ondansetron (ZOFRAN) 4 MG tablet Take 1 tablet by mouth Every 6 (Six) Hours As Needed for Nausea or Vomiting. (Patient not taking: Reported on 3/21/2023)       General appearance: Elderly male, NAD, alert and cooperative, well groomed  HEENT: Normocephalic, atraumatic, PERRL, no masses or tenderness  COR: RRR  Resp: Even and unlabored  Extremities: No obvious edema  Skin: warm, dry    Neurological:   MS: oriented x3, recent/remote memory intact, normal attention/concentration, language intact, no neglect, normal fund of knowledge  CN: visual fields full, EOMI, facial sensation equal, no facial dr  oop, hearing symmetric, palate elevates  symmetrically, shoulder shrug equal, tongue midline  Motor: 5/5 upper extremities, 4/5 lower extremities  Sensory: light touch sensation intact in all 4 ext.  Diminished cold temperature sensation of lower extremities  Coordination: Normal finger to nose test bilaterally but performed cautiously  Gait and station: Deferred  Rapid alternating movements: normal finger to thumb tap    Laboratory results:  Lab Results   Component Value Date    TSH 12.800 (H) 03/08/2023     No results found for: HGBA1C  Lab Results   Component Value Date    [id number] 414 01/13/2023     No results found for: CHOL, CHLPL  No results found for: TRIG  No results found for: HDL  No results found for: LDL, LDLDIRECT  Lab Results   Component Value Date    WBC 11.01 (H) 03/22/2023    HGB 10.9 (L) 03/22/2023    HCT 31.5 (L) 03/22/2023    MCV 99.1 (H) 03/22/2023     03/22/2023     Lab Results   Component Value Date    GLUCOSE 83 03/22/2023    BUN 36 (H) 03/22/2023    CREATININE 1.42 (H) 03/22/2023    EGFRIFNONA 73 02/01/2022    BCR 25.4 (H) 03/22/2023    K 4.5 03/22/2023    CO2 23.0 03/22/2023    CALCIUM 8.2 (L) 03/22/2023    ALBUMIN 3.9 03/21/2023    AST 16 03/21/2023    ALT 15 03/21/2023     Lab Results   Component Value Date    PTT 35.1 03/21/2023     Lab Results   Component Value Date    INR 1.07 03/21/2023    INR 1.18 (H) 01/13/2023    INR 1.0 08/13/2022    PROTIME 14.1 03/21/2023    PROTIME 15.2 (H) 01/13/2023    PROTIME 10.6 08/13/2022     Brief Urine Lab Results  (Last result in the past 365 days)      Color   Clarity   Blood   Leuk Est   Nitrite   Protein   CREAT   Urine HCG        03/21/23 1653 Yellow   Clear   Negative   Negative   Negative   Negative                 Review and interpretation of imaging:  XR Chest 1 View    Result Date: 3/21/2023  New area of density in the retrocardiac left lung base may represent atelectasis, aspiration pneumonitis or pneumonia.  This report was finalized on 3/21/2023 5:13 PM by Dr. Lockhart  FIFI Anderson.      CT Angiogram Head w AI Analysis of LVO    Result Date: 3/21/2023  Chronic changes as described. No acute abnormality identified.  This report was finalized on 3/21/2023 5:13 PM by Dr. Richy Anderson M.D.      Results for orders placed during the hospital encounter of 01/12/23    Adult Transthoracic Echo Complete W/ Cont if Necessary Per Protocol    Interpretation Summary  •  Left ventricular systolic function is normal. Left ventricular ejection fraction appears to be 56 - 60%.  •  Estimated right ventricular systolic pressure from tricuspid regurgitation is normal (<35 mmHg).      Impression/Assessment:  This is a 89-year-old male with past medical history of MI, CAD, hyperlipidemia, ulcerative colitis, no formal diagnosis of dementia that I can see in his chart however he is on donepezil 5 mg per his home med rec who presented to the hospital on 3/21/2023 with complaints of a staring spell and decreased responsiveness.  Reportedly this lasted around 20 minutes and then afterwards the patient's family noted that he had some right facial weakness and change in his speech as well as worsening confusion.  He underwent a CT/CTA that revealed less than 50% right ICA stenosis proximally and less than 20% on the left, otherwise no significant LVO or evidence of dissection or hemorrhage.  Since admission he has returned back to his baseline.  He has been found to have left lung pneumonia and was initiated on antibiotics by the primary team.  His white count on arrival was 16.11.  No fevers.  Creatinine was 1.76.  He had a BP of 109/57.  He was recently hospitalized for hypothyroidism, hypotension, and bradycardia.  His last TSH was 12.800 on 3/8.  Blood cultures are pending.    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  He seems to be  better today as far as awareness and alertness.  Able to follow simple and complex commands. Oriented x3.  Hold off on EEG, if has reoccurring symptoms in the absence of infection/metabolic abnormalities then can proceed with obtaining  Chest x-ray showing high suspicion for pneumonia.  He has been initiated on antibiotics.  Likely exacerbated his underlying neurocognitive disorder which I suspect he should improve as his infection is cleared.  He had a lot of coughing on my exam today.  ST recommending n.p.o. and VFSS.  Recommend also checking TSH, recent TSH level on 3/8 was 12.8.  JOSE improving with fluids which could have also exacerbated underlying cog. Disorder.  Continue delirium precautions especially if he has prolonged hospitalization.   We will sign off, will see again per request.    Case discussed with patient and Dr. Montaño, and she agrees with plan above.     LAURA Queen

## 2023-03-22 NOTE — PROGRESS NOTES
Name: Fritz Flores ADMIT: 3/21/2023   : 1933  PCP: Rachid العلي Jr., MD    MRN: [id number] LOS: 1 days   AGE/SEX: 89 y.o. male  ROOM: [facility]     Subjective   Subjective   Awake, alert. Unable to say why he is in the hospital. No family present. Has cough, phlegm    Review of Systems   Constitutional: Negative for fever.   HENT: Negative for congestion.    Respiratory: Positive for cough. Negative for shortness of breath.    Cardiovascular: Negative for chest pain.   Gastrointestinal: Negative for nausea and vomiting.   Genitourinary: Negative for dysuria.   Musculoskeletal: Negative for arthralgias.   Skin: Negative for rash.   Neurological: Negative for headaches.   Psychiatric/Behavioral: Negative for sleep disturbance.        Objective   Objective   Vital Signs  Temp:  [96 °F (35.6 °C)-97.8 °F (36.6 °C)] 97.8 °F (36.6 °C)  Heart Rate:  [59-73] 62  Resp:  [18] 18  BP: (104-150)/(56-77) 104/65  SpO2:  [92 %-98 %] 97 %  on   ;   Device (Oxygen Therapy): room air  Body mass index is 24.94 kg/m².  Physical Exam  Vitals and nursing note reviewed.   Constitutional:       General: He is not in acute distress.     Appearance: He is ill-appearing.   HENT:      Head: Normocephalic.      Mouth/Throat:      Mouth: Mucous membranes are moist.   Eyes:      Conjunctiva/sclera: Conjunctivae normal.   Cardiovascular:      Rate and Rhythm: Normal rate and regular rhythm.   Pulmonary:      Effort: Pulmonary effort is normal. No respiratory distress.      Breath sounds: Examination of the right-lower field reveals decreased breath sounds. Examination of the left-lower field reveals decreased breath sounds. Decreased breath sounds present.      Comments: Productive cough, throat clearing  Abdominal:      General: Bowel sounds are normal.      Palpations: Abdomen is soft.   Musculoskeletal:      Cervical back: Neck supple.      Right lower leg: No edema.      Left lower leg: No edema.   Skin:     General: Skin is  warm and dry.   Neurological:      Mental Status: He is alert. Mental status is at baseline.   Psychiatric:         Mood and Affect: Mood normal.         Behavior: Behavior normal.       Results Review     I reviewed the patient's new clinical results.  Results from last 7 days   Lab Units 03/22/23  0447 03/21/23  1612   WBC 10*3/mm3 11.01* 16.11*   HEMOGLOBIN g/dL 10.9* 12.6*   PLATELETS 10*3/mm3 171 213     Results from last 7 days   Lab Units 03/22/23  0447 03/21/23  1612   SODIUM mmol/L 138 137   POTASSIUM mmol/L 4.5 5.3*   CHLORIDE mmol/L 105 100   CO2 mmol/L 23.0 24.4   BUN mg/dL 36* 42*   CREATININE mg/dL 1.42* 1.76*   GLUCOSE mg/dL 83 93   EGFR mL/min/1.73 47.2* 36.5*     Results from last 7 days   Lab Units 03/21/23  1612   ALBUMIN g/dL 3.9   BILIRUBIN mg/dL 0.4   ALK PHOS U/L 134*   AST (SGOT) U/L 16   ALT (SGPT) U/L 15     Results from last 7 days   Lab Units 03/22/23  0447 03/21/23  1612   CALCIUM mg/dL 8.2* 9.3   ALBUMIN g/dL  --  3.9     Results from last 7 days   Lab Units 03/21/23  1727 03/21/23  1612   PROCALCITONIN ng/mL  --  0.46*   LACTATE mmol/L 1.0  --      Glucose   Date/Time Value Ref Range Status   03/22/2023 1123 100 70 - 130 mg/dL Final     Comment:     Meter: [id number] : [id number] Elise BOWDEN   03/22/2023 0627 88 70 - 130 mg/dL Final     Comment:     Meter: [id number] : [id number] Bess BOWDEN   03/21/2023 2254 145 (H) 70 - 130 mg/dL Final     Comment:     Meter: [id number] : [id number] Bess BOWDEN       XR Chest 1 View    Result Date: 3/21/2023  New area of density in the retrocardiac left lung base may represent atelectasis, aspiration pneumonitis or pneumonia.  This report was finalized on 3/21/2023 5:13 PM by Dr. Richy Anderson M.D.      CT Angiogram Head w AI Analysis of LVO    Result Date: 3/21/2023  Chronic changes as described. No acute abnormality identified.  This report was finalized on 3/21/2023 5:13 PM by Dr. Richy Anderson M.D.      I have  personally reviewed all medications:  Scheduled Medications  aspirin, 81 mg, Oral, Daily  atorvastatin, 10 mg, Oral, Nightly  azithromycin, 500 mg, Intravenous, Q24H  cefTRIAXone, 1 g, Intravenous, Q24H  docusate sodium, 100 mg, Oral, BID  donepezil, 5 mg, Oral, Nightly  [START ON 3/23/2023] levothyroxine, 25 mcg, Oral, Q AM  cyanocobalamin, 1,000 mcg, Oral, Daily    Infusions  dextrose 5 % and sodium chloride 0.9 %, 75 mL/hr    Diet  NPO Diet NPO Type: Strict NPO    I have personally reviewed:  [x]  Laboratory   [x]  Microbiology   [x]  Radiology   [x]  EKG/Telemetry  [x]  Cardiology/Vascular   [x]  Pathology    [x]  Records       Assessment/Plan     Active Hospital Problems    Diagnosis  POA   • **Seizure-like activity (HCC) [R56.9]  Yes   • Pneumonia [J18.9]  Yes   • Stage 3a chronic kidney disease (HCC) [N18.31]  Yes   • Hypothyroidism [E03.9]  Yes   • Mild dementia [F03.A0]  Yes   • Acute kidney injury (HCC) [N17.9]  Yes   • Anemia [D64.9]  Yes   • Essential hypertension [I10]  Yes   • Coronary artery disease [I25.10]  Yes      Resolved Hospital Problems   No resolved problems to display.       89 y.o. male admitted with Seizure-like activity (HCC).    Seizure-like activity/Transient episode of altered awareness:  -Appreciate Neurology assistance. CTA H/N without acute findings; MRI not needed at this time. Check Orthostatics if able. ? Seizure; defer further testing per Neurology    Pneumonia:  -May also be cause for presenting symptoms. Continue antibiotics. WBC trending down. NPO due to poor secretion clearance, SLP following. Check sputum culture and urine antigens for completeness. Follow blood cultures    Mild dementia:  -Likely at baseline. Continue aricept    CKD/JOSE:  -Cr 1.8 on arrival, down to 1.4 which is near baseline. Avoid nephrotoxic meds. IVF's while NPO. Diuretic held for now    HTN/Hx of hypotension  -BP acceptable acutely. Midodrine as needed    CAD:  -Denies chest pain    Anemia:  -Monitor  Hgb, near baseline    Hypothyroidism:  -Newly diagnosed in January. TSH 12.800, FT4 0.96 3/8/23. Continue same dose for now      · SCDs for DVT prophylaxis.  · Limited code (no CPR, no intubation).  · Discussed with patient.  · Anticipate discharge home with family in 2-3 days.      LAURA Ellis  [facility]  03/22/23  12:54 EDT

## 2023-03-23 PROBLEM — R13.12 OROPHARYNGEAL DYSPHAGIA: Status: ACTIVE | Noted: 2021-09-28

## 2023-03-23 NOTE — DISCHARGE PLACEMENT REQUEST
"Fritz Herbert (89 y.o. Male)     Date of Birth   12/21/1933    Social Security Number       Address   [address] [address]    Home Phone   [phone]    MRN   [id number]       [facility]    Marital Status                               Admission Date   3/21/23    Admission Type   Emergency    Admitting Provider   Ana Mares MD    Attending Provider   David Finley MD    Department, Room/Bed   [address], [address]       Discharge Date       Discharge Disposition       Discharge Destination                               Attending Provider: David Finley MD    Allergies: Penicillins    Isolation: None   Infection: None   Code Status: No CPR    Ht: 185.4 cm (73\")   Wt: 84.7 kg (186 lb 11.2 oz)    Admission Cmt: None   Principal Problem: Seizure-like activity (HCC) [R56.9]                 Active Insurance as of 3/21/2023     Primary Coverage     Payor Plan Insurance Group Employer/Plan Group    MEDICARE MEDICARE A & B      Payor Plan Address Payor Plan Phone Number Payor Plan Fax Number Effective Dates    [address] [phone]  12/1/1998 - None Entered    [facility] [id number]       Subscriber Name Subscriber Birth Date Member ID       FRITZ HERBERT 12/21/1933 [id number]           Secondary Coverage     Payor Plan Insurance Group Employer/Plan Group    ANTH BLUE CROSS ANTH BLUE CROSS BLUE [facility] PPO [id number]     Payor Plan Address Payor Plan Phone Number Payor Plan Fax Number Effective Dates    [address] [phone]  7/1/2005 - None Entered    [facility] [id number]       Subscriber Name Subscriber Birth Date Member ID       FRITZ HERBERT 12/21/1933 [id number]                 Emergency Contacts      (Rel.) Home Phone Work Phone Mobile Phone    Deidre Herbert (POA) (Daughter) [phone] -- --    MarkEh (Son) -- -- [phone]              "

## 2023-03-23 NOTE — PLAN OF CARE
Goal Outcome Evaluation:  Plan of Care Reviewed With: patient           Outcome Evaluation: A&O x1, VSS, RA, patient agitated throughout the night soft wrist restraints applied due to patient pulling things off and trying to get out of bed. WIll continue to monitor patient.

## 2023-03-23 NOTE — MBS/VFSS/FEES
Acute Care - Speech Language Pathology   Swallow Initial Evaluation [facility]     Patient Name: Fritz Flores  : 1933  MRN: [id number]  Today's Date: 3/23/2023               Admit Date: 3/21/2023    Visit Dx:     ICD-10-CM ICD-9-CM   1. Seizure-like activity ([facility])  R56.9 780.39   2. Aspiration pneumonia, unspecified aspiration pneumonia type, unspecified laterality, unspecified part of lung ([facility])  J69.0 507.0   3. Sepsis without acute organ dysfunction, due to unspecified organism ([facility])  A41.9 038.9     995.91     Patient Active Problem List   Diagnosis   • Closed intertrochanteric fracture of hip, left, initial encounter ([facility])   • Closed comminuted intertrochanteric fracture of proximal end of left femur ([facility])   • Ulcerative colitis (HCC)   • Coronary artery disease   • Hyponatremia   • Postoperative anemia due to acute blood loss   • Constipation   • Dysphagia   • Age-related physical debility   • Bacteriuria   • Acute urinary tract infection   • COVID-19 virus detected   • DNR (do not resuscitate)   • Dehydration   • Cytokine release syndrome, grade 1   • Multiple falls   • Essential hypertension   • Elevated troponin   • B12 deficiency   • Lower leg DVT (deep venous thromboembolism), chronic, left (HCC)   • Acute kidney injury (HCC)   • Anemia   • Mild dementia   • Hypothermia   • Bradycardia   • Hypothyroidism   • Seizure-like activity (HCC)   • Pneumonia   • Stage 3a chronic kidney disease (HCC)     Past Medical History:   Diagnosis Date   • Colon polyps    • Coronary artery disease    • Disease of thyroid gland    • Hyperlipemia    • Myocardial infarction (HCC)    • Ulcerative colitis (HCC)      Past Surgical History:   Procedure Laterality Date   • CARDIAC SURGERY      CABG X 1   • COLONOSCOPY     • COLONOSCOPY N/A 2016    Procedure: COLONOSCOPY INTO CECUM WITH COLD BIOPSIES;  Surgeon: Aaron Gregg MD;  Location: [facility] ENDOSCOPY;  Service:    • FEMUR IM NAILING/RODDING Left  6/30/2019    Procedure: FEMUR INTRAMEDULLARY NAILING/RODDING;  Surgeon: Jaqueline Wagoner MD;  Location: [facility];  Service: Orthopedics   • FRACTURE SURGERY      upper leg w/hardware   • HIP SURGERY Left     x 3       SLP Recommendation and Plan  SLP Swallowing Diagnosis: severe, oral dysphagia, pharyngeal dysphagia (03/23/23 1200)  SLP Diet Recommendation: NPO, long term alternate methods of nutrition/hydration, ice chips between meals after oral care, with supervision (03/23/23 1200)     SLP Rec. for Method of Medication Administration: meds via alternate route (03/23/23 1200)     Monitor for Signs of Aspiration: yes (03/23/23 1200)     Swallow Criteria for Skilled Therapeutic Interventions Met: demonstrates skilled criteria, other (see comments) (follow for education) (03/23/23 1200)  Anticipated Discharge Disposition (SLP): unknown (03/23/23 1200)  Rehab Potential/Prognosis, Swallowing: re-evaluate goals as necessary (03/23/23 1200)  Therapy Frequency (Swallow): PRN (03/23/23 1200)  Predicted Duration Therapy Intervention (Days): until discharge (03/23/23 1200)                                        Outcome Evaluation: VFSS completed. Pt demonstrated severe oropharyngeal dysphagia. Pt's swallow is impacted by a large cervical osteophyte. Pt is not safe for PO intake, Recommend NPO w/ frequent oral care. Nutrition and meds via an alternative means. ST to follow for education and GOC as needed.      SWALLOW EVALUATION (last 72 hours)     SLP Adult Swallow Evaluation     Row Name 03/23/23 1200 03/22/23 1206                Rehab Evaluation    Document Type evaluation  -AW evaluation  -KA       Subjective Information no complaints  -AW no complaints  -KA       Patient Observations alert;agree to therapy  -AW alert;cooperative  -KA       Patient/Family/Caregiver Comments/Observations Pt needed encouragement to participate in the study.  -AW --       Patient Effort adequate  -AW good  -KA       Symptoms  Noted During/After Treatment none  -AW none  -KA          General Information    Patient Profile Reviewed yes  -AW yes  -KA       Pertinent History Of Current Problem Pt admitted with seizure activity, JOSE, TME, sepsis, and aspiration PNA. Pt has a h/o dysphagia w/ VFSS in 9/2021 recommending fork mashed and HTL via tsp  -AW seizure activity. Chest XRAY=new area of density left lung base may represent atelectasis, aspiration PNA or PNA. VFSS 9/29/21 revealed aspiration of thins and NTL. Forked  mash and HTL via spoon recommended.  -KA       Current Method of Nutrition NPO  -AW NPO  -KA       Precautions/Limitations, Vision WFL;for purposes of eval  -AW WFL;for purposes of eval  -KA       Precautions/Limitations, Hearing hearing impairment, bilaterally  -AW hearing impairment, bilaterally  -KA       Prior Level of Function-Communication cognitive-linguistic impairment  -AW cognitive-linguistic impairment  -KA       Prior Level of Function-Swallowing unknown  -AW other (see comments)  see hx above, pt unable to provide any history regarding if he was compliant with thickening liquids  -KA       Plans/Goals Discussed with patient;agreed upon  -AW patient  -KA       Barriers to Rehab medically complex;previous functional deficit;cognitive status  -AW medically complex;previous functional deficit;cognitive status  -KA       Patient's Goals for Discharge patient did not state  -AW patient did not state  -KA          Pain    Additional Documentation Pain Scale: Numbers Pre/Post-Treatment (Group)  -AW --          Pain Scale: Numbers Pre/Post-Treatment    Pretreatment Pain Rating 0/10 - no pain  -AW --       Posttreatment Pain Rating 0/10 - no pain  -AW --          Oral Motor Structure and Function    Dentition Assessment missing teeth  -AW missing teeth  -KA       Secretion Management wet vocal quality  -AW dried secretions in oral cavity  -KA       Mucosal Quality moist, healthy  -AW dry  -KA       Volitional Swallow --  unable to elicit  -KA       Volitional Cough -- weak  -KA          Oral Musculature and Cranial Nerve Assessment    Oral Motor General Assessment -- generalized oral motor weakness  -KA          General Eating/Swallowing Observations    Respiratory Support Currently in Use room air  -AW room air  -KA       Eating/Swallowing Skills fed by SLP  -AW fed by SLP  -KA       Positioning During Eating upright in bed  -AW upright in bed  -KA       Utensils Used -- spoon  -KA       Consistencies Trialed -- ice chips;honey-thick liquids  -KA          Clinical Swallow Eval    Clinical Swallow Evaluation Summary -- Patient demonstrated baseline coughing prior to any PO. Patient demonstrated consistent congested coughing after 3 ice chip trials and spoonful sip of HTL. Unable to r/o aspiration risks at the bedside, recommend VFSS to further assess swallow. Laryngeal elevation felt slightly reduced/age appropriate upon neck palpation.  -KA          MBS/VFSS    Utensils Used spoon  -AW --       Consistencies Trialed honey-thick liquids  -AW --          MBS/VFSS Interpretation    VFSS Summary VFSS completed with Dr. Johnson. Pt exhibited severe oropharyngeal dysphagia characterized by reduced hyolaryngeal excursion, mistiming/delayed swallow, absent epiglottic deflection impacted by a large osteophyte, and reduced cricopharyngeal opening. Pt given a teaspoon trial of honey thick liquid with spillage to the pyriforms noted, delayed swallow initiation, and aspiration during the swallow and after from residuals. Severe diffuse pharyngeal residuals were noted with pt unable to clear in spite of 4 spontaneous swallows. Pt began significant coughing with wet gurgly vocal quality and aspiration of secretions/residuals noted. Pt expelled barium and secretions. Pt is not safe for PO intake, Recommend NPO w/ frequent oral care. Nutrition and meds via an alternative means. ST to follow for education and GOC as needed.  -AW --          SLP  Communication to Radiology    Summary Statement VFSS completed with Dr. Johnson. Pt exhibited severe oropharyngeal dysphagia characterized by reduced hyolaryngeal excursion, mistiming/delayed swallow, absent epiglottic deflection impacted by a large osteophyte, and reduced cricopharyngeal opening. Pt given a teaspoon trial of honey thick liquid with spillage to the pyriforms noted, delayed swallow initiation, and aspiration during the swallow and after from residuals. Severe diffuse pharyngeal residuals were noted with pt unable to clear in spite of 4 spontaneous swallows. Pt began significant coughing with wet gurgly vocal quality and aspiration of secretions/residuals noted. Pt expelled barium and secretions.  -AW --          SLP Evaluation Clinical Impression    SLP Swallowing Diagnosis severe;oral dysphagia;pharyngeal dysphagia  -AW suspected pharyngeal dysphagia  -KA       Functional Impact risk of aspiration/pneumonia;risk of malnutrition;risk of dehydration  -AW risk of aspiration/pneumonia  -KA       Rehab Potential/Prognosis, Swallowing re-evaluate goals as necessary  -AW adequate, monitor progress closely  -KA       Swallow Criteria for Skilled Therapeutic Interventions Met demonstrates skilled criteria;other (see comments)  follow for education  -AW demonstrates skilled criteria  -KA          Recommendations    Therapy Frequency (Swallow) PRN  -AW PRN  -KA       Predicted Duration Therapy Intervention (Days) until discharge  -AW until discharge  -KA       SLP Diet Recommendation NPO;long term alternate methods of nutrition/hydration;ice chips between meals after oral care, with supervision  -AW NPO  -KA       Recommended Diagnostics -- VFSS ([facility])  -KA       Oral Care Recommendations Oral Care BID/PRN  -AW Oral Care BID/PRN  -KA       SLP Rec. for Method of Medication Administration meds via alternate route  -AW meds via alternate route  -KA       Monitor for Signs of Aspiration yes  -AW yes;notify SLP if  any concerns  -KA       Anticipated Discharge Disposition (SLP) unknown  -AW unknown  -KA          (STG) Swallow 1    (STG) Swallow 1 Pt and family will verbalize understanding of pt's swallow and risks of aspiraiton.  -AW --       Time Frame (Swallow Short Term Goal 1) by discharge  -AW --             User Key  (r) = Recorded By, (t) = Taken By, (c) = Cosigned By    Initials Name Effective Dates    Bernard Osullivan MA,CCC-SLP 06/02/22 -     Michelle Diehl MS CCC-SLP 06/16/21 -                 EDUCATION  The patient has been educated in the following areas:   Dysphagia (Swallowing Impairment) Oral Care/Hydration NPO rationale.        SLP GOALS     Row Name 03/23/23 1200             (STG) Swallow 1    (STG) Swallow 1 Pt and family will verbalize understanding of pt's swallow and risks of aspiraiton.  -AW      Time Frame (Swallow Short Term Goal 1) by discharge  -AW            User Key  (r) = Recorded By, (t) = Taken By, (c) = Cosigned By    Initials Name Provider Type    Michelle Diehl MS CCC-SLP Speech and Language Pathologist                   Time Calculation:    Time Calculation- SLP     Row Name 03/23/23 1349             Time Calculation- SLP    SLP Start Time 1215  -AW      SLP Received On 03/23/23  -AW            User Key  (r) = Recorded By, (t) = Taken By, (c) = Cosigned By    Initials Name Provider Type    Michelle Diehl MS CCC-SLP Speech and Language Pathologist                Therapy Charges for Today     Code Description Service Date Service Provider Modifiers Qty    [id number] HC ST MOTION FLUORO EVAL SWALLOW 3 3/23/2023 Michelle Gee MS CCC-SLP GN 1               Michelle Gee MS CCC-SLP  3/23/2023

## 2023-03-23 NOTE — NURSING NOTE
Patient very agitated, pulling brief and purewick, removed heart monitor and tossed it across the room and also pulled IV out. Called placed to [facility] for soft wrist restraints Spoke with Patti Mejía whom put the order in.    Placed call to Daughter to inform her of the restraints.

## 2023-03-23 NOTE — PLAN OF CARE
Goal Outcome Evaluation:  Plan of Care Reviewed With: patient           Outcome Evaluation: 89yo white male admitted 3/21/23 with sz like symptoms and altered mental status, now with pna. PMH includes hbp, anemia, dementia, CKD 3a, femur fx, weakness and hx falls. PLOF: Pt lives at home with wife and son and used a wx or w/c for mobility. He is limited now by generalized weakness and decreased activity tolerance as well as decreased mental status. Today, he was reseting in bed in NAD, increased time required to respond to questions or direction. He req min A for bed mobility and min A Of 2 to stand from EOB with r wx. Pt amb 30' with r wx and min A of 2 - slow paace, flexed posture, unsteady but 0 LOB, decreased endurance limits further amb distance. Pt fatigued quickly adn returned to bed - he sat impulsively before it was safe to do so. He would benefit from skilled PT services to address functional deficits and prepare for d/c.

## 2023-03-23 NOTE — THERAPY EVALUATION
Patient Name: Fritz Flores  : 1933    MRN: [id number]                              Today's Date: 3/23/2023       Admit Date: 3/21/2023    Visit Dx:     ICD-10-CM ICD-9-CM   1. Seizure-like activity (Formerly [facility])  R56.9 780.39   2. Aspiration pneumonia, unspecified aspiration pneumonia type, unspecified laterality, unspecified part of lung (Formerly [facility])  J69.0 507.0   3. Sepsis without acute organ dysfunction, due to unspecified organism (Formerly [facility])  A41.9 038.9     995.91     Patient Active Problem List   Diagnosis   • Closed intertrochanteric fracture of hip, left, initial encounter (Formerly [facility])   • Closed comminuted intertrochanteric fracture of proximal end of left femur (Formerly [facility])   • Ulcerative colitis (HCC)   • Coronary artery disease   • Hyponatremia   • Postoperative anemia due to acute blood loss   • Constipation   • Dysphagia   • Age-related physical debility   • Bacteriuria   • Acute urinary tract infection   • COVID-19 virus detected   • DNR (do not resuscitate)   • Dehydration   • Cytokine release syndrome, grade 1   • Multiple falls   • Essential hypertension   • Elevated troponin   • B12 deficiency   • Lower leg DVT (deep venous thromboembolism), chronic, left (HCC)   • Acute kidney injury (HCC)   • Anemia   • Mild dementia   • Hypothermia   • Bradycardia   • Hypothyroidism   • Seizure-like activity (HCC)   • Pneumonia   • Stage 3a chronic kidney disease (HCC)     Past Medical History:   Diagnosis Date   • Colon polyps    • Coronary artery disease    • Disease of thyroid gland    • Hyperlipemia    • Myocardial infarction (HCC)    • Ulcerative colitis (HCC)      Past Surgical History:   Procedure Laterality Date   • CARDIAC SURGERY      CABG X 1   • COLONOSCOPY     • COLONOSCOPY N/A 2016    Procedure: COLONOSCOPY INTO CECUM WITH COLD BIOPSIES;  Surgeon: Aaron Gregg MD;  Location: [facility] ENDOSCOPY;  Service:    • FEMUR IM NAILING/RODDING Left 2019    Procedure: FEMUR INTRAMEDULLARY NAILING/RODDING;   Surgeon: Jaqueline Wagoner MD;  Location: [facility] MAIN OR;  Service: Orthopedics   • FRACTURE SURGERY      upper leg w/hardware   • HIP SURGERY Left     x 3      General Information     Row Name 03/23/23 1406          Physical Therapy Time and Intention    Document Type evaluation  -DJ     Mode of Treatment individual therapy;physical therapy  -DJ     Row Name 03/23/23 1406          General Information    Patient Profile Reviewed yes  -DJ     Prior Level of Function min assist:;bathing  -DJ     Existing Precautions/Restrictions fall  -DJ     Barriers to Rehab medically complex;previous functional deficit;cognitive status  -DJ     Row Name 03/23/23 1406          Living Environment    People in Home spouse  -DJ     Row Name 03/23/23 1406          Cognition    Orientation Status (Cognition) oriented to;person;place;unable/difficult to assess;other (see comments)  pt slow to respond  -DJ     Row Name 03/23/23 1406          Safety Issues, Functional Mobility    Safety Issues Affecting Function (Mobility) impulsivity;ability to follow commands;judgment;safety precaution awareness  -DJ     Impairments Affecting Function (Mobility) balance;cognition;endurance/activity tolerance;strength  -DJ     Cognitive Impairments, Mobility Safety/Performance judgment;safety precaution awareness;problem-solving/reasoning;sequencing abilities  -DJ     Comment, Safety Issues/Impairments (Mobility) nonskid socks, gt belt  -DJ           User Key  (r) = Recorded By, (t) = Taken By, (c) = Cosigned By    Initials Name Provider Type    Daniela Moreno, PT Physical Therapist               Mobility     Row Name 03/23/23 1408          Bed Mobility    Bed Mobility supine-sit;sit-supine  -DJ     Supine-Sit [place] (Bed Mobility) minimum assist (75% patient effort);verbal cues  -DJ     Sit-Supine [place] (Bed Mobility) moderate assist (50% patient effort);2 person assist;verbal cues  -DJ     Assistive Device (Bed Mobility) bed  rails;head of bed elevated;draw sheet  -DJ     Comment, (Bed Mobility) vc for sequencing, increased time to respond  -DJ     Row Name 03/23/23 1408          Transfers    Comment, (Transfers) sit/stand from EOB  -DJ     Row Name 03/23/23 1408          Bed-Chair Transfer    Bed-Chair [place] (Transfers) not tested  -DJ     Row Name 03/23/23 1408          Sit-Stand Transfer    Sit-Stand [place] (Transfers) minimum assist (75% patient effort);2 person assist;verbal cues  -DJ     Assistive Device (Sit-Stand Transfers) walker, front-wheeled  -DJ     Row Name 03/23/23 1408          Gait/Stairs (Locomotion)    [place] Level (Gait) minimum assist (75% patient effort);2 person assist;verbal cues  -DJ     Assistive Device (Gait) walker, front-wheeled  -DJ     Distance in Feet (Gait) 30'  -DJ     Deviations/Abnormal Patterns (Gait) courtney decreased;festinating/shuffling;gait speed decreased;stride length decreased  -DJ     Bilateral Gait Deviations forward flexed posture  -DJ     [place] Level (Stairs) not tested  -DJ     Comment, (Gait/Stairs) Pt amb 30' with r wx and min A of 2 - slow paace, flexed posture, unsteady but 0 LOB, decreased endurance limits further amb distance.  -DJ           User Key  (r) = Recorded By, (t) = Taken By, (c) = Cosigned By    Initials Name Provider Type    Daniela Moreno, PT Physical Therapist               Obj/Interventions     Row Name 03/23/23 1411          Range of Motion Comprehensive    Comment, General Range of Motion Pt lacking B TKE, unable to fully extend knees in sitting or standing  -DJ     Row Name 03/23/23 1411          Strength Comprehensive (MMT)    Comment, General Manual Muscle Testing (MMT) Assessment moves all 4s, generally weak  -DJ     Row Name 03/23/23 1411          Motor Skills    Motor Skills functional endurance  -DJ     Functional Endurance poor  -DJ     Row Name 03/23/23 1411          Balance    Balance Assessment standing static balance;standing  dynamic balance  -DJ     Static Standing Balance contact guard;minimal assist;verbal cues  -DJ     Dynamic Standing Balance minimal assist;2-person assist;verbal cues  -DJ     Position/Device Used, Standing Balance walker, front-wheeled;supported  -DJ     Balance Interventions sitting;standing;sit to stand;supported;weight shifting activity  -DJ     Comment, Balance unsteady but 0 LOB  -DJ     Row Name 03/23/23 1411          Sensory Assessment (Somatosensory)    Sensory Assessment (Somatosensory) not tested  -DJ           User Key  (r) = Recorded By, (t) = Taken By, (c) = Cosigned By    Initials Name Provider Type    Daniela Moreno, PT Physical Therapist               Goals/Plan     Row Name 03/23/23 1417          Bed Mobility Goal 1 (PT)    Activity/Assistive Device (Bed Mobility Goal 1, PT) sit to supine;supine to sit  -DJ     [place] Level/Cues Needed (Bed Mobility Goal 1, PT) standby assist;verbal cues required  -DJ     Time Frame (Bed Mobility Goal 1, PT) 10 days  -DJ     Row Name 03/23/23 1417          Transfer Goal 1 (PT)    Activity/Assistive Device (Transfer Goal 1, PT) sit-to-stand/stand-to-sit  -DJ     [place] Level/Cues Needed (Transfer Goal 1, PT) verbal cues required;standby assist  -DJ     Time Frame (Transfer Goal 1, PT) 10 days  -DJ     Row Name 03/23/23 1417          Gait Training Goal 1 (PT)    Activity/Assistive Device (Gait Training Goal 1, PT) gait (walking locomotion);assistive device use;improve balance and speed;increase endurance/gait distance;increase energy conservation;walker, rolling  -DJ     [place] Level (Gait Training Goal 1, PT) standby assist  -DJ     Distance (Gait Training Goal 1, PT) 150'  -DJ     Time Frame (Gait Training Goal 1, PT) 10 days  -DJ     Row Name 03/23/23 1417          Therapy Assessment/Plan (PT)    Planned Therapy Interventions (PT) balance training;bed mobility training;gait training;home exercise program;strengthening;postural  re-education;patient/family education;transfer training  -DJ           User Key  (r) = Recorded By, (t) = Taken By, (c) = Cosigned By    Initials Name Provider Type    Daniela Moreno, PT Physical Therapist               Clinical Impression     Row Name 03/23/23 1412          Pain    Pretreatment Pain Rating 0/10 - no pain  -DJ     Row Name 03/23/23 1412          Plan of Care Review    Plan of Care Reviewed With patient  -DJ     Outcome Evaluation 88yo white male admitted 3/21/23 with sz like symptoms and altered mental status, now with pna. PMH includes hbp, anemia, dementia, CKD 3a, femur fx, weakness and hx falls. PLOF: Pt lives at home with wife and son and used a wx or w/c for mobility. He is limited now by generalized weakness and decreased activity tolerance as well as decreased mental status. Today, he was reseting in bed in NAD, increased time required to respond to questions or direction. He req min A for bed mobility and min A Of 2 to stand from EOB with r wx. Pt amb 30' with r wx and min A of 2 - slow paace, flexed posture, unsteady but 0 LOB, decreased endurance limits further amb distance. Pt fatigued quickly adn returned to bed - he sat impulsively before it was safe to do so. He would benefit from skilled PT services to address functional deficits and prepare for d/c.  -DJ     Row Name 03/23/23 1412          Therapy Assessment/Plan (PT)    Rehab Potential (PT) fair, will monitor progress closely  -DJ     Criteria for Skilled Interventions Met (PT) yes;meets criteria;skilled treatment is necessary  -DJ     Therapy Frequency (PT) 5 times/wk  -DJ     Row Name 03/23/23 1412          Vital Signs    O2 Delivery Pre Treatment room air  -DJ     O2 Delivery Intra Treatment room air  -DJ     O2 Delivery Post Treatment room air  -DJ     Pre Patient Position Supine  -DJ     Intra Patient Position Standing  -DJ     Post Patient Position Supine  -DJ     Row Name 03/23/23 1412          Positioning and Restraints     Pre-Treatment Position in bed  -DJ     Post Treatment Position bed  -DJ     In Bed notified nsg;supine;call light within reach;encouraged to call for assist;exit alarm on  -DJ           User Key  (r) = Recorded By, (t) = Taken By, (c) = Cosigned By    Initials Name Provider Type    Daniela Moreno, СВЕТЛАНА Physical Therapist               Outcome Measures     Row Name 03/23/23 1418          How much help from another person do you currently need...    Turning from your back to your side while in flat bed without using bedrails? 3  -DJ     Moving from lying on back to sitting on the side of a flat bed without bedrails? 3  -DJ     Moving to and from a bed to a chair (including a wheelchair)? 3  -DJ     Standing up from a chair using your arms (e.g., wheelchair, bedside chair)? 3  -DJ     Climbing 3-5 steps with a railing? 2  -DJ     To walk in hospital room? 2  -DJ     AM-PAC 6 Clicks Score (PT) 16  -DJ     Highest level of mobility 5 --> Static standing  -DJ     Row Name 03/23/23 1418          Functional Assessment    Outcome Measure Options AM-PAC 6 Clicks Basic Mobility (PT)  -DJ           User Key  (r) = Recorded By, (t) = Taken By, (c) = Cosigned By    Initials Name Provider Type    Daniela Moreno PT Physical Therapist                             Physical Therapy Education     Title: PT OT SLP Therapies (In Progress)     Topic: Physical Therapy (In Progress)     Point: Mobility training (In Progress)     Learning Progress Summary           Patient Acceptance, E, NR by XOCHITL at 3/23/2023 1418                   Point: Home exercise program (Not Started)     Learner Progress:  Not documented in this visit.          Point: Body mechanics (In Progress)     Learning Progress Summary           Patient Acceptance, E, NR by XOCHITL at 3/23/2023 1418                   Point: Precautions (In Progress)     Learning Progress Summary           Patient Acceptance, E, NR by XOCHITL at 3/23/2023 1418                               User Key      Initials Effective Dates Name Provider Type Discipline    DJ 10/25/19 -  Daniela Moreno PT Physical Therapist PT              PT Recommendation and Plan  Planned Therapy Interventions (PT): balance training, bed mobility training, gait training, home exercise program, strengthening, postural re-education, patient/family education, transfer training  Plan of Care Reviewed With: patient  Outcome Evaluation: 89yo white male admitted 3/21/23 with sz like symptoms and altered mental status, now with pna. PMH includes hbp, anemia, dementia, CKD 3a, femur fx, weakness and hx falls. PLOF: Pt lives at home with wife and son and used a wx or w/c for mobility. He is limited now by generalized weakness and decreased activity tolerance as well as decreased mental status. Today, he was reseting in bed in NAD, increased time required to respond to questions or direction. He req min A for bed mobility and min A Of 2 to stand from EOB with r wx. Pt amb 30' with r wx and min A of 2 - slow paace, flexed posture, unsteady but 0 LOB, decreased endurance limits further amb distance. Pt fatigued quickly adn returned to bed - he sat impulsively before it was safe to do so. He would benefit from skilled PT services to address functional deficits and prepare for d/c.     Time Calculation:    PT Charges     Row Name 03/23/23 1419             Time Calculation    Start Time 1005  -DJ      Stop Time 1029  -DJ      Time Calculation (min) 24 min  -DJ      PT Non-Billable Time (min) 10 min  -DJ      PT Received On 03/23/23  -DJ      PT - Next Appointment 03/24/23  -DJ      PT Goal Re-Cert Due Date 04/02/23  -DJ            User Key  (r) = Recorded By, (t) = Taken By, (c) = Cosigned By    Initials Name Provider Type    Daniela Moreno PT Physical Therapist              Therapy Charges for Today     Code Description Service Date Service Provider Modifiers Qty    36450710796 HC PT EVAL MOD COMPLEXITY 2 3/23/2023 Daniela Moreno PT GP 1    [id number]   PT THERAPEUTIC ACT EA 15 MIN 3/23/2023 Daniela Moreno, PT GP 2    70914984489 HC PT THER SUPP EA 15 MIN 3/23/2023 Daniela Moreno, PT GP 2          PT G-Codes  Outcome Measure Options: AM-PAC 6 Clicks Basic Mobility (PT)  AM-PAC 6 Clicks Score (PT): 16  AM-PAC 6 Clicks Score (OT): 12  Modified Darryl Scale: 4 - Moderately severe disability.  Unable to walk without assistance, and unable to attend to own bodily needs without assistance.  PT Discharge Summary  Anticipated Discharge Disposition (PT): skilled nursing facility    Dainela Moreno PT  3/23/2023

## 2023-03-23 NOTE — PLAN OF CARE
Goal Outcome Evaluation:              Outcome Evaluation: VFSS completed. Pt demonstrated severe oropharyngeal dysphagia. Pt's swallow is impacted by a large cervical osteophyte. Pt is not safe for PO intake, Recommend NPO w/ frequent oral care. Nutrition and meds via an alternative means. ST to follow for education and GOC as needed.

## 2023-03-23 NOTE — CONSULTS
Visited with patient who seemed to have trouble verbalizing but understood purpose of visit and was appreciative.

## 2023-03-23 NOTE — PROGRESS NOTES
Name: Fritz Flores ADMIT: 3/21/2023   : 1933  PCP: Rachid العلي Jr., MD    MRN: [id number] LOS: 2 days   AGE/SEX: 89 y.o. male  ROOM: [facility]     Subjective   Subjective   Asking to go home. Became restless/agitated yesterday requiring soft wrist restraints as he was attempting to get out of bed. Easily redirectable on exam. Voices understanding of why he is in the hospital and he is not ready to be discharged yet. Not agitated. Plan VFSS today. Denies pain, soa    Review of Systems   Unable to perform ROS: Dementia        Objective   Objective   Vital Signs  Temp:  [97.6 °F (36.4 °C)-98.6 °F (37 °C)] 97.9 °F (36.6 °C)  Heart Rate:  [58-72] 69  Resp:  [14-16] 14  BP: (100-141)/(54-68) 138/65  SpO2:  [94 %-98 %] 96 %  on  Flow (L/min):  [2] 2;   Device (Oxygen Therapy): room air  Body mass index is 24.63 kg/m².  Physical Exam  Vitals and nursing note reviewed.   Constitutional:       General: He is not in acute distress.     Appearance: He is ill-appearing.   HENT:      Head: Normocephalic.      Mouth/Throat:      Mouth: Mucous membranes are moist.   Eyes:      Conjunctiva/sclera: Conjunctivae normal.   Cardiovascular:      Rate and Rhythm: Normal rate and regular rhythm.   Pulmonary:      Effort: Pulmonary effort is normal. No respiratory distress.      Breath sounds: No wheezing or rales.   Abdominal:      General: Bowel sounds are normal.      Palpations: Abdomen is soft.   Musculoskeletal:      Cervical back: Neck supple.      Right lower leg: Edema present.      Left lower leg: Edema present.   Skin:     General: Skin is warm and dry.   Neurological:      Mental Status: He is alert.      Comments: Oriented to self, place, situation   Psychiatric:         Mood and Affect: Mood normal.         Behavior: Behavior normal.       Results Review     I reviewed the patient's new clinical results.  Results from last 7 days   Lab Units 23  0538 23  0447 23  1612   WBC 10*3/mm3 7.47  11.01* 16.11*   HEMOGLOBIN g/dL 10.3* 10.9* 12.6*   PLATELETS 10*3/mm3 186 171 213     Results from last 7 days   Lab Units 03/23/23  0538 03/22/23  0447 03/21/23  1612   SODIUM mmol/L 142 138 137   POTASSIUM mmol/L 4.3 4.5 5.3*   CHLORIDE mmol/L 110* 105 100   CO2 mmol/L 22.0 23.0 24.4   BUN mg/dL 26* 36* 42*   CREATININE mg/dL 1.25 1.42* 1.76*   GLUCOSE mg/dL 100* 83 93   EGFR mL/min/1.73 55.0* 47.2* 36.5*     Results from last 7 days   Lab Units 03/21/23  1612   ALBUMIN g/dL 3.9   BILIRUBIN mg/dL 0.4   ALK PHOS U/L 134*   AST (SGOT) U/L 16   ALT (SGPT) U/L 15     Results from last 7 days   Lab Units 03/23/23  0538 03/22/23  0447 03/21/23  1612   CALCIUM mg/dL 8.3* 8.2* 9.3   ALBUMIN g/dL  --   --  3.9     Results from last 7 days   Lab Units 03/21/23  1727 03/21/23  1612   PROCALCITONIN ng/mL  --  0.46*   LACTATE mmol/L 1.0  --      Glucose   Date/Time Value Ref Range Status   03/23/2023 1058 96 70 - 130 mg/dL Final     Comment:     Meter: [id number] : [id number] Fletcher Luu NA   03/23/2023 0611 108 70 - 130 mg/dL Final     Comment:     Meter: [id number] : [id number] Michelle Blancaite NA   03/22/2023 2104 101 70 - 130 mg/dL Final     Comment:     Meter: [id number] : [id number] Marciointdelisame Sibite NA   03/22/2023 1628 120 70 - 130 mg/dL Final     Comment:     Meter: [id number] : [id number] Elise Kemi NA   03/22/2023 1123 100 70 - 130 mg/dL Final     Comment:     Meter: [id number] : [id number] Lichtefeld Kemi NA   03/22/2023 0627 88 70 - 130 mg/dL Final     Comment:     Meter: [id number] : [id number] Bess Soto NA   03/21/2023 2254 145 (H) 70 - 130 mg/dL Final     Comment:     Meter: [id number] : [id number] Bess BOWDEN       CT Angiogram Neck    Result Date: 3/21/2023  Chronic changes as described. No acute abnormality identified.  This report was finalized on 3/21/2023 5:13 PM by Dr. Richy Anderson M.D.      XR Chest 1 View    Result Date: 3/21/2023  New area of density  in the retrocardiac left lung base may represent atelectasis, aspiration pneumonitis or pneumonia.  This report was finalized on 3/21/2023 5:13 PM by Dr. Richy Anderson M.D.      CT Angiogram Head w AI Analysis of LVO    Result Date: 3/21/2023  Chronic changes as described. No acute abnormality identified.  This report was finalized on 3/21/2023 5:13 PM by Dr. Richy Anderson M.D.      I have personally reviewed all medications:  Scheduled Medications  aspirin, 81 mg, Oral, Daily   Or  aspirin, 300 mg, Rectal, Daily  atorvastatin, 10 mg, Oral, Nightly  azithromycin, 500 mg, Intravenous, Q24H  cefTRIAXone, 1 g, Intravenous, Q24H  docusate sodium, 100 mg, Oral, BID  donepezil, 5 mg, Oral, Nightly  levothyroxine, 25 mcg, Oral, Q AM  cyanocobalamin, 1,000 mcg, Oral, Daily    Infusions  dextrose 5 % and sodium chloride 0.9 %, 75 mL/hr, Last Rate: 75 mL/hr (03/22/23 1327)    Diet  NPO Diet NPO Type: Strict NPO    I have personally reviewed:  [x]  Laboratory   [x]  Microbiology   [x]  Radiology   [x]  EKG/Telemetry  [x]  Cardiology/Vascular   [x]  Pathology    [x]  Records       Assessment/Plan     Active Hospital Problems    Diagnosis  POA   • **Seizure-like activity (HCC) [R56.9]  Yes   • Pneumonia [J18.9]  Yes   • Stage 3a chronic kidney disease (HCC) [N18.31]  Yes   • Hypothyroidism [E03.9]  Yes   • Mild dementia [F03.A0]  Yes   • Acute kidney injury (HCC) [N17.9]  Yes   • Anemia [D64.9]  Yes   • Essential hypertension [I10]  Yes   • Coronary artery disease [I25.10]  Yes      Resolved Hospital Problems   No resolved problems to display.       89 y.o. male admitted with Seizure-like activity (HCC).    Toxic metabolic encephalopathy:  -Appreciate Neurology assistance. CTA H/N without acute findings; MRI not needed at this time. Check Orthostatics if able. EEG not indicated at this time     Pneumonia:  -Likely cause for presenting symptoms. Continue antibiotics. WBC trending down. NPO due to poor secretion clearance, SLP  following. VFSS today. Urine antigens negative. BC NGTD     Mild dementia:  -Likely at baseline. Restless/agitated overnight. Able to redirect today. Will offer zyprexa if needed     CKD/JOSE:  -Cr 1.8 on arrival, now normalized. Avoid nephrotoxic meds. IVF's while NPO, decrease rate. Diuretic held for now     HTN/Hx of hypotension  -BP acceptable acutely. Midodrine as needed     CAD:  -Denies chest pain     Anemia:  -Monitor Hgb, near baseline     Hypothyroidism:  -Newly diagnosed in January. TSH 8, down from 12 on 3/8/23. Check FT4 . Continue same dose for now     · SCDs for DVT prophylaxis.  · Limited code (no CPR, no intubation).  · Discussed with patient and nursing staff.  · Anticipate discharge home with family timing yet to be determined.    Addendum: Returned to room to discuss VFSS results with family and patient. Noted severe oropharyngeal dysphagia, swallow impacted by large cervical osteophyte. Not safe for po intake. D/W Daughter Pat via telephone. Patient voices that he does not want a feeding tube. Daughter is hopeful that SLP will continue to work with him and eventually be safe for a modified diet of some kind. Asking for Palliative Care to have further discussion. Will also order sitter to avoid restraints if pt should get restless/agitated again. He was again redirectable and cooperative for me. RN reports he had good response w/low dose Zyprexa earlier today, will order PRN dosing.     LAURA Ellis  [facility]  03/23/23  12:10 EDT

## 2023-03-24 NOTE — PROGRESS NOTES
"Nutrition Services    Patient Name:  Fritz Flores  YOB: 1933  MRN: [id number]  Admit Date:  3/21/2023    FOLLOW UP - CLINICAL NUTRITION    Assessment Date:  03/24/23    Encounter Information         Reason for Encounter RD f/u.    Current Issues Pt disoriented/confused. He remains NPO since admission 3/21, per video swallow with SLP on 3/23 pt is not safe for PO. APRN notes state pt does not want a feeding tube. Palliative care has been consulted to discuss GOC. RD following.      Current Nutrition Orders & Evaluation of Intake       Oral Nutrition     Current PO Diet NPO Diet NPO Type: Strict NPO   Supplement n/a   PO Evaluation     % PO Intake     # of Days Evaluated     Factors Affecting Intake  swallow impairment   --  Anthropometrics          Height    Weight Height: 185.4 cm (73\")  Weight: 83.5 kg (184 lb 1.6 oz) (03/24/23 0500)    BMI kg/m2 Body mass index is 24.29 kg/m².    Weight trend Loss -5# since admit     Labs        Pertinent Labs Reviewed, listed below     Results from last 7 days   Lab Units 03/24/23  1019 03/23/23  0538 03/22/23  0447 03/21/23  1612   SODIUM mmol/L 143 142 138 137   POTASSIUM mmol/L 5.4* 4.3 4.5 5.3*   CHLORIDE mmol/L 115* 110* 105 100   CO2 mmol/L 19.0* 22.0 23.0 24.4   BUN mg/dL 20 26* 36* 42*   CREATININE mg/dL 1.08 1.25 1.42* 1.76*   CALCIUM mg/dL 8.3* 8.3* 8.2* 9.3   BILIRUBIN mg/dL  --   --   --  0.4   ALK PHOS U/L  --   --   --  134*   ALT (SGPT) U/L  --   --   --  15   AST (SGOT) U/L  --   --   --  16   GLUCOSE mg/dL 115* 100* 83 93     Results from last 7 days   Lab Units 03/24/23  1019 03/22/23  0447 03/21/23  1612   HEMOGLOBIN g/dL 10.9*   < > 12.6*   HEMATOCRIT % 31.6*   < > 35.0*   WBC 10*3/mm3 9.21   < > 16.11*   ALBUMIN g/dL  --   --  3.9    < > = values in this interval not displayed.     Results from last 7 days   Lab Units 03/24/23  1019 03/23/23  0538 03/22/23  0447 03/21/23  1612   INR   --   --   --  1.07   APTT seconds  --   --   --  35.1 "   PLATELETS 10*3/mm3 211 186 171 213     COVID19   Date Value Ref Range Status   03/21/2023 Not Detected Not Detected - Ref. Range Final     No results found for: HGBA1C       Medications            Scheduled Medications aspirin, 81 mg, Oral, Daily   Or  aspirin, 300 mg, Rectal, Daily  atorvastatin, 10 mg, Oral, Nightly  cefTRIAXone, 1 g, Intravenous, Q24H  docusate sodium, 100 mg, Oral, BID  donepezil, 5 mg, Oral, Nightly  levothyroxine, 25 mcg, Oral, Q AM  cyanocobalamin, 1,000 mcg, Oral, Daily        Infusions dextrose 5 % and sodium chloride 0.9 %, 75 mL/hr, Last Rate: 75 mL/hr (03/24/23 0912)        PRN Medications •  acetaminophen  •  melatonin  •  midodrine  •  OLANZapine  •  ondansetron **OR** ondansetron  •  sodium chloride     Physical Findings          Physical Appearance alert, confused, disoriented   Oral/Mouth Cavity dentures   Edema  lower extremity , 3+ (moderate)   Gastrointestinal last bowel movement:3/20   Skin  bruising   Tubes/Drains none   NFPE Not applicable at this time   --  NUTRITION INTERVENTION / PLAN OF CARE  Intervention Goal         Intervention Goal(s) Initiate feeding/diet and Maintain weight     Nutrition Intervention         RD Action Await begin PO diet and Follow Tx Progress     Nutrition Prescription         Diet Prescription     Supplement Prescription n/a   EN/PN Prescription    New Prescription Ordered? No changes at this time   --  Monitor/Evaluation        Monitor Per protocol, Weight, Swallow function   Discharge Needs No discharge needs identified at this time, Pending clinical course   Education Education not appropriate at this time   --    RD to follow up per protocol.    Electronically signed by:  Kenna Iyer RD  03/24/23 12:38 EDT

## 2023-03-24 NOTE — NURSING NOTE
Spoke to oncall hospitalist regarding NIH change and bradycardia.    Orders received:  CT head stat  Reconsult neuro    As requested by radiologist I have notified both the hopistalist on call and neurology service regarding pt's CT results.

## 2023-03-24 NOTE — PROGRESS NOTES
Name: Fritz Flores ADMIT: 3/21/2023   : 1933  PCP: Rachid العلي Jr., MD    MRN: [id number] LOS: 3 days   AGE/SEX: 89 y.o. male  ROOM: [facility]     Subjective   Subjective   Not as verbal as previous days. Follows simple commands. More weak. Son was present when pt seen, agrees with Palliative care conversation as discussed with daughter Deidre (POA) yesterday.     Review of Systems   Unable to perform ROS: Other        Objective   Objective   Vital Signs  Temp:  [97 °F (36.1 °C)-98.1 °F (36.7 °C)] 98 °F (36.7 °C)  Heart Rate:  [49-60] 49  Resp:  [16-22] 18  BP: (144-172)/() 161/67  SpO2:  [94 %-100 %] 94 %  on   ;   Device (Oxygen Therapy): room air  Body mass index is 24.29 kg/m².  Physical Exam  Vitals and nursing note reviewed.   Constitutional:       General: He is not in acute distress.     Appearance: He is ill-appearing.   HENT:      Head: Normocephalic.      Mouth/Throat:      Mouth: Mucous membranes are dry.   Eyes:      Conjunctiva/sclera: Conjunctivae normal.   Cardiovascular:      Rate and Rhythm: Normal rate and regular rhythm.   Pulmonary:      Effort: Pulmonary effort is normal. No respiratory distress.      Breath sounds: Examination of the right-lower field reveals decreased breath sounds. Examination of the left-lower field reveals decreased breath sounds. Decreased breath sounds present.      Comments: Pooled secretions back of throat; cough strong at times, requires yaunker for removal  Abdominal:      General: Bowel sounds are normal.      Palpations: Abdomen is soft.   Musculoskeletal:      Cervical back: Neck supple.      Right lower leg: Edema present.      Left lower leg: Edema present.   Skin:     General: Skin is warm and dry.   Neurological:      Motor: Weakness present.      Comments: Decreased verbalization, not as responsive  Follows simple commands   Psychiatric:         Speech: Speech is delayed.         Behavior: Behavior is slowed.       Results Review     I  reviewed the patient's new clinical results.  Results from last 7 days   Lab Units 03/24/23  1019 03/23/23  0538 03/22/23  0447 03/21/23  1612   WBC 10*3/mm3 9.21 7.47 11.01* 16.11*   HEMOGLOBIN g/dL 10.9* 10.3* 10.9* 12.6*   PLATELETS 10*3/mm3 211 186 171 213     Results from last 7 days   Lab Units 03/24/23  1019 03/23/23  0538 03/22/23  0447 03/21/23  1612   SODIUM mmol/L 143 142 138 137   POTASSIUM mmol/L 5.4* 4.3 4.5 5.3*   CHLORIDE mmol/L 115* 110* 105 100   CO2 mmol/L 19.0* 22.0 23.0 24.4   BUN mg/dL 20 26* 36* 42*   CREATININE mg/dL 1.08 1.25 1.42* 1.76*   GLUCOSE mg/dL 115* 100* 83 93   EGFR mL/min/1.73 65.6 55.0* 47.2* 36.5*     Results from last 7 days   Lab Units 03/21/23  1612   ALBUMIN g/dL 3.9   BILIRUBIN mg/dL 0.4   ALK PHOS U/L 134*   AST (SGOT) U/L 16   ALT (SGPT) U/L 15     Results from last 7 days   Lab Units 03/24/23  1019 03/23/23  0538 03/22/23  0447 03/21/23  1612   CALCIUM mg/dL 8.3* 8.3* 8.2* 9.3   ALBUMIN g/dL  --   --   --  3.9     Results from last 7 days   Lab Units 03/21/23  1727 03/21/23  1612   PROCALCITONIN ng/mL  --  0.46*   LACTATE mmol/L 1.0  --      Glucose   Date/Time Value Ref Range Status   03/24/2023 1115 110 70 - 130 mg/dL Final     Comment:     Meter: [id number] : [id number] Fletcher Luu    03/24/2023 0608 115 70 - 130 mg/dL Final     Comment:     Meter: [id number] : [id number] Michelle Bullock    03/23/2023 1626 122 70 - 130 mg/dL Final     Comment:     Meter: [id number] : [id number] Sri Cesar NA   03/23/2023 1058 96 70 - 130 mg/dL Final     Comment:     Meter: [id number] : [id number] Fletcher Bell NA   03/23/2023 0611 108 70 - 130 mg/dL Final     Comment:     Meter: [id number] : [id number] [facility]   03/22/2023 2104 101 70 - 130 mg/dL Final     Comment:     Meter: [id number] : [id number] [facility]   03/22/2023 1628 120 70 - 130 mg/dL Final     Comment:     Meter: [id number] : [id number] Elise BOWDEN        CT Head Without Contrast    Result Date: 3/24/2023  The study is hampered by patient motion and beam hardening artifact limiting evaluation of the posterior fossa. There is no evidence of acute infarction or hemorrhage. Further evaluation could be performed with a MRI examination of brain as indicated. The above findings and recommendations were called to the patient's nurse at the time of the dictation. The patient's nurse is to immediately relay the information to the clinical service.    Radiation dose reduction techniques were utilized, including automated exposure control and exposure modulation based on body size.       I have personally reviewed all medications:  Scheduled Medications  aspirin, 81 mg, Oral, Daily   Or  aspirin, 300 mg, Rectal, Daily  atorvastatin, 10 mg, Oral, Nightly  cefTRIAXone, 1 g, Intravenous, Q24H  docusate sodium, 100 mg, Oral, BID  donepezil, 5 mg, Oral, Nightly  levothyroxine, 25 mcg, Oral, Q AM  cyanocobalamin, 1,000 mcg, Oral, Daily    Infusions  dextrose 5 % and sodium chloride 0.9 %, 75 mL/hr, Last Rate: 75 mL/hr (03/24/23 0912)    Diet  NPO Diet NPO Type: Strict NPO    I have personally reviewed:  [x]  Laboratory   [x]  Microbiology   [x]  Radiology   [x]  EKG/Telemetry  [x]  Cardiology/Vascular   [x]  Pathology    [x]  Records       Assessment/Plan     Active Hospital Problems    Diagnosis  POA   • **Seizure-like activity (HCC) [R56.9]  Yes   • Pneumonia [J18.9]  Yes   • Stage 3a chronic kidney disease (HCC) [N18.31]  Yes   • Hypothyroidism [E03.9]  Yes   • Mild dementia [F03.A0]  Yes   • Acute kidney injury (HCC) [N17.9]  Yes   • Anemia [D64.9]  Yes   • Essential hypertension [I10]  Yes   • Oropharyngeal dysphagia [R13.12]  Yes   • Coronary artery disease [I25.10]  Yes      Resolved Hospital Problems   No resolved problems to display.       89 y.o. male admitted with Seizure-like activity (HCC).    Toxic metabolic encephalopathy:  -Appreciate Neurology assistance. CTA  H/N without acute findings. Less verbal/alert as previous days w/increased weakness. Repeated CTH that was negative. Family asking to have MRI to evaluate for causes of neurological change, will attempt per their wishes. Ask Neurology to re-evaluate     Pneumonia:  -Likely contributed for presenting symptoms. Continue antibiotics. WBC trending down. NPO. SLP following. Urine antigens negative. BC NGTD     Mild dementia:  -Has been Restless/agitated at times. PRN zyprexa available. Sitter used overnight     CKD/JOSE:  -Cr 1.8 on arrival, now normalized. Avoid nephrotoxic meds. IVF's. Diuretic held     HTN/Hx of hypotension  -BP acceptable acutely. Midodrine as needed     CAD:  -No report of chest pain     Anemia:  -Monitor Hgb, near baseline     Hypothyroidism:  -Newly diagnosed in January. TSH 8, down from 12 on 3/8/23. FT4 wnl . NPO at this time. Determine need for IV replacement    Severe oropharyngeal dysphagia:  -VFSS completed yesterday demonstrating severe dysphagia, swallow impacted by large cervical osteophyte. Not safe for PO w/recommendation for alternative means of nutrition. After talking with family, sounds like he has had signs of swallowing issues for a while. Discussed with POA via telephone yesterday who agreed with Palliative care consult. Palliative has met with family who agrees with transfer to , allow small dose comfort meds if needed and want to have MRI completed. Goal is to take pt home with appropriate services, focus on comfort care/quality of life and would like to allow him to eat for comfort/quality of life      · SCDs for DVT prophylaxis.  · Limited code - No CPR, no intubation, no artificial nutrition.  · Discussed with patient, family and nursing staff.  · Anticipate discharge home with family timing yet to be determined.      LAURA Ellis  [facility]  03/24/23  14:24 EDT

## 2023-03-24 NOTE — CONSULTS
Purpose of the visit was to evaluate for: goals of care/advanced care planning, support for patient/family, hospice referral/discussion and comfort care. Spoke with MD and RN as well as family and discussed palliative care, goals of care, care options, resuscitation status, [facility] and discharge options.      Assessment:  Patient is palliative care appropriate for inpatient care given (list diagnosis/symptoms):  History of dementia, hypertension, ulcerative colitis, myocardial infarction, and coronary artery disease. Patient admitted from home d/t change in mental status. CT showed no acute pathology. Upon assessment, patient has wet vocal quality and congested wet cough. He has moments of staring at ceiling and restless/agitated behaviors. PPS 30%      Recommendations/Plan: Transfer to palliative care unit with goal of comfort and discharge home. Continue therapies to see if patient is able to transfer and/or walk to bathroom. Discharge home when stable with either home health and [facility] or [facility] services only which ever meets the patient/family goal of quality of life.     Other Comments: Spoke with patient's son, Balwinder, at bedside and daughter, Deidre via phone. Both verbalized understanding of patient condition. Discussed VFSS results of severe dysphagia and recommendation of NPO. Both agreed the patient/family would not want a feeding tube and understand the risk involved in quality of life diet. Discussed [facility] services and [facility] services. Family adamant patient will return home with either service, but will not go to a facility. If patient can participate in therapires, they would want to do that with home health and [facility]. If patient cannot participate in therapies they will elect hospice services.     Discussed palliative care unit as a transition to home with either of these services. Family in agreement with transfer. Daughter did mention they would like to continue with therapies here and  "obtain MRI if possible. Reasoning behind MRI per daughter, to see if patient has had a stroke so they \"know what all they are up against\". Discussed goals and conversation with APRN, RN, and CCP.   "

## 2023-03-24 NOTE — PLAN OF CARE
Problem: Adult Inpatient Plan of Care  Goal: Absence of Hospital-Acquired Illness or Injury  Intervention: Prevent Skin Injury  Description: Perform a screening for skin injury risk, such as pressure or moisture associated skin damage on admission and at regular intervals throughout hospital stay.  Keep all areas of skin (especially folds) clean and dry.  Maintain adequate skin hydration.  Relieve and redistribute pressure and protect bony prominences; implement measures based on patient-specific risk factors.  Match turning and repositioning schedule to clinical condition.  Encourage weight shift frequently; assist with reposition if unable to complete independently.  Float heels off bed; avoid pressure on the Achilles tendon.  Keep skin free from extended contact with medical devices.  Encourage functional activity and mobility, as early as tolerated.  Use aids (e.g., slide boards, mechanical lift) during transfer.  Recent Flowsheet Documentation  Taken 3/24/2023 0441 by Jeb Jonas RN  Body Position: supine, legs elevated  Taken 3/24/2023 0213 by Jeb Jonas RN  Body Position: supine, legs elevated  Taken 3/24/2023 0041 by Jeb Jonas RN  Body Position:   tilted   left  Skin Protection: adhesive use limited  Taken 3/23/2023 2245 by Jeb Jonas RN  Body Position:   side-lying   right  Taken 3/23/2023 2045 by Jeb Jonas RN  Body Position: supine, legs elevated  Skin Protection: adhesive use limited   Goal Outcome Evaluation:      Patient not resting much this shift, and still trying to get out of bed constantly. Sitter at bedside, IM zyprexa administered. Patient still not resting despite medication. VSS, bed alarm set, call light in reach, sitter at bedside.

## 2023-03-24 NOTE — THERAPY TREATMENT NOTE
Patient Name: Fritz Flores  : 1933    MRN: [id number]                              Today's Date: 3/24/2023       Admit Date: 3/21/2023    Visit Dx:     ICD-10-CM ICD-9-CM   1. Seizure-like activity ([facility])  R56.9 780.39   2. Aspiration pneumonia, unspecified aspiration pneumonia type, unspecified laterality, unspecified part of lung ([facility])  J69.0 507.0   3. Sepsis without acute organ dysfunction, due to unspecified organism ([facility])  A41.9 038.9     995.91     Patient Active Problem List   Diagnosis   • Closed intertrochanteric fracture of hip, left, initial encounter ([facility])   • Closed comminuted intertrochanteric fracture of proximal end of left femur ([facility])   • Ulcerative colitis (HCC)   • Coronary artery disease   • Hyponatremia   • Postoperative anemia due to acute blood loss   • Constipation   • Oropharyngeal dysphagia   • Age-related physical debility   • Bacteriuria   • Acute urinary tract infection   • COVID-19 virus detected   • DNR (do not resuscitate)   • Dehydration   • Cytokine release syndrome, grade 1   • Multiple falls   • Essential hypertension   • Elevated troponin   • B12 deficiency   • Lower leg DVT (deep venous thromboembolism), chronic, left (HCC)   • Acute kidney injury (HCC)   • Anemia   • Mild dementia   • Hypothermia   • Bradycardia   • Hypothyroidism   • Seizure-like activity (HCC)   • Pneumonia   • Stage 3a chronic kidney disease (HCC)     Past Medical History:   Diagnosis Date   • Colon polyps    • Coronary artery disease    • Disease of thyroid gland    • Hyperlipemia    • Myocardial infarction (HCC)    • Ulcerative colitis (HCC)      Past Surgical History:   Procedure Laterality Date   • CARDIAC SURGERY      CABG X 1   • COLONOSCOPY     • COLONOSCOPY N/A 2016    Procedure: COLONOSCOPY INTO CECUM WITH COLD BIOPSIES;  Surgeon: Aaron Gregg MD;  Location: [facility] ENDOSCOPY;  Service:    • FEMUR IM NAILING/RODDING Left 2019    Procedure: FEMUR INTRAMEDULLARY  NAILING/RODDING;  Surgeon: Jaqueline Wagoner MD;  Location: [facility] OR;  Service: Orthopedics   • FRACTURE SURGERY      upper leg w/hardware   • HIP SURGERY Left     x 3      General Information     Row Name 03/24/23 1149          Physical Therapy Time and Intention    Document Type therapy note (daily note)  -DJ     Mode of Treatment individual therapy;physical therapy  -DJ     Row Name 03/24/23 1149          General Information    Patient Profile Reviewed yes  -DJ     Existing Precautions/Restrictions fall  -DJ     Row Name 03/24/23 1149          Cognition    Orientation Status (Cognition) unable/difficult to assess;verbal cues/prompts needed for orientation  speech more slurred today, eyes fixed on ceiling, slow to respond  -DJ           User Key  (r) = Recorded By, (t) = Taken By, (c) = Cosigned By    Initials Name Provider Type    Daniela Moreno, PT Physical Therapist               Mobility     Row Name 03/24/23 1151          Bed Mobility    Bed Mobility supine-sit;sit-supine  -DJ     Supine-Sit [place] (Bed Mobility) maximum assist (25% patient effort);2 person assist;verbal cues;dependent (less than 25% patient effort)  -DJ     Sit-Supine [place] (Bed Mobility) maximum assist (25% patient effort);2 person assist  -DJ     Assistive Device (Bed Mobility) bed rails;head of bed elevated;draw sheet  -DJ     Comment, (Bed Mobility) unable to sit unsupported, dependent to sit EOB  -DJ     Row Name 03/24/23 1151          Transfers    Comment, (Transfers) unsafe to attempt standing due to inability so sit EOB  -DJ     Row Name 03/24/23 1151          Bed-Chair Transfer    Bed-Chair [place] (Transfers) unable to assess  -DJ     Row Name 03/24/23 1151          Sit-Stand Transfer    Sit-Stand [place] (Transfers) unable to assess  -DJ     Row Name 03/24/23 1151          Gait/Stairs (Locomotion)    [place] Level (Gait) unable to assess  -DJ     [place] Level (Stairs) not tested  -DJ            User Key  (r) = Recorded By, (t) = Taken By, (c) = Cosigned By    Initials Name Provider Type    Daniela Moreno PT Physical Therapist               Obj/Interventions     Row Name 03/24/23 1154          Motor Skills    Motor Skills functional endurance  -DJ     Functional Endurance poor  -DJ     Row Name 03/24/23 1154          Balance    Balance Assessment sitting static balance  -DJ     Static Sitting Balance dependent;2-person assist  -DJ     Balance Interventions sitting;supported  -DJ           User Key  (r) = Recorded By, (t) = Taken By, (c) = Cosigned By    Initials Name Provider Type    Daniela Moreno PT Physical Therapist               Goals/Plan    No documentation.                Clinical Impression     Row Name 03/24/23 1154          Pain    Pretreatment Pain Rating 0/10 - no pain  -DJ     Row Name 03/24/23 1154          Plan of Care Review    Plan of Care Reviewed With patient  -DJ     Progress declining  -DJ     Outcome Evaluation Pt supine in bed, very slow to respond, speech more slurred and difficult to understand, eyes fixed on ceiling and unable to track finger. Attempted to sit pt EOB and he was essentially dependent assist of 2. It was unsafe to attempt standing due to inability to sit EOB. Pt returned supine with max A Of 2 and was dependent to reposition in bed. Pt with decreased mobility and level of alertness today (amb 30' yesterday) - nsg notified. Palliative consult noted.  -DJ     Row Name 03/24/23 1154          Therapy Assessment/Plan (PT)    Therapy Frequency (PT) 3 times/wk  -DJ     Row Name 03/24/23 1154          Vital Signs    O2 Delivery Pre Treatment room air  -DJ     O2 Delivery Intra Treatment room air  -DJ     O2 Delivery Post Treatment room air  -DJ     Pre Patient Position Supine  -DJ     Intra Patient Position Sitting  -DJ     Post Patient Position Supine  -DJ     Row Name 03/24/23 1154          Positioning and Restraints    Pre-Treatment Position in bed  -DJ      Post Treatment Position bed  -DJ     In Bed notified nsg;supine;call light within reach;encouraged to call for assist;exit alarm on;heels elevated  -DJ           User Key  (r) = Recorded By, (t) = Taken By, (c) = Cosigned By    Initials Name Provider Type    Daniela Moreno, СВЕТЛАНА Physical Therapist               Outcome Measures     Row Name 03/24/23 1201          How much help from another person do you currently need...    Turning from your back to your side while in flat bed without using bedrails? 1  -DJ     Moving from lying on back to sitting on the side of a flat bed without bedrails? 1  -DJ     Moving to and from a bed to a chair (including a wheelchair)? 1  -DJ     Standing up from a chair using your arms (e.g., wheelchair, bedside chair)? 1  -DJ     Climbing 3-5 steps with a railing? 1  -DJ     To walk in hospital room? 1  -DJ     AM-PAC 6 Clicks Score (PT) 6  -DJ     Highest level of mobility 2 --> Bed activities/dependent transfer  -DJ     Row Name 03/24/23 1201          Modified Darryl Scale    Modified Darryl Scale 4 - Moderately severe disability.  Unable to walk without assistance, and unable to attend to own bodily needs without assistance.  -DJ     Row Name 03/24/23 1201          Functional Assessment    Outcome Measure Options Modified Darryl;AM-PAC 6 Clicks Basic Mobility (PT)  -DJ           User Key  (r) = Recorded By, (t) = Taken By, (c) = Cosigned By    Initials Name Provider Type    Daniela Moreno PT Physical Therapist                             Physical Therapy Education     Title: PT OT SLP Therapies (In Progress)     Topic: Physical Therapy (In Progress)     Point: Mobility training (In Progress)     Learning Progress Summary           Patient Nonacceptance, E, NL by XOCHITL at 3/24/2023 1202    Acceptance, E, NR by XOCHITL at 3/23/2023 1418                   Point: Home exercise program (Not Started)     Learner Progress:  Not documented in this visit.          Point: Body mechanics (In Progress)      Learning Progress Summary           Patient Acceptance, E, NR by XOCHITL at 3/23/2023 1418                   Point: Precautions (In Progress)     Learning Progress Summary           Patient Acceptance, E, NR by XOCHITL at 3/23/2023 1418                               User Key     Initials Effective Dates Name Provider Type Discipline     10/25/19 -  Daniela Moreno PT Physical Therapist PT              PT Recommendation and Plan  Planned Therapy Interventions (PT): balance training, bed mobility training, gait training, home exercise program, strengthening, postural re-education, patient/family education, transfer training  Plan of Care Reviewed With: patient  Progress: declining  Outcome Evaluation: Pt supine in bed, very slow to respond, speech more slurred and difficult to understand, eyes fixed on ceiling and unable to track finger. Attempted to sit pt EOB and he was essentially dependent assist of 2. It was unsafe to attempt standing due to inability to sit EOB. Pt returned supine with max A Of 2 and was dependent to reposition in bed. Pt with decreased mobility and level of alertness today (amb 30' yesterday) - nsg notified. Palliative consult noted.     Time Calculation:    PT Charges     Row Name 03/24/23 1203             Time Calculation    Start Time 1028  -DJ      Stop Time 1051  -DJ      Time Calculation (min) 23 min  -DJ      PT Non-Billable Time (min) 10 min  -DJ      PT Received On 03/24/23  -XOCHITL      PT - Next Appointment 03/27/23  -XOCHITL            User Key  (r) = Recorded By, (t) = Taken By, (c) = Cosigned By    Initials Name Provider Type    Daniela Moreno PT Physical Therapist              Therapy Charges for Today     Code Description Service Date Service Provider Modifiers Qty    53653668970 HC PT EVAL MOD COMPLEXITY 2 3/23/2023 Daniela Moreno, PT GP 1    66432710355 HC PT THERAPEUTIC ACT EA 15 MIN 3/23/2023 Daniela Moreno, PT GP 2    54582818450 HC PT THER SUPP EA 15 MIN 3/23/2023 Daniela Moreno, PT GP 2     51541929753 HC PT THERAPEUTIC ACT EA 15 MIN 3/24/2023 Daniela Moreno, PT GP 2    70273360728 HC PT THER SUPP EA 15 MIN 3/24/2023 Daniela Moreno, PT GP 2          PT G-Codes  Outcome Measure Options: Modified Darryl, AM-PAC 6 Clicks Basic Mobility (PT)  AM-PAC 6 Clicks Score (PT): 6  AM-PAC 6 Clicks Score (OT): 12  Modified [place] Scale: 4 - Moderately severe disability.  Unable to walk without assistance, and unable to attend to own bodily needs without assistance.  PT Discharge Summary  Anticipated Discharge Disposition (PT): skilled nursing facility    Daniela Moreno PT  3/24/2023

## 2023-03-24 NOTE — PLAN OF CARE
Goal Outcome Evaluation:  Plan of Care Reviewed With: patient        Progress: declining  Outcome Evaluation: Pt supine in bed, very slow to respond, speech more slurred and difficult to understand, eyes fixed on ceiling and unable to track finger. Attempted to sit pt EOB and he was essentially dependent assist of 2. It was unsafe to attempt standing due to inability to sit EOB. Pt returned supine with max A Of 2 and was dependent to reposition in bed. Pt with decreased mobility and level of alertness today (amb 30' yesterday) - nsg notified. Palliative consult noted.

## 2023-03-24 NOTE — CASE MANAGEMENT/SOCIAL WORK
Continued Stay Note  [facility]     Patient Name: Fritz Flores  MRN: [id number]  Today's Date: 3/24/2023    Admit Date: 3/21/2023    Plan: TBD   Discharge Plan     Row Name 03/24/23 1243       Plan    Plan TBD    Plan Comments Palliative consult pending.  Plan at present is home with family as caregivers.  Referral sent to [facility] - pending.  CCP will followup and assist as needed. .........Heather MALAVE/ CCP               Discharge Codes    No documentation.               Expected Discharge Date and Time     Expected Discharge Date Expected Discharge Time    Mar 26, 2023             Heather Warner RN

## 2023-03-24 NOTE — PROGRESS NOTES
Neurology Progress Note    Reason for visit  Follow up for altered mental status    Interval History  Neurology asked to see patient again for worsening mental status.  He was improving for several days but yesteday started declining.   TOday his speech is worse and he is more confused and altered.    HE has had no fever. He was on azithromycin, changed now to ceftriaxone, has received 3 doses.    Head CT today showed no hemorrhage.    Bradycardia at times, to 40's.    Medications:  Scheduled Meds:aspirin, 81 mg, Oral, Daily   Or  aspirin, 300 mg, Rectal, Daily  atorvastatin, 10 mg, Oral, Nightly  cefTRIAXone, 1 g, Intravenous, Q24H  docusate sodium, 100 mg, Oral, BID  donepezil, 5 mg, Oral, Nightly  levothyroxine, 25 mcg, Oral, Q AM  cyanocobalamin, 1,000 mcg, Oral, Daily      Continuous Infusions:dextrose 5 % and sodium chloride 0.9 %, 75 mL/hr, Last Rate: 75 mL/hr (03/24/23 0912)      PRN Meds:.•  acetaminophen **OR** acetaminophen **OR** acetaminophen  •  acetaminophen  •  Glycerin-Hypromellose-  •  HYDROmorphone **OR** Morphine **OR** morphine **OR** ketorolac  •  LORazepam **OR** LORazepam **OR** LORazepam  •  melatonin  •  midodrine  •  OLANZapine  •  ondansetron **OR** ondansetron  •  ondansetron **OR** ondansetron  •  sodium chloride    Review of Systems:   Review of Systems   Unable to perform ROS: Mental status change     Vital Signs  Temp:  [97 °F (36.1 °C)-98.2 °F (36.8 °C)] 98 °F (36.7 °C)  Heart Rate:  [49-60] 49  Resp:  [16-22] 18  BP: (144-172)/() 161/67    Physical Exam:  Constitutional:  Appears comfortable but restless  HEENT:  Mouth open, no drooling  CVS:  Regular rate and rhythm.    Musculoskeletal:  No signs of peripheral edema  Neurologic:   Eyes open and patient looking persistently up and to the right   Reaches out at times   Pupils reactive   No twitching or involuntary movements noted   Able to move all extremities at least anti-gravity     Psychiatric: no  agitation     Results Review:    Ammonia 18  TSH  8.07  Free T4 1.01    Glucose 115  BUN  20  Creatinine 1.08  Sodium 143  Potassium 5.4  Calcium 8.3  Magnesium       WBC  9.21  Hgb  10.9  Platelets 211      Head CT poor quality, no hemorrhage    Medical Decision Making and Recommendations  Acute encephalopathy  No focality on exam to suggest large vessel stroke  He may have had small stroke or strokes not appreciated on head CT  Very unlikely to tolerate brain MRI or to allow for a good study without significant sedation which poses increased risk for delirium    Most likely etiology of change is neurotoxicity associated with antibiotics along with intermittent hypoperfusion from bradycardia.    Also consider complex partial seizure given presentation with staring episode    Recommend:   Stop antibiotics as soon as medically appropriate    Do not pursue brain MRI at this time    Stat EEG    Will continue to follow.      Loren Montaño MD  03/24/23  16:18 EDT

## 2023-03-25 NOTE — PLAN OF CARE
Problem: Adult Inpatient Plan of Care  Goal: Plan of Care Review  Outcome: Ongoing, Progressing  Flowsheets (Taken 3/25/2023 0603)  Progress: declining  Plan of Care Reviewed With: patient  Outcome Evaluation: Patient was transferred from [address] [place] [address]. Maintained comfort measures per palliative care protocol. Patient was medicated with PRN Ativan 0.5 mg and Morphine 2 mg IV this shift from the previous unit. 2L NC O2. PPS 10%. Will continue to monitor vital signs and comfort.   Goal Outcome Evaluation:  Plan of Care Reviewed With: patient        Progress: declining  Outcome Evaluation: Patient was transferred from [address] [place] [address]. Maintained comfort measures per palliative care protocol. Patient was medicated with PRN Ativan 0.5 mg and Morphine 2 mg IV this shift from the previous unit. 2L NC O2. PPS 10%. Will continue to monitor vital signs and comfort.

## 2023-03-25 NOTE — PROGRESS NOTES
Neurology Progress Note    Reason for visit  Follow up for encephalopathy    Interval History  Transitioned to palliative care.  Off ceftriaxone  Remains bradycardic  Family (daughter) at bedside    Medications:  Scheduled Meds:aspirin, 81 mg, Oral, Daily   Or  aspirin, 300 mg, Rectal, Daily  atorvastatin, 10 mg, Oral, Nightly  cefTRIAXone, 1 g, Intravenous, Q24H  docusate sodium, 100 mg, Oral, BID  donepezil, 5 mg, Oral, Nightly  levothyroxine, 25 mcg, Oral, Q AM  cyanocobalamin, 1,000 mcg, Oral, Daily      Continuous Infusions:dextrose 5 % and sodium chloride 0.9 %, 75 mL/hr, Last Rate: 75 mL/hr (03/24/23 0912)      PRN Meds:.•  acetaminophen **OR** acetaminophen **OR** acetaminophen  •  acetaminophen  •  Glycerin-Hypromellose-  •  HYDROmorphone **OR** Morphine **OR** morphine **OR** ketorolac  •  LORazepam **OR** LORazepam **OR** LORazepam  •  melatonin  •  midodrine  •  OLANZapine  •  ondansetron **OR** ondansetron  •  ondansetron **OR** ondansetron  •  sodium chloride    Review of Systems:   Review of Systems   Unable to perform ROS: Mental status change     Vital Signs  Temp:  [97 °F (36.1 °C)-97.6 °F (36.4 °C)] 97.5 °F (36.4 °C)  Heart Rate:  [43-52] 43  Resp:  [16-20] 20  BP: (135-165)/(65-80) 136/65    Physical Exam:  Constitutional:  Appears comfortable  HEENT:  Mouth open, no drooling  CVS:  Regular rate and rhythm.    Musculoskeletal:  No signs of peripheral edema  Neurologic:   Poorly responsive   Sonorous respirations   Psychiatric: no agitation     Results Review:    Ammonia 18  TSH  8.07  Free T4 1.01    Head CT 3/24/23 poor quality, no hemorrhage    EEG 3/24/23 showed generalized slowing, no seizure acitivy    Medical Decision Making and Recommendations  Acute encephalopathy  No focality on exam to suggest large vessel stroke  He may have had small stroke or strokes not appreciated on head CT  Very unlikely to tolerate brain MRI or to allow for a good study without significant sedation which  poses increased risk for delirium    Most likely etiology of change is  hypoperfusion from bradycardia.    EEG benign, no sign of seizure activity    Updated daughter with EEG findings.    Palliative care appropriate, discontinue donepezil    Neurology signing off, please call if needed further.    Loren Montaño MD  03/25/23  12:24 EDT

## 2023-03-25 NOTE — PLAN OF CARE
Goal Outcome Evaluation:           Progress: declining  Outcome Evaluation: PPS 10%, minimally responsive. Pt has appeared comfortable all day, no prns given but remains somnolent. Pt becoming slightly more alert this evening once family arrived. Pt's breathing is very shallow, pulmonary congestion present. Scope patch placed. Pt has not urinated, order received for patten catheter and patten placed. Approx 500cc out. Pt continues to appear calm and comfortable, family at bedside.

## 2023-03-25 NOTE — PROGRESS NOTES
Name: Fritz Flores ADMIT: 3/21/2023   : 1933  PCP: Rachid العلي Jr., MD    MRN: [id number] LOS: 4 days   AGE/SEX: 89 y.o. male  ROOM: [facility]     Subjective   Subjective   Sleeping.  Appears comfortable.  No family present    Objective   Objective   Vital Signs  Temp:  [97 °F (36.1 °C)-97.6 °F (36.4 °C)] 97.5 °F (36.4 °C)  Heart Rate:  [43-52] 43  Resp:  [16-20] 20  BP: (135-165)/(65-80) 136/65  SpO2:  [93 %-98 %] 98 %  on  Flow (L/min):  [2] 2;   Device (Oxygen Therapy): nasal cannula  Body mass index is 24.29 kg/m².  Physical Exam  Constitutional:       General: He is not in acute distress.     Comments: Chronically ill-appearing   Cardiovascular:      Rate and Rhythm: Normal rate.   Pulmonary:      Effort: No respiratory distress.      Breath sounds: Normal breath sounds.   Abdominal:      General: Abdomen is flat. There is no distension.      Palpations: There is no mass.   Skin:     Coloration: Skin is not pale.      Findings: No bruising or erythema.   Neurological:      Comments: Asleep         Results Review     I reviewed the patient's new clinical results.  Results from last 7 days   Lab Units 23  1019 23  0538 23  0447 23  1612   WBC 10*3/mm3 9.21 7.47 11.01* 16.11*   HEMOGLOBIN g/dL 10.9* 10.3* 10.9* 12.6*   PLATELETS 10*3/mm3 211 186 171 213     Results from last 7 days   Lab Units 23  1429 23  1019 23  0538 23  0447 23  1612   SODIUM mmol/L  --  143 142 138 137   POTASSIUM mmol/L 4.2 5.4* 4.3 4.5 5.3*   CHLORIDE mmol/L  --  115* 110* 105 100   CO2 mmol/L  --  19.0* 22.0 23.0 24.4   BUN mg/dL  --  20 26* 36* 42*   CREATININE mg/dL  --  1.08 1.25 1.42* 1.76*   GLUCOSE mg/dL  --  115* 100* 83 93   EGFR mL/min/1.73  --  65.6 55.0* 47.2* 36.5*     Results from last 7 days   Lab Units 23  1612   ALBUMIN g/dL 3.9   BILIRUBIN mg/dL 0.4   ALK PHOS U/L 134*   AST (SGOT) U/L 16   ALT (SGPT) U/L 15     Results from last 7 days   Lab  Units 03/24/23  1019 03/23/23  0538 03/22/23  0447 03/21/23  1612   CALCIUM mg/dL 8.3* 8.3* 8.2* 9.3   ALBUMIN g/dL  --   --   --  3.9     Results from last 7 days   Lab Units 03/21/23  1727 03/21/23  1612   PROCALCITONIN ng/mL  --  0.46*   LACTATE mmol/L 1.0  --      Glucose   Date/Time Value Ref Range Status   03/24/2023 2058 105 70 - 130 mg/dL Final     Comment:     Meter: [id number] : [id number] Michelle Bullock NA   03/24/2023 1614 129 70 - 130 mg/dL Final     Comment:     Meter: [id number] : [id number] Fletcher Luu NA   03/24/2023 1115 110 70 - 130 mg/dL Final     Comment:     Meter: [id number] : [id number] Fletcher Luu NA   03/24/2023 0608 115 70 - 130 mg/dL Final     Comment:     Meter: [id number] : [id number] Michelle Bullock NA   03/23/2023 1626 122 70 - 130 mg/dL Final     Comment:     Meter: [id number] : [id number] Sri Tali NA   03/23/2023 1058 96 70 - 130 mg/dL Final     Comment:     Meter: [id number] : [id number] Fletcher Luu NA   03/23/2023 0611 108 70 - 130 mg/dL Final     Comment:     Meter: [id number] : [id number] Michelle Blancaite NA       CT Head Without Contrast    Result Date: 3/24/2023  The study is hampered by patient motion and beam hardening artifact limiting evaluation of the posterior fossa. There is no evidence of acute infarction or hemorrhage. Further evaluation could be performed with a MRI examination of brain as indicated. The above findings and recommendations were called to the patient's nurse at the time of the dictation. The patient's nurse is to immediately relay the information to the clinical service.    Radiation dose reduction techniques were utilized, including automated exposure control and exposure modulation based on body size.  This report was finalized on 3/24/2023 4:39 PM by Dr. Nawaf Anvar, M.D.      Scheduled Medications  aspirin, 81 mg, Oral, Daily   Or  aspirin, 300 mg, Rectal, Daily  atorvastatin, 10 mg, Oral,  Nightly  cefTRIAXone, 1 g, Intravenous, Q24H  docusate sodium, 100 mg, Oral, BID  levothyroxine, 25 mcg, Oral, Q AM  cyanocobalamin, 1,000 mcg, Oral, Daily    Infusions  dextrose 5 % and sodium chloride 0.9 %, 75 mL/hr, Last Rate: 75 mL/hr (03/24/23 0912)    Diet  No diet orders on file       Assessment/Plan     Active Hospital Problems    Diagnosis  POA   • **Seizure-like activity (HCC) [R56.9]  Yes   • Pneumonia [J18.9]  Yes   • Stage 3a chronic kidney disease (HCC) [N18.31]  Yes   • Hypothyroidism [E03.9]  Yes   • Mild dementia [F03.A0]  Yes   • Acute kidney injury (HCC) [N17.9]  Yes   • Anemia [D64.9]  Yes   • Essential hypertension [I10]  Yes   • Oropharyngeal dysphagia [R13.12]  Yes   • Coronary artery disease [I25.10]  Yes      Resolved Hospital Problems   No resolved problems to display.       89 y.o. male admitted with Seizure-like activity (HCC).      03/25/23  [facility] consult pending    Transferred to palliative unit yesterday.  Continue full palliative order set.  Hospice consult pending.    · SCDs for DVT prophylaxis.  · DNR.  · Discussed with patient and nursing staff.        Cristiano Long MD  [facility]  03/25/23  12:52 EDT

## 2023-03-26 NOTE — PROGRESS NOTES
Name: Fritz Flores ADMIT: 3/21/2023   : 1933  PCP: Rachid العلي Jr., MD    MRN: [id number] LOS: 5 days   AGE/SEX: 89 y.o. male  ROOM: [facility]     Subjective   Subjective   Resting comfortable in bed.  Patient is alert, though speech unclear.  Multiple family members at bedside state that he has been in no distress.    Objective   Objective   Vital Signs  Heart Rate:  [45] 45  Resp:  [16-18] 18  BP: (148)/(53) 148/53  SpO2:  [100 %] 100 %  on  Flow (L/min):  [2] 2;   Device (Oxygen Therapy): nasal cannula  Body mass index is 24.29 kg/m².  Physical Exam  Constitutional:       General: He is not in acute distress.     Comments: Chronically ill-appearing   Cardiovascular:      Rate and Rhythm: Normal rate.   Pulmonary:      Effort: No respiratory distress.      Breath sounds: Normal breath sounds.   Abdominal:      General: Abdomen is flat. There is no distension.      Palpations: There is no mass.   Skin:     Coloration: Skin is not pale.      Findings: No bruising or erythema.         Results Review     I reviewed the patient's new clinical results.  Results from last 7 days   Lab Units 23  1019 23  0538 23  0447 23  1612   WBC 10*3/mm3 9.21 7.47 11.01* 16.11*   HEMOGLOBIN g/dL 10.9* 10.3* 10.9* 12.6*   PLATELETS 10*3/mm3 211 186 171 213     Results from last 7 days   Lab Units 23  1429 23  1019 23  0538 23  0447 23  1612   SODIUM mmol/L  --  143 142 138 137   POTASSIUM mmol/L 4.2 5.4* 4.3 4.5 5.3*   CHLORIDE mmol/L  --  115* 110* 105 100   CO2 mmol/L  --  19.0* 22.0 23.0 24.4   BUN mg/dL  --  20 26* 36* 42*   CREATININE mg/dL  --  1.08 1.25 1.42* 1.76*   GLUCOSE mg/dL  --  115* 100* 83 93   EGFR mL/min/1.73  --  65.6 55.0* 47.2* 36.5*     Results from last 7 days   Lab Units 23  1612   ALBUMIN g/dL 3.9   BILIRUBIN mg/dL 0.4   ALK PHOS U/L 134*   AST (SGOT) U/L 16   ALT (SGPT) U/L 15     Results from last 7 days   Lab Units 23  1019  03/23/23  0538 03/22/23  0447 03/21/23  1612   CALCIUM mg/dL 8.3* 8.3* 8.2* 9.3   ALBUMIN g/dL  --   --   --  3.9     Results from last 7 days   Lab Units 03/21/23  1727 03/21/23  1612   PROCALCITONIN ng/mL  --  0.46*   LACTATE mmol/L 1.0  --      Glucose   Date/Time Value Ref Range Status   03/24/2023 2058 105 70 - 130 mg/dL Final     Comment:     Meter: [id number] : [id number] Michelle Bullock NA   03/24/2023 1614 129 70 - 130 mg/dL Final     Comment:     Meter: [id number] : [id number] Fletcher Luu NA   03/24/2023 1115 110 70 - 130 mg/dL Final     Comment:     Meter: [id number] : [id number] Fletcher Luu NA   03/24/2023 0608 115 70 - 130 mg/dL Final     Comment:     Meter: [id number] : [id number] Michelle Bullock NA   03/23/2023 1626 122 70 - 130 mg/dL Final     Comment:     Meter: [id number] : [id number] Sri Cesar NA   03/23/2023 1058 96 70 - 130 mg/dL Final     Comment:     Meter: [id number] : [id number] Fletcher BOWDEN       CT Head Without Contrast    Result Date: 3/24/2023  The study is hampered by patient motion and beam hardening artifact limiting evaluation of the posterior fossa. There is no evidence of acute infarction or hemorrhage. Further evaluation could be performed with a MRI examination of brain as indicated. The above findings and recommendations were called to the patient's nurse at the time of the dictation. The patient's nurse is to immediately relay the information to the clinical service.    Radiation dose reduction techniques were utilized, including automated exposure control and exposure modulation based on body size.  This report was finalized on 3/24/2023 4:39 PM by Dr. Nawaf Marrufo M.D.      Scheduled Medications  aspirin, 81 mg, Oral, Daily   Or  aspirin, 300 mg, Rectal, Daily  atorvastatin, 10 mg, Oral, Nightly  cefTRIAXone, 1 g, Intravenous, Q24H  docusate sodium, 100 mg, Oral, BID  levothyroxine, 25 mcg, Oral, Q AM  Scopolamine, 1 patch,  Transdermal, Q72H  cyanocobalamin, 1,000 mcg, Oral, Daily    Infusions  dextrose 5 % and sodium chloride 0.9 %, 75 mL/hr, Last Rate: 75 mL/hr (03/24/23 0912)    Diet  No diet orders on file       Assessment/Plan     Active Hospital Problems    Diagnosis  POA   • **Seizure-like activity (HCC) [R56.9]  Yes   • Pneumonia [J18.9]  Yes   • Stage 3a chronic kidney disease (HCC) [N18.31]  Yes   • Hypothyroidism [E03.9]  Yes   • Mild dementia [F03.A0]  Yes   • Acute kidney injury (HCC) [N17.9]  Yes   • Anemia [D64.9]  Yes   • Essential hypertension [I10]  Yes   • Oropharyngeal dysphagia [R13.12]  Yes   • Coronary artery disease [I25.10]  Yes      Resolved Hospital Problems   No resolved problems to display.       89 y.o. male admitted with Seizure-like activity (HCC).      03/26/23  [facility] consult pending.  Hoping for discharge home soon.    Transferred to palliative unit.  Continue full palliative order set.  Hospice consult pending.    · SCDs for DVT prophylaxis.  · DNR.  · Discussed with patient and nursing staff.        Cristiano Long MD  [facility]  03/26/23  06:58 EDT

## 2023-03-26 NOTE — PLAN OF CARE
Goal Outcome Evaluation:     Progress: no change    Pt has not required any medication this shift. Responds to voice. Family at bedside and attentive to pt. Will continue with plan of care.

## 2023-03-26 NOTE — PLAN OF CARE
Goal Outcome Evaluation:  Plan of Care Reviewed With: family        Progress: no change  Outcome Evaluation: PPS 10%. Pt has been more alert today. Pt attempting to communicate but most of what is said is incoherent. Pt becoming increasingly restless and agitated. Ativan 0.5mg given x1, orders received to increase PRN dosing. Family at bedside. Extremities cold to touch. Pulmonary congestion present. New IV placed. Pt currently resting after receiving ativan. Ongoing education and support provided regarding management of agitation, s/s of agitation and discomfort. No needs at this time.

## 2023-03-27 NOTE — PLAN OF CARE
Goal Outcome Evaluation:  Plan of Care Reviewed With: daughter, patient        Progress: declining  Outcome Evaluation: Pt unresponsive today.  Respiration were in 40s and 50s today.  Is now being medicated prior to turns and meds were increased through the day.  He is taking Dilaudid .5 IV, Ativan 2 IV and Robinul .4 IV for congestion.

## 2023-03-27 NOTE — PROGRESS NOTES
Discharge Planning Assessment  [facility]     Patient Name: Fritz Flores  MRN: [id number]  Today's Date: 3/27/2023    Admit Date: 3/21/2023    Plan: TBD   Discharge Needs Assessment    No documentation.                Discharge Plan     Row Name 03/27/23 1559       Plan    Plan Comments The patient transferred to [facility] from [address] [place] [address] on 3/25/23. The patient is palliative. [facility] to evaluate. CCP will continue to follow for any needs that may arise. CARLENE Moy RN, CCP.              Continued Care and Services - Admitted Since 3/21/2023     Home Medical Care     Service Provider Request Status Selected Services Address Phone Fax Patient Preferred     [facility] Accepted N/A [address] [facility] [address] [address] [phone] --            Selected Continued Care - Prior Encounters Includes continued care and service providers with selected services from prior encounters from 12/21/2022 to 3/27/2023    Discharged on 1/16/2023 Admission date: 1/12/2023 - Discharge disposition: Home-Health Care c    Home Medical Care     Service Provider Selected Services Address Phone Fax Patient Preferred     [facility] [address] [facility] [place] [id number] [phone] --                    Expected Discharge Date and Time     Expected Discharge Date Expected Discharge Time    Mar 27, 2023          Demographic Summary    No documentation.                Functional Status    No documentation.                Psychosocial    No documentation.                Abuse/Neglect    No documentation.                Legal    No documentation.                Substance Abuse    No documentation.                Patient Forms    No documentation.                   Ellie Moy RN

## 2023-03-27 NOTE — PLAN OF CARE
Goal Outcome Evaluation:              Outcome Evaluation: Events noted. Pt now on Palliative, comfort measures. ST to sign off at this time.

## 2023-03-27 NOTE — PROGRESS NOTES
[facility]     LOS: 6 days     Subjective:    CC:Altered Mental Status    DIET:  Diet Order   Procedures   • NPO Diet NPO Type: Strict NPO   Patient unresponsive  He is not eating    Objective:    Vital Signs:  Heart Rate:  [78-87] 87  Resp:  [36-52] 48  BP: (113)/(59) 113/59    SpO2:  [86 %-92 %] 86 %  on  Flow (L/min):  [2] 2;   Device (Oxygen Therapy): nasal cannula  Body mass index is 24.29 kg/m².    Physical Exam  Increased respiratory rate  He overall appears comfortable  Family at bedside    Results Review:    No results found for: GLUCOSE  Results from last 7 days   Lab Units 03/24/23  1019   WBC 10*3/mm3 9.21   HEMOGLOBIN g/dL 10.9*   HEMATOCRIT % 31.6*   PLATELETS 10*3/mm3 211     Results from last 7 days   Lab Units 03/24/23  1429 03/24/23  1019 03/22/23  0447 03/21/23  1612   SODIUM mmol/L  --  143   < > 137   POTASSIUM mmol/L 4.2 5.4*   < > 5.3*   CHLORIDE mmol/L  --  115*   < > 100   CO2 mmol/L  --  19.0*   < > 24.4   BUN mg/dL  --  20   < > 42*   CREATININE mg/dL  --  1.08   < > 1.76*   CALCIUM mg/dL  --  8.3*   < > 9.3   BILIRUBIN mg/dL  --   --   --  0.4   ALK PHOS U/L  --   --   --  134*   ALT (SGPT) U/L  --   --   --  15   AST (SGOT) U/L  --   --   --  16   GLUCOSE mg/dL  --  115*   < > 93    < > = values in this interval not displayed.     Results from last 7 days   Lab Units 03/21/23  1612   INR  1.07   APTT seconds 35.1         Results from last 7 days   Lab Units 03/21/23  1612   HSTROP T ng/L 30*     Cultures:  Blood Culture   Date Value Ref Range Status   03/21/2023 No growth at 5 days  Final   03/21/2023 No growth at 5 days  Final       I have reviewed daily medications and changes in CPOE    Scheduled meds  aspirin, 81 mg, Oral, Daily   Or  aspirin, 300 mg, Rectal, Daily  atorvastatin, 10 mg, Oral, Nightly  docusate sodium, 100 mg, Oral, BID  levothyroxine, 25 mcg, Oral, Q AM  Scopolamine, 1 patch, Transdermal, Q72H  sodium chloride, 10 mL, Intravenous,  Q12H  cyanocobalamin, 1,000 mcg, Oral, Daily           PRN meds  •  acetaminophen **OR** acetaminophen **OR** acetaminophen  •  acetaminophen  •  Glycerin-Hypromellose-  •  glycopyrrolate **OR** glycopyrrolate  •  HYDROmorphone **OR** Morphine **OR** morphine **OR** ketorolac  •  LORazepam **OR** LORazepam **OR** LORazepam  •  LORazepam **OR** LORazepam **OR** LORazepam  •  LORazepam **OR** LORazepam **OR** LORazepam  •  melatonin  •  midodrine  •  OLANZapine  •  ondansetron **OR** ondansetron  •  ondansetron **OR** ondansetron  •  sodium chloride  •  sodium chloride        Seizure-like activity (HCC)    Coronary artery disease    Oropharyngeal dysphagia    Essential hypertension    Acute kidney injury (HCC)    Anemia    Mild dementia    Hypothyroidism    Pneumonia    Stage 3a chronic kidney disease (HCC)        Assessment/Plan:    Palliative care  -Robinul, morphine, Dilaudid, Ativan        Nawaf Mercedes MD  03/27/23  16:56 EDT

## 2023-03-27 NOTE — PLAN OF CARE
Problem: Adult Inpatient Plan of Care  Goal: Plan of Care Review  Outcome: Ongoing, Progressing  Flowsheets (Taken 3/27/2023 0610)  Progress: no change  Plan of Care Reviewed With: daughter  Outcome Evaluation: Maintained comfort measures per palliative care protocol. Patient was medicated with PRN. PRN oral suction done. PRN Ativan 0.5 mg and Robinul 0.4 mg IV given x 1 this shift. PPS 10%. Family at bedside at all times. Will continue to monitor vital signs and comfort.   Goal Outcome Evaluation:  Plan of Care Reviewed With: daughter        Progress: no change  Outcome Evaluation: Maintained comfort measures per palliative care protocol. Patient was medicated with PRN. PRN oral suction done. PRN Ativan 0.5 mg and Robinul 0.4 mg IV given x 1 this shift. PPS 10%. Family at bedside at all times. Will continue to monitor vital signs and comfort.

## 2023-03-27 NOTE — PROGRESS NOTES
Palliative Social Work Note    Purpose of visit: Initial visit  Assessment: Patient unresponsive, appears comfortable, PPS: 10%.   Support System: Present at bedside, Involved in care and Family  Psychosocial Needs Identified: None identified at this time  Plan/Other Comments:   Spoke to dgtr at bedside. She was attentive to patient. Several family members present sharing pictures of patient. Explained role and purpose of visit, offered support. Patient's dgtr expressed appreciation, denies needs at this time. Advised of availability if needs arise.      Social Work Assessment Tool (SWAT) for Palliative Care Patients and Caregivers          How well are patient and/or primary caregiver doing?    1              2          3               4                 5  Not well  Not too Neutral  Reasonably  Extremely     at all         well                      well              well                          Issue:               Patient                  Primary               Caregiver    1.End of life decisions consistent with their religous and cultural norms.   6 N/A   5 Extremely well   2. Patient thoughts of suicide or wanting to hasten death.  6 N/A 5 Extremely well   3. Anxiety about death    6 N/A 5 Extremely well   4. Preferences about environment (e.g., pets, own bed etc.) 6 N/A 5 Extremely well   5. Social support    6 N/A 5 Extremely well   6. Financial resources 6 N/A 5 Extremely well   7. Safety issues 6 N/A 5 Extremely well   8. Comfort issues 6 N/A 5 Extremely well   9. Complicated anticipatory grief (e.g., guilt, depression, etc.) 6 N/A 5 Extremely well   10. Awareness of prognosis 6 N/A 5 Extremely well   11. Spirituality  (e.g., higher purpose in life, sense of connection with all)  6 N/A 5 Extremely well    Total Score  55

## 2023-03-28 NOTE — PLAN OF CARE
Goal Outcome Evaluation:           Progress: declining  Outcome Evaluation: Pt unresponsive. Medicated x2 with 0.5mg dilaudid and 2mg ativan. Refused 1300 turns. Family at bedside and aware of decline. Will cont to monitor

## 2023-03-28 NOTE — PLAN OF CARE
Goal Outcome Evaluation:  Plan of Care Reviewed With: patient   Patient is unresponsive. He is having periods of apnea. His heart rate is very faint. Family remain at the bedside.

## 2023-03-28 NOTE — PROGRESS NOTES
[facility]     LOS: 7 days     Subjective:    CC:Altered Mental Status    DIET:  Diet Order   Procedures   • NPO Diet NPO Type: Strict NPO   Patient unresponsive  He is not eating  Overnight had a prolonged period of not breathing but recovered and started breathing, bp remains low    Objective:    Vital Signs:  Heart Rate:  [69-78] 69  Resp:  [32-36] 32  BP: (64)/(42) 64/42    SpO2:  [92 %] 92 %  on  Flow (L/min):  [2] 2;   Device (Oxygen Therapy): nasal cannula  Body mass index is 24.29 kg/m².    Physical Exam  Increased respiratory rate  He overall appears comfortable  Family at bedside    Results Review:    No results found for: GLUCOSE  Results from last 7 days   Lab Units 03/24/23  1019   WBC 10*3/mm3 9.21   HEMOGLOBIN g/dL 10.9*   HEMATOCRIT % 31.6*   PLATELETS 10*3/mm3 211     Results from last 7 days   Lab Units 03/24/23  1429 03/24/23  1019   SODIUM mmol/L  --  143   POTASSIUM mmol/L 4.2 5.4*   CHLORIDE mmol/L  --  115*   CO2 mmol/L  --  19.0*   BUN mg/dL  --  20   CREATININE mg/dL  --  1.08   CALCIUM mg/dL  --  8.3*   GLUCOSE mg/dL  --  115*                 Cultures:  Blood Culture   Date Value Ref Range Status   03/21/2023 No growth at 5 days  Final   03/21/2023 No growth at 5 days  Final       I have reviewed daily medications and changes in CPOE    Scheduled meds  aspirin, 81 mg, Oral, Daily   Or  aspirin, 300 mg, Rectal, Daily  atorvastatin, 10 mg, Oral, Nightly  docusate sodium, 100 mg, Oral, BID  levothyroxine, 25 mcg, Oral, Q AM  Scopolamine, 1 patch, Transdermal, Q72H  sodium chloride, 10 mL, Intravenous, Q12H  cyanocobalamin, 1,000 mcg, Oral, Daily           PRN meds  •  acetaminophen **OR** acetaminophen **OR** acetaminophen  •  acetaminophen  •  Glycerin-Hypromellose-  •  glycopyrrolate **OR** glycopyrrolate  •  HYDROmorphone **OR** Morphine **OR** morphine **OR** ketorolac  •  LORazepam **OR** LORazepam **OR** LORazepam  •  LORazepam **OR** LORazepam **OR**  LORazepam  •  LORazepam **OR** LORazepam **OR** LORazepam  •  melatonin  •  midodrine  •  OLANZapine  •  ondansetron **OR** ondansetron  •  ondansetron **OR** ondansetron  •  sodium chloride  •  sodium chloride        Seizure-like activity (HCC)    Coronary artery disease    Oropharyngeal dysphagia    Essential hypertension    Acute kidney injury (HCC)    Anemia    Mild dementia    Hypothyroidism    Pneumonia    Stage 3a chronic kidney disease (HCC)        Assessment/Plan:    Palliative care  -Robinul, morphine, Dilaudid, Ativan        Nawaf Mercedes MD  03/28/23  18:23 EDT

## 2023-03-29 NOTE — CONSULTS
Visit with family immediately after death. Family is tearful and also grateful that he is no longer hurting.

## 2023-03-29 NOTE — PLAN OF CARE
Goal Outcome Evaluation:  Plan of Care Reviewed With: patient, daughter   Patient is unresponsive and barely breathing. Family have refused turns this shift. Daughter remains at the bedside. Will monitor.

## 2023-03-30 NOTE — PROGRESS NOTES
Case Management Discharge Note      Final Note: The patient  on 3/29/23 @ 14:35. CARLENE Moy RN CCP.         Selected Continued Care - Discharged on 3/29/2023 Admission date: 3/21/2023 - Discharge disposition:     Destination    No services have been selected for the patient.              Durable Medical Equipment    No services have been selected for the patient.              Dialysis/Infusion    No services have been selected for the patient.              Home Medical Care    No services have been selected for the patient.              Therapy    No services have been selected for the patient.              Community Resources    No services have been selected for the patient.              Community & DME    No services have been selected for the patient.                Selected Continued Care - Prior Encounters Includes continued care and service providers with selected services from prior encounters from 2022 to 3/29/2023    Discharged on 2023 Admission date: 2023 - Discharge disposition: Home-Health Care Svc    Home Medical Care     Service Provider Selected Services Address Phone Fax Patient Preferred    [facility] [address] [address] [phone] --                         Final Discharge Disposition Code: 20 -

## 2023-04-01 NOTE — DISCHARGE SUMMARY
[facility]  [phone]    DISCHARGE SUMMARY  [facility]    Patient Identification:  Name: Fritz Flores  Age: 89 y.o.  Sex: male  :  1933  MRN: [id number]  Primary Care Physician: Rachid العلي Jr., MD    Admit date: 3/21/2023  Discharge date and time: 3/29/2023     Discharge Diagnoses:  Seizure-like activity    Coronary artery disease    Oropharyngeal dysphagia    Essential hypertension    Acute kidney injury    Anemia    Mild dementia    Hypothyroidism    Pneumonia    Stage 3a chronic kidney disease       History of present illness from H&P:  89 year old gentleman who presented to the ED from home after he had an episode of staring off into space for 20 minutes; he was confused and unable to speak and had a facial droop; he was not able to follow commands when he arrived; neurology was called and thought the events were more consistent with seizure like activity; he has mild cognitive impairment; he was recently seen by his pcp for an upper respiratory infection.    Hospital Course:     The patient was admitted for pneumonia and was found to have severe dysphagia, as outlined by Grace Murray:  VFSS completed yesterday demonstrating severe dysphagia, swallow impacted by large cervical osteophyte. Not safe for PO w/recommendation for alternative means of nutrition. After talking with family, sounds like he has had signs of swallowing issues for a while. Discussed with POA via telephone yesterday who agreed with Palliative care consult. Palliative has met with family who agrees with transfer to     He had also developed encephalopathy and as noted above the patient was transferred to the palliative care unit and kept comfortable.            Consults:   Consults     Date and Time Order Name Status Description    3/21/2023  4:08 PM Inpatient Neurology Consult Stroke Completed     3/21/2023  4:08 PM Inpatient Neurology Consult Stroke Completed                        Significant Diagnostic Studies: No results found for: WBC, HGB, HCT, PLT  No results found for: NA, K, CL, CO2, BUN, CREATININE, GLUCOSE  No results found for: CALCIUM, MG, PHOS  No results found for: AST, ALT, ALKPHOS  No results found for: APTT, INR  No results found for: COLORU, CLARITYU, SPECGRAV, PHUR, PROTEINUR, GLUCOSEU, KETONESU, BLOODU, NITRITE, LEUKOCYTESUR, BILIRUBINUR, UROBILINOGEN, RBCUA, WBCUA, BACTERIA, UACOMMENT  No results found for: TROPONINT, TROPONINI, BNP  No components found for: HGBA1C;2  No components found for: TSH;2    Imaging Results (All)     Procedure Component Value Units Date/Time    CT Head Without Contrast [[id number]] Collected: 03/24/23 1201     Updated: 03/24/23 1642    Narrative:      CT HEAD WITHOUT CONTRAST     HISTORY: Difficulty speaking, evaluate for stroke.     COMPARISON: CT head 03/21/2023.     FINDINGS: The study is hampered somewhat by patient motion. There is  age-appropriate atrophy. There is no evidence of hemorrhage, midline  shift or of a focal area of decreased attenuation to suggest acute  infarction.     Moderate to severe vascular calcification is noted.       Impression:      The study is hampered by patient motion and beam hardening  artifact limiting evaluation of the posterior fossa. There is no  evidence of acute infarction or hemorrhage. Further evaluation could be  performed with a MRI examination of brain as indicated. The above  findings and recommendations were called to the patient's nurse at the  time of the dictation. The patient's nurse is to immediately relay the  information to the clinical service.           Radiation dose reduction techniques were utilized, including automated  exposure control and exposure modulation based on body size.     This report was finalized on 3/24/2023 4:39 PM by Dr. Nawaf Marrufo M.D.       FL Video Swallow With Speech Single Contrast [[id number]] Collected: 03/24/23 1024     Updated: 03/24/23  1028    Narrative:      VIDEO SWALLOWING EXAMINATION BY SPEECH PATHOLOGY     Clinical: Dysphasia     Video swallowing examination performed under the direction of speech  pathology. Imaging reviewed by radiologist who concurs with the  findings.     Speech pathology summary:VFSS completed with Dr. Johnson. Pt exhibited  severe oropharyngeal dysphagia characterized by reduced hyolaryngeal  excursion, mistiming/delayed swallow, absent epiglottic deflection  impacted by a large osteophyte, and reduced cricopharyngeal opening. Pt  given a teaspoon trial of honey thick liquid with spillage to the  pyriforms noted, delayed swallow initiation, and aspiration during the  swallow and after from residuals. Severe diffuse pharyngeal residuals  were noted with pt unable to clear in spite of 4 spontaneous swallows.  Pt began significant coughing with wet gurgly vocal quality and  aspiration of secretions/residuals noted. Pt expelled barium and  secretions.     FLUOROSCOPY TIME: 45 seconds, 1160 images.     This report was finalized on 3/24/2023 10:25 AM by Dr. Raul Hammond M.D.       CT Angiogram Head w AI Analysis of LVO [[id number]] Collected: 03/21/23 1701     Updated: 03/23/23 1050    Narrative:      CT ANGIOGRAM HEAD AND NECK     HISTORY: New onset of altered mental status.     TECHNIQUE: Precontrast head CT was performed, I discussed the findings  with the stroke team physician on call at around 4:20 PM who asked that  we proceed with CT angiogram head and neck. That was performed and I  subsequently had a follow-up call with the stroke team physician at 4:34  PM. 90 mL of Isovue-370 IV contrast was used for CT angiogram head and  neck with postcontrast CT images also acquired. Correlation with CT  01/12/2023. Multiplanar and 3-dimensional reformatted images were  produced at a separate workstation. AI analysis of LVO was utilized.     Radiation dose reduction techniques were utilized, including automated  exposure  control and exposure modulation based on body size.     FINDINGS: Pre and post IV contrast head CT images show loss of cerebral  parenchymal volume similar to that observed previously and as is typical  for the age group. There is no hemorrhage, abnormal contrast  enhancement, or acute ischemic change. Extra-axial spaces appear normal.  Paranasal sinuses are clear.     The visualized thoracic aorta appears normal. Brachiocephalic and  subclavian arteries are patent. There is minimal atheromatous plaque at  the carotid bulbs with less than 50% right proximal ICA stenosis and  less than 20% luminal stenosis at the left proximal ICA using NASCET  criteria. The vertebral arteries are symmetrical and patent throughout  their length with both terminating at the normal-caliber and widely  patent basilar artery.     There is calcification along the carotid siphons. Anterior, middle, and  posterior cerebral arteries and branches are patent. No aneurysm or  vascular cut-off identified. Visualized intracranial venous sinuses  enhance as expected.     There is degenerative change in the spine.       Impression:      Chronic changes as described. No acute abnormality  identified.     This report was finalized on 3/21/2023 5:13 PM by Dr. Richy Anderson M.D.       CT Angiogram Neck [[id number]] Collected: 03/21/23 1701     Updated: 03/23/23 1050    Narrative:      CT ANGIOGRAM HEAD AND NECK     HISTORY: New onset of altered mental status.     TECHNIQUE: Precontrast head CT was performed, I discussed the findings  with the stroke team physician on call at around 4:20 PM who asked that  we proceed with CT angiogram head and neck. That was performed and I  subsequently had a follow-up call with the stroke team physician at 4:34  PM. 90 mL of Isovue-370 IV contrast was used for CT angiogram head and  neck with postcontrast CT images also acquired. Correlation with CT  01/12/2023. Multiplanar and 3-dimensional reformatted images  were  produced at a separate workstation. AI analysis of LVO was utilized.     Radiation dose reduction techniques were utilized, including automated  exposure control and exposure modulation based on body size.     FINDINGS: Pre and post IV contrast head CT images show loss of cerebral  parenchymal volume similar to that observed previously and as is typical  for the age group. There is no hemorrhage, abnormal contrast  enhancement, or acute ischemic change. Extra-axial spaces appear normal.  Paranasal sinuses are clear.     The visualized thoracic aorta appears normal. Brachiocephalic and  subclavian arteries are patent. There is minimal atheromatous plaque at  the carotid bulbs with less than 50% right proximal ICA stenosis and  less than 20% luminal stenosis at the left proximal ICA using NASCET  criteria. The vertebral arteries are symmetrical and patent throughout  their length with both terminating at the normal-caliber and widely  patent basilar artery.     There is calcification along the carotid siphons. Anterior, middle, and  posterior cerebral arteries and branches are patent. No aneurysm or  vascular cut-off identified. Visualized intracranial venous sinuses  enhance as expected.     There is degenerative change in the spine.       Impression:      Chronic changes as described. No acute abnormality  identified.     This report was finalized on 3/21/2023 5:13 PM by Dr. Richy Anderson M.D.       XR Chest 1 View [[id number]] Collected: 03/21/23 1702     Updated: 03/21/23 1716    Narrative:      PORTABLE CHEST X-RAY     HISTORY: Acute stroke protocol.     TECHNIQUE: Portable chest x-ray is correlated with chest x-ray  03/08/2023.     FINDINGS: Sternal wires with cardiomegaly and normal pulmonary vascular  volume. Asymmetric density at the left retrocardiac lung base appears  new and suspicious for pneumonia. The lungs are otherwise clear. Bones  are demineralized.       Impression:      New area of density  in the retrocardiac left lung base may  represent atelectasis, aspiration pneumonitis or pneumonia.     This report was finalized on 3/21/2023 5:13 PM by Dr. Richy Anderson M.D.         No results found for: SITE, ALLENTEST, PHART, YKH0BKR, PO2ART, MER5OOF, BASEEXCESS, Y0OOFNWH, HGBBG, HCTABG, OXYHEMOGLOBI, METHHGBN, CARBOXYHGB, CO2CT, BAROMETRIC, MODALITY, FIO2            Signed:  Nawaf Mercedes MD  4/1/2023  10:22 EDT

## 2024-02-07 NOTE — PROGRESS NOTES
Patient: Fritz Flores  YOB: 1933     Date of Admission: 6/30/2019  2:53 AM Medical Record Number: [id number]     Attending Physician: Richy Monterroso MD    Status Post:  Procedure(s):  FEMUR INTRAMEDULLARY NAILING/RODDINGPost Operative Day Number: 2    Subjective : No new orthopaedic complaints     Pain Relief: some relief with present medication.     Systemic Complaints: No Complaints  Vitals:    07/01/19 1948 07/01/19 2255 07/02/19 0810 07/02/19 1401   BP: 147/89 161/60 137/68 139/62   BP Location:   Right arm Right arm   Patient Position:   Lying Lying   Pulse: 77 77 71 75   Resp: 18 18 18 18   Temp: 97.9 °F (36.6 °C)  98 °F (36.7 °C) 98.2 °F (36.8 °C)   TempSrc: Oral  Oral Oral   SpO2:   98% 96%   Weight:       Height:           Physical Exam: 85 y.o. male    General Appearance:       Alert, cooperative, in no acute distress                  Extremities:    Dressing Clean, Dry and Intact         Incision healthy without signs or symptoms of infections         No clinical sign of DVT        Able to do good movements of digits    Pulses:   Pulses palpable and equal bilaterally           Diagnostic Tests:     Results from last 7 days   Lab Units 07/02/19  0720 07/01/19 0751 06/30/19  0329   WBC 10*3/mm3 10.11 7.06 7.45   HEMOGLOBIN g/dL 9.0* 9.0* 11.9*   HEMATOCRIT % 27.3* 26.9* 34.6*   PLATELETS 10*3/mm3 249 218 202     Results from last 7 days   Lab Units 07/02/19  0720 07/01/19 0751 06/30/19  0329   SODIUM mmol/L 131* 132* 133*   POTASSIUM mmol/L 4.1 4.6 4.1   CHLORIDE mmol/L 98 100 101   CO2 mmol/L 24.4 23.5 21.4*   BUN mg/dL 21 18 24*   CREATININE mg/dL 0.89 0.78 1.04   GLUCOSE mg/dL 103* 126* 132*   CALCIUM mg/dL 8.7 8.2* 8.7     Results from last 7 days   Lab Units 06/30/19  0329   INR  1.16*   APTT seconds 28.6     No results found for: CRP  No results found for: SEDRATE  No results found for: URICACID  No results found for: CRYSTAL  Microbiology Results (last 10 days)     ** No  Lmom for pt to call back and schedule awv and follow up   results found for the last 240 hours. **        Xr Chest 1 View    Result Date: 6/30/2019   No active disease by portable imaging.  This report was finalized on 6/30/2019 4:20 AM by Chino Grey M.D.      Xr Hip With Or Without Pelvis 2 - 3 View Left    Result Date: 6/30/2019  FINDINGS AND IMPRESSION: There is mild distention of multiple small bowel loops. While findings are favored to represent postoperative ileus, obstruction could appear similar and correlation with patient history as well as continued attention on follow-up with serial abdominal exams is recommended. This may be further evaluated with CT of the abdomen and pelvis versus small bowel follow-through if clinically indicated.  Post surgical changes from recent open reduction and internal fixation of a left proximal femur fracture with placement of an intramedullary tevin and dynamic hip screw are present. The hardware is intact. Alignment of the femoral neck is near-anatomic. There continues to be displacement of the lesser trochanter medially.  There is an intramedullary tevin and dynamic hip screw within the right femur. The distal locking screw is fractured, new since 07/08/2008. No displaced right hip fracture is visualized.  This report was finalized on 6/30/2019 5:18 PM by Dr. Oscar Silva M.D.      Xr Hip With Or Without Pelvis 2 - 3 View Left    Result Date: 6/30/2019  1. Proximal left femur fracture without dislocation.  This report was finalized on 6/30/2019 4:21 AM by Chino Grey M.D.              Current Medications:  Scheduled Meds:  aspirin 81 mg Oral Daily   atorvastatin 10 mg Oral Daily   enoxaparin 40 mg Subcutaneous Daily   famotidine 20 mg Oral BID AC   metoprolol tartrate 12.5 mg Oral Q12H   polyethylene glycol 17 g Oral Daily   sennosides-docusate sodium 2 tablet Oral Nightly   sodium chloride 3 mL Intravenous Q12H     Continuous Infusions:   PRN Meds:.•  acetaminophen  •  bisacodyl  •  docusate sodium  •   HYDROcodone-acetaminophen  •  Morphine  •  ondansetron  •  [COMPLETED] Insert peripheral IV **AND** sodium chloride  •  sodium chloride    Assessment: Status post  Procedure(s):  FEMUR INTRAMEDULLARY NAILING/RODDING    Patient Active Problem List   Diagnosis   • Closed intertrochanteric fracture of hip, left, initial encounter (CMS/[facility])   • Closed comminuted intertrochanteric fracture of proximal end of left femur (CMS/[facility])   • Ulcerative colitis (CMS/[facility])   • Coronary artery disease   • Hyponatremia   • Metabolic encephalopathy   • Postoperative anemia due to acute blood loss       PLAN:   Continues current post-op course  Anticoagulation: Lovenox started  Dressing Change prn  Mobilize with PT as tolerated per protocol    Weight Bearing: WBAT  Discharge Plan: OK to plan for discharge  from orthopadic perspective.      Jaqueline Wagoner MD    Date: 7/2/2019    Time: 4:04 PM

## (undated) DEVICE — TOWEL,OR,DSP,ST,BLUE,STD,4/PK,20PK/CS: Brand: MEDLINE

## (undated) DEVICE — OPTIFOAM GENTLE SA, POSTOP, 4X8: Brand: MEDLINE

## (undated) DEVICE — 1000 S-DRAPE TOWEL DRAPE 10/BX: Brand: STERI-DRAPE™

## (undated) DEVICE — MAT FLR ABSORBENT LG 4FT 10 2.5FT

## (undated) DEVICE — INTENDED FOR TISSUE SEPARATION, AND OTHER PROCEDURES THAT REQUIRE A SHARP SURGICAL BLADE TO PUNCTURE OR CUT.: Brand: BARD-PARKER ® CARBON RIB-BACK BLADES

## (undated) DEVICE — PK HIP PINNING 40

## (undated) DEVICE — SUT VIC 0 CT1 36IN J946H

## (undated) DEVICE — DRILL, AO, STERILE

## (undated) DEVICE — GLV SURG PREMIERPRO ORTHO LTX PF SZ8 BRN

## (undated) DEVICE — SOL ISO/ALC RUB 70PCT 4OZ

## (undated) DEVICE — DRAPE,REIN 53X77,STERILE: Brand: MEDLINE

## (undated) DEVICE — SKIN PREP TRAY W/CHG: Brand: MEDLINE INDUSTRIES, INC.

## (undated) DEVICE — GLV SURG TRIUMPH CLASSIC PF LTX 8 STRL

## (undated) DEVICE — GOWN,PREVENTION PLUS,XLONG/XLARGE,STRL: Brand: MEDLINE

## (undated) DEVICE — STPLR SKIN VISISTAT WD 35CT

## (undated) DEVICE — GLV SURG BIOGEL LTX PF 8

## (undated) DEVICE — SUT VIC 3/0 CTI 36IN J944H

## (undated) DEVICE — APPL CHLORAPREP W/TINT 26ML ORNG

## (undated) DEVICE — GLV SURG TRIUMPH ORTHO W/ALOE PF LTX 8 STRL

## (undated) DEVICE — DRSNG WND SIL OPTIFOAM GENTLE BRDR ADHS W/SA 4X4IN

## (undated) DEVICE — GUIDE WIRE, BALL-TIPPED, STERILE

## (undated) DEVICE — K-WIRE
Type: IMPLANTABLE DEVICE | Site: TROCHANTER | Status: NON-FUNCTIONAL
Removed: 2019-06-30